# Patient Record
Sex: FEMALE | Race: WHITE | Employment: OTHER | URBAN - METROPOLITAN AREA
[De-identification: names, ages, dates, MRNs, and addresses within clinical notes are randomized per-mention and may not be internally consistent; named-entity substitution may affect disease eponyms.]

---

## 2017-01-13 PROBLEM — Z63.79 STRESS DUE TO SPOUSE WITH DEMENTIA: Status: ACTIVE | Noted: 2017-01-13

## 2017-04-13 PROBLEM — N63.20 LEFT BREAST MASS: Status: ACTIVE | Noted: 2017-04-13

## 2017-04-19 ENCOUNTER — HOSPITAL ENCOUNTER (OUTPATIENT)
Dept: MAMMOGRAPHY | Age: 82
Discharge: HOME | End: 2017-04-19
Attending: INTERNAL MEDICINE
Payer: MEDICARE

## 2017-04-19 DIAGNOSIS — N63.20 LEFT BREAST MASS: ICD-10-CM

## 2017-04-19 PROCEDURE — 77066 DX MAMMO INCL CAD BI: CPT

## 2017-07-13 PROBLEM — Z63.79 STRESS DUE TO SPOUSE WITH DEMENTIA: Status: RESOLVED | Noted: 2017-01-13 | Resolved: 2017-07-13

## 2017-10-09 PROBLEM — K62.1 RECTAL POLYP: Status: ACTIVE | Noted: 2017-10-09

## 2018-04-10 PROBLEM — K62.1 RECTAL POLYP: Status: RESOLVED | Noted: 2017-10-09 | Resolved: 2018-04-10

## 2018-04-10 PROBLEM — N63.20 LEFT BREAST MASS: Status: RESOLVED | Noted: 2017-04-13 | Resolved: 2018-04-10

## 2018-04-27 ENCOUNTER — HOSPITAL ENCOUNTER (OUTPATIENT)
Dept: MAMMOGRAPHY | Age: 83
Discharge: HOME | End: 2018-04-27
Attending: INTERNAL MEDICINE
Payer: MEDICARE

## 2018-04-27 DIAGNOSIS — Z12.31 VISIT FOR SCREENING MAMMOGRAM: ICD-10-CM

## 2018-04-27 PROCEDURE — 77067 SCR MAMMO BI INCL CAD: CPT

## 2018-09-25 ENCOUNTER — PATIENT OUTREACH (OUTPATIENT)
Dept: CASE MANAGEMENT | Age: 83
End: 2018-09-25

## 2018-09-25 NOTE — PROGRESS NOTES
Medication Adherence Outreach    Date/Time of Call:  9/25/2018  10:25 am    Name of medication and dosage:   * Atorvastatin 80 mg 1 every day    Does the patient know the purpose and dosage of medication? * Yes    Are you getting a #30 day or #90 day supply of your medication? * 90 day supply    Are you still taking this medication? If not, why? * Yes    Transportation issues or any problems paying for the medication or other reason? * No    What pharmacy are you using to fill your medication and do you know the last time that you got your medication filled? * [facility]   * Last refill 9/12/2018      Are you having any side effects from taking your medication? * No          Any other questions or concerns expressed by the patient. * No    Comments:  * Discussed medication adherence with Ms. Kaylene Jaime and she states she has no current barriers to prevent her from obtaining or taking her medication.           Call Completed By:  [id number] Raad Jain

## 2018-10-22 PROBLEM — Z86.73 HISTORY OF CEREBELLAR STROKE: Status: ACTIVE | Noted: 2018-10-22

## 2019-02-05 ENCOUNTER — HOSPITAL ENCOUNTER (OUTPATIENT)
Dept: GENERAL RADIOLOGY | Age: 84
Discharge: HOME | End: 2019-02-05
Payer: MEDICARE

## 2019-02-05 DIAGNOSIS — M75.52 BURSITIS OF LEFT SHOULDER: ICD-10-CM

## 2019-02-05 PROCEDURE — 73030 X-RAY EXAM OF SHOULDER: CPT

## 2019-02-05 NOTE — PROGRESS NOTES
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 Hopefully treatment in the office today will help.

## 2019-02-06 NOTE — PROGRESS NOTES
This has been fully explained to the patient, who indicates understanding that per Dr. 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 Hopefully treatment in the office today will help. Patient states the treatment yesterday help a lot,but she still have a little pain.

## 2019-03-22 DIAGNOSIS — I67.9 CVD (CEREBROVASCULAR DISEASE): ICD-10-CM

## 2019-05-01 ENCOUNTER — HOSPITAL ENCOUNTER (OUTPATIENT)
Dept: GENERAL RADIOLOGY | Age: 84
Discharge: HOME | End: 2019-05-01
Payer: MEDICARE

## 2019-05-01 DIAGNOSIS — M17.11 PRIMARY OSTEOARTHRITIS OF RIGHT KNEE: ICD-10-CM

## 2019-05-01 PROCEDURE — 73560 X-RAY EXAM OF KNEE 1 OR 2: CPT

## 2019-05-01 NOTE — PROGRESS NOTES
Please call patient, xray of knee does not show any significant arthritis. No fractures.   Santa Zarate MD

## 2019-05-15 ENCOUNTER — HOSPITAL ENCOUNTER (OUTPATIENT)
Dept: MRI IMAGING | Age: 84
Discharge: HOME | End: 2019-05-15
Attending: INTERNAL MEDICINE
Payer: MEDICARE

## 2019-05-15 DIAGNOSIS — M25.561 ACUTE PAIN OF RIGHT KNEE: ICD-10-CM

## 2019-05-15 DIAGNOSIS — M17.11 PRIMARY OSTEOARTHRITIS OF RIGHT KNEE: ICD-10-CM

## 2019-05-15 PROCEDURE — 73721 MRI JNT OF LWR EXTRE W/O DYE: CPT

## 2019-06-22 ENCOUNTER — HOSPITAL ENCOUNTER (OUTPATIENT)
Dept: MAMMOGRAPHY | Age: 84
Discharge: HOME | End: 2019-06-22
Attending: INTERNAL MEDICINE
Payer: MEDICARE

## 2019-06-22 DIAGNOSIS — Z12.31 VISIT FOR SCREENING MAMMOGRAM: ICD-10-CM

## 2019-06-22 PROCEDURE — 77067 SCR MAMMO BI INCL CAD: CPT

## 2019-09-30 ENCOUNTER — HOSPITAL ENCOUNTER (OUTPATIENT)
Dept: ULTRASOUND IMAGING | Age: 84
Discharge: HOME | End: 2019-09-30
Attending: INTERNAL MEDICINE
Payer: MEDICARE

## 2019-09-30 ENCOUNTER — HOSPITAL ENCOUNTER (OUTPATIENT)
Dept: CT IMAGING | Age: 84
Discharge: HOME | End: 2019-09-30
Attending: INTERNAL MEDICINE
Payer: MEDICARE

## 2019-09-30 DIAGNOSIS — I25.708 CORONARY ARTERY DISEASE OF BYPASS GRAFT OF NATIVE HEART WITH STABLE ANGINA PECTORIS (HCC): ICD-10-CM

## 2019-09-30 DIAGNOSIS — I67.9 CVD (CEREBROVASCULAR DISEASE): ICD-10-CM

## 2019-09-30 PROCEDURE — 70450 CT HEAD/BRAIN W/O DYE: CPT

## 2019-09-30 PROCEDURE — 93880 EXTRACRANIAL BILAT STUDY: CPT

## 2020-02-16 ENCOUNTER — APPOINTMENT (OUTPATIENT)
Dept: CT IMAGING | Age: 85
End: 2020-02-16
Attending: FAMILY MEDICINE
Payer: MEDICARE

## 2020-02-16 ENCOUNTER — HOSPITAL ENCOUNTER (OUTPATIENT)
Age: 85
Setting detail: OBSERVATION
Discharge: HOME | End: 2020-02-18
Attending: EMERGENCY MEDICINE | Admitting: INTERNAL MEDICINE
Payer: MEDICARE

## 2020-02-16 ENCOUNTER — APPOINTMENT (OUTPATIENT)
Dept: CT IMAGING | Age: 85
End: 2020-02-16
Attending: EMERGENCY MEDICINE
Payer: MEDICARE

## 2020-02-16 DIAGNOSIS — I16.1 HYPERTENSIVE EMERGENCY: Primary | ICD-10-CM

## 2020-02-16 DIAGNOSIS — I63.429 CEREBROVASCULAR ACCIDENT (CVA) DUE TO EMBOLISM OF ANTERIOR CEREBRAL ARTERY, UNSPECIFIED BLOOD VESSEL LATERALITY (HCC): ICD-10-CM

## 2020-02-16 DIAGNOSIS — R20.0 LEFT SIDED NUMBNESS: ICD-10-CM

## 2020-02-16 PROBLEM — I63.9 STROKE (CEREBRUM) (HCC): Status: ACTIVE | Noted: 2020-02-16

## 2020-02-16 PROBLEM — I16.0 HYPERTENSIVE URGENCY: Status: ACTIVE | Noted: 2020-02-16

## 2020-02-16 LAB
ALBUMIN SERPL-MCNC: 3.7 G/DL (ref 3.2–4.6)
ALBUMIN/GLOB SERPL: 0.9 {RATIO} (ref 1.2–3.5)
ALP SERPL-CCNC: 68 U/L (ref 50–130)
ALT SERPL-CCNC: 22 U/L (ref 12–65)
ANION GAP SERPL CALC-SCNC: 4 MMOL/L (ref 7–16)
AST SERPL-CCNC: 23 U/L (ref 15–37)
BASOPHILS # BLD: 0.1 K/UL (ref 0–0.2)
BASOPHILS NFR BLD: 1 % (ref 0–2)
BILIRUB SERPL-MCNC: 0.4 MG/DL (ref 0.2–1.1)
BUN SERPL-MCNC: 16 MG/DL (ref 8–23)
CALCIUM SERPL-MCNC: 9.9 MG/DL (ref 8.3–10.4)
CHLORIDE SERPL-SCNC: 106 MMOL/L (ref 98–107)
CO2 SERPL-SCNC: 29 MMOL/L (ref 21–32)
CREAT SERPL-MCNC: 1.1 MG/DL (ref 0.6–1)
DIFFERENTIAL METHOD BLD: ABNORMAL
EOSINOPHIL # BLD: 0.4 K/UL (ref 0–0.8)
EOSINOPHIL NFR BLD: 6 % (ref 0.5–7.8)
ERYTHROCYTE [DISTWIDTH] IN BLOOD BY AUTOMATED COUNT: 12.1 % (ref 11.9–14.6)
GLOBULIN SER CALC-MCNC: 3.9 G/DL (ref 2.3–3.5)
GLUCOSE BLD STRIP.AUTO-MCNC: 75 MG/DL (ref 65–100)
GLUCOSE SERPL-MCNC: 76 MG/DL (ref 65–100)
HCT VFR BLD AUTO: 48.1 % (ref 35.8–46.3)
HGB BLD-MCNC: 15.7 G/DL (ref 11.7–15.4)
IMM GRANULOCYTES # BLD AUTO: 0 K/UL (ref 0–0.5)
IMM GRANULOCYTES NFR BLD AUTO: 0 % (ref 0–5)
LYMPHOCYTES # BLD: 2.7 K/UL (ref 0.5–4.6)
LYMPHOCYTES NFR BLD: 37 % (ref 13–44)
MCH RBC QN AUTO: 29.8 PG (ref 26.1–32.9)
MCHC RBC AUTO-ENTMCNC: 32.6 G/DL (ref 31.4–35)
MCV RBC AUTO: 91.3 FL (ref 79.6–97.8)
MONOCYTES # BLD: 0.7 K/UL (ref 0.1–1.3)
MONOCYTES NFR BLD: 9 % (ref 4–12)
NEUTS SEG # BLD: 3.6 K/UL (ref 1.7–8.2)
NEUTS SEG NFR BLD: 48 % (ref 43–78)
NRBC # BLD: 0 K/UL (ref 0–0.2)
PLATELET # BLD AUTO: 233 K/UL (ref 150–450)
PMV BLD AUTO: 11 FL (ref 9.4–12.3)
POTASSIUM SERPL-SCNC: 4.1 MMOL/L (ref 3.5–5.1)
PROT SERPL-MCNC: 7.6 G/DL (ref 6.3–8.2)
RBC # BLD AUTO: 5.27 M/UL (ref 4.05–5.2)
SODIUM SERPL-SCNC: 139 MMOL/L (ref 136–145)
TROPONIN I SERPL-MCNC: <0.02 NG/ML (ref 0.02–0.05)
WBC # BLD AUTO: 7.5 K/UL (ref 4.3–11.1)

## 2020-02-16 PROCEDURE — 0042T CT PERF W CBF: CPT

## 2020-02-16 PROCEDURE — 74011250637 HC RX REV CODE- 250/637: Performed by: INTERNAL MEDICINE

## 2020-02-16 PROCEDURE — 74011250636 HC RX REV CODE- 250/636: Performed by: FAMILY MEDICINE

## 2020-02-16 PROCEDURE — 94762 N-INVAS EAR/PLS OXIMTRY CONT: CPT

## 2020-02-16 PROCEDURE — 85025 COMPLETE CBC W/AUTO DIFF WBC: CPT

## 2020-02-16 PROCEDURE — 65270000029 HC RM PRIVATE

## 2020-02-16 PROCEDURE — 74011000250 HC RX REV CODE- 250: Performed by: EMERGENCY MEDICINE

## 2020-02-16 PROCEDURE — 99218 HC RM OBSERVATION: CPT

## 2020-02-16 PROCEDURE — 96374 THER/PROPH/DIAG INJ IV PUSH: CPT

## 2020-02-16 PROCEDURE — 70496 CT ANGIOGRAPHY HEAD: CPT

## 2020-02-16 PROCEDURE — 74011000258 HC RX REV CODE- 258: Performed by: EMERGENCY MEDICINE

## 2020-02-16 PROCEDURE — 82962 GLUCOSE BLOOD TEST: CPT

## 2020-02-16 PROCEDURE — 80053 COMPREHEN METABOLIC PANEL: CPT

## 2020-02-16 PROCEDURE — 99285 EMERGENCY DEPT VISIT HI MDM: CPT

## 2020-02-16 PROCEDURE — 74011636320 HC RX REV CODE- 636/320: Performed by: EMERGENCY MEDICINE

## 2020-02-16 PROCEDURE — 70450 CT HEAD/BRAIN W/O DYE: CPT

## 2020-02-16 PROCEDURE — 84484 ASSAY OF TROPONIN QUANT: CPT

## 2020-02-16 RX ORDER — VALSARTAN 80 MG/1
80 TABLET ORAL DAILY
Status: DISCONTINUED | OUTPATIENT
Start: 2020-02-17 | End: 2020-02-18 | Stop reason: HOSPADM

## 2020-02-16 RX ORDER — ACETAMINOPHEN 325 MG/1
650 TABLET ORAL
Status: DISCONTINUED | OUTPATIENT
Start: 2020-02-16 | End: 2020-02-18 | Stop reason: HOSPADM

## 2020-02-16 RX ORDER — ISOSORBIDE MONONITRATE 30 MG/1
30 TABLET, EXTENDED RELEASE ORAL
Status: DISCONTINUED | OUTPATIENT
Start: 2020-02-17 | End: 2020-02-18 | Stop reason: HOSPADM

## 2020-02-16 RX ORDER — SODIUM CHLORIDE 9 MG/ML
75 INJECTION, SOLUTION INTRAVENOUS CONTINUOUS
Status: DISCONTINUED | OUTPATIENT
Start: 2020-02-16 | End: 2020-02-17

## 2020-02-16 RX ORDER — CLOPIDOGREL BISULFATE 75 MG/1
75 TABLET ORAL DAILY
Status: DISCONTINUED | OUTPATIENT
Start: 2020-02-17 | End: 2020-02-18 | Stop reason: HOSPADM

## 2020-02-16 RX ORDER — GUAIFENESIN 100 MG/5ML
81 LIQUID (ML) ORAL DAILY
Status: DISCONTINUED | OUTPATIENT
Start: 2020-02-17 | End: 2020-02-18 | Stop reason: HOSPADM

## 2020-02-16 RX ORDER — LABETALOL HYDROCHLORIDE 5 MG/ML
20 INJECTION, SOLUTION INTRAVENOUS
Status: COMPLETED | OUTPATIENT
Start: 2020-02-16 | End: 2020-02-16

## 2020-02-16 RX ORDER — LORATADINE 10 MG/1
10 TABLET ORAL DAILY
Status: DISCONTINUED | OUTPATIENT
Start: 2020-02-17 | End: 2020-02-18 | Stop reason: HOSPADM

## 2020-02-16 RX ORDER — FAMOTIDINE 20 MG/1
20 TABLET, FILM COATED ORAL EVERY 12 HOURS
Status: DISCONTINUED | OUTPATIENT
Start: 2020-02-16 | End: 2020-02-16

## 2020-02-16 RX ORDER — SODIUM CHLORIDE 0.9 % (FLUSH) 0.9 %
10 SYRINGE (ML) INJECTION
Status: COMPLETED | OUTPATIENT
Start: 2020-02-16 | End: 2020-02-16

## 2020-02-16 RX ORDER — ATORVASTATIN CALCIUM 40 MG/1
80 TABLET, FILM COATED ORAL
Status: DISCONTINUED | OUTPATIENT
Start: 2020-02-16 | End: 2020-02-18 | Stop reason: HOSPADM

## 2020-02-16 RX ORDER — SODIUM CHLORIDE 0.9 % (FLUSH) 0.9 %
5-40 SYRINGE (ML) INJECTION AS NEEDED
Status: DISCONTINUED | OUTPATIENT
Start: 2020-02-16 | End: 2020-02-18 | Stop reason: HOSPADM

## 2020-02-16 RX ORDER — MELATONIN
2000 2 TIMES DAILY
Status: DISCONTINUED | OUTPATIENT
Start: 2020-02-16 | End: 2020-02-18 | Stop reason: HOSPADM

## 2020-02-16 RX ORDER — BISACODYL 5 MG
5 TABLET, DELAYED RELEASE (ENTERIC COATED) ORAL DAILY PRN
Status: DISCONTINUED | OUTPATIENT
Start: 2020-02-16 | End: 2020-02-18 | Stop reason: HOSPADM

## 2020-02-16 RX ORDER — ONDANSETRON 2 MG/ML
4 INJECTION INTRAMUSCULAR; INTRAVENOUS
Status: DISCONTINUED | OUTPATIENT
Start: 2020-02-16 | End: 2020-02-18 | Stop reason: HOSPADM

## 2020-02-16 RX ORDER — SODIUM CHLORIDE 0.9 % (FLUSH) 0.9 %
5-40 SYRINGE (ML) INJECTION EVERY 8 HOURS
Status: DISCONTINUED | OUTPATIENT
Start: 2020-02-16 | End: 2020-02-18 | Stop reason: HOSPADM

## 2020-02-16 RX ORDER — FAMOTIDINE 20 MG/1
20 TABLET, FILM COATED ORAL DAILY
Status: DISCONTINUED | OUTPATIENT
Start: 2020-02-17 | End: 2020-02-17

## 2020-02-16 RX ADMIN — MELATONIN 2 TABLET: at 22:53

## 2020-02-16 RX ADMIN — ATORVASTATIN CALCIUM 80 MG: 40 TABLET, FILM COATED ORAL at 22:53

## 2020-02-16 RX ADMIN — SODIUM CHLORIDE 75 ML/HR: 900 INJECTION, SOLUTION INTRAVENOUS at 18:26

## 2020-02-16 RX ADMIN — Medication 5 ML: at 22:56

## 2020-02-16 RX ADMIN — LABETALOL HYDROCHLORIDE 20 MG: 5 INJECTION INTRAVENOUS at 15:28

## 2020-02-16 RX ADMIN — SODIUM CHLORIDE 100 ML: 900 INJECTION, SOLUTION INTRAVENOUS at 16:57

## 2020-02-16 RX ADMIN — IOPAMIDOL 100 ML: 755 INJECTION, SOLUTION INTRAVENOUS at 16:57

## 2020-02-16 RX ADMIN — Medication 10 ML: at 16:57

## 2020-02-16 NOTE — ED PROVIDER NOTES
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  She denies any headaches. Normally blood pressures between 120 and 130. Compliant with her medications. She denies any head injury. Denies any chest pain or shortness of breath. She initially thought it was just because her arm was falling asleep but symptom involved the left side of the face the arm the body as well as some in the legs. No resolution over the past 24 hours. With her previous stroke she has severe lightheadedness and dizziness. This is not the same. ROS  Constitutional: No fever, no chills  Skin: no rash  Eye: No vision changes  ENMT: No sore throat  Respiratory: No shortness of breath, no cough  Cardiovascular: No chest pain, no palpitations  Gastrointestinal: No vomiting, no nausea, no diarrhea, no abdominal pain  : No dysuria  MSK: No back pain, no muscle pain, no joint pain  Neuro: No headache, no change in mental status, + numbness, no tingling, no weakness  Psych:   Endocrine:   All other review of systems positive per history of present illness and the above otherwise negative or noncontributory. Visit Vitals  BP (!) 204/95   Pulse (!) 55   Temp 97.8 °F (36.6 °C)   Resp 17   Ht 5' 7\" (1.702 m)   Wt 61.2 kg (135 lb)   SpO2 96%   BMI 21.14 kg/m²     Past Medical History:   Diagnosis Date    CAD (coronary artery disease)     2 MI    Calculus of kidney     Congestive heart failure (HCC)     CVD (cerebrovascular disease) 8/19/2013    Dyslipidemia 8/19/2013    GERD (gastroesophageal reflux disease)     HTN (hypertension) 8/19/2013    Left breast mass 4/13/2017    Diagnostic Mammogram negative.     Long term current use of anticoagulant therapy     [facility] infarction [facility]) 2005, 2006    [facility], Dr. Jayla Ramos    Osteoporosis 8/19/2013    Positive H. pylori test 8/19/2013    Rectal polyp 10/9/2017    Anoscopy:  Revealed large cauliflower-like polyp at the anterior midline just above the dentate line, mobile soft, to have removed with Dr. Bladimir Obregon ([facility]), benign. No more fecal leakage.  Stroke ([address] 1/7/2009    no deficits expect patient reports going to one side if she gets up too fast    Thromboembolus ([address]     pulmanary     Past Surgical History:   Procedure Laterality Date    CARDIAC SURG PROCEDURE UNLIST      by pass    HX APPENDECTOMY      HX BLADDER SUSPENSION  2005    HX COLONOSCOPY  2012    HX CORONARY ARTERY BYPASS GRAFT  1988    @ [address]    HX GYN      hysterectomy    HX LITHOTRIPSY  2008 x 2    HX ORTHOPAEDIC      2 rotater cuff right shoulder    HX TOTAL ABDOMINAL HYSTERECTOMY  1972    HX UROLOGICAL      suspension     Prior to Admission Medications   Prescriptions Last Dose Informant Patient Reported? Taking? CRANBERRY EXTRACT (CRANBERRY PO)   Yes No   Sig: Take  by mouth two (2) times a day. TURMERIC PO   Yes No   Sig: Take  by mouth. aspirin 81 mg tablet   Yes No   Sig: Take 81 mg by mouth. TAKE AM OF SURGERY WITH SMALL SIP OF WATER   atorvastatin (LIPITOR) 40 mg tablet   No No   Sig: Take 1 Tab by mouth daily. cholecalciferol (D3-2000) 2,000 unit cap capsule   Yes No   Sig: Take 2,000 Units by mouth two (2) times a day. clopidogrel (PLAVIX) 75 mg tab   No No   Sig: Take 1 Tab by mouth daily. irbesartan (AVAPRO) 150 mg tablet   No No   Sig: Take 1 Tab by mouth daily. isosorbide mononitrate ER (IMDUR) 30 mg tablet   No No   Sig: Take 1 Tab by mouth every morning. loratadine (CLARITIN) 10 mg tablet   Yes No   Sig: Take 10 mg by mouth.       Facility-Administered Medications: None         Adult Exam   General: alert, no acute distress  Head: normocephalic, atraumatic  ENT: moist mucous membranes  Neck: supple, non-tender; full range of motion  Cardiovascular: regular rate and rhythm, normal peripheral perfusion, no edema, equal radial pulses  Respiratory:  normal respirations; no wheezing, rales or rhonchi  Gastrointestinal: soft, non-tender; no rebound or guarding, no peritoneal signs, no distension  Back: non-tender, full range of motion  Musculoskeletal: normal range of motion, normal strength, no gross deformities  Neurological: alert and oriented x 4, no gross focal deficits with the exception of decreased sensation in the left face, left chest, left arm, left leg; normal speech. Strength are equal.  No facial droop. No pronator drift. Normal finger-nose-finger exam.  Psychiatric: cooperative; appropriate mood and affect    MDM: Blood pressures are elevated. 290 systolic. Patient denies headache. Outside of code stroke window. She is on anticoagulant Plavix. Not a TPA candidate. CT noncontrast of the head obtained without any acute intracranial hemorrhage, large territory stroke. 20 mg of IV labetalol given. Heart rate improved to 928 systolic. EKG rate of 54 sinus bradycardia. Normal axis and interval.  No STEMI or ischemic changes noted. No ectopy or Brug[place]. No improvement in symptoms with blood pressure improvement. Given multiple risk factor for stroke recommend admission for further evaluation of possible stroke with decreased sensation in left side of body. This may also be secondary to hypertensive urgency. Will speak with hospitalist for admission. No signs of endorgan injury at this time.     Recent Results (from the past 12 hour(s))   GLUCOSE, POC    Collection Time: 02/16/20  3:14 PM   Result Value Ref Range    Glucose (POC) 75 65 - 100 mg/dL   CBC WITH AUTOMATED DIFF    Collection Time: 02/16/20  3:22 PM   Result Value Ref Range    WBC 7.5 4.3 - 11.1 K/uL    RBC 5.27 (H) 4.05 - 5.2 M/uL    HGB 15.7 (H) 11.7 - 15.4 g/dL    HCT 48.1 (H) 35.8 - 46.3 %    MCV 91.3 79.6 - 97.8 FL    MCH 29.8 26.1 - 32.9 PG MCHC 32.6 31.4 - 35.0 g/dL    RDW 12.1 11.9 - 14.6 %    PLATELET 457 667 - 089 K/uL    MPV 11.0 9.4 - 12.3 FL    ABSOLUTE NRBC 0.00 0.0 - 0.2 K/uL    DF AUTOMATED      NEUTROPHILS 48 43 - 78 %    LYMPHOCYTES 37 13 - 44 %    MONOCYTES 9 4.0 - 12.0 %    EOSINOPHILS 6 0.5 - 7.8 %    BASOPHILS 1 0.0 - 2.0 %    IMMATURE GRANULOCYTES 0 0.0 - 5.0 %    ABS. NEUTROPHILS 3.6 1.7 - 8.2 K/UL    ABS. LYMPHOCYTES 2.7 0.5 - 4.6 K/UL    ABS. MONOCYTES 0.7 0.1 - 1.3 K/UL    ABS. EOSINOPHILS 0.4 0.0 - 0.8 K/UL    ABS. BASOPHILS 0.1 0.0 - 0.2 K/UL    ABS. IMM. GRANS. 0.0 0.0 - 0.5 K/UL   METABOLIC PANEL, COMPREHENSIVE    Collection Time: 02/16/20  3:22 PM   Result Value Ref Range    Sodium 139 136 - 145 mmol/L    Potassium 4.1 3.5 - 5.1 mmol/L    Chloride 106 98 - 107 mmol/L    CO2 29 21 - 32 mmol/L    Anion gap 4 (L) 7 - 16 mmol/L    Glucose 76 65 - 100 mg/dL    BUN 16 8 - 23 MG/DL    Creatinine 1.10 (H) 0.6 - 1.0 MG/DL    GFR est AA >60 >60 ml/min/1.73m2    GFR est non-AA 50 (L) >60 ml/min/1.73m2    Calcium 9.9 8.3 - 10.4 MG/DL    Bilirubin, total 0.4 0.2 - 1.1 MG/DL    ALT (SGPT) 22 12 - 65 U/L    AST (SGOT) 23 15 - 37 U/L    Alk. phosphatase 68 50 - 130 U/L    Protein, total 7.6 6.3 - 8.2 g/dL    Albumin 3.7 3.2 - 4.6 g/dL    Globulin 3.9 (H) 2.3 - 3.5 g/dL    A-G Ratio 0.9 (L) 1.2 - 3.5                Ct Head Wo Cont    Result Date: 2/16/2020  Noncontrast head CT Clinical Indication: 5 days of persisting left upper extremity paresthesias and weakness with history of stroke, currently taking blood thinning medication. Technique: Noncontrast axial images were obtained through the brain. All CT scans at this location are performed using dose modulation techniques as appropriate including the following: Automated exposure control, adjustment of the MA and/or kV according to patient's size, or use of iterative reconstruction technique. Comparison: CT 9/30/2019 and MRI 5/30/2014 Findings:  There is no acute intracranial hemorrhage, hydrocephalus, intra-axial mass, or mass-effect. Again noted is the old left cerebellar infarction. Again demonstrated are moderate scattered and confluent areas of low attenuation involving bilateral white matter, and old tiny lacunar infarcts in the bilateral basal ganglia. Possibility of acute superimposed or developing small infarct cannot be excluded by CT, if clinically suspected. There is no CT evidence of acute large artery territorial infarction or abnormal extra-axial fluid collection. The mastoid air cells and paranasal sinuses are clear where imaged. No displaced skull fractures are present. Impression: No CT evidence of acute intracranial abnormality. Chronic changes. Dragon voice recognition software was used to create this note. Although the note has been reviewed and corrected where necessary, additional errors may have been overlooked and remain in the text.

## 2020-02-16 NOTE — ED TRIAGE NOTES
Pt has numbness in her left arm since Wednesday. No other deficits, but previous hx of stroke and bp is 212/96.

## 2020-02-16 NOTE — ED NOTES
TRANSFER - OUT REPORT:    Verbal report given to RN Nivia Favre on Varney Merlin  being transferred to [facility] [phone] for routine progression of care       Report consisted of patients Situation, Background, Assessment and   Recommendations(SBAR). Information from the following report(s) SBAR, ED Summary, Intake/Output, MAR and Cardiac Rhythm NSR was reviewed with the receiving nurse. Lines:   Peripheral IV 02/16/20 Right Forearm (Active)   Site Assessment Clean, dry, & intact 2/16/2020  3:42 PM   Phlebitis Assessment 0 2/16/2020  3:42 PM   Infiltration Assessment 0 2/16/2020  3:42 PM   Dressing Status Clean, dry, & intact 2/16/2020  3:42 PM   Hub Color/Line Status Green 2/16/2020  3:42 PM   Alcohol Cap Used No 2/16/2020  3:42 PM        Opportunity for questions and clarification was provided.       Patient transported with:   Registered Nurse

## 2020-02-16 NOTE — H&P
History and Physical    Patient: Michael Hunter MRN: [id number]  SSN: [id number]    YOB: 1931  Age: 80 y.o. Sex: female      Subjective:      Michael Hunter is a 80 y.o. female who came to ER due to left side weakness and numbness for the past 4 days. Patient has hypertension, CAD, previous cerebellar stroke, on aspirin and Plavix. At baseline, she has been walking well. She drives. She lives alone and is independent. Four days prior to admission, she had headache and felt weak and numb on left hand, arm and leg. It is persistent and she came to ER. No lightheadedness. No headache now. No nausea. No vomiting. No fever. No shaking. No chills. No chest pain. No shortness of breath. CT brain did not show acute abnormality. Her BP was elevated. She received Labetalol in ER and her BP was improved. Hospitalist service is requested to admit the patient due to stroke. Past Medical History:   Diagnosis Date    CAD (coronary artery disease)     2 MI    Calculus of kidney     Congestive heart failure (HCC)     CVD (cerebrovascular disease) 8/19/2013    Dyslipidemia 8/19/2013    GERD (gastroesophageal reflux disease)     HTN (hypertension) 8/19/2013    Left breast mass 4/13/2017    Diagnostic Mammogram negative.  Long term current use of anticoagulant therapy     Myocardial infarction [facility]) 2005, 2006    [facility], Dr. Jayla Ramos    Osteoporosis 8/19/2013    Positive H. pylori test 8/19/2013    Rectal polyp 10/9/2017    Anoscopy:  Revealed large cauliflower-like polyp at the anterior midline just above the dentate line, mobile soft, to have removed with Dr. Bladimir Obregon ([facility]), benign. No more fecal leakage.        Stroke ([address] 1/7/2009    no deficits expect patient reports going to one side if she gets up too fast    Thromboembolus [facility])     pulmanary     Past Surgical History:   Procedure Laterality Date    CARDIAC SURG PROCEDURE UNLIST      by pass    HX APPENDECTOMY      HX BLADDER SUSPENSION  2005    HX COLONOSCOPY  2012    [place]ARY [place] [facility]    HX GYN      hysterectomy    HX LITHOTRIPSY  2008 x 2    HX ORTHOPAEDIC      2 rotater cuff right shoulder    HX TOTAL ABDOMINAL HYSTERECTOMY  1972    HX UROLOGICAL      suspension      Family History   Problem Relation Age of Onset    Heart Disease Mother     Stroke Mother     Heart Disease Father     Heart Disease Brother     Cancer Brother         prostate    Heart Disease Brother     Heart Disease Brother     Heart Disease Brother     Malignant Hyperthermia Neg Hx     Pseudocholinesterase Deficiency Neg Hx     Delayed Awakening Neg Hx     Post-op Nausea/Vomiting Neg Hx     Emergence Delirium Neg Hx     Post-op Cognitive Dysfunction Neg Hx     Other Neg Hx      Social History     Tobacco Use    Smoking status: Never Smoker    Smokeless tobacco: Never Used   Substance Use Topics    Alcohol use: No      Prior to Admission medications    Medication Sig Start Date End Date Taking? Authorizing Provider   TURMERIC PO Take  by mouth. Provider, Historical   irbesartan (AVAPRO) 150 mg tablet Take 1 Tab by mouth daily. 1/21/20   Margaree Gosselin, MD   atorvastatin (LIPITOR) 40 mg tablet Take 1 Tab by mouth daily. 1/21/20   Margaree Gosselin, MD   isosorbide mononitrate ER (IMDUR) 30 mg tablet Take 1 Tab by mouth every morning. 9/23/19   Margaree Gosselin, MD   clopidogrel (PLAVIX) 75 mg tab Take 1 Tab by mouth daily. 9/23/19   Margaree Gosselin, MD   cholecalciferol (D3-2000) 2,000 unit cap capsule Take 2,000 Units by mouth two (2) times a day. Provider, Historical   loratadine (CLARITIN) 10 mg tablet Take 10 mg by mouth. Provider, Historical   CRANBERRY EXTRACT (CRANBERRY PO) Take  by mouth two (2) times a day. Provider, Historical   aspirin 81 mg tablet Take 81 mg by mouth.  TAKE AM OF SURGERY WITH SMALL SIP OF WATER    Janet Wright MD        Allergies   Allergen Reactions    Accupril [Quinapril] Unknown (comments)    Norvasc [Amlodipine] Unknown (comments)    Zoloft [Sertraline] Drowsiness       Review of Systems:    10-point review of systems is negative except what is mentioned in the present illness section. Objective:     Vitals:    02/16/20 1455 02/16/20 1516 02/16/20 1522 02/16/20 1532   BP: (!) 212/96 (!) 223/94 (!) 204/95    Pulse: 74  (!) 55    Resp: 17  17    Temp: 97.8 °F (36.6 °C)      SpO2: 98% 98% 98% 96%   Weight: 61.2 kg (135 lb)      Height: 5' 7\" (1.702 m)           Physical Exam:    General:                    The patient is a pleasant elderly female in no acute distress. She is lying flat in bed. Head:                                   Normocephalic/atraumatic. Eyes:                                   No palpebral pallor or scleral icterus. ENT:                                    External auricular and nasal exam within normal limits. Mucous membranes are moist.  Neck:                                   Supple, non-tender, no JVD. Lungs:                       Clear to auscultation bilaterally without wheezes or crackles. No respiratory distress or accessory muscle use. Heart:                                  Regular rate and rhythm, without murmurs, rubs, or gallops. Abdomen:                  Soft, non-tender, non-distended with normoactive bowel sounds. Genitourinary:           No tenderness over the bladder or bilateral CVAs. Extremities:               Without clubbing, cyanosis, or edema. Skin:                                    Normal color, texture, and turgor. No rashes, lesions, or jaundice. Pulses:                      Radial and dorsalis pedis pulses present 2+ bilaterally. Capillary refill <2s.    Neurologic:                CN II-XII grossly intact and symmetrical. motor power of left arm and leg is 4-/5. some numbnes sof left arm and leg. Psychiatric:                Pleasant demeanor, appropriate affect. Alert and oriented x 3    Lab and data     Recent Results (from the past 24 hour(s))   GLUCOSE, POC    Collection Time: 02/16/20  3:14 PM   Result Value Ref Range    Glucose (POC) 75 65 - 100 mg/dL   CBC WITH AUTOMATED DIFF    Collection Time: 02/16/20  3:22 PM   Result Value Ref Range    WBC 7.5 4.3 - 11.1 K/uL    RBC 5.27 (H) 4.05 - 5.2 M/uL    HGB 15.7 (H) 11.7 - 15.4 g/dL    HCT 48.1 (H) 35.8 - 46.3 %    MCV 91.3 79.6 - 97.8 FL    MCH 29.8 26.1 - 32.9 PG    MCHC 32.6 31.4 - 35.0 g/dL    RDW 12.1 11.9 - 14.6 %    PLATELET 847 366 - 566 K/uL    MPV 11.0 9.4 - 12.3 FL    ABSOLUTE NRBC 0.00 0.0 - 0.2 K/uL    DF AUTOMATED      NEUTROPHILS 48 43 - 78 %    LYMPHOCYTES 37 13 - 44 %    MONOCYTES 9 4.0 - 12.0 %    EOSINOPHILS 6 0.5 - 7.8 %    BASOPHILS 1 0.0 - 2.0 %    IMMATURE GRANULOCYTES 0 0.0 - 5.0 %    ABS. NEUTROPHILS 3.6 1.7 - 8.2 K/UL    ABS. LYMPHOCYTES 2.7 0.5 - 4.6 K/UL    ABS. MONOCYTES 0.7 0.1 - 1.3 K/UL    ABS. EOSINOPHILS 0.4 0.0 - 0.8 K/UL    ABS. BASOPHILS 0.1 0.0 - 0.2 K/UL    ABS. IMM. GRANS. 0.0 0.0 - 0.5 K/UL   METABOLIC PANEL, COMPREHENSIVE    Collection Time: 02/16/20  3:22 PM   Result Value Ref Range    Sodium 139 136 - 145 mmol/L    Potassium 4.1 3.5 - 5.1 mmol/L    Chloride 106 98 - 107 mmol/L    CO2 29 21 - 32 mmol/L    Anion gap 4 (L) 7 - 16 mmol/L    Glucose 76 65 - 100 mg/dL    BUN 16 8 - 23 MG/DL    Creatinine 1.10 (H) 0.6 - 1.0 MG/DL    GFR est AA >60 >60 ml/min/1.73m2    GFR est non-AA 50 (L) >60 ml/min/1.73m2    Calcium 9.9 8.3 - 10.4 MG/DL    Bilirubin, total 0.4 0.2 - 1.1 MG/DL    ALT (SGPT) 22 12 - 65 U/L    AST (SGOT) 23 15 - 37 U/L    Alk.  phosphatase 68 50 - 130 U/L    Protein, total 7.6 6.3 - 8.2 g/dL    Albumin 3.7 3.2 - 4.6 g/dL    Globulin 3.9 (H) 2.3 - 3.5 g/dL    A-G Ratio 0.9 (L) 1.2 - 3.5     TROPONIN I Collection Time: 02/16/20  3:22 PM   Result Value Ref Range    Troponin-I, Qt. <0.02 (L) 0.02 - 0.05 NG/ML     CT brain   2-  Impression: No CT evidence of acute intracranial abnormality. Assessment:     Hospital Problems  Date Reviewed: 1/21/2020          Codes Class Noted POA    * (Principal) Stroke (cerebrum) ([facility] [address] ICD-10-CM: I63.9  ICD-9-CM: 434.91  2/16/2020 Unknown        Hypertensive urgency ICD-10-CM: I16.0  ICD-9-CM: 401.9  2/16/2020 Unknown        History of cerebellar stroke ICD-10-CM: Z86.73  ICD-9-CM: V12.54  10/22/2018 Yes        Coronary artery disease involving coronary bypass graft of native heart without angina pectoris ICD-10-CM: I25.810  ICD-9-CM: 414.05  12/14/2015 Yes        HTN (hypertension) ICD-10-CM: I10  ICD-9-CM: 401.9  8/19/2013 Yes        Mixed dyslipidemia ICD-10-CM: E78.2  ICD-9-CM: 272.2  8/19/2013 Yes        CVD (cerebrovascular disease) ICD-10-CM: I67.9  ICD-9-CM: 437.9  8/19/2013 Yes        Osteoporosis ICD-10-CM: M81.0  ICD-9-CM: 733.00  8/19/2013 Yes              Plan:     Stroke   Left sided weakness and numbness   Admit to medical floor. IV fluid   Neurological monitoring   Monitor BP and vital signs   Aspirin   Plavix   Check CTA head and neck   Perfusion with CBF study   Echo   MRI brain   Consult PT and stroke coordinator     Hypertension  Monitor blood pressure and manage accordingly. Dyslipidemia  Statin     CAD  Continue home medications. Patient requires hospital stay as an in-patient and anticipated stay is more than 2 midnights due to the serious nature of the illness. Healthcare power of  is Ada lockhart December. I have discussed with patient regarding advance directive. Patient would like to have a DNR status. DVT prophylaxis : SCD     I have discussed the plan of care with patient and family members at bedside. Patient has no pain now. Will monitor. Further treatments will depend on initial responses and findings. Signed By: jC Anglin MD     February 16, 2020

## 2020-02-16 NOTE — PROGRESS NOTES
Shift assessment complete. Respirations present. Even and unlabored. No s/s of distress. Zero c/o pain at this time. Call light within reach. Encouraged patient to call for assistance. Patient verbalizes understanding. See Doc Flowsheet for assessment details. Patient resting in bed. Bed alarm in progress. Patient excellent . Memory perfect. Family at bedside. Room air. Heart monitor in place.

## 2020-02-17 ENCOUNTER — HOME HEALTH ADMISSION (OUTPATIENT)
Dept: HOME HEALTH SERVICES | Facility: HOME HEALTH | Age: 85
End: 2020-02-17

## 2020-02-17 ENCOUNTER — APPOINTMENT (OUTPATIENT)
Dept: MRI IMAGING | Age: 85
End: 2020-02-17
Attending: INTERNAL MEDICINE
Payer: MEDICARE

## 2020-02-17 LAB
CHOLEST SERPL-MCNC: 227 MG/DL
EST. AVERAGE GLUCOSE BLD GHB EST-MCNC: 123 MG/DL
HBA1C MFR BLD: 5.9 %
HDLC SERPL-MCNC: 36 MG/DL (ref 40–60)
HDLC SERPL: 6.3 {RATIO}
LDLC SERPL CALC-MCNC: 155.4 MG/DL
LIPID PROFILE,FLP: ABNORMAL
TRIGL SERPL-MCNC: 178 MG/DL (ref 35–150)
VLDLC SERPL CALC-MCNC: 35.6 MG/DL (ref 6–23)

## 2020-02-17 PROCEDURE — 80061 LIPID PANEL: CPT

## 2020-02-17 PROCEDURE — 93306 TTE W/DOPPLER COMPLETE: CPT

## 2020-02-17 PROCEDURE — 70551 MRI BRAIN STEM W/O DYE: CPT

## 2020-02-17 PROCEDURE — 83036 HEMOGLOBIN GLYCOSYLATED A1C: CPT

## 2020-02-17 PROCEDURE — 92523 SPEECH SOUND LANG COMPREHEN: CPT

## 2020-02-17 PROCEDURE — 36415 COLL VENOUS BLD VENIPUNCTURE: CPT

## 2020-02-17 PROCEDURE — 97161 PT EVAL LOW COMPLEX 20 MIN: CPT

## 2020-02-17 PROCEDURE — 99218 HC RM OBSERVATION: CPT

## 2020-02-17 PROCEDURE — 97535 SELF CARE MNGMENT TRAINING: CPT

## 2020-02-17 PROCEDURE — 94762 N-INVAS EAR/PLS OXIMTRY CONT: CPT

## 2020-02-17 PROCEDURE — 99205 OFFICE O/P NEW HI 60 MIN: CPT | Performed by: PSYCHIATRY & NEUROLOGY

## 2020-02-17 PROCEDURE — 97530 THERAPEUTIC ACTIVITIES: CPT

## 2020-02-17 PROCEDURE — 92610 EVALUATE SWALLOWING FUNCTION: CPT

## 2020-02-17 PROCEDURE — 97116 GAIT TRAINING THERAPY: CPT

## 2020-02-17 PROCEDURE — 97165 OT EVAL LOW COMPLEX 30 MIN: CPT

## 2020-02-17 PROCEDURE — 74011250637 HC RX REV CODE- 250/637: Performed by: INTERNAL MEDICINE

## 2020-02-17 PROCEDURE — 74011250636 HC RX REV CODE- 250/636: Performed by: FAMILY MEDICINE

## 2020-02-17 RX ORDER — PANTOPRAZOLE SODIUM 40 MG/1
40 TABLET, DELAYED RELEASE ORAL
Status: DISCONTINUED | OUTPATIENT
Start: 2020-02-17 | End: 2020-02-18 | Stop reason: HOSPADM

## 2020-02-17 RX ADMIN — LORATADINE 10 MG: 10 TABLET ORAL at 08:22

## 2020-02-17 RX ADMIN — ATORVASTATIN CALCIUM 80 MG: 40 TABLET, FILM COATED ORAL at 21:49

## 2020-02-17 RX ADMIN — SODIUM CHLORIDE 75 ML/HR: 900 INJECTION, SOLUTION INTRAVENOUS at 06:52

## 2020-02-17 RX ADMIN — ASPIRIN 81 MG 81 MG: 81 TABLET ORAL at 08:22

## 2020-02-17 RX ADMIN — VALSARTAN 80 MG: 80 TABLET, FILM COATED ORAL at 08:22

## 2020-02-17 RX ADMIN — PANTOPRAZOLE SODIUM 40 MG: 40 TABLET, DELAYED RELEASE ORAL at 13:06

## 2020-02-17 RX ADMIN — CLOPIDOGREL BISULFATE 75 MG: 75 TABLET ORAL at 08:22

## 2020-02-17 RX ADMIN — MELATONIN 2 TABLET: at 08:22

## 2020-02-17 RX ADMIN — ISOSORBIDE MONONITRATE 30 MG: 30 TABLET, EXTENDED RELEASE ORAL at 08:22

## 2020-02-17 RX ADMIN — Medication 10 ML: at 13:07

## 2020-02-17 RX ADMIN — Medication 10 ML: at 21:49

## 2020-02-17 RX ADMIN — FAMOTIDINE 20 MG: 20 TABLET, FILM COATED ORAL at 08:22

## 2020-02-17 RX ADMIN — MELATONIN 2 TABLET: at 17:19

## 2020-02-17 NOTE — PROGRESS NOTES
Problem: Mobility Impaired (Adult and Pediatric)  Goal: *Acute Goals and Plan of Care (Insert Text)  Description      LTG:  (1.)Ms. Samuels will move from supine to sit and sit to supine  in bed with INDEPENDENT within 7 treatment day(s). (2.)Ms. Samuels will transfer from bed to chair and chair to bed with STAND BY ASSIST using the least restrictive device within 7 treatment day(s). (3.)Ms. Samuels will ambulate with STAND BY ASSIST for 500 feet with the least restrictive device within 7 treatment day(s). (4)Ms. Samuels will perform HEP with cues and assistance to increase safety on her feet in 7 days. (5)Ms. Samuels will go up and down 5 steps with rail in 7 days. ________________________________________________________________________________________________     Outcome: Progressing Towards Goal     PHYSICAL THERAPY: Initial Assessment and PM 2/17/2020  INPATIENT: PT Visit Days : 1  Payor: Wiley Yony / Plan: Abhay Roman / Product Type: [employer] /       NAME/AGE/GENDER: Mohinder Chaidez is a 80 y.o. female   PRIMARY DIAGNOSIS: Stroke (cerebrum) ([facility] [address] [I63.9] Stroke (cerebrum) ([facility] [address] Stroke (cerebrum) ([facility] [address]        ICD-10: Treatment Diagnosis:    · Other lack of cordination (R27.8)  · Other abnormalities of gait and mobility (R26.89)   Precaution/Allergies:  Accupril [quinapril]; Norvasc [amlodipine]; and Zoloft [sertraline]      ASSESSMENT:     Ms. Bell Marie presents with some slight decreased strength, sensation, and coordination left LE. She complains more of her right chronic knee pain. She is normally independent at baseline with some general decreased balance, lives alone. She did well with functional mobility and was able to walk down the sahu CGA. She has some unsteadiness on her feet and has difficulty with higher level type balance. She would benefit from an assistive device. She reports chronic painful right knee and limps on it some with gait. Will follow.   Left with several family members in the room. This section established at most recent assessment   PROBLEM LIST (Impairments causing functional limitations):  1. Decreased Strength  2. Decreased ADL/Functional Activities  3. Decreased Transfer Abilities  4. Decreased Ambulation Ability/Technique  5. Decreased Balance  6. Decreased Activity Tolerance  7. Decreased Flexibility/Joint Mobility  8. Decreased [place] with Home Exercise Program   INTERVENTIONS PLANNED: (Benefits and precautions of physical therapy have been discussed with the patient.)  1. Balance Exercise  2. Bed Mobility  3. Family Education  4. Gait Training  5. Home Exercise Program (HEP)  6. Neuromuscular Re-education/Strengthening  7. Range of Motion (ROM)  8. Therapeutic Activites  9. Therapeutic Exercise/Strengthening  10. Transfer Training     TREATMENT PLAN: Frequency/Duration: daily for duration of hospital stay  Rehabilitation Potential For Stated Goals: Good     REHAB RECOMMENDATIONS (at time of discharge pending progress):    Placement:  Home with HHPT and family support ? Equipment:    None at this time              HISTORY:   History of Present Injury/Illness (Reason for Referral):  Rebeca Freeman is a 80 y.o. female who came to ER due to left side weakness and numbness for the past 4 days prior to admission. Patient has hypertension, CAD, previous cerebellar stroke, on aspirin and Plavix. At baseline, she has been walking well. She drives. She lives alone and is independent. Four days prior to admission, she had headache and felt weak and numb on left hand, arm and leg. It is persistent and she came to ER. No lightheadedness. No headache now. No nausea. No vomiting. No fever. No shaking. No chills. No chest pain. No shortness of breath. Initial CT brain did not show acute abnormality. Her BP was elevated. She received Labetalol in ER and her BP was improved.       Follow up perfusion scan of CBF and CTA head and neck show findings as below. Patient is feeling the same this morning. Past Medical History/Comorbidities:   Ms. Emmie Max  has a past medical history of CAD (coronary artery disease), Calculus of kidney, Congestive heart failure ([address] CVD (cerebrovascular disease) (8/19/2013), Dyslipidemia (8/19/2013), GERD (gastroesophageal reflux disease), HTN (hypertension) (8/19/2013), Left breast mass (4/13/2017), Long term current use of anticoagulant therapy, Myocardial infarction ([address] (2005, 2006), Osteoporosis (8/19/2013), Positive H. pylori test (8/19/2013), Rectal polyp (10/9/2017), Stroke ([address] (1/7/2009), and Thromboembolus ([address] She also has no past medical history of Aneurysm ([address] Coagulation defects, Difficult intubation, Heart failure ([address] Liver disease, Malignant hyperthermia due to anesthesia, Morbid obesity ([address] Nausea & vomiting, Pseudocholinesterase deficiency, Psychiatric disorder, Unspecified adverse effect of anesthesia, or Unspecified sleep apnea. Ms. Emmei Max  has a past surgical history that includes hx coronary artery bypass graft (1988); hx bladder suspension (2005); hx total abdominal hysterectomy (1972); hx orthopaedic; hx colonoscopy (2012); pr cardiac surg procedure unlist; hx gyn; hx lithotripsy (2008 x 2); hx urological; and hx appendectomy.   Social History/Living Environment:   Home Environment: Private residence  # Steps to Enter: 5(5 steps back R rail, 4 steps front B rails)  Rails to Enter: Yes  [employer] : Right  Living Alone: Yes  Support Systems: Child(birgit)  Patient Expects to be Discharged to[de-identified] Private residence  Current DME Used/Available at Home: [address], [place], straight, Grab bars, Walker, rolling  Prior Level of Function/Work/Activity:  independent     Number of Personal Factors/Comorbidities that affect the Plan of Care: 1-2: MODERATE COMPLEXITY   EXAMINATION:   Most Recent Physical Functioning:   Gross Assessment:  AROM: Within functional limits(LE's)  Strength: Generally decreased, functional(left LE slightly weaker)  Coordination: Generally decreased, functional(left LE slightly decreased)  Sensation: Impaired(left LE)               Posture:  Posture (WDL): Exceptions to WDL  Posture Assessment: Rounded shoulders  Balance:  Sitting: Intact  Standing: Intact; Impaired  Standing - Static: Good  Standing - Dynamic : Fair Bed Mobility:  Supine to Sit: Stand-by assistance  Wheelchair Mobility:     Transfers:  Sit to Stand: Stand-by assistance  Stand to Sit: Stand-by assistance  Duration: 10 Minutes  Gait:     Speed/Barb: Delayed  Stance: Right decreased  Gait Abnormalities: Decreased step clearance  Distance (ft): 150 Feet (ft)  Ambulation - Level of Assistance: Contact guard assistance  Interventions: Safety awareness training;Verbal cues      Body Structures Involved:  1. Bones  2. Joints  3. Muscles  4. Ligaments Body Functions Affected:  1. Movement Related Activities and Participation Affected:  1. Mobility   Number of elements that affect the Plan of Care: 3: MODERATE COMPLEXITY   CLINICAL PRESENTATION:   Presentation: Stable and uncomplicated: LOW COMPLEXITY   CLINICAL DECISION MAKIN [facility] [id number] AM-PAC 6 Clicks   Basic Mobility Inpatient Short Form  How much difficulty does the patient currently have. .. Unable A Lot A Little None   1. Turning over in bed (including adjusting bedclothes, sheets and blankets)? [] 1   [] 2   [x] 3   [] 4   2. Sitting down on and standing up from a chair with arms ( e.g., wheelchair, bedside commode, etc.)   [] 1   [] 2   [x] 3   [] 4   3. Moving from lying on back to sitting on the side of the bed? [] 1   [] 2   [x] 3   [] 4   How much help from another person does the patient currently need. .. Total A Lot A Little None   4. Moving to and from a bed to a chair (including a wheelchair)? [] 1   [] 2   [] 3   [x] 4   5. Need to walk in hospital room? [] 1   [] 2   [] 3   [x] 4   6. Climbing 3-5 steps with a railing?    [] 1   [] 2 [x] 3   [] 4   © 2007, Trustees of [address] [place] [address] [address] [address], under license to [employer]. All rights reserved      Score:  Initial: 20 Most Recent: X (Date: -- )    Interpretation of Tool:  Represents activities that are increasingly more difficult (i.e. Bed mobility, Transfers, Gait). Medical Necessity:     · Patient is expected to demonstrate progress in   · strength, range of motion, and balance  ·  to   · decrease assistance required with exercises and functional mobility   · . Reason for Services/Other Comments:  · Patient   · continues to require present interventions due to patient's inability to perform exercises and functional mobility independently   · . Use of outcome tool(s) and clinical judgement create a POC that gives a: Clear prediction of patient's progress: LOW COMPLEXITY            TREATMENT:   (In addition to Assessment/Re-Assessment sessions the following treatments were rendered)   Pre-treatment Symptoms/Complaints:  right knee pain, left LE weakness  Pain: Initial:      Post Session:  did not rate     Gait Training (10 Minutes):  Gait training to improve and/or restore physical functioning as related to mobility, strength and balance. Ambulated 150 Feet (ft) with Contact guard assistance using a   and minimal Safety awareness training;Verbal cues related to their stance phase to promote proper body alignment, promote proper body posture and promote proper body mechanics. Braces/Orthotics/Lines/Etc:   · O2 Device: Room air  Treatment/Session Assessment:    · Response to Treatment:  did fine  · Interdisciplinary Collaboration:   o Registered Nurse  · After treatment position/precautions:   o Bed/Chair-wheels locked  o Bed in low position  o Caregiver at bedside  o Call light within reach  o Family at bedside  o Sitting on side of bed   · Compliance with Program/Exercises: Will assess as treatment progresses  · Recommendations/Intent for next treatment session:   \"Next visit will focus on advancements to more challenging activities and reduction in assistance provided\".   Total Treatment Duration:  PT Patient Time In/Time Out  Time In: 1340  Time Out: 201 Fabi Bates, PT

## 2020-02-17 NOTE — PROGRESS NOTES
Progress Note    Patient: Mohinder Chaidez MRN: [id number]  SSN: [id number]    YOB: 1931  Age: 80 y.o. Sex: female      Admit Date: 2/16/2020    LOS: 1 day     Subjective:     Mohinder Chaidez is a 80 y.o. female who came to ER due to left side weakness and numbness for the past 4 days prior to admission.     Patient has hypertension, CAD, previous cerebellar stroke, on aspirin and Plavix.      At baseline, she has been walking well. She drives. She lives alone and is independent.      Four days prior to admission, she had headache and felt weak and numb on left hand, arm and leg. It is persistent and she came to ER.      No lightheadedness. No headache now. No nausea. No vomiting. No fever. No shaking. No chills. No chest pain. No shortness of breath.       Initial CT brain did not show acute abnormality. Her BP was elevated. She received Labetalol in ER and her BP was improved.      Follow up perfusion scan of CBF and CTA head and neck show findings as below. Patient is feeling the same this morning. Objective:     Vitals:    02/17/20 0009 02/17/20 0333 02/17/20 0805 02/17/20 0832   BP: 169/67 162/69 150/83    Pulse: (!) 48 (!) 52 (!) 50    Resp: 20 16 18    Temp: 97.9 °F (36.6 °C) 98.2 °F (36.8 °C) 96.3 °F (35.7 °C)    SpO2: 95% 95% 99% 97%   Weight:       Height:            Intake and Output:  Current Shift: No intake/output data recorded. Last three shifts: No intake/output data recorded. Physical Exam:     General:                    The patient is a pleasant elderly female in no acute distress. She is lying flat in bed. she is talking well. KTEM:                                   EUWUDSBCPAKMI/PLBCREBRPO. Eyes:                                   No palpebral pallor or scleral icterus. ENT:                                    External auricular and nasal exam within normal limits.                                                KZLASU membranes are moist.  Neck:                                   Supple, non-tender, no JVD. Lungs:                       Clear to auscultation bilaterally without wheezes or crackles.                                             No respiratory distress or accessory muscle use. Heart:                                  Regular rate and rhythm, without murmurs, rubs, or gallops. Abdomen:                  Soft, non-tender, non-distended with normoactive bowel sounds. Genitourinary:           No tenderness over the bladder or bilateral CVAs. Extremities:               Without clubbing, cyanosis, or edema. Skin:                                    Normal color, texture, and turgor. No rashes, lesions, or jaundice. Pulses:                      Radial and dorsalis pedis pulses present 2+ bilaterally.                                               Capillary refill <2s. Neurologic:                CN II-XII grossly intact and symmetrical.                                               motor power of left arm and leg is 4-/5. some numbnes sof left arm and leg. Psychiatric:                Pleasant demeanor, appropriate affect. Alert and oriented x 3    Lab/Data Review:    Recent Results (from the past 24 hour(s))   GLUCOSE, POC    Collection Time: 02/16/20  3:14 PM   Result Value Ref Range    Glucose (POC) 75 65 - 100 mg/dL   CBC WITH AUTOMATED DIFF    Collection Time: 02/16/20  3:22 PM   Result Value Ref Range    WBC 7.5 4.3 - 11.1 K/uL    RBC 5.27 (H) 4.05 - 5.2 M/uL    HGB 15.7 (H) 11.7 - 15.4 g/dL    HCT 48.1 (H) 35.8 - 46.3 %    MCV 91.3 79.6 - 97.8 FL    MCH 29.8 26.1 - 32.9 PG    MCHC 32.6 31.4 - 35.0 g/dL    RDW 12.1 11.9 - 14.6 %    PLATELET 413 581 - 394 K/uL    MPV 11.0 9.4 - 12.3 FL    ABSOLUTE NRBC 0.00 0.0 - 0.2 K/uL    DF AUTOMATED      NEUTROPHILS 48 43 - 78 %    LYMPHOCYTES 37 13 - 44 %    MONOCYTES 9 4.0 - 12.0 %    EOSINOPHILS 6 0.5 - 7.8 %    BASOPHILS 1 0.0 - 2.0 %    IMMATURE GRANULOCYTES 0 0.0 - 5.0 %    ABS. NEUTROPHILS 3.6 1.7 - 8.2 K/UL    ABS. LYMPHOCYTES 2.7 0.5 - 4.6 K/UL    ABS. MONOCYTES 0.7 0.1 - 1.3 K/UL    ABS. EOSINOPHILS 0.4 0.0 - 0.8 K/UL    ABS. BASOPHILS 0.1 0.0 - 0.2 K/UL    ABS. IMM. GRANS. 0.0 0.0 - 0.5 K/UL   METABOLIC PANEL, COMPREHENSIVE    Collection Time: 02/16/20  3:22 PM   Result Value Ref Range    Sodium 139 136 - 145 mmol/L    Potassium 4.1 3.5 - 5.1 mmol/L    Chloride 106 98 - 107 mmol/L    CO2 29 21 - 32 mmol/L    Anion gap 4 (L) 7 - 16 mmol/L    Glucose 76 65 - 100 mg/dL    BUN 16 8 - 23 MG/DL    Creatinine 1.10 (H) 0.6 - 1.0 MG/DL    GFR est AA >60 >60 ml/min/1.73m2    GFR est non-AA 50 (L) >60 ml/min/1.73m2    Calcium 9.9 8.3 - 10.4 MG/DL    Bilirubin, total 0.4 0.2 - 1.1 MG/DL    ALT (SGPT) 22 12 - 65 U/L    AST (SGOT) 23 15 - 37 U/L    Alk. phosphatase 68 50 - 130 U/L    Protein, total 7.6 6.3 - 8.2 g/dL    Albumin 3.7 3.2 - 4.6 g/dL    Globulin 3.9 (H) 2.3 - 3.5 g/dL    A-G Ratio 0.9 (L) 1.2 - 3.5     TROPONIN I    Collection Time: 02/16/20  3:22 PM   Result Value Ref Range    Troponin-I, Qt. <0.02 (L) 0.02 - 0.05 NG/ML   LIPID PANEL    Collection Time: 02/17/20  5:23 AM   Result Value Ref Range    LIPID PROFILE          Cholesterol, total 227 MG/DL    Triglyceride 178 (H) 35 - 150 MG/DL    HDL Cholesterol 36 (L) 40 - 60 MG/DL    LDL, calculated 155.4 (H) <100 MG/DL    VLDL, calculated 35.6 (H) 6.0 - 23.0 MG/DL    CHOL/HDL Ratio 6.3 <200     HEMOGLOBIN A1C WITH EAG    Collection Time: 02/17/20  5:23 AM   Result Value Ref Range    Hemoglobin A1c 5.9 %    Est. average glucose 123 mg/dL     CT brain   2-  Impression: No CT evidence of acute intracranial abnormality.     CT perfusion with CBF   2-  IMPRESSION:        1. No core infarct.     2. Small volume potential ischemic penumbra in the right parietal lobe.     3. Possible technical error involving venous and arterial curves. .     Please note that the determination of patient treatment is not based solely upon  imaging factors or calculation values. Management of ischemia is at the  discretion of the primary physician and is based upon a combination of clinical  and imaging data, along other factors.     CTA  2-  Impression:   1. No evidence of proximal ICA stenosis. 2. In the cavernous right ICA, extensive plaque is present with at least  moderate stenosis. 3. No evidence of intracranial large vessel occlusion.       Current Facility-Administered Medications:     0.9% sodium chloride infusion, 75 mL/hr, IntraVENous, CONTINUOUS, Giles Tolentino MD, Last Rate: 75 mL/hr at 02/17/20 0652, 75 mL/hr at 02/17/20 0652    sodium chloride (NS) flush 5-40 mL, 5-40 mL, IntraVENous, Q8H, Debby Joaquin MD, 5 mL at 02/16/20 2256    sodium chloride (NS) flush 5-40 mL, 5-40 mL, IntraVENous, PRN, Debby Joaquin MD    ondansetron (ZOFRAN) injection 4 mg, 4 mg, IntraVENous, Q6H PRN, Debby Joaquin MD    aspirin chewable tablet 81 mg, 81 mg, Oral, DAILY, Debby Joaquin MD, 81 mg at 02/17/20 0822    clopidogreL (PLAVIX) tablet 75 mg, 75 mg, Oral, DAILY, Debby Joaquin MD, 75 mg at 02/17/20 2920    acetaminophen (TYLENOL) tablet 650 mg, 650 mg, Oral, Q4H PRN, Emerald Cornejo MD    atorvastatin (LIPITOR) tablet 80 mg, 80 mg, Oral, QHS, Debby Joaquin MD, 80 mg at 02/16/20 2253    bisacodyL (DULCOLAX) tablet 5 mg, 5 mg, Oral, DAILY PRN, Emerald Cornejo MD    cholecalciferol (VITAMIN D3) (1000 Units /25 mcg) tablet 2 Tab, 2,000 Units, Oral, BID, Emerald Cornejo MD, 2 Tab at 02/17/20 0822    valsartan (DIOVAN) tablet 80 mg, 80 mg, Oral, DAILY, Debby Joaquin MD, 80 mg at 02/17/20 3292    isosorbide mononitrate ER (IMDUR) tablet 30 mg, 30 mg, Oral, 7am, Debby Joaquin MD, 30 mg at 02/17/20 0822    loratadine (CLARITIN) tablet 10 mg, 10 mg, Oral, DAILY, Debby Joaquin MD, 10 mg at 02/17/20 0822    famotidine (PEPCID) tablet 20 mg, 20 mg, Oral, DAILY, Jemal Giles Adamson MD, 20 mg at 02/17/20 1930         Assessment:     Principal Problem:    Stroke (cerebrum) ([facility] [address] (2/16/2020)    Active Problems:    HTN (hypertension) (8/19/2013)      Mixed dyslipidemia (8/19/2013)      CVD (cerebrovascular disease) (8/19/2013)      Osteoporosis (8/19/2013)      Coronary artery disease involving coronary bypass graft of native heart without angina pectoris (12/14/2015)      History of cerebellar stroke (10/22/2018)      Hypertensive urgency (2/16/2020)        Plan:     Stroke   Left sided weakness and numbness   Neurological monitoring   Monitor BP and vital signs   Aspirin   Plavix   CTA head and neck and Perfusion with CBF study as above. Will check Echo   Will check MRI brain   Consult PT and stroke coordinator   consult neurology for recommendation.      Hypertension  Monitor blood pressure and manage accordingly.      Dyslipidemia  Statin      CAD  Continue home medications.       I have discussed with patient regarding advance directive. Patient would like to have a DNR status.       DVT prophylaxis : SCD      I have discussed the plan of care with patient.       Signed By: Kadi Barry MD     February 17, 2020

## 2020-02-17 NOTE — PROGRESS NOTES
02/16/20 1955   Oxygen Therapy   O2 Sat (%) 97 %   Pulse via Oximetry 48 beats per minute   O2 Device Room air   Patient placed on continuous sat monitor. Monitor history deleted prior to placing on patient. Family at bedside. NAD.

## 2020-02-17 NOTE — PROGRESS NOTES
[address] [facility]  Face to Face Encounter    Patients Name: Saud Herring    YOB: 1931    Ordering Physician:  CHIARA Joaquin    Primary Diagnosis: Stroke (cerebrum) [facility]) [I63.9]  Stroke (cerebrum) [facility]) [I63.9]    Date of Face to Face:   2/17/2020                                  Face to Face Encounter findings are related to primary reason for home care:   yes. 1. I certify that the patient needs intermittent care as follows: skilled nursing care:  skilled observation/assessment, patient education  physical therapy: strengthening and gait/stair training  occupational therapy:  ADL safety (ie. cooking, bathing, dressing)    2. I certify that this patient is homebound, that is: 1) patient requires the use of a cane device, special transportation, or assistance of another to leave the home; or 2) patient's condition makes leaving the home medically contraindicated; and 3) patient has a normal inability to leave the home and leaving the home requires considerable and taxing effort. Patient may leave the home for infrequent and short duration for medical reasons, and occasional absences for non-medical reasons. Homebound status is due to the following functional limitations: Patient with strength deficits limiting the performance of all ADL's without caregiver assistance or the use of an assistive device. 3. I certify that this patient is under my care and that I, or a nurse practitioner or  [id number], or clinical nurse specialist, or certified nurse midwife, working with me, had a Face-to-Face Encounter that meets the physician Face-to-Face Encounter requirements.   The following are the clinical findings from the [address] [place] [address] encounter that support the need for skilled services and is a summary of the encounter:  See Progress Notes     See attached progess note      JOELLE Huston  2/17/2020      THE FOLLOWING TO BE COMPLETED BY THE COMMUNITY PHYSICIAN:    I concur with the findings described above from the F2F encounter that this patient is homebound and in need of a skilled service.     Certifying Physician: _____________________________________      Printed Certifying Physician Name: _____________________________________    Date: _________________

## 2020-02-17 NOTE — PROGRESS NOTES
Assessment complete via flow sheet. Pt A&Ox4. NIH 0. Respirations even and unlabored. S1 S2 auscultated. Pt on room air. Pt reports pain level of 0 out of 10. Bowel sounds active, abdomen soft and tender. Denies other needs. Bed in lowest position, side rails up 3, call bell in reach. Instructed to call for assistance. Pt verbalized understanding. Plan of care reviewed with patient.

## 2020-02-17 NOTE — PROGRESS NOTES
Problem: Falls - Risk of  Goal: *Absence of Falls  Description  Document [address] Fall Risk and appropriate interventions in the flowsheet.   Outcome: Progressing Towards Goal  Note: Fall Risk Interventions:                      History of Falls Interventions: Bed/chair exit alarm         Problem: Patient Education: Go to Patient Education Activity  Goal: Patient/Family Education  Outcome: Progressing Towards Goal     Problem: Patient Education: Go to Patient Education Activity  Goal: Patient/Family Education  Outcome: Progressing Towards Goal     Problem: Patient Education: Go to Patient Education Activity  Goal: Patient/Family Education  Outcome: Progressing Towards Goal     Problem: Patient Education: Go to Patient Education Activity  Goal: Patient/Family Education  Outcome: Progressing Towards Goal

## 2020-02-17 NOTE — PROGRESS NOTES
Problem: Neurolinguistics Impaired (Adult)  Goal: *Acute Goals and Plan of Care (Insert Text)  Description  LTG: Patient will increase neuro-linguistic abilities to increase safety and awareness in functional living environment   STG: Patient will participate in moderate level word finding task with 90% accuracy given minimal verbal cues. STG: Patient will recall relevant verbal information with 80% accuracy with minimal assistance. STG: Patient will utilize memory compensatory strategies to improve recall of functional list/message with 80% accuracy given min-mod cues. STG: Patient will participate in ongoing cognitive linguistic assessment for goal development as clinically indicated. Outcome: Progressing Towards Goal    SPEECH LANGUAGE PATHOLOGY: DYSPHAGIA AND SPEECH-LANGUAGE/COGNITION: Initial Assessment    NAME/AGE/GENDER: Aravind Garcias is a 80 y.o. female  DATE: 2/17/2020  PRIMARY DIAGNOSIS: Stroke (cerebrum) ([facility] [address] [I63.9]       ICD-10: Treatment Diagnosis: R13.12 Dysphagia, Oropharyngeal Phase  R41.841 Cognitive-Communication Deficit    INTERDISCIPLINARY COLLABORATION: Registered Nurse  PRECAUTIONS/ALLERGIES: Accupril [quinapril]; Norvasc [amlodipine]; and Zoloft [sertraline]     SUBJECTIVE   Patient alert upright in bed for session. \"Granddaughter in-law\" at bedside     Current Diet: Clear liquid diet     History of Present Injury/Illness: Ms. aRndy Boyle  has a past medical history of CAD (coronary artery disease), Calculus of kidney, Congestive heart failure ([address] CVD (cerebrovascular disease) (8/19/2013), Dyslipidemia (8/19/2013), GERD (gastroesophageal reflux disease), HTN (hypertension) (8/19/2013), Left breast mass (4/13/2017), Long term current use of anticoagulant therapy, Myocardial infarction ([address] (2005, 2006), Osteoporosis (8/19/2013), Positive H. pylori test (8/19/2013), Rectal polyp (10/9/2017), Stroke ([address] (1/7/2009), and Thromboembolus ([address]  She also has no past medical history of Aneurysm ([address] Coagulation defects, Difficult intubation, Heart failure ([address] Liver disease, Malignant hyperthermia due to anesthesia, Morbid obesity ([address] Nausea & vomiting, Pseudocholinesterase deficiency, Psychiatric disorder, Unspecified adverse effect of anesthesia, or Unspecified sleep apnea. . She also  has a past surgical history that includes hx coronary artery bypass graft (); hx bladder suspension (); hx total abdominal hysterectomy (); hx orthopaedic; hx colonoscopy (); pr cardiac surg procedure unlist; hx gyn; hx lithotripsy (2008 x 2); hx urological; and hx appendectomy. Previous Speech Therapy: NONE REPORTED  Patient does report having frequent belching and globus sensation in mid esophagus    Problem List:  (Impairments causing functional limitations):  1. Oropharyngeal dysphagia- No symptoms identified  2. Cognitive linguistic deficit    Orientation:   Person  Place  Time  Situation     Pain: Pain Scale 1: Numeric (0 - 10)  Pain Intensity 1: 0         OBJECTIVE   Oral Motor Assessment:  · Labial: No impairment  · Dentition: Intact and Natural  · Oral Hygiene: Adequate  · Lingual: No impairment     Swallow assessment:   Patient presented with thin by cup/straw/serial sips, mixed, and solid consistency trials. No overt s/sx airway compromise with solids or liquids. Timely oral prep time and adequate oral clearance with all trials. Patient with frequent belching and reports globus sensation with both solids and liquids in mid chest with all PO. Reports this has been going on for a while, but worse over the past few months. Cognitive Linguistic Assessment :Informal cognitive linguistic assessment    Speech 100% intelligible in conversation. No anomia in 3-4 minute conversation.    1 step directions: 3/3 accurate  2 step directions: 3/3 accurate  Responsive namin/4 accurate  Short term recall: 1/3 accurate  Functional reasonin/2 accurate       ASSESSMENT   Swallowing Evaluation: Patient presents with grossly functional oropharyngeal swallow function. No oral or pharyngeal dysphagia symptoms identified. Recommend regular consistency diet/thin liquids. Medications with liquids wash. Concerns for possible esophageal dysphagia as patient noted to have frequent belching throughout assessment and reports globus sensation in mid chest with solid and liquids. Patient may benefit from GI consult, per MD discretion, to further assess. Cognitive/language: Patient present with mild cognitive linguistic deficits characterized by reduced specific word finding and short term memory. Speech production and receptive language skills intact. Although patient exhibited no expressive language deficits during assessment, patient reports some mild difficulty with word finding at baseline. Basic reasoning skills intact; however, further assessment indicated as patient is independent with all ADLs including driving. Patient with reduced short term recall during assessment unable to recall information approximately 5 minutes after presentation. Will continue to follow for further treatment and assessment of cognitive linguistic function.           Tool Used: Dysphagia Outcome and Severity Scale (MARITZA)    Score Comments   Normal Diet  [x] 7 With no strategies or extra time needed   Functional Swallow  [] 6 May have mild oral or pharyngeal delay   Mild Dysphagia  [] 5 Which may require one diet consistency restricted    Mild-Moderate Dysphagia  [] 4 With 1-2 diet consistencies restricted   Moderate Dysphagia  [] 3 With 2 or more diet consistencies restricted   Moderate-Severe Dysphagia  [] 2 With partial PO strategies (trials with ST only)   Severe Dysphagia  [] 1 With inability to tolerate any PO safely      Score:  Initial: 7 Most Recent:  (Date 02/17/20 )   Interpretation of Tool: The Dysphagia Outcome and Severity Scale (MARITZA) is a simple, easy-to-use, 7-point scale developed to systematically rate the functional severity of dysphagia based on objective assessment and make recommendations for diet level, independence level, and type of nutrition. Tool Used: MODIFIED YADIRA SCALE (mRS)   Score   No Symptoms  [] 0   No significant disability despite symptoms; able to carry out all usual duties and activities  [] 1   Slight disability; unable to carry out all previous activities but able to look after own affairs without assistance. [] 2   Moderate disability; requiring some help but able to walk without assistance  [] 3   Moderately severe disability; unable to walk without assistance and unable to attend to own bodily needs without assistance  [] 4   Severe disability; bedridden, incontinent, and requiring constant nursing care and attention  [] 5      Score:  Initial: 3    Interpretation of Tool: The Modified Yadira Scale is a 7-point scaled used to quantify level of disability as it relates to a patient's functional abilities. Current Medications:   No current facility-administered medications on file prior to encounter. Current Outpatient Medications on File Prior to Encounter   Medication Sig Dispense Refill    TURMERIC PO Take  by mouth.  irbesartan (AVAPRO) 150 mg tablet Take 1 Tab by mouth daily. 90 Tab 3    atorvastatin (LIPITOR) 40 mg tablet Take 1 Tab by mouth daily. 90 Tab 3    isosorbide mononitrate ER (IMDUR) 30 mg tablet Take 1 Tab by mouth every morning. 90 Tab 3    clopidogrel (PLAVIX) 75 mg tab Take 1 Tab by mouth daily. 90 Tab 3    cholecalciferol (D3-2000) 2,000 unit cap capsule Take 2,000 Units by mouth two (2) times a day.  loratadine (CLARITIN) 10 mg tablet Take 10 mg by mouth.  CRANBERRY EXTRACT (CRANBERRY PO) Take  by mouth two (2) times a day.  aspirin 81 mg tablet Take 81 mg by mouth.  TAKE AM OF SURGERY WITH SMALL SIP OF WATER         PLAN    FREQUENCY/DURATION: Continue to follow patient 2 times a week for duration of hospital stay to address above goals. - Recommendations for next treatment session: Next treatment will address cognitive linguistic treatment      REHABILITATION POTENTIAL FOR STATED GOALS: Excellent     COMPLIANCE WITH PROGRAM/EXERCISES: Will assess as treatment progresses    CONTINUATION OF SKILLED SERVICES/MEDICAL NECESSITY:   No dysphagia goals identified as swallow function is within normal limits.  Patient is expected to demonstrate progress in expressive communication and cognitive ability to decrease assistance required communication, increase independence with activities of daily living, and increase communication with family/caregivers.  Patient continues to require skilled intervention due to cognitive linguistic deficits. RECOMMENDATIONS   DIET:    PO:  Regular   Liquids:  regular thin    MEDICATIONS: With liquid     ASPIRATION PRECAUTIONS  · Slow rate of intake  · Small bites/sips  · Upright at 90 degrees during meal     COMPENSATORY STRATEGIES/MODIFICATIONS  · None     EDUCATION:  · Recommendations discussed with Nursing  · Family  · Messaged hospitalist via perfect serve regarding recommendations  · Patient     RECOMMENDATIONS for CONTINUED SPEECH THERAPY: YES: Anticipate need for ongoing speech therapy during this hospitalization.        SAFETY:  After treatment position/precautions:  · Upright in bed  · RN notified  · Family at bedside    Total Treatment Duration:   Time In: 1140  Time Out: [address], [id number] [facility]

## 2020-02-17 NOTE — PROGRESS NOTES
SPEECH PATHOLOGY NOTE:    Speech therapy consult received and appreciated. Attempted to see patient this AM for bedside swallow evaluation. Patient is currently off the floor for MRI. Will re-attempt at later time this date as patient becomes available.        Lidia Samuel MS, CCC-SLP

## 2020-02-17 NOTE — PROGRESS NOTES
PT NOTE: Attempted to see patient 4 times this morning: receiving neuro consult, then echocardiogram and then off floor for MRI. Will check back later today if time allows or in the morning.

## 2020-02-17 NOTE — CONSULTS
PM&R Consulted under CODE S    HPI; Randol Boast is a 80 y.o. female who came to ER due to left side weakness and numbness for the past 4 days. Patient has hypertension, CAD, previous cerebellar stroke, on aspirin and Plavix.    At baseline, she has been walking well. She drives. She lives alone and is independent.    Four days prior to admission, she had headache and felt weak and numb on left hand, arm and leg. It is persistent and she came to ER.    No lightheadedness. No headache now. No nausea. No vomiting. No fever. No shaking. No chills. No chest pain. No shortness of breath.     CT brain did not show acute abnormality. Her BP was elevated. She received Labetalol in ER and her BP was improved. Chart reviewed, PT/OT evals appreciated  MRI has resulted with a noted early subacute lacunar infarct in the right thalamus. .4, on hi intensity statin. Cont ASA/Plavix.    BP long term goal <140/80; currently on diovan and imdur with BP of 175/71 with permissive htn ok for now    Rec; HH PT/OT and supervision 24/7; no driving  -currently, has no medical necessity for [facility] and is too functional. Please if situation changes  -No charge    Avril Mantilla MD

## 2020-02-17 NOTE — CONSULTS
[facility]  [place] [facility], [address] [facility] [facility]          Chief Complaint   Patient presents with   [address] Extremity Weakness       Diomedes Andujar is a 80 y.o. female who presents for evaluation of acute stroke  The patient experienced the onset last Wednesday of numbness affecting the left side of the body. This was not associated with a degree of dysphasia this was not associated with dysarthria. It did not have associated headache. There is a past history of similar event with relatively quiescent symptoms. Also history of coronary artery disease for which she is on dual antiplatelet therapy chronically. She has a fair amount of bruising tendency is relation to that therapy    Does not have a history of atrial fibrillation. Is hypertensive. No history of seizures no history of recent head injury no history of recent falls with head trauma    She is receiving longstanding statin therapy for dyslipidemia. Past Medical History:   Diagnosis Date    CAD (coronary artery disease)     2 MI    Calculus of kidney     Congestive heart failure (HCC)     CVD (cerebrovascular disease) 8/19/2013    Dyslipidemia 8/19/2013    GERD (gastroesophageal reflux disease)     HTN (hypertension) 8/19/2013    Left breast mass 4/13/2017    Diagnostic Mammogram negative.  Long term current use of anticoagulant therapy     Myocardial infarction [facility]) 2005, 2006    [address] Card[address], Dr. Terese Todd    Osteoporosis 8/19/2013    Positive H. pylori test 8/19/2013    Rectal polyp 10/9/2017    Anoscopy:  Revealed large cauliflower-like polyp at the anterior midline just above the dentate line, mobile soft, to have removed with Dr. Jacoby Fleming ([facility]), benign. No more fecal leakage.        Stroke ([address] 1/7/2009    no deficits expect patient reports going to one side if she gets up too fast    Thromboembolus [facility])     pulmanary       Past Surgical History:   Procedure Laterality Date    CARDIAC SURG PROCEDURE UNLIST      by pass    HX APPENDECTOMY      HX BLADDER SUSPENSION  2005    HX COLONOSCOPY  2012    HX CORONARY ARTERY BYPASS GRAFT  1988    [facility]    HX GYN      hysterectomy    HX LITHOTRIPSY  2008 x 2    HX ORTHOPAEDIC      2 rotater cuff right shoulder    HX TOTAL ABDOMINAL HYSTERECTOMY  1972    HX UROLOGICAL      suspension       Family History   Problem Relation Age of Onset    Heart Disease Mother     Stroke Mother     Heart Disease Father     Heart Disease Brother     Cancer Brother         prostate    Heart Disease Brother     Heart Disease Brother     Heart Disease Brother     Malignant Hyperthermia Neg Hx     Pseudocholinesterase Deficiency Neg Hx     Delayed Awakening Neg Hx     Post-op Nausea/Vomiting Neg Hx     Emergence Delirium Neg Hx     Post-op Cognitive Dysfunction Neg Hx     Other Neg Hx    Review of systems  Denies diplopia dysarthria dysphagia.   No shortness of breath  No orthopnea  No snoring or sleep disturbance  No hematemesis or melena  No rash  Degenerative arthritic symptoms no inflammatory arthropathy  Denies productive sputum  No sepsis chills or fever    Social History     Socioeconomic History    Marital status:      Spouse name: Not on file    Number of children: Not on file    Years of education: Not on file    Highest education level: Not on file   Tobacco Use    Smoking status: Never Smoker    Smokeless tobacco: Never Used   Substance and Sexual Activity    Alcohol use: No    Drug use: No           Current Facility-Administered Medications:     pantoprazole (PROTONIX) tablet 40 mg, 40 mg, Oral, ACB, Debby Joaquin MD, 40 mg at 02/17/20 1306    sodium chloride (NS) flush 5-40 mL, 5-40 mL, IntraVENous, Q8H, Debby Joaquin MD, 10 mL at 02/17/20 1307    sodium chloride (NS) flush 5-40 mL, 5-40 mL, IntraVENous, PRN, Debby Joaquin MD    ondansetron (ZOFRAN) injection 4 mg, 4 mg, IntraVENous, Q6H PRN, Jemal MD Debby    aspirin chewable tablet 81 mg, 81 mg, Oral, DAILY, Debby Joaquin MD, 81 mg at 02/17/20 0464    clopidogreL (PLAVIX) tablet 75 mg, 75 mg, Oral, DAILY, Debby Joaquin MD, 75 mg at 02/17/20 3343    acetaminophen (TYLENOL) tablet 650 mg, 650 mg, Oral, Q4H PRN, Debby Joaquin MD    atorvastatin (LIPITOR) tablet 80 mg, 80 mg, Oral, QHS, Debby Joaquin MD, 80 mg at 02/16/20 2253    bisacodyL (DULCOLAX) tablet 5 mg, 5 mg, Oral, DAILY PRN, Debby Joaquin MD    cholecalciferol (VITAMIN D3) (1000 Units /25 mcg) tablet 2 Tab, 2,000 Units, Oral, BID, Debby Joaquin MD, 2 Tab at 02/17/20 9375    valsartan (DIOVAN) tablet 80 mg, 80 mg, Oral, DAILY, Debby Joaquin MD, 80 mg at 02/17/20 3688    isosorbide mononitrate ER (IMDUR) tablet 30 mg, 30 mg, Oral, 7am, Debby Joaquin MD, 30 mg at 02/17/20 1751    loratadine (CLARITIN) tablet 10 mg, 10 mg, Oral, DAILY, Debby Joaquin MD, 10 mg at 02/17/20 7235    Allergies   Allergen Reactions    Accupril [Quinapril] Unknown (comments)    Norvasc [Amlodipine] Unknown (comments)    Zoloft [Sertraline] Drowsiness       Visit Vitals  /71 (BP 1 Location: Right arm, BP Patient Position: At rest;Supine)   Pulse (!) 50   Temp 96.4 °F (35.8 °C)   Resp 18   Ht 5' 7\" (1.702 m)   Wt 135 lb (61.2 kg)   SpO2 96%   Breastfeeding Unknown   BMI 21.14 kg/m²       Neurologic Exam  The patient is alert cooperative and oriented. Skin is not icteric. Patient looks well-hydrated. Does not appear chronically ill. Appears stated age  Cranial nerve examination normal visual fields. Normal random eye movements. No ptosis. No lid lag. There is no nystagmus on lateral gaze and no evidence of a supranuclear disorder of eye movement.     Face moves strongly symmetrically and equally  Speech is normal  Motor control of the upper extremities is normal.  There is no evidence of finger-to-nose ataxia in the upper extremities  There is hypnesthesia over the left face and left arm none over the left torso or left lower extremity  Casual gait is normal with no evidence of ataxia  No evidence of focal ataxia. No evidence of tremor  Fine motor coordination is normal   no bruits carotids     Most recent MRI  Results from Hospital Encounter encounter on 02/16/20   MRI BRAIN WO CONT    Narrative MRI BRAIN WITHOUT CONTRAST 2/17/2020    HISTORY: Numbness in left arm since Wednesday    TECHNIQUE: Sagittal and axial T1-weighted, axial T2-weighted, axial and coronal  FLAIR, axial T2-weighted gradient-echo, axial diffusion weighted images with ADC  maps of the brain. COMPARISON: May 30, 2014    FINDINGS: There is a small focus of restricted diffusion with increased FLAIR  signal in the right thalamus consistent with an early subacute lacunar infarct. There is no associated hemorrhage or mass effect. Encephalomalacia is present in  the left cerebellar hemisphere. Mucosal thickening is present in the left  maxillary sinus. There is no intra-axial mass, hydrocephalus or extra-axial hematoma. Impression IMPRESSION:    1. Early subacute lacunar infarct in the right thalamus. Neurology evaluation is  recommended. 2. Left cerebellar encephalomalacia. 3. White matter findings compatible with chronic small vessel ischemic disease. Most recent MRA  Results from [place] encounter on 02/16/20   MRI BRAIN WO CONT    Narrative MRI BRAIN WITHOUT CONTRAST 2/17/2020    HISTORY: Numbness in left arm since Wednesday    TECHNIQUE: Sagittal and axial T1-weighted, axial T2-weighted, axial and coronal  FLAIR, axial T2-weighted gradient-echo, axial diffusion weighted images with ADC  maps of the brain. COMPARISON: May 30, 2014    FINDINGS: There is a small focus of restricted diffusion with increased FLAIR  signal in the right thalamus consistent with an early subacute lacunar infarct.   There is no associated hemorrhage or mass effect. Encephalomalacia is present in  the left cerebellar hemisphere. Mucosal thickening is present in the left  maxillary sinus. There is no intra-axial mass, hydrocephalus or extra-axial hematoma. Impression IMPRESSION:    1. Early subacute lacunar infarct in the right thalamus. Neurology evaluation is  recommended. 2. Left cerebellar encephalomalacia. 3. White matter findings compatible with chronic small vessel ischemic disease. Most recent CTA  Results from [place] encounter on 02/16/20   CT PERF W CBF    Narrative EXAMINATION: CT PERFUSION    DATE: 2/16/2020 5:10 PM    INDICATION: : r/o cva; 5 days a left arm numbness. Patient is on blood thinners. COMPARISON: Head CT and CT angiogram same day    TECHNIQUE: CT perfusion of the brain was obtained after the administration of  intravenous contrast. Perfusion maps and perfusion analysis output were  generated using the [employer] perfusion processing software algorithm. Radiation  dose reduction techniques were used for this study: All CT scans performed at  this facility use one or all of the following: Automated exposure control,  adjustment of the mA and/or kVp according to patient's size, iterative  reconstruction. FINDINGS: Arterial input and venous output function curves are almost identical  raising a question of erroneous regions of interest selected. Perfusion maps demonstrate no core infarct. There is a small volume area of  elevated Tmax in the right parietal lobe may be an ischemic penumbra.       Perfusion Output Values:     CBF < 30% volume (best correlation with core infarct volume without overcalls):  0 ml (core infarction volume greater than 50 cc associated with poor outcomes)    Tmax > 6 seconds: 12.9 ml    Tmax/CBF Mismatch Volume: 12.9 ml    Tmax/CBF Mismatch Ratio: Infinity    Hypoperfusion Intensity Ratio (Tmax > 10 seconds / Tmax > 6 seconds): 0 (values  greater than 0.5 associated with poor outcome)    Tmax > 10 seconds Volume: 0 ml (volume greater than 100 mL is associated with  poor outcome)      Impression IMPRESSION:      1. No core infarct. 2. Small volume potential ischemic penumbra in the right parietal lobe. 3. Possible technical error involving venous and arterial curves. .    Please note that the determination of patient treatment is not based solely upon  imaging factors or calculation values. Management of ischemia is at the  discretion of the primary physician and is based upon a combination of clinical  and imaging data, along other factors. Most recent Echo  No results found for this visit on 02/16/20. Most recent lipid panels  Lab Results   Component Value Date/Time    Cholesterol, total 227 02/17/2020 05:23 AM    HDL Cholesterol 36 (L) 02/17/2020 05:23 AM    LDL, calculated 155.4 (H) 02/17/2020 05:23 AM    VLDL, calculated 35.6 (H) 02/17/2020 05:23 AM    Triglyceride 178 (H) 02/17/2020 05:23 AM    CHOL/HDL Ratio 6.3 02/17/2020 05:23 AM       Most recent Hgb A1C  Lab Results   Component Value Date/Time    Hemoglobin A1c 5.9 02/17/2020 05:23 AM                 Diagnoses and all orders for this visit:    1. Hypertensive emergency    2.  Left sided numbness    Other orders  -     CT HEAD WO CONT; Standing  -     INSERT PERIPHERAL IV; Standing  -     CBC WITH AUTOMATED DIFF; Standing  -     METABOLIC PANEL, COMPREHENSIVE; Standing  -     TROPONIN I; Standing  -     labetaloL (NORMODYNE;TRANDATE) injection 20 mg  -     GLUCOSE, POC; Standing  -     CTA CODE NEURO HEAD AND NECK W CONT; Standing  -     CT PERF W CBF; Standing  -     2D ECHO COMPLETE ADULT (TTE) W OR WO CONTR; Standing  -     sodium chloride 0.9 % bolus infusion 100 mL  -     saline peripheral flush soln 10 mL  -     iopamidoL (ISOVUE-370) 76 % injection 100 mL  -     NURSING-MISCELLANEOUS:; Standing  -     VITAL SIGNS PER UNIT ROUTINE; Standing  -     NOTIFY PROVIDER: VITAL SIGNS CHANGES; Standing  - NEURO/VASCULAR CHECKS; Standing  -     CARDIAC MONITORING; Standing  -     INTAKE AND OUTPUT; Standing  -     MEASURE HEIGHT; Standing  -     WEIGH PATIENT; Standing  -     FALL PRECAUTIONS; Standing  -     NURSING-MISCELLANEOUS:; Standing  -     NURSING-MISCELLANEOUS:; Standing  -     sodium chloride (NS) flush 5-40 mL  -     sodium chloride (NS) flush 5-40 mL  -     LIPID PANEL; Standing  -     HEMOGLOBIN A1C WITH EAG; Standing  -     IP CONSULT TO STROKE COORDINATOR; Standing  -     IP CONSULT TO CASE MANAGEMENT; Standing  -     SLP--EVAL, DEVISE PLAN OF CARE AND TREAT; Standing  -     PT--EVAL, DEVISE PLAN OF CARE AND TREAT; Standing  -     OT--EVAL, DEVISE PLAN OF CARE AND TREAT; Standing  -     INITIAL PHYSICIAN ORDER: INPATIENT; Standing  -     DO NOT RESUSCITATE; Standing  -     RT--OXIMETRY, CONTINUOUS; Standing  -     RT--OXIMETRY, SPOT CHECK; Standing  -     ondansetron (ZOFRAN) injection 4 mg  -     aspirin chewable tablet 81 mg  -     clopidogreL (PLAVIX) tablet 75 mg  -     acetaminophen (TYLENOL) tablet 650 mg  -     atorvastatin (LIPITOR) tablet 80 mg  -     bisacodyL (DULCOLAX) tablet 5 mg  -     APPLY/MAINTAIN SEQUENTIAL COMPRESSION DEVICE; Standing  -     IP CONSULT TO PHYSIATRIST(REHAB MEDICINE);  Standing  -     cholecalciferol (VITAMIN D3) (1000 Units /25 mcg) tablet 2 Tab  -     valsartan (DIOVAN) tablet 80 mg  -     isosorbide mononitrate ER (IMDUR) tablet 30 mg  -     loratadine (CLARITIN) tablet 10 mg  -     MRI BRAIN WO CONT; Standing  -     IP CONSULT TO NEUROLOGY; Standing  -     DIET CARDIAC; Standing  -     pantoprazole (PROTONIX) tablet 40 mg  -     INITIAL PHYSICIAN ORDER: OBSERVATION/OUTPATIENT IN A BED; Standing    Impression acute thalamic stroke    Our therapeutic recommendations are as followed the majority of these have already been carried out  Recommendations  · Start ASA 81 mg daily   · Initiate high intensity statin   · Neurochecks Q4H  · MRI of brain  · CTA of head and neck · Bedside swallow test   · Labs: A1c, FLP, TSH, Cardiac enzymes, BMP, CBC  · Telemetry and echocardiogram with bubble study  · PT/OT/ST  · DVT prophylaxis   · BP management avoid hypertension at this juncture with blood pressures systolic above 1 83-3 50 range  · Smoking cessation if applicable   · Diabetes education if applicable   · Depression Screening prior to discharge              Remigio Patrick MD

## 2020-02-17 NOTE — PROGRESS NOTES
Care Management Interventions  PCP Verified by CM: Yes  Transition of Care Consult (CM Consult): Discharge Planning  Physical Therapy Consult: Yes  Occupational Therapy Consult: Yes  Current Support Network: Own Home, Lives Alone  Confirm Follow Up Transport: Family  The Plan for Transition of Care is Related to the Following Treatment Goals : pt  The Patient and/or Patient Representative was Provided with a Choice of Provider and Agrees with the Discharge Plan?: Yes  Freedom of Choice List was Provided with Basic Dialogue that Supports the Patient's Individualized Plan of Care/Goals, Treatment Preferences and Shares the Quality Data Associated with the Providers?: Yes  Discharge Location  Discharge Placement: Home with home health      PERFECTO spoke with pt who is A&O. PTA pt indep with ADL'S, lives alone, drives. Pt's sister-in-law ( Maris ) at bedside, pt has another sister-in-law and niece who live next door to pt. Pt admitted for CVA, has been evaluated by both PT and OT who along with MD recommend [facility] PT & OT. Pt has both a walker and cane at home. Pt's PCP is Dr. Lindsay England. PERFECTO provided pt [facility] choice list & pt prefers [facility]. SW made referral to [facility].

## 2020-02-17 NOTE — PROGRESS NOTES
Problem: Self Care Deficits Care Plan (Adult)  Goal: *Acute Goals and Plan of Care (Insert Text)  Description  1. Patient will perform grooming with supervision. 2. Patient will perform Upper body dressing with supervision. 3. Patient will perform lower body dressing with supervision. 4. Patient will perform upper and lower body bathing with SBA. 5. Patient will perform toilet transfers with SBA. 6. Patient will perform shower transfer with CGA/SBA. 7. Patient will participate in 30 + minutes of ADL/ therapeutic exercise/therapeutic activity with min rest breaks to increase activity tolerance for self care. 8. Patient will perform ADL functional mobility in room with SBA. Goals to be achieved in 7 days. Outcome: Progressing Towards Goal       OCCUPATIONAL THERAPY: Initial Assessment and AM 2/17/2020  INPATIENT: OT Visit Days: 1  Payor: Sanna Spear / Plan: Francisco Law / Product Type: [employer] Medicare /      NAME/AGE/GENDER: Mike Padgett is a 80 y.o. female   PRIMARY DIAGNOSIS:  Stroke (cerebrum) ([facility] [address] [I63.9] Stroke (cerebrum) ([facility] [address] Stroke (cerebrum) ([facility] [address]        ICD-10: Treatment Diagnosis:    · Generalized Muscle Weakness (M62.81)  · Other lack of cordination (R27.8)  · Difficulty in walking, Not elsewhere classified (R26.2)  · Other abnormalities of gait and mobility (R26.89)   Precautions/Allergies:     Accupril [quinapril]; Norvasc [amlodipine]; and Zoloft [sertraline]      ASSESSMENT:     Ms. Bobby Gonzalez presents supine in bed with above diagnosis. 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 She lives alone and is very independent. She drives and is Independent with all ADLs. She presents supine in bed. She transferred to EOB with SBA. She donned her socks with supervision. She stood and ambulated to the bathroom with CGA. She holds onto the wall and door for balance.  She toileted with with SBA and washed her hands at the sink with SBA. She returned to her bed with CGA and SBA sit to supine. Her grandson is present. Her L UE is numb from mid upper arm to her hand and her strength in B UE appears symmetrical.  She is able to use L UE functionally with additional time. She tied a lace, did snaps and was able groom at sink with SBa. SHe reported her vision is at baseline and was able to read monitor and clock effectively. She would benefit form skilled OT services. Will follow. This section established at most recent assessment   PROBLEM LIST (Impairments causing functional limitations):  1. Decreased ADL/Functional Activities  2. Decreased Transfer Abilities  3. Decreased Ambulation Ability/Technique  4. Decreased Balance   INTERVENTIONS PLANNED: (Benefits and precautions of occupational therapy have been discussed with the patient.)  1. Activities of daily living training  2. Adaptive equipment training  3. Balance training  4. Clothing management  5. Donning&doffing training  6. Neuromuscular re-eduation  7. Therapeutic activity  8. Therapeutic exercise     TREATMENT PLAN: Frequency/Duration: Follow patient 3 times to address above goals. Rehabilitation Potential For Stated Goals: Good     REHAB RECOMMENDATIONS (at time of discharge pending progress):    Placement: It is my opinion, based on this patient's performance to date, that Ms. Samuels may benefit from [address] after discharge due to the functional deficits listed above that are likely to improve with skilled rehabilitation because he/she has multiple medical issues that affect his/her functional mobility in the community.   Equipment:    None at this time              OCCUPATIONAL PROFILE AND HISTORY:   History of Present Injury/Illness (Reason for Referral):  See H and P  Past Medical History/Comorbidities:   Ms. Linda Sharif  has a past medical history of CAD (coronary artery disease), Calculus of kidney, Congestive heart failure ([facility] [address] CVD (cerebrovascular disease) (8/19/2013), Dyslipidemia (8/19/2013), GERD (gastroesophageal reflux disease), HTN (hypertension) (8/19/2013), Left breast mass (4/13/2017), Long term current use of anticoagulant therapy, Myocardial infarction ([facility] [address] (2005, 2006), Osteoporosis (8/19/2013), Positive H. pylori test (8/19/2013), Rectal polyp (10/9/2017), Stroke ([address] (1/7/2009), and Thromboembolus ([facility] [address] She also has no past medical history of Aneurysm ([address] Coagulation defects, Difficult intubation, Heart failure ([address] Liver disease, Malignant hyperthermia due to anesthesia, Morbid obesity ([address] Nausea & vomiting, Pseudocholinesterase deficiency, Psychiatric disorder, Unspecified adverse effect of anesthesia, or Unspecified sleep apnea. Ms. Reid Fox  has a past surgical history that includes hx coronary artery bypass graft (1988); hx bladder suspension (2005); hx total abdominal hysterectomy (1972); hx orthopaedic; hx colonoscopy (2012); pr cardiac surg procedure unlist; hx gyn; hx lithotripsy (2008 x 2); hx urological; and hx appendectomy.   Social History/Living Environment:   Home Environment: Private residence  # Steps to Enter: 5(5 steps back R rail, 4 steps front B rails)  Rails to Enter: Yes  [employer] : Right  Living Alone: Yes  Support Systems: Child(birgit)  Patient Expects to be Discharged to[de-identified] Private residence  Current DME Used/Available at Home: Shefali Bacca, straight, Grab bars, Walker, rolling  Prior Level of Function/Work/Activity:  Independent with ADLs, lives alone, drives supportive son and grandson     Number of Personal Factors/Comorbidities that affect the Plan of Care: Brief history (0):  LOW COMPLEXITY   ASSESSMENT OF OCCUPATIONAL PERFORMANCE[de-identified]   Activities of Daily Living:   Basic ADLs (From Assessment) Complex ADLs (From Assessment)   Feeding: Supervision  Oral Facial Hygiene/Grooming: Stand-by assistance(stood at sink to wash her hands)  Bathing: Stand-by assistance, Contact guard assistance  Upper Body Dressing: Supervision  Lower Body Dressing: Stand-by assistance, Contact guard assistance  Toileting: Stand by assistance, Contact guard assistance     Grooming/Bathing/Dressing Activities of Daily Living     Cognitive Retraining  Safety/Judgement: Awareness of environment; Fall prevention; Insight into deficits                 Functional Transfers  Bathroom Mobility: Contact guard assistance  Toilet Transfer : Contact guard assistance     Bed/Mat Mobility  Supine to Sit: Stand-by assistance  Sit to Supine: Stand-by assistance  Sit to Stand: Contact guard assistance  Stand to Sit: Contact guard assistance  Scooting: Stand-by assistance     Most Recent Physical Functioning:   Gross Assessment:                  Posture:     Balance:  Sitting: Intact  Standing: With support; Without support Bed Mobility:  Supine to Sit: Stand-by assistance  Sit to Supine: Stand-by assistance  Scooting: Stand-by assistance  Wheelchair Mobility:     Transfers:  Sit to Stand: Contact guard assistance  Stand to Sit: Contact guard assistance            Patient Vitals for the past 6 hrs:   BP BP Patient Position SpO2 Pulse   20 0805 150/83 At rest;Supine 99 % (!) 50   20 0832   97 %        Mental Status  Neurologic State: Alert, Appropriate for age  Orientation Level: Appropriate for age  Cognition: Appropriate decision making, Appropriate for age attention/concentration, Appropriate safety awareness, Follows commands  Perception: Appears intact  Safety/Judgement: Awareness of environment, Fall prevention, Insight into deficits                          Physical Skills Involved:  1. Balance  2. Strength  3. Activity Tolerance Cognitive Skills Affected (resulting in the inability to perform in a timely and safe manner):   1. none  Psychosocial Skills Affected:  1. none    Number of elements that affect the Plan of Care: 1-3:  LOW COMPLEXITY   CLINICAL DECISION MAKIN [facility] [address] [id number] AM-PAC 6 Clicks   Daily Activity Inpatient Short Form  How much help from another person does the patient currently need. .. Total A Lot A Little None   1. Putting on and taking off regular lower body clothing? [] 1   [] 2   [x] 3   [] 4   2. Bathing (including washing, rinsing, drying)? [] 1   [] 2   [x] 3   [] 4   3. Toileting, which includes using toilet, bedpan or urinal?   [] 1   [] 2   [x] 3   [] 4   4. Putting on and taking off regular upper body clothing? [] 1   [] 2   [] 3   [x] 4   5. Taking care of personal grooming such as brushing teeth? [] 1   [] 2   [] 3   [x] 4   6. Eating meals? [] 1   [] 2   [] 3   [x] 4   © 2007, Trustees of [address] [place] [address] [address] [address], under license to [employer]. All rights reserved      Score:  Initial: 21 Most Recent: X (Date: -- )    Interpretation of Tool:  Represents activities that are increasingly more difficult (i.e. Bed mobility, Transfers, Gait). Medical Necessity:     · Patient is expected to demonstrate progress in   · Self care skills and functional mobility  ·     Reason for Services/Other Comments:  · Patient continues to require skilled intervention due to   · Above listed deficits     Use of outcome tool(s) and clinical judgement create a POC that gives a: LOW COMPLEXITY         TREATMENT:   (In addition to Assessment/Re-Assessment sessions the following treatments were rendered)     Pre-treatment Symptoms/Complaints:    Pain: Initial:   Pain Intensity 1: 0  Post Session:  0/10     Self Care: (10): Procedure(s) (per grid) utilized to improve and/or restore self-care/home management as related to toileting and grooming. Required minimal visual, verbal, manual, and tactile cueing to facilitate activities of daily living skills and compensatory activities.     Assessment/Reassessment 10 minutes    Braces/Orthotics/Lines/Etc:   · IV  · O2 sat monitor   · O2 Device: Room air  Treatment/Session Assessment:    · Response to Treatment:  tolerated well, supine in bed grandson present  · Interdisciplinary Collaboration: o Physical Therapist  o Occupational Therapist  o Registered Nurse  o   · After treatment position/precautions:   o Supine in bed  o Bed alarm/tab alert on  o Bed/Chair-wheels locked  o Bed in low position  o Call light within reach  o RN notified  o Family at bedside  o Side rails x 2   · Compliance with Program/Exercises: Compliant all of the time. · Recommendations/Intent for next treatment session: \"Next visit will focus on advancements to more challenging activities and reduction in assistance provided\".   Total Treatment Duration:10  OT Patient Time In/Time Out  Time In: 0[address]  Time Out: [facility] [address],

## 2020-02-18 VITALS
OXYGEN SATURATION: 97 % | SYSTOLIC BLOOD PRESSURE: 183 MMHG | RESPIRATION RATE: 18 BRPM | DIASTOLIC BLOOD PRESSURE: 84 MMHG | BODY MASS INDEX: 21.19 KG/M2 | TEMPERATURE: 98 F | WEIGHT: 135 LBS | HEART RATE: 74 BPM | HEIGHT: 67 IN

## 2020-02-18 PROCEDURE — 74011250637 HC RX REV CODE- 250/637: Performed by: INTERNAL MEDICINE

## 2020-02-18 PROCEDURE — 99218 HC RM OBSERVATION: CPT

## 2020-02-18 PROCEDURE — 92523 SPEECH SOUND LANG COMPREHEN: CPT

## 2020-02-18 RX ORDER — PANTOPRAZOLE SODIUM 20 MG/1
20 TABLET, DELAYED RELEASE ORAL DAILY
Qty: 90 TAB | Refills: 1 | Status: SHIPPED | OUTPATIENT
Start: 2020-02-18 | End: 2020-09-24 | Stop reason: SDUPTHER

## 2020-02-18 RX ORDER — ATORVASTATIN CALCIUM 80 MG/1
80 TABLET, FILM COATED ORAL
Qty: 90 TAB | Refills: 1 | Status: SHIPPED | OUTPATIENT
Start: 2020-02-18 | End: 2020-09-15

## 2020-02-18 RX ADMIN — VALSARTAN 80 MG: 80 TABLET, FILM COATED ORAL at 08:20

## 2020-02-18 RX ADMIN — LORATADINE 10 MG: 10 TABLET ORAL at 08:20

## 2020-02-18 RX ADMIN — MELATONIN 2 TABLET: at 08:20

## 2020-02-18 RX ADMIN — CLOPIDOGREL BISULFATE 75 MG: 75 TABLET ORAL at 08:20

## 2020-02-18 RX ADMIN — Medication 10 ML: at 05:47

## 2020-02-18 RX ADMIN — Medication 10 ML: at 14:00

## 2020-02-18 RX ADMIN — ISOSORBIDE MONONITRATE 30 MG: 30 TABLET, EXTENDED RELEASE ORAL at 07:10

## 2020-02-18 RX ADMIN — PANTOPRAZOLE SODIUM 40 MG: 40 TABLET, DELAYED RELEASE ORAL at 07:10

## 2020-02-18 RX ADMIN — ASPIRIN 81 MG 81 MG: 81 TABLET ORAL at 08:20

## 2020-02-18 NOTE — PROGRESS NOTES
Problem: Falls - Risk of  Goal: *Absence of Falls  Description  Document Bebe Donohue Fall Risk and appropriate interventions in the flowsheet.   Outcome: Progressing Towards Goal  Note: Fall Risk Interventions:                      History of Falls Interventions: Door open when patient unattended         Problem: Patient Education: Go to Patient Education Activity  Goal: Patient/Family Education  Outcome: Progressing Towards Goal     Problem: Patient Education: Go to Patient Education Activity  Goal: Patient/Family Education  Outcome: Progressing Towards Goal     Problem: Patient Education: Go to Patient Education Activity  Goal: Patient/Family Education  Outcome: Progressing Towards Goal     Problem: Patient Education: Go to Patient Education Activity  Goal: Patient/Family Education  Outcome: Progressing Towards Goal     Problem: Patient Education: Go to Patient Education Activity  Goal: Patient/Family Education  Outcome: Progressing Towards Goal     Problem: TIA/CVA Stroke: 0-24 hours  Goal: Off Pathway (Use only if patient is Off Pathway)  Outcome: Progressing Towards Goal  Goal: Activity/Safety  Outcome: Progressing Towards Goal  Goal: Consults, if ordered  Outcome: Progressing Towards Goal  Goal: Diagnostic Test/Procedures  Outcome: Progressing Towards Goal  Goal: Nutrition/Diet  Outcome: Progressing Towards Goal  Goal: Discharge Planning  Outcome: Progressing Towards Goal  Goal: Medications  Outcome: Progressing Towards Goal  Goal: Respiratory  Outcome: Progressing Towards Goal  Goal: Treatments/Interventions/Procedures  Outcome: Progressing Towards Goal  Goal: Minimize risk of bleeding post-thrombolytic infusion  Outcome: Progressing Towards Goal  Goal: Monitor for complications post-thrombolytic infusion  Outcome: Progressing Towards Goal  Goal: Psychosocial  Outcome: Progressing Towards Goal  Goal: *Hemodynamically stable  Outcome: Progressing Towards Goal  Goal: *Neurologically stable  Description  Absence of additional neurological deficits    Outcome: Progressing Towards Goal  Goal: *Verbalizes anxiety and depression are reduced or absent  Outcome: Progressing Towards Goal  Goal: *Absence of Signs of Aspiration on Current Diet  Outcome: Progressing Towards Goal  Goal: *Absence of deep venous thrombosis signs and symptoms(Stroke Metric)  Outcome: Progressing Towards Goal  Goal: *Ability to perform ADLs and demonstrates progressive mobility and function  Outcome: Progressing Towards Goal  Goal: *Stroke education started(Stroke Metric)  Outcome: Progressing Towards Goal  Goal: *Dysphagia screen performed(Stroke Metric)  Outcome: Progressing Towards Goal  Goal: *Rehab consulted(Stroke Metric)  Outcome: Progressing Towards Goal     Problem: TIA/CVA Stroke: Day 2 Until Discharge  Goal: Off Pathway (Use only if patient is Off Pathway)  Outcome: Progressing Towards Goal  Goal: Activity/Safety  Outcome: Progressing Towards Goal  Goal: Diagnostic Test/Procedures  Outcome: Progressing Towards Goal  Goal: Nutrition/Diet  Outcome: Progressing Towards Goal  Goal: Discharge Planning  Outcome: Progressing Towards Goal  Goal: Medications  Outcome: Progressing Towards Goal  Goal: Respiratory  Outcome: Progressing Towards Goal  Goal: Treatments/Interventions/Procedures  Outcome: Progressing Towards Goal  Goal: Psychosocial  Outcome: Progressing Towards Goal  Goal: *Verbalizes anxiety and depression are reduced or absent  Outcome: Progressing Towards Goal  Goal: *Absence of aspiration  Outcome: Progressing Towards Goal  Goal: *Absence of deep venous thrombosis signs and symptoms(Stroke Metric)  Outcome: Progressing Towards Goal  Goal: *Optimal pain control at patient's stated goal  Outcome: Progressing Towards Goal  Goal: *Tolerating diet  Outcome: Progressing Towards Goal  Goal: *Ability to perform ADLs and demonstrates progressive mobility and function  Outcome: Progressing Towards Goal  Goal: *Stroke education continued(Stroke Metric)  Outcome: Progressing Towards Goal     Problem: Ischemic Stroke: Discharge Outcomes  Goal: *Verbalizes anxiety and depression are reduced or absent  Outcome: Progressing Towards Goal  Goal: *Verbalize understanding of risk factor modification(Stroke Metric)  Outcome: Progressing Towards Goal  Goal: *Hemodynamically stable  Outcome: Progressing Towards Goal  Goal: *Absence of aspiration pneumonia  Outcome: Progressing Towards Goal  Goal: *Aware of needed dietary changes  Outcome: Progressing Towards Goal  Goal: *Verbalize understanding of prescribed medications including anti-coagulants, anti-lipid, and/or anti-platelets(Stroke Metric)  Outcome: Progressing Towards Goal  Goal: *Tolerating diet  Outcome: Progressing Towards Goal  Goal: *Aware of follow-up diagnostics related to anticoagulants  Outcome: Progressing Towards Goal  Goal: *Ability to perform ADLs and demonstrates progressive mobility and function  Outcome: Progressing Towards Goal  Goal: *Absence of DVT(Stroke Metric)  Outcome: Progressing Towards Goal  Goal: *Absence of aspiration  Outcome: Progressing Towards Goal  Goal: *Optimal pain control at patient's stated goal  Outcome: Progressing Towards Goal  Goal: *Home safety concerns addressed  Outcome: Progressing Towards Goal  Goal: *Describes available resources and support systems  Outcome: Progressing Towards Goal  Goal: *Verbalizes understanding of activation of EMS(911) for stroke symptoms(Stroke Metric)  Outcome: Progressing Towards Goal  Goal: *Understands and describes signs and symptoms to report to providers(Stroke Metric)  Outcome: Progressing Towards Goal  Goal: *Neurolgocially stable (absence of additional neurological deficits)  Outcome: Progressing Towards Goal  Goal: *Verbalizes importance of follow-up with primary care physician(Stroke Metric)  Outcome: Progressing Towards Goal  Goal: *Smoking cessation discussed,if applicable(Stroke Metric)  Outcome: Progressing Towards Goal  Goal: *Depression screening completed(Stroke Metric)  Outcome: Progressing Towards Goal

## 2020-02-18 NOTE — DISCHARGE SUMMARY
Hospitalist Discharge Summary     Admit Date:  2020  3:04 PM   Name:  Alysia Paige   Age:  80 y.o.  :  1931   MRN:  [id number]   PCP:  John Rose MD  Treatment Team: Utilization Review: Lisa Butterfield, SRAVANTHI; : Kehinde Delgado [place]    Problem List for this Hospitalization:  Hospital Problems as of 2020 Date Reviewed: 2020          Codes Class Noted - Resolved POA    * (Principal) Stroke (cerebrum) ([facility] [address] ICD-10-CM: I63.9  ICD-9-CM: 434.91  2020 - Present Unknown        Hypertensive urgency ICD-10-CM: I16.0  ICD-9-CM: 401.9  2020 - Present Unknown        History of cerebellar stroke ICD-10-CM: Z86.73  ICD-9-CM: V12.54  10/22/2018 - Present Yes        Coronary artery disease involving coronary bypass graft of native heart without angina pectoris ICD-10-CM: I25.810  ICD-9-CM: 414.05  2015 - Present Yes        HTN (hypertension) ICD-10-CM: I10  ICD-9-CM: 401.9  2013 - Present Yes        Mixed dyslipidemia ICD-10-CM: E78.2  ICD-9-CM: 272.2  2013 - Present Yes        CVD (cerebrovascular disease) ICD-10-CM: I67.9  ICD-9-CM: 437.9  2013 - Present Yes        Osteoporosis ICD-10-CM: M81.0  ICD-9-CM: 733.00  2013 - Present Yes                Admission HPI from 2020:    \" Alysia Paige is a 80 y.o. female who came to ER due to left side weakness and numbness for the past 4 days.      Patient has hypertension, CAD, previous cerebellar stroke, on aspirin and Plavix.      At baseline, she has been walking well. She drives. She lives alone and is independent.      Four days prior to admission, she had headache and felt weak and numb on left hand, arm and leg. It is persistent and she came to ER.      No lightheadedness. No headache now. No nausea. No vomiting. No fever. No shaking. No chills. No chest pain. No shortness of breath.       CT brain did not show acute abnormality. Her BP was elevated.  She received Labetalol in ER and her BP was improved.      Hospitalist service is requested to admit the patient due to stroke [facility] Course:  Pt admitted for suspected cva. MRI with early subacute lacunar infarct in R thalamus, L cerebellar encephlomalacia, small vessel disease. There was also extensive plaque R ICA with at least moderate stenosis. She was already on DAPT and statin for hx of multivessel CAD. Echo with bubble showed: large right-to-left shunt, induced by the Valsalva  Maneuver. No prior to compare. D/w neuro: f/u outpatient with cards for ? MELYSSA w/ closure-Dr Leatha Noguera would like cardiology to comment. Pt stated at home her BP was controlled but inpatient it was elevated. She was instructed to f/u OP with PMD for to evaluate if her home BP cuff is accurate. She was instructed to check BP 4x per day. Dc'd home with OP PT/OT. She was instructed not to drive by physiatry. Started on PPI given need for prolonged DAPT. Return precautions given. I answered all of her questions. Disposition: Home or Self Care  Diet: DIET CARDIAC Regular  Code Status: DNR    Follow up instructions, discharge meds at bottom of this note. Plan was discussed with patient/son. All questions answered. Patient was stable at time of discharge. Patient will call a physician or return if any concerns. Discharge summary was sent to PCP electronically via \"Comm Mgt\" link in [facility], if possible. Diagnostic Imaging/Tests:   Mri Brain Wo Cont    Result Date: 2/17/2020  MRI BRAIN WITHOUT CONTRAST 2/17/2020 HISTORY: Numbness in left arm since Wednesday TECHNIQUE: Sagittal and axial T1-weighted, axial T2-weighted, axial and coronal FLAIR, axial T2-weighted gradient-echo, axial diffusion weighted images with ADC maps of the brain. COMPARISON: May 30, 2014 FINDINGS: There is a small focus of restricted diffusion with increased FLAIR signal in the right thalamus consistent with an early subacute lacunar infarct. There is no associated hemorrhage or mass effect. Encephalomalacia is present in the left cerebellar hemisphere. Mucosal thickening is present in the left maxillary sinus. There is no intra-axial mass, hydrocephalus or extra-axial hematoma. IMPRESSION: 1. Early subacute lacunar infarct in the right thalamus. Neurology evaluation is recommended. 2. Left cerebellar encephalomalacia. 3. White matter findings compatible with chronic small vessel ischemic disease. Ct Head Wo Cont    Result Date: 2/16/2020  Noncontrast head CT Clinical Indication: 5 days of persisting left upper extremity paresthesias and weakness with history of stroke, currently taking blood thinning medication. Technique: Noncontrast axial images were obtained through the brain. All CT scans at this location are performed using dose modulation techniques as appropriate including the following: Automated exposure control, adjustment of the MA and/or kV according to patient's size, or use of iterative reconstruction technique. Comparison: CT 9/30/2019 and MRI 5/30/2014 Findings: There is no acute intracranial hemorrhage, hydrocephalus, intra-axial mass, or mass-effect. Again noted is the old left cerebellar infarction. Again demonstrated are moderate scattered and confluent areas of low attenuation involving bilateral white matter, and old tiny lacunar infarcts in the bilateral basal ganglia. Possibility of acute superimposed or developing small infarct cannot be excluded by CT, if clinically suspected. There is no CT evidence of acute large artery territorial infarction or abnormal extra-axial fluid collection. The mastoid air cells and paranasal sinuses are clear where imaged. No displaced skull fractures are present. Impression: No CT evidence of acute intracranial abnormality. Chronic changes. Ct Perf W Cbf    Result Date: 2/17/2020  EXAMINATION: CT PERFUSION DATE: 2/16/2020 5:10 PM INDICATION: : r/o cva; 5 days a left arm numbness. Patient is on blood thinners.  COMPARISON: Head CT and CT angiogram same day TECHNIQUE: CT perfusion of the brain was obtained after the administration of intravenous contrast. Perfusion maps and perfusion analysis output were generated using the [employer] perfusion processing software algorithm. Radiation dose reduction techniques were used for this study: All CT scans performed at this facility use one or all of the following: Automated exposure control, adjustment of the mA and/or kVp according to patient's size, iterative reconstruction. FINDINGS: Arterial input and venous output function curves are almost identical raising a question of erroneous regions of interest selected. Perfusion maps demonstrate no core infarct. There is a small volume area of elevated Tmax in the right parietal lobe may be an ischemic penumbra. Perfusion Output Values: CBF < 30% volume (best correlation with core infarct volume without overcalls): 0 ml (core infarction volume greater than 50 cc associated with poor outcomes) Tmax > 6 seconds: 12.9 ml Tmax/CBF Mismatch Volume: 12.9 ml Tmax/CBF Mismatch Ratio: Infinity Hypoperfusion Intensity Ratio (Tmax > 10 seconds / Tmax > 6 seconds): 0 (values greater than 0.5 associated with poor outcome) Tmax > 10 seconds Volume: 0 ml (volume greater than 100 mL is associated with poor outcome)     IMPRESSION: 1. No core infarct. 2. Small volume potential ischemic penumbra in the right parietal lobe. 3. Possible technical error involving venous and arterial curves. . Please note that the determination of patient treatment is not based solely upon imaging factors or calculation values. Management of ischemia is at the discretion of the primary physician and is based upon a combination of clinical and imaging data, along other factors. Cta Code Neuro Head And Neck W Cont    Result Date: 2/16/2020  Title:  CT arteriogram of the neck and head. Indication: Left arm numbness for 5 days.  Technique: Axial images of the neck and head were obtained after the uneventful administration of intravenous iodinated contrast media. Contrast was used to best identify the arterial structures. Images were reviewed on a separate, free standing, three-dimensional workstation as per the referring physicians request.  All stenosis percentages derived by comparing the narrowest segment with the distal Internal Carotid Artery luminal diameter, as described in the [employer] American Symptomatic Carotid Endarterectomy Trial (NASCET) criteria. All CT scans at this facility are performed using dose reduction/dose modulation techniques, as appropriate the performed exam, including the following: Automated Exposure Control; Adjustment of the mA and/or kV according to patient size (this includes techniques or standardized protocols for targeted exams where dose is matched to indication/reason for exam); and Use of Iterative Reconstruction Technique. Comparison: Ultrasound September 30, 2019. Findings: Lungs: Normal Soft Tissues: Minimal left maxillary mucosal sinus thickening. Cervical Spine: Degenerative changes Aorta: Conventional 3 vessel arch Great Vessels: Patent Right ICA: Extensive hard and soft plaque at the bifurcation with extensive packing in the cavernous sinus. At least moderate stenosis is present. % Stenosis: Less than 25% in the bulb Right MCA: Patent Right EDITH: Patent Left ICA: Primarily hard plaque at the cavernous sinus and bulge. Mild stenoses are present. % Stenosis: Less than 50% Left MCA: Patent Left EDITH: Patent Right Vertebral: Patent and ends in PICA Left Vertebral: Patent Dominance: Left Basilar: Patent Right PCA: Patent Left PCA: Patent Other Vascular: Negative     Impression: 1. No evidence of proximal ICA stenosis. 2. In the cavernous right ICA, extensive plaque is present with at least moderate stenosis. 3. No evidence of intracranial large vessel occlusion. .        Echocardiogram results:  Results for orders placed or performed during the hospital encounter of 20   2D ECHO COMPLETE ADULT (TTE) W OR [address]  [address] [facility], [address] [facility]  [phone]    Transthoracic Echocardiogram  2D, M-mode, Doppler, and Color Doppler    Patient: Esteban Agee  MR #: [id number]  : [id number]  Age: 80 years  Gender: Female  Study date: 2020  Account #: [de-identified]  Height: 67 in  Weight: 134.6 lb  BSA: 1.71 mï¾²  Status:Routine  Location: [facility]  BP: 162/ 69    Allergies: QUINAPRIL, AMLODIPINE, SERTRALINE    Sonographer:  Lamont Centeno [facility]  Group:  [facility]  Referring Physician:  Elle Vu MD  Reading Physician:  Abbey Haddad MD [facility]    INDICATIONS: TIA vs CVA    PROCEDURE: This was a routine study. A transthoracic echocardiogram was  performed. The study included complete 2D imaging, M-mode, complete spectral  Doppler, and color Doppler. Intravenous contrast (agitated saline) was  administered. Image quality was adequate. LEFT VENTRICLE: Size was normal. Systolic function was normal. Ejection  fraction was estimated in the range of 55 % to 60 %. There were no regional  wall motion abnormalities. Wall thickness was normal. Left ventricular  diastolic function is indeterminate basedon assessment criteria. Avg   E/e':13.7. RIGHT VENTRICLE: The size was normal. Systolic function was normal. Estimated  peak pressure was in the range of 30-35 mmHg. LEFT ATRIUM: The atrium was mildly dilated. ATRIAL SEPTUM: Agitated saline contrast injection (bubble study) was   performed. There was a large right-to-left shunt, induced by the Valsalva maneuver. RIGHT ATRIUM: Size was normal.    SYSTEMIC VEINS: IVC: The inferior vena cava was normal in size and course. The  respirophasic change in diameter was more than 50%. AORTIC VALVE: The valve was trileaflet. Leaflets exhibited mild to moderate  sclerosis. There was no evidence for stenosis.  There was no insufficiency. MITRAL VALVE: There was mild to moderate annular calcification. There was no  evidence for stenosis. There was mild regurgitation. TRICUSPID VALVE: The valve structure was normal. There was no evidence for  stenosis. There was mild regurgitation. PULMONIC VALVE: Not well visualized. There was no evidence for stenosis. There  was mild regurgitation. PERICARDIUM: There was no pericardial effusion. AORTA: The root exhibited normal size. SUMMARY:    -  Left ventricle: Systolic function was normal. Ejection fraction was  estimated in the range of 55 % to 60 %. There were no regional wall motion  abnormalities. -  Left atrium: The atrium was mildly dilated. -  Atrial septum: Agitated saline contrast injection (bubble study) was  performed. There was a large right-to-left shunt, induced by the Valsalva  maneuver. -  Inferior vena cava, hepatic veins: The respirophasic change in diameter   was  more than 50%. -  Mitral valve: There was mild to moderate annular calcification. There was  mild regurgitation.    -  Tricuspid valve: There was mild regurgitation. SYSTEM MEASUREMENT TABLES    2D mode  AoR Diam (2D): 2.8 cm  LA Dimension (2D): 4.2 cm  Left Atrium Systolic Volume Index; Method of Disks, Biplane; 2D mode;: 26.9  ml/m2  IVS/LVPW (2D): 1.1  IVSd (2D): 1 cm  LVIDd (2D): 3.7 cm  LVIDs (2D): 2.4 cm  LVOT Area (2D): 2.5 cm2  LVPWd (2D): 1 cm  RVIDd (2D): 3.2 cm    Tissue Doppler Imaging  LV Peak Early Rodriguez Tissue Jose; Lateral MA (TDI): 7.8 cm/s  LV Peak Early Rodriguez Tissue Jose; Medial MA (TDI): 4.6 cm/s    Unspecified Scan Mode  Peak Grad; Mean; Antegrade Flow: 4 mm[Hg]  Vmax; Antegrade Flow: 96.6 cm/s  LVOT Diam: 1.8 cm  MV Peak Jose/LV Peak Tissue Jose E-Wave; Lateral MA: 10.1  MV Peak Jose/LV Peak Tissue Jose E-Wave; Medial MA: 17.3    Prepared and signed by    Toñito Venegas MD [facility]  Signed 17-Feb-2020 15:31:39         Procedures done this admission:  * No surgery found *    All Micro Results     None          Labs: Results:       BMP, Mg, Phos Recent Labs     02/16/20  1522      K 4.1      CO2 29   AGAP 4*   BUN 16   CREA 1.10*   CA 9.9   GLU 76      CBC Recent Labs     02/16/20  1522   WBC 7.5   RBC 5.27*   HGB 15.7*   HCT 48.1*      GRANS 48   LYMPH 37   EOS 6   MONOS 9   BASOS 1   IG 0   ANEU 3.6   ABL 2.7   BRODIE 0.4   ABM 0.7   ABB 0.1   AIG 0.0      LFT Recent Labs     02/16/20  1522   SGOT 23   ALT 22   AP 68   TP 7.6   ALB 3.7   GLOB 3.9*   AGRAT 0.9*      Cardiac Testing Lab Results   Component Value Date/Time    Troponin-I, Qt. <0.02 (L) 02/16/2020 03:22 PM      Coagulation Tests Lab Results   Component Value Date/Time    Prothrombin time 10.4 01/07/2009 03:00 PM    INR 1.0 01/07/2009 03:00 PM    aPTT 24.5 01/12/2009 06:50 AM    aPTT 24.6 01/11/2009 06:30 AM    aPTT 24.7 01/10/2009 06:25 AM      A1c Lab Results   Component Value Date/Time    Hemoglobin A1c 5.9 02/17/2020 05:23 AM      Lipid Panel Lab Results   Component Value Date/Time    Cholesterol, total 227 02/17/2020 05:23 AM    HDL Cholesterol 36 (L) 02/17/2020 05:23 AM    LDL, calculated 155.4 (H) 02/17/2020 05:23 AM    VLDL Cholesterol 25 07/22/2013 02:10 PM    VLDL, calculated 35.6 (H) 02/17/2020 05:23 AM    Triglyceride 178 (H) 02/17/2020 05:23 AM    CHOL/HDL Ratio 6.3 02/17/2020 05:23 AM      Thyroid Panel Lab Results   Component Value Date/Time    TSH 2.110 04/03/2018 08:05 AM    TSH 2.150 10/04/2017 08:03 AM        Most Recent UA Lab Results   Component Value Date/Time    Color Yellow 07/10/2017 08:41 AM    Appearance Clear 07/10/2017 08:41 AM    pH (UA) 6.0 07/10/2017 08:41 AM    Ketone Negative 07/10/2017 08:41 AM    Bilirubin Negative 07/10/2017 08:41 AM    Blood 2+ (A) 07/10/2017 08:41 AM    Nitrites Negative 07/10/2017 08:41 AM    Leukocyte Esterase Trace (A) 07/10/2017 08:41 AM    WBC 0-5 07/10/2017 08:41 AM    RBC 11-30 (A) 07/10/2017 08:41 AM    Epithelial cells 0-10 07/10/2017 08:41 AM    Bacteria None seen 07/10/2017 08:41 AM    Mucus Present 07/10/2017 08:41 AM        Allergies   Allergen Reactions    Accupril [Quinapril] Unknown (comments)    Norvasc [Amlodipine] Unknown (comments)    Zoloft [Sertraline] Drowsiness     Immunization History   Administered Date(s) Administered    Influenza High Dose Vaccine PF 11/16/2015, 09/26/2016, 10/09/2017, 09/18/2018    Influenza Vaccine 09/24/2012, 10/10/2013, 10/20/2014    Influenza Vaccine (Tri) Adjuvanted 09/23/2019    Pneumococcal Conjugate (PCV-13) 06/14/2016    Pneumococcal Vaccine (Unspecified Type) 10/01/2010    Tdap 09/02/2013       All Labs from Last 24 Hrs:  No results found for this or any previous visit (from the past 24 hour(s)). Current Med List in Hospital:   Current Facility-Administered Medications   Medication Dose Route Frequency    pantoprazole (PROTONIX) tablet 40 mg  40 mg Oral ACB    sodium chloride (NS) flush 5-40 mL  5-40 mL IntraVENous Q8H    sodium chloride (NS) flush 5-40 mL  5-40 mL IntraVENous PRN    ondansetron (ZOFRAN) injection 4 mg  4 mg IntraVENous Q6H PRN    aspirin chewable tablet 81 mg  81 mg Oral DAILY    clopidogreL (PLAVIX) tablet 75 mg  75 mg Oral DAILY    acetaminophen (TYLENOL) tablet 650 mg  650 mg Oral Q4H PRN    atorvastatin (LIPITOR) tablet 80 mg  80 mg Oral QHS    bisacodyL (DULCOLAX) tablet 5 mg  5 mg Oral DAILY PRN    cholecalciferol (VITAMIN D3) (1000 Units /25 mcg) tablet 2 Tab  2,000 Units Oral BID    valsartan (DIOVAN) tablet 80 mg  80 mg Oral DAILY    isosorbide mononitrate ER (IMDUR) tablet 30 mg  30 mg Oral 7am    loratadine (CLARITIN) tablet 10 mg  10 mg Oral DAILY     Current Outpatient Medications   Medication Sig    atorvastatin (LIPITOR) 80 mg tablet Take 1 Tab by mouth nightly.  aspirin 81 mg tablet Take 1 Tab by mouth daily. TAKE AM OF SURGERY WITH SMALL SIP OF WATER    pantoprazole (PROTONIX) 20 mg tablet Take 1 Tab by mouth daily.     TURMERIC PO Take  by mouth.    irbesartan (AVAPRO) 150 mg tablet Take 1 Tab by mouth daily.  isosorbide mononitrate ER (IMDUR) 30 mg tablet Take 1 Tab by mouth every morning.  clopidogrel (PLAVIX) 75 mg tab Take 1 Tab by mouth daily.  cholecalciferol (D3-2000) 2,000 unit cap capsule Take 2,000 Units by mouth two (2) times a day.  loratadine (CLARITIN) 10 mg tablet Take 10 mg by mouth.  CRANBERRY EXTRACT (CRANBERRY PO) Take  by mouth two (2) times a day. Discharge Exam:  Patient Vitals for the past 24 hrs:   Temp Pulse Resp BP SpO2   02/18/20 1204 98 °F (36.7 °C) 74 18 183/84 97 %   02/18/20 0734 97.7 °F (36.5 °C) 60 16 180/90 99 %   02/18/20 0318 98 °F (36.7 °C)  16 147/77    02/17/20 2315 97.8 °F (36.6 °C) 66 16 170/79 98 %   02/17/20 1938 97.8 °F (36.6 °C) (!) 53 18 165/77 98 %     Oxygen Therapy  O2 Sat (%): 97 % (02/18/20 1204)  Pulse via Oximetry: 53 beats per minute (02/17/20 0832)  O2 Device: Room air (02/17/20 0832)    Estimated body mass index is 21.14 kg/m² as calculated from the following:    Height as of this encounter: 5' 7\" (1.702 m). Weight as of this encounter: 61.2 kg (135 lb). No intake or output data in the 24 hours ending 02/18/20 1757    *Note that automatically entered I/Os may not be accurate; dependent on patient compliance with collection and accurate  by assistants. General:    Well nourished. Alert. Eyes:   Normal sclerae. Extraocular movements intact. ENT:  Normocephalic, atraumatic. Moist mucous membranes  CV:   Regular rate and rhythm. No murmur, rub, or gallop. Lungs:  Clear to auscultation bilaterally. No wheezing, rhonchi, or rales. Abdomen: Soft, nontender, nondistended. Bowel sounds normal.   Extremities: Warm and dry. No cyanosis or edema. Neurologic: CN II-XII grossly intact. No gross focal deficits   Skin:     No rashes or jaundice. Psych:  Normal mood and affect.       Discharge Info:   Discharge Medication List as of 2/18/2020  4:12 PM START taking these medications    Details   pantoprazole (PROTONIX) 20 mg tablet Take 1 Tab by mouth daily. , Normal, Disp-90 Tab, R-1         CONTINUE these medications which have CHANGED    Details   atorvastatin (LIPITOR) 80 mg tablet Take 1 Tab by mouth nightly., Normal, Disp-90 Tab, R-1      aspirin 81 mg tablet Take 1 Tab by mouth daily. TAKE AM OF SURGERY WITH SMALL SIP OF WATER, Normal, Disp-21 Tab, R-0         CONTINUE these medications which have NOT CHANGED    Details   TURMERIC PO Take  by mouth., Historical Med      irbesartan (AVAPRO) 150 mg tablet Take 1 Tab by mouth daily. , Normal, Disp-90 Tab, R-3      isosorbide mononitrate ER (IMDUR) 30 mg tablet Take 1 Tab by mouth every morning., Normal, Disp-90 Tab, R-3      clopidogrel (PLAVIX) 75 mg tab Take 1 Tab by mouth daily. , Normal, Disp-90 Tab, R-3      cholecalciferol (D3-2000) 2,000 unit cap capsule Take 2,000 Units by mouth two (2) times a day., Historical Med      loratadine (CLARITIN) 10 mg tablet Take 10 mg by mouth., Historical Med      CRANBERRY EXTRACT (CRANBERRY PO) Take  by mouth two (2) times a day., Historical Med             Follow Up Orders: Follow-up Appointments   Procedures    FOLLOW UP VISIT Appointment in: Other (Specify) PMD in 3-5 days, neurology in 2 weeks, cardiology in 2 weeks (for Right to Left Shunt)     PMD in 3-5 days, neurology in 2 weeks, cardiology in 2 weeks (for Right to Left Shunt)     Standing Status:   Standing     Number of Occurrences:   1     Order Specific Question:   Appointment in     Answer:    Other (Specify)       Follow-up Information     Follow up With Specialties Details Why Contact Info    Irene Winter MD Internal Medicine   [address] [facility]  [phone]      Elba Isbell MD Neurology On 3/18/2020 at 1120 AM, please arrive 30 minutes prior to appt. [address] [place] [address]  On 3/10/2021 at [address] Jaky Jones [address] [place] [address]  [phone]          Time spent in patient discharge planning and coordination 35 minutes.     Signed:  Zachary Taylor MD

## 2020-02-18 NOTE — PROGRESS NOTES
Assessment complete via flow sheet. Pt A&Ox4. NIH 0  Respirations even and unlabored. CTA. S1 S2 auscultated. Pt on room air. Pt reports pain level of 0 out of 10. Bowel sounds active, abdomen soft and tender. Denies other needs. Bed in lowest position, side rails up x3, call bell in reach. Instructed to call for assistance. Pt verbalized understanding. Plan of care reviewed with patient.

## 2020-02-18 NOTE — PROGRESS NOTES
Problem: Neurolinguistics Impaired (Adult)  Goal: *Acute Goals and Plan of Care (Insert Text)  Description  LTG: Patient will increase neuro-linguistic abilities to increase safety and awareness in functional living environment   STG: Patient will participate in moderate level word finding task with 90% accuracy given minimal verbal cues. STG: Patient will recall relevant verbal information with 80% accuracy with minimal assistance. STG: Patient will utilize memory compensatory strategies to improve recall of functional list/message with 80% accuracy given min-mod cues. STG: Patient will participate in ongoing cognitive linguistic assessment for goal development as clinically indicated. MET 2/18/20       Outcome: Progressing Towards Goal      SPEECH LANGUAGE PATHOLOGY: SPEECH-LANGUAGE/COGNITION: Initial Assessment    NAME/AGE/GENDER: Duran John is a 80 y.o. female  DATE: 2/18/2020  PRIMARY DIAGNOSIS: Stroke (cerebrum) ([address] [I63.9]  Stroke (cerebrum) ([address] [I63.9]      ICD-10: Treatment Diagnosis: R41.841 Cognitive-Communication Deficit    INTERDISCIPLINARY COLLABORATION: Registered Nurse  PRECAUTIONS/ALLERGIES: Accupril [quinapril]; Norvasc [amlodipine]; and Zoloft [sertraline]     SUBJECTIVE   Patient alert and upright in bed for cognitive linguistic assessment. Son at bedside. History of Present Injury/Illness: Ms. Chay Paz  has a past medical history of CAD (coronary artery disease), Calculus of kidney, Congestive heart failure ([address] CVD (cerebrovascular disease) (8/19/2013), Dyslipidemia (8/19/2013), GERD (gastroesophageal reflux disease), HTN (hypertension) (8/19/2013), Left breast mass (4/13/2017), Long term current use of anticoagulant therapy, Myocardial infarction ([address] (2005, 2006), Osteoporosis (8/19/2013), Positive H. pylori test (8/19/2013), Rectal polyp (10/9/2017), Stroke ([address] (1/7/2009), and Thromboembolus ([address]  She also has no past medical history of Aneurysm ([address] Coagulation defects, Difficult intubation, Heart failure ([facility] Liver disease, Malignant hyperthermia due to anesthesia, Morbid obesity ([facility] Nausea & vomiting, Pseudocholinesterase deficiency, Psychiatric disorder, Unspecified adverse effect of anesthesia, or Unspecified sleep apnea. . She also  has a past surgical history that includes hx coronary artery bypass graft (); hx bladder suspension (); hx total abdominal hysterectomy (); hx orthopaedic; hx colonoscopy (); pr cardiac surg procedure unlist; hx gyn; hx lithotripsy (2008 x 2); hx urological; and hx appendectomy. Previous Speech Therapy: NONE REPORTED    Problem List:  (Impairments causing functional limitations):  1. Mild cognitive linguistic deficits    Orientation:   Person  Place  Time  Situation     Pain: Pain Scale 1: Numeric (0 - 10)  Pain Intensity 1: 0         OBJECTIVE   Patient was administered the SLUMS yielding a score of 23 out of 30, which falls within the mild neurocognitive decline range. A score of 27 or greater is considered WNL. Results as follows:    Orientation: 3/3 accurate  Short term memory: recalled 5/5 unrelated words 3 minutes post training with word list verbally presented x3 before patient was able to repeat. Mental manipulation/functional math: 1/3 accurate  Divergent naming: 3/3 accurate  Divided attention: 1/2 accurate  Executive function/clock drawin/4 accurate  Auditory comprehension: 2/2 accurate  Paragraph recall: 4/8 accurate        ASSESSMENT   Patient presents with mild cognitive linguistic deficits characterized by reduced attention for mental manipulation tasks and impaired immediate and short memory. Immediate memory currently impacting short term recall as patient is able to recall short term memory information, but requires repetition of information for immediate recall. Patient reports this is her baseline at the [facility] utilizes calendars and checklists to compensate for memory deficit.  Will continue to follow patient for cognitive linguistic treatment and ongoing education on compensatory strategies for mild deficits during hospitalization as patient is independent with ADLs. Tool Used: MODIFIED RADHA SCALE (mRS)   Score   No Symptoms  [] 0   No significant disability despite symptoms; able to carry out all usual duties and activities  [] 1   Slight disability; unable to carry out all previous activities but able to look after own affairs without assistance. [x] 2   Moderate disability; requiring some help but able to walk without assistance  [] 3   Moderately severe disability; unable to walk without assistance and unable to attend to own bodily needs without assistance  [] 4   Severe disability; bedridden, incontinent, and requiring constant nursing care and attention  [] 5      Score:  Initial: 2    Interpretation of Tool: The Modified [place] Scale is a 7-point scaled used to quantify level of disability as it relates to a patient's functional abilities. Current Medications:   No current facility-administered medications on file prior to encounter. Current Outpatient Medications on File Prior to Encounter   Medication Sig Dispense Refill    TURMERIC PO Take  by mouth.  irbesartan (AVAPRO) 150 mg tablet Take 1 Tab by mouth daily. 90 Tab 3    atorvastatin (LIPITOR) 40 mg tablet Take 1 Tab by mouth daily. 90 Tab 3    isosorbide mononitrate ER (IMDUR) 30 mg tablet Take 1 Tab by mouth every morning. 90 Tab 3    clopidogrel (PLAVIX) 75 mg tab Take 1 Tab by mouth daily. 90 Tab 3    cholecalciferol (D3-2000) 2,000 unit cap capsule Take 2,000 Units by mouth two (2) times a day.  loratadine (CLARITIN) 10 mg tablet Take 10 mg by mouth.  CRANBERRY EXTRACT (CRANBERRY PO) Take  by mouth two (2) times a day.  aspirin 81 mg tablet Take 81 mg by mouth.  TAKE AM OF SURGERY WITH SMALL SIP OF WATER         PLAN    FREQUENCY/DURATION: Continue to follow patient 2 times a week for duration of hospital stay to address above goals. - Recommendations for next treatment session: Next treatment will address cognitive linguistic treatment     REHABILITATION POTENTIAL FOR STATED GOALS: Good         RECOMMENDATIONS   STRATEGIES: N/A     EDUCATION:  · Recommendations discussed with Family  · Patient     RECOMMENDATIONS for CONTINUED SPEECH THERAPY: YES: Anticipate need for ongoing speech therapy during this hospitalization. Compliance with Program/Exercises: Will assess as treatment progresses  Continuation of Skilled Services/Medical Necessity:   Patient is expected to demonstrate progress in expressive communication and cognitive ability to increase independence with activities of daily living.  Patient continues to require skilled intervention due to mild cognitive linguistic deficits.        SAFETY:  After treatment position/precautions:  · Upright in bed  · Son at bedside  · Call light within reach    Total Treatment Duration:     Time In: 1213  Time Out: 1430 [facility] [address], [address] [facility]

## 2020-02-18 NOTE — PROGRESS NOTES
Report received from Jorge Avalos RN. PM assessment completed. PT is AAO x 4 with normal unlabored respirations present on RA. IV site is CDI. PT denies pain or other needs at this time. Bed is low and locked with call light in reach. Will continue to monitor.

## 2020-02-18 NOTE — DISCHARGE INSTRUCTIONS
DISCHARGE SUMMARY from Nurse    PATIENT INSTRUCTIONS:    After general anesthesia or intravenous sedation, for 24 hours or while taking prescription Narcotics:  · Limit your activities  · Do not drive and operate hazardous machinery  · Do not make important personal or business decisions  · Do  not drink alcoholic beverages  · If you have not urinated within 8 hours after discharge, please contact your surgeon on call. Report the following to your surgeon:  · Excessive pain, swelling, redness or odor of or around the surgical area  · Temperature over 100.5  · Nausea and vomiting lasting longer than 4 hours or if unable to take medications  · Any signs of decreased circulation or nerve impairment to extremity: change in color, persistent  numbness, tingling, coldness or increase pain  · Any questions    What to do at Home:  Recommended activity: Activity as tolerated, recommended by MD    If you experience any of the following symptoms balance issues, pupils uneven, facial drop, arm/leg un even, please come to ER. *  Please give a list of your current medications to your Primary Care Provider. *  Please update this list whenever your medications are discontinued, doses are      changed, or new medications (including over-the-counter products) are added. *  Please carry medication information at all times in case of emergency situations. These are general instructions for a healthy lifestyle:    No smoking/ No tobacco products/ Avoid exposure to second hand smoke  Surgeon General's Warning:  Quitting smoking now greatly reduces serious risk to your health.     Obesity, smoking, and sedentary lifestyle greatly increases your risk for illness    A healthy diet, regular physical exercise & weight monitoring are important for maintaining a healthy lifestyle    You may be retaining fluid if you have a history of heart failure or if you experience any of the following symptoms:  Weight gain of 3 pounds or more overnight or 5 pounds in a week, increased swelling in our hands or feet or shortness of breath while lying flat in bed. Please call your doctor as soon as you notice any of these symptoms; do not wait until your next office visit. The discharge information has been reviewed with the patient. The patient verbalized understanding. Discharge medications reviewed with the patient and appropriate educational materials and side effects teaching were provided. ___________________________________________________________________________________________________________________________________  Stroke: After Your Visit     Your Care Instructions     Risk factors for stroke include being overweight, smoking, and sedentary lifestyle. This means that the blood flow to a part of your brain was blocked for some time, which damages the nerve cells in that part of the brain. The part of your body controlled by that part of your brain may not function properly now. The brain is an amazing organ that can heal itself to some degree. The stroke you had damaged part of your brain, but other parts of your brain may take over in some way for the damaged areas. You have already started this process. Going home may be hard for you and your family. The more you can try to do for yourself, the better. Remember to take each day one at a time. Follow-up care is a key part of your treatment and safety. Be sure to make and go to all appointments, and call your doctor if you are having problems. Its also a good idea to know your test results and keep a list of the medicines you take. How can you care for yourself at home? Enter a stroke rehabilitation (rehab) program, if your doctor recommends it. Physical, speech, and occupational therapies can help you manage bathing, dressing, eating, and other basics of daily living. Eat a heart-healthy diet that is low in cholesterol, saturated fat, and salt.  Eat lots of fresh fruits and vegetables and foods high in fiber. Increase your activities slowly. Take short rest breaks when you get tired. Gradually increase the amount you walk. Start out by walking a little more than you did the day before. Do not drive until your doctor says it is okay. It is normal to feel sad or depressed after a stroke. If the blues last, talk to your doctor. If you are having problems with urine leakage, go to the bathroom at regular times, including when you first wake up and at bedtime. Also, limit fluids after dinner. If you are constipated, drink plenty of fluids, enough so that your urine is light yellow or clear like water. If you have kidney, heart, or liver disease and have to limit fluids, talk with your doctor before you increase the amount of fluids you drink. Set up a regular time for using the toilet. If you continue to have constipation, your doctor may suggest using a bulking agent, such as Metamucil, or a stool softener, laxative, or enema. Medicines  Take your medicines exactly as prescribed. Call your doctor if you think you are having a problem with your medicine. You may be taking several medicines. ACE (angiotensin-converting enzyme) inhibitors, angiotensin II receptor blockers (ARBs), beta-blockers, diuretics (water pills), and calcium channel blockers control your blood pressure. Statins help lower cholesterol. Your doctor may also prescribe medicines for depression, pain, sleep problems, anxiety, or agitation. If your doctor has given you medicine that prevents blood clots, such as warfarin (Coumadin), aspirin combined with extended-release dipyridamole (Aggrenox), clopidogrel (Plavix), or aspirin to prevent another stroke, you should:  Tell your dentist, pharmacist, and other health professionals that you take these medicines. Watch for unusual bruising or bleeding, such as blood in your urine, red or black stools, or bleeding from your nose or gums.   Get regular blood tests to check your clotting time if you are taking Coumadin. Wear medical alert jewelry that says you take blood thinners. You can buy this at most drugstores. Do not take any over-the-counter medicines or herbal products without talking to your doctor first.  If you take birth control pills or hormone replacement therapy, talk to your doctor about whether they are right for you. For family members and caregivers  Make the home safe. Set up a room so that your loved one does not have to climb stairs. Be sure the bathroom is on the same floor. Move throw rugs and furniture that could cause falls, and make sure that the lighting is good. Put grab bars and seats in tubs and showers. Find out what your loved one can do and what he or she needs help with. Try not to do things for your loved one that your loved one can do on his or her own. Help him or her learn and practice new skills. Visit and talk with your loved one often. Try doing activities together that you both enjoy, such as playing cards or board games. Keep in touch with your loved one's friends as much as you can, and encourage them to visit. Take care of yourself. Do not try to do everything yourself. Ask other family members to help. Eat well, get enough rest, and take time to do things that you enjoy. Keep up with your own doctor visits, and make sure to take your medicines regularly. Get out of the house as much as you can. Join a local support group. Find out if you qualify for home health care visits to help with rehab or for adult day care. When should you call for help? Call 911 anytime you think you may need emergency care. For example, call if:  You have signs of another stroke. These may include:  Sudden numbness, paralysis, or weakness in your face, arm, or leg, especially on only one side of your body. New problems with walking or balance. Sudden vision changes. Drooling or slurred speech.   New problems speaking or understanding simple statements, or you feel confused. A sudden, severe headache that is different from past headaches. Call 911 even if these symptoms go away in a few minutes. You cough up blood. You vomit blood or what looks like coffee grounds. You pass maroon or very bloody stools. Call your doctor now or seek immediate medical care if:  You have new bruises or blood spots under your skin. You have a nosebleed. Your gums bleed when you brush your teeth. You have blood in your urine. Your stools are black and tarlike or have streaks of blood. You have vaginal bleeding when you are not having your period, or heavy period bleeding. You have new symptoms that may be related to your stroke, such as falls or trouble swallowing. Watch closely for changes in your health, and be sure to contact your doctor if you have any problems. Where can you learn more? Go to Bookya.be    Enter C294  in the search box to learn more about \"Stroke: After Your Visit\". © 8921-6762 Healthwise, Incorporated. Care instructions adapted under license by [employer] (which disclaims liability or warranty for this information). This care instruction is for use with your licensed healthcare professional. If you have questions about a medical condition or this instruction, always ask your healthcare professional. Tereasa Scale any warranty or liability for your use of this information.

## 2020-02-18 NOTE — PROGRESS NOTES
Physical therapy - spoke with patient who was eating lunch and getting ready to go home. Son present. She says she has been up and moving this am without difficulty. Stressed safety to her and she has family to help. She had no questions or concerns about going home today.

## 2020-02-18 NOTE — PROGRESS NOTES
Care Management Interventions  PCP Verified by CM: Yes  Transition of Care Consult (CM Consult): Discharge Planning  Physical Therapy Consult: Yes  Occupational Therapy Consult: Yes  Current Support Network: Own Home, Lives Alone  Confirm Follow Up Transport: Family  The Plan for Transition of Care is Related to the Following Treatment Goals : pt  The Patient and/or Patient Representative was Provided with a Choice of Provider and Agrees with the Discharge Plan?: Yes  Freedom of Choice List was Provided with Basic Dialogue that Supports the Patient's Individualized Plan of Care/Goals, Treatment Preferences and Shares the Quality Data Associated with the Providers?: Yes  Discharge Location  Discharge Placement: Home with home health          Per MD pt stable for d/c today. SW notified Jalil Cooper with [facility].

## 2020-02-20 ENCOUNTER — HOME CARE VISIT (OUTPATIENT)
Dept: SCHEDULING | Facility: HOME HEALTH | Age: 85
End: 2020-02-20

## 2020-07-08 ENCOUNTER — HOSPITAL ENCOUNTER (OUTPATIENT)
Dept: MAMMOGRAPHY | Age: 85
Discharge: HOME | End: 2020-07-08
Attending: INTERNAL MEDICINE

## 2020-07-08 DIAGNOSIS — Z12.31 SCREENING MAMMOGRAM, ENCOUNTER FOR: ICD-10-CM

## 2020-09-01 ENCOUNTER — HOSPITAL ENCOUNTER (OUTPATIENT)
Dept: GENERAL RADIOLOGY | Age: 85
Discharge: HOME | End: 2020-09-01
Attending: PHYSICIAN ASSISTANT
Payer: MEDICARE

## 2020-09-01 DIAGNOSIS — M54.16 LUMBAR BACK PAIN WITH RADICULOPATHY AFFECTING RIGHT LOWER EXTREMITY: ICD-10-CM

## 2020-09-01 PROCEDURE — 72110 X-RAY EXAM L-2 SPINE 4/>VWS: CPT

## 2020-09-01 NOTE — PROGRESS NOTES
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 Please notify patient and forward a copy of these results to [facility] - see [facility] for their fax number.

## 2020-09-02 NOTE — PROGRESS NOTES
Patient notified of Back xray results and told that a copy results would be faxed to [facility]

## 2020-09-24 PROBLEM — I25.118 CORONARY ARTERY DISEASE WITH STABLE ANGINA PECTORIS (HCC): Status: ACTIVE | Noted: 2020-09-24

## 2021-02-02 NOTE — PROGRESS NOTES
Called to pre-assess for Arvind , Scheduled [facility]. No answer & message left. Left VM telling patient to arrive for 1030.

## 2021-02-03 ENCOUNTER — HOSPITAL ENCOUNTER (OUTPATIENT)
Dept: CARDIAC CATH/INVASIVE PROCEDURES | Age: 86
Discharge: HOME | End: 2021-02-03
Attending: INTERNAL MEDICINE | Admitting: INTERNAL MEDICINE
Payer: MEDICARE

## 2021-02-03 VITALS
OXYGEN SATURATION: 95 % | TEMPERATURE: 98.1 F | HEIGHT: 66 IN | WEIGHT: 135 LBS | BODY MASS INDEX: 21.69 KG/M2 | SYSTOLIC BLOOD PRESSURE: 180 MMHG | DIASTOLIC BLOOD PRESSURE: 80 MMHG | HEART RATE: 44 BPM

## 2021-02-03 DIAGNOSIS — I20.9 ANGINA, CLASS III (HCC): ICD-10-CM

## 2021-02-03 LAB
ALBUMIN SERPL-MCNC: 4.2 G/DL (ref 3.2–4.6)
ALBUMIN/GLOB SERPL: 1.2 {RATIO} (ref 1.2–3.5)
ALP SERPL-CCNC: 70 U/L (ref 50–136)
ALT SERPL-CCNC: 22 U/L (ref 12–65)
ANION GAP SERPL CALC-SCNC: 4 MMOL/L (ref 7–16)
AST SERPL-CCNC: 22 U/L (ref 15–37)
ATRIAL RATE: 49 BPM
BILIRUB SERPL-MCNC: 1 MG/DL (ref 0.2–1.1)
BUN SERPL-MCNC: 16 MG/DL (ref 8–23)
CALCIUM SERPL-MCNC: 10.1 MG/DL (ref 8.3–10.4)
CALCULATED P AXIS, ECG09: 58 DEGREES
CALCULATED R AXIS, ECG10: 6 DEGREES
CALCULATED T AXIS, ECG11: 93 DEGREES
CHLORIDE SERPL-SCNC: 109 MMOL/L (ref 98–107)
CO2 SERPL-SCNC: 29 MMOL/L (ref 21–32)
CREAT SERPL-MCNC: 0.89 MG/DL (ref 0.6–1)
DIAGNOSIS, 93000: NORMAL
ERYTHROCYTE [DISTWIDTH] IN BLOOD BY AUTOMATED COUNT: 11.9 % (ref 11.9–14.6)
GLOBULIN SER CALC-MCNC: 3.6 G/DL (ref 2.3–3.5)
GLUCOSE SERPL-MCNC: 86 MG/DL (ref 65–100)
HCT VFR BLD AUTO: 48.3 % (ref 35.8–46.3)
HGB BLD-MCNC: 15.9 G/DL (ref 11.7–15.4)
INR PPP: 1
MCH RBC QN AUTO: 29.7 PG (ref 26.1–32.9)
MCHC RBC AUTO-ENTMCNC: 32.9 G/DL (ref 31.4–35)
MCV RBC AUTO: 90.3 FL (ref 79.6–97.8)
NRBC # BLD: 0 K/UL (ref 0–0.2)
P-R INTERVAL, ECG05: 156 MS
PLATELET # BLD AUTO: 212 K/UL (ref 150–450)
PMV BLD AUTO: 11.1 FL (ref 9.4–12.3)
POTASSIUM SERPL-SCNC: 4 MMOL/L (ref 3.5–5.1)
PROT SERPL-MCNC: 7.8 G/DL (ref 6.3–8.2)
PROTHROMBIN TIME: 13.2 SEC (ref 12.5–14.7)
Q-T INTERVAL, ECG07: 504 MS
QRS DURATION, ECG06: 80 MS
QTC CALCULATION (BEZET), ECG08: 455 MS
RBC # BLD AUTO: 5.35 M/UL (ref 4.05–5.2)
SODIUM SERPL-SCNC: 142 MMOL/L (ref 136–145)
VENTRICULAR RATE, ECG03: 49 BPM
WBC # BLD AUTO: 6.8 K/UL (ref 4.3–11.1)

## 2021-02-03 PROCEDURE — 33285 INSJ SUBQ CAR RHYTHM MNTR: CPT

## 2021-02-03 PROCEDURE — C1764 EVENT RECORDER, CARDIAC: HCPCS

## 2021-02-03 PROCEDURE — 93005 ELECTROCARDIOGRAM TRACING: CPT | Performed by: INTERNAL MEDICINE

## 2021-02-03 PROCEDURE — C1887 CATHETER, GUIDING: HCPCS

## 2021-02-03 PROCEDURE — 93459 L HRT ART/GRFT ANGIO: CPT

## 2021-02-03 PROCEDURE — 77030013687 HC GD NDL BARD -B

## 2021-02-03 PROCEDURE — C1769 GUIDE WIRE: HCPCS

## 2021-02-03 PROCEDURE — 85027 COMPLETE CBC AUTOMATED: CPT

## 2021-02-03 PROCEDURE — 80053 COMPREHEN METABOLIC PANEL: CPT

## 2021-02-03 PROCEDURE — C1894 INTRO/SHEATH, NON-LASER: HCPCS

## 2021-02-03 PROCEDURE — 85610 PROTHROMBIN TIME: CPT

## 2021-02-03 PROCEDURE — 77030002996 HC SUT SLK J&J -A

## 2021-02-03 PROCEDURE — 74011000250 HC RX REV CODE- 250: Performed by: INTERNAL MEDICINE

## 2021-02-03 PROCEDURE — 74011250636 HC RX REV CODE- 250/636: Performed by: INTERNAL MEDICINE

## 2021-02-03 PROCEDURE — 74011000636 HC RX REV CODE- 636: Performed by: INTERNAL MEDICINE

## 2021-02-03 PROCEDURE — 77030019569 HC BND COMPR RAD TERU -B

## 2021-02-03 PROCEDURE — 99153 MOD SED SAME PHYS/QHP EA: CPT

## 2021-02-03 PROCEDURE — 74011250637 HC RX REV CODE- 250/637: Performed by: INTERNAL MEDICINE

## 2021-02-03 PROCEDURE — 99152 MOD SED SAME PHYS/QHP 5/>YRS: CPT

## 2021-02-03 PROCEDURE — 77030041397 HC DRSG PRIMASEAL AG MDII -B

## 2021-02-03 PROCEDURE — 77030016699 HC CATH ANGI DX INFN1 CARD -A

## 2021-02-03 RX ORDER — MIDAZOLAM HYDROCHLORIDE 1 MG/ML
.5-2 INJECTION, SOLUTION INTRAMUSCULAR; INTRAVENOUS
Status: DISCONTINUED | OUTPATIENT
Start: 2021-02-03 | End: 2021-02-03 | Stop reason: HOSPADM

## 2021-02-03 RX ORDER — LIDOCAINE HYDROCHLORIDE 10 MG/ML
1-30 INJECTION, SOLUTION EPIDURAL; INFILTRATION; INTRACAUDAL; PERINEURAL ONCE
Status: COMPLETED | OUTPATIENT
Start: 2021-02-03 | End: 2021-02-03

## 2021-02-03 RX ORDER — HYDROMORPHONE HYDROCHLORIDE 2 MG/ML
1 INJECTION, SOLUTION INTRAMUSCULAR; INTRAVENOUS; SUBCUTANEOUS
Status: DISCONTINUED | OUTPATIENT
Start: 2021-02-03 | End: 2021-02-03 | Stop reason: HOSPADM

## 2021-02-03 RX ORDER — SODIUM CHLORIDE 9 MG/ML
75 INJECTION, SOLUTION INTRAVENOUS CONTINUOUS
Status: DISCONTINUED | OUTPATIENT
Start: 2021-02-03 | End: 2021-02-03 | Stop reason: HOSPADM

## 2021-02-03 RX ORDER — HEPARIN SODIUM 10000 [USP'U]/ML
1000-10000 INJECTION, SOLUTION INTRAVENOUS; SUBCUTANEOUS
Status: DISCONTINUED | OUTPATIENT
Start: 2021-02-03 | End: 2021-02-03 | Stop reason: HOSPADM

## 2021-02-03 RX ORDER — CLONIDINE HYDROCHLORIDE 0.1 MG/1
0.1 TABLET ORAL
Status: COMPLETED | OUTPATIENT
Start: 2021-02-03 | End: 2021-02-03

## 2021-02-03 RX ORDER — GUAIFENESIN 100 MG/5ML
81-324 LIQUID (ML) ORAL ONCE
Status: DISCONTINUED | OUTPATIENT
Start: 2021-02-03 | End: 2021-02-03 | Stop reason: HOSPADM

## 2021-02-03 RX ORDER — HEPARIN SODIUM 200 [USP'U]/100ML
3 INJECTION, SOLUTION INTRAVENOUS CONTINUOUS
Status: DISCONTINUED | OUTPATIENT
Start: 2021-02-03 | End: 2021-02-03 | Stop reason: HOSPADM

## 2021-02-03 RX ADMIN — HEPARIN SODIUM 2 ML: 10000 INJECTION, SOLUTION INTRAVENOUS; SUBCUTANEOUS at 14:35

## 2021-02-03 RX ADMIN — CLONIDINE HYDROCHLORIDE 0.1 MG: 0.1 TABLET ORAL at 18:16

## 2021-02-03 RX ADMIN — MIDAZOLAM 1 MG: 1 INJECTION INTRAMUSCULAR; INTRAVENOUS at 14:41

## 2021-02-03 RX ADMIN — HEPARIN SODIUM 3 UNITS/HR: 5000 INJECTION, SOLUTION INTRAVENOUS; SUBCUTANEOUS at 14:33

## 2021-02-03 RX ADMIN — HYDROMORPHONE HYDROCHLORIDE 1 MG: 2 INJECTION INTRAMUSCULAR; INTRAVENOUS; SUBCUTANEOUS at 14:51

## 2021-02-03 RX ADMIN — IOPAMIDOL 155 ML: 755 INJECTION, SOLUTION INTRAVENOUS at 15:21

## 2021-02-03 RX ADMIN — MIDAZOLAM 2 MG: 1 INJECTION INTRAMUSCULAR; INTRAVENOUS at 14:27

## 2021-02-03 RX ADMIN — HEPARIN SODIUM 3000 UNITS: 10000 INJECTION INTRAVENOUS; SUBCUTANEOUS at 14:35

## 2021-02-03 RX ADMIN — LIDOCAINE HYDROCHLORIDE 4 ML: 10 INJECTION, SOLUTION EPIDURAL; INFILTRATION; INTRACAUDAL; PERINEURAL at 14:30

## 2021-02-03 RX ADMIN — SODIUM CHLORIDE 75 ML/HR: 900 INJECTION, SOLUTION INTRAVENOUS at 12:01

## 2021-02-03 RX ADMIN — LIDOCAINE HYDROCHLORIDE 6 ML: 10 INJECTION, SOLUTION EPIDURAL; INFILTRATION; INTRACAUDAL; PERINEURAL at 14:22

## 2021-02-03 RX ADMIN — MIDAZOLAM 1 MG: 1 INJECTION INTRAMUSCULAR; INTRAVENOUS at 15:03

## 2021-02-03 NOTE — PROGRESS NOTES
Patient up to bedside, vital signs and site stable. Patient ambulated to bathroom without difficulty. Patient voided without difficulty. Vascular site stable. Discharge instructions and home medications reviewed with patient. Time allowed for questions and answers. 1830 Patient ambulated second time without difficulty. Site stable after ambulation. Peripheral IV sites dc'd without difficulty with tips intact. 1845 Patient discharged to home with family.

## 2021-02-03 NOTE — PROGRESS NOTES
Report received from [address] [place] [address]. Procedural findings communicated. Intra procedural  medication administration reviewed. Progression of care discussed. Patient received into [id number] [address] post sheath removal.     Access site without bleeding or swelling yes    Dressing dry and intact yes    Patient instructed to limit movement to left upper extremity and mid sternal incision.     Routine post procedural vital signs and site assessment initiated yes

## 2021-02-03 NOTE — PROGRESS NOTES
Dr. Hussain Garza notified patient's bp still running consistently elavated. Orders received to start patient on new prescription of Clonidine 0.1 mg po bid. Called to pharmacy.

## 2021-02-03 NOTE — PROGRESS NOTES
Terumo band completely deflated. 1810 Terumo band removed from left wrist using sterile technique. Sterile dressing applied. No signs and symptoms of bleeding, oozing or hematoma.

## 2021-02-03 NOTE — PROGRESS NOTES
Pt arrived, ambulated to room with no visible problems, planned C for Dr Justen Dean. Consent signed, Procedure discussed with pt all questions answered voiced understanding. Medications and history discussed with pt. Pt prepped per ordersThe patient has a fraility score of 4-VULNERABLE, based on age, ability to complete ADLs without assistance.       Patient took Aspirin 324mg today at 0800 prior to arrival.

## 2021-02-03 NOTE — DISCHARGE INSTRUCTIONS
New medication:  Clonidine 0.1 mg twice daily,  Prescription called to [facility] [address] [facility]    You are scheduled for a heart catheterization on Monday February 8th. Nothing to eat or drink after Midnight Sunday night. You will be contacted with time and instructions on Friday afternoon. HEART CATHETERIZATION/ANGIOGRAPHY DISCHARGE INSTRUCTIONS    1. Check puncture site frequently for swelling or bleeding. If there is any bleeding, lie down and apply pressure over the area with a clean towel or washcloth and call 911. Notify your doctor for any redness, swelling, drainage, or oozing from the puncture site. Notify your doctor for any fever or chills. 2. If the extremity becomes cold, numb, or painful call Dr. Hussain Garza at [phone].  3. Activity should be limited for the next 48 hours. Avoid pushing, pulling and bending of affected wrist for 48 hours. No heavy lifting (anything over 5 pounds) for 3 days. No driving for 48 hours. 4. You may resume your usual diet. Drink more fluids than usual.  5. Have a responsible person drive you home and stay with you for at least 24 hours after your heart catheterization/angiography. 6. You may remove bandage from your left arm  in 24 hours. You may shower in 24 hours. No tub baths, hot tubs, or swimming for 1 week. Do not place any lotions, creams, powders, or ointments over puncture site for 1 week. You may place a clean band-aid over the puncture site each day for 5 days. Change daily. I have read the above instructions and have had the opportunity to ask questions.

## 2021-02-03 NOTE — ROUTINE PROCESS
TRANSFER - OUT REPORT: 
 
[facility] w/ grafts and loop recorder implant Dr. Akil Gongora LRA w/ hemoband \"12\" Left chest incision-Suture, Primaseal dressing Diagnostic cath- multivessel disease, RCA occluded, Plan for rotablade LAD/circ Versed 4mg, Dilaudid 1mg Heparin 5,000 units cocktail Verbal report given to [facility]) on Mariano Pipe  being transferred to CPRU(unit) for routine progression of care Report consisted of patients Situation, Background, Assessment and  
Recommendations(SBAR). Information from the following report(s) SBAR and Procedure Summary was reviewed with the receiving nurse. Lines:  
Peripheral IV 02/03/21 Right; Lower Forearm (Active) Peripheral IV 02/03/21 Left; Lower Forearm (Active) Opportunity for questions and clarification was provided. Patient transported with: 
 Registered Nurse

## 2021-02-04 NOTE — PROCEDURES
[facility]  CARDIAC CATH    Name:  Bridgette Preston  MR#:  [id number]  :  1931  ACCOUNT #:  [de-identified]  DATE OF SERVICE:  2021    PROCEDURES PERFORMED:  1. Percutaneous loop monitor implantation. 2.  Cardiac catheterization. PREOPERATIVE DIAGNOSES:  1. History of embolic stroke of uncertain source. 2.  Coronary artery disease with worsening angina. POSTOPERATIVE DIAGNOSES:  The same. SURGEON:  Kailash Barron MD    ASSISTANT:  STARR Khoury    ESTIMATED BLOOD LOSS:  Zero. SPECIMENS REMOVED:  Zero. COMPLICATIONS:  Zero. IMPLANTS:  Loop monitor. ANESTHESIA:  Conscious sedation administered by Gricelda Gallego RN. She is certified to give conscious sedation. This was under my direction. A total of 4 mg of Versed and 1 mg of Dilaudid was administered during the procedure whose start time was 14:30 and end time 15:15. HISTORY:  This is an active 43-year-old lady. She has had an embolic stroke of uncertain source and a loop monitor implant has been recommended. She also has coronary artery disease. She has had prior CABG and recent onset of worsening angina pectoris in spite of good medical therapy. A loop monitor implant and a cardiac catheterization is recommended. PROCEDURE:  The left parasternal area was prepped and draped in sterile manner. A [employer]onic loop monitor model number [id number], serial number [id number] was implanted by standard technique without difficulty. The wound was closed with a 2-0 silk mattress suture. Sterile dressing was applied. Cardiac catheterization was then performed from the distal left radial artery. Using ultrasound at the anatomic snuffbox, a 4.5 Slender sheath was inserted and cardiac catheterization was performed. The native right was injected with a 5-Ukrainian multipurpose. This was then used to inject the occluded vein graft to the LAD.   A left ventriculogram was then performed with a 5-Ukrainian angled pigtail catheter. A 5-Vietnamese 3.5 EBU was then advanced. The left coronary could not be intubated. This was used to perform a second injection of the right coronary artery. This catheter was then changed out for a 3.0 EBU. This was used for intubation of the left coronary artery followed by selective angiography. A patent vein graft to the ramus and left circumflex was then injected with a 5-Vietnamese 3.5 left Beth. She tolerated the procedure well. FINDINGS:  The central aortic pressure is 120/70 mmHg. Left ventricular end-diastolic pressure is 18 mmHg. There is no gradient on pullback across the aortic valve. The overall left ventricular size is normal.  The wall motion looks normal.  Ejection fraction is 60%. On fluoroscopy, there is heavy coronary calcification of the left and right coronary arteries. The right coronary artery is chronically occluded in its proximal portion. There is faint left filling from the left. It does not appear to be a very large vessel. On the left, the main looks fairly normal.  It divides into an occluded left circumflex and the LAD. The LAD is severely diseased all along its proximal segment. There is high-grade stenosis at its junction with the middle segment. The further middle and distal segments show mild disease. The saphenous vein graft to the ramus and left circumflex is patent. It is irregular with mild atherosclerosis along the course of the vessel with no severe stenosis identified. It is normal in its insertion with good runoff into the obtuse marginal branch with good retrograde filling of the more proximal and distal segments of the left circumflex circulation. There is good filling of the ramus as well. IMPRESSION:  1. Normal left ventricular function. 2.  Severe coronary artery disease as described. There is severe left anterior descending disease. The ramus and left circumflex are occluded.   The sequential saphenous vein graft to these vessels is patent. The right coronary artery is chronically occluded. It does not appear to be a large vessel. 3.  Successful loop implantation. PLAN:  She will be brought back for PCI to the LAD.       Gisele Pleitez MD      [id number]  D:  02/03/2021 15:56  T:  02/04/2021 6:09  JOB #:  [id number]

## 2021-02-18 ENCOUNTER — HOSPITAL ENCOUNTER (OUTPATIENT)
Dept: CARDIAC CATH/INVASIVE PROCEDURES | Age: 86
Discharge: HOME | End: 2021-02-18
Attending: INTERNAL MEDICINE | Admitting: INTERNAL MEDICINE
Payer: MEDICARE

## 2021-02-18 ENCOUNTER — APPOINTMENT (OUTPATIENT)
Dept: GENERAL RADIOLOGY | Age: 86
End: 2021-02-18
Attending: INTERNAL MEDICINE
Payer: MEDICARE

## 2021-02-18 VITALS — OXYGEN SATURATION: 97 % | DIASTOLIC BLOOD PRESSURE: 70 MMHG | HEART RATE: 60 BPM | SYSTOLIC BLOOD PRESSURE: 161 MMHG

## 2021-02-18 LAB
ATRIAL RATE: 39 BPM
CALCULATED P AXIS, ECG09: 49 DEGREES
CALCULATED R AXIS, ECG10: 9 DEGREES
CALCULATED T AXIS, ECG11: 70 DEGREES
DIAGNOSIS, 93000: NORMAL
P-R INTERVAL, ECG05: 176 MS
Q-T INTERVAL, ECG07: 536 MS
QRS DURATION, ECG06: 82 MS
QTC CALCULATION (BEZET), ECG08: 431 MS
VENTRICULAR RATE, ECG03: 39 BPM

## 2021-02-18 PROCEDURE — 93005 ELECTROCARDIOGRAM TRACING: CPT | Performed by: INTERNAL MEDICINE

## 2021-02-18 PROCEDURE — 74011250636 HC RX REV CODE- 250/636: Performed by: INTERNAL MEDICINE

## 2021-02-18 PROCEDURE — L3650 SO 8 ABD RESTRAINT PRE OTS: HCPCS

## 2021-02-18 PROCEDURE — 33286 RMVL SUBQ CAR RHYTHM MNTR: CPT

## 2021-02-18 PROCEDURE — 99153 MOD SED SAME PHYS/QHP EA: CPT

## 2021-02-18 PROCEDURE — C1785 PMKR, DUAL, RATE-RESP: HCPCS

## 2021-02-18 PROCEDURE — 99152 MOD SED SAME PHYS/QHP 5/>YRS: CPT

## 2021-02-18 PROCEDURE — 77030002986 HC SUT PROL J&J -A

## 2021-02-18 PROCEDURE — 71045 X-RAY EXAM CHEST 1 VIEW: CPT

## 2021-02-18 PROCEDURE — 74011000250 HC RX REV CODE- 250: Performed by: INTERNAL MEDICINE

## 2021-02-18 PROCEDURE — 33208 INSRT HEART PM ATRIAL & VENT: CPT

## 2021-02-18 PROCEDURE — 77030011747 HC SEAL HEMSTAT TELE -C

## 2021-02-18 PROCEDURE — 74011000258 HC RX REV CODE- 258: Performed by: INTERNAL MEDICINE

## 2021-02-18 PROCEDURE — 77030041397 HC DRSG PRIMASEAL AG MDII -B

## 2021-02-18 PROCEDURE — 33208 INSRT HEART PM ATRIAL & VENT: CPT | Performed by: INTERNAL MEDICINE

## 2021-02-18 PROCEDURE — C1892 INTRO/SHEATH,FIXED,PEEL-AWAY: HCPCS

## 2021-02-18 PROCEDURE — 77030031139 HC SUT VCRL2 J&J -A

## 2021-02-18 PROCEDURE — 33286 RMVL SUBQ CAR RHYTHM MNTR: CPT | Performed by: INTERNAL MEDICINE

## 2021-02-18 PROCEDURE — C1898 LEAD, PMKR, OTHER THAN TRANS: HCPCS

## 2021-02-18 PROCEDURE — 77030018579 HC SUT TICRN1 COVD -B

## 2021-02-18 PROCEDURE — 77030013687 HC GD NDL BARD -B

## 2021-02-18 PROCEDURE — 77030041279 HC DRSG PRMSL AG MDII -B

## 2021-02-18 PROCEDURE — 74011250637 HC RX REV CODE- 250/637: Performed by: INTERNAL MEDICINE

## 2021-02-18 PROCEDURE — 77030008462 HC STPLR SKN PROX J&J -A

## 2021-02-18 RX ORDER — ACETAMINOPHEN 325 MG/1
650 TABLET ORAL
Status: DISCONTINUED | OUTPATIENT
Start: 2021-02-18 | End: 2021-02-18 | Stop reason: HOSPADM

## 2021-02-18 RX ORDER — SODIUM CHLORIDE 9 MG/ML
75 INJECTION, SOLUTION INTRAVENOUS CONTINUOUS
Status: DISCONTINUED | OUTPATIENT
Start: 2021-02-18 | End: 2021-02-18 | Stop reason: HOSPADM

## 2021-02-18 RX ORDER — CEFAZOLIN SODIUM/WATER 2 G/20 ML
2 SYRINGE (ML) INTRAVENOUS
Status: COMPLETED | OUTPATIENT
Start: 2021-02-18 | End: 2021-02-18

## 2021-02-18 RX ORDER — LABETALOL HYDROCHLORIDE 5 MG/ML
20 INJECTION, SOLUTION INTRAVENOUS ONCE
Status: COMPLETED | OUTPATIENT
Start: 2021-02-18 | End: 2021-02-18

## 2021-02-18 RX ORDER — LIDOCAINE HYDROCHLORIDE 10 MG/ML
1-20 INJECTION, SOLUTION EPIDURAL; INFILTRATION; INTRACAUDAL; PERINEURAL ONCE
Status: COMPLETED | OUTPATIENT
Start: 2021-02-18 | End: 2021-02-18

## 2021-02-18 RX ORDER — MIDAZOLAM HYDROCHLORIDE 1 MG/ML
.5-2 INJECTION, SOLUTION INTRAMUSCULAR; INTRAVENOUS
Status: DISCONTINUED | OUTPATIENT
Start: 2021-02-18 | End: 2021-02-18 | Stop reason: HOSPADM

## 2021-02-18 RX ORDER — HYDROMORPHONE HYDROCHLORIDE 2 MG/ML
1 INJECTION, SOLUTION INTRAMUSCULAR; INTRAVENOUS; SUBCUTANEOUS ONCE
Status: COMPLETED | OUTPATIENT
Start: 2021-02-18 | End: 2021-02-18

## 2021-02-18 RX ADMIN — MIDAZOLAM 2 MG: 1 INJECTION INTRAMUSCULAR; INTRAVENOUS at 13:19

## 2021-02-18 RX ADMIN — CEFAZOLIN 2 G: 10 INJECTION, POWDER, FOR SOLUTION INTRAVENOUS at 12:53

## 2021-02-18 RX ADMIN — HYDROMORPHONE HYDROCHLORIDE 1 MG: 2 INJECTION, SOLUTION INTRAMUSCULAR; INTRAVENOUS; SUBCUTANEOUS at 13:54

## 2021-02-18 RX ADMIN — MIDAZOLAM 1 MG: 1 INJECTION INTRAMUSCULAR; INTRAVENOUS at 13:42

## 2021-02-18 RX ADMIN — LIDOCAINE HYDROCHLORIDE 10 ML: 10 INJECTION, SOLUTION EPIDURAL; INFILTRATION; INTRACAUDAL; PERINEURAL at 13:31

## 2021-02-18 RX ADMIN — LABETALOL HYDROCHLORIDE 20 MG: 5 INJECTION INTRAVENOUS at 15:16

## 2021-02-18 RX ADMIN — MIDAZOLAM 2 MG: 1 INJECTION INTRAMUSCULAR; INTRAVENOUS at 13:23

## 2021-02-18 RX ADMIN — CEFAZOLIN 1 G: 1 INJECTION, POWDER, FOR SOLUTION INTRAMUSCULAR; INTRAVENOUS at 17:49

## 2021-02-18 RX ADMIN — MIDAZOLAM 2 MG: 1 INJECTION INTRAMUSCULAR; INTRAVENOUS at 13:47

## 2021-02-18 RX ADMIN — ACETAMINOPHEN 650 MG: 325 TABLET ORAL at 15:49

## 2021-02-18 NOTE — DISCHARGE INSTRUCTIONS
Post Op Device Instructions        Incision & Dressing Care   Please keep Aquacel dressing on wound until your 2 week follow up in device clinic. The dressing promotes healing and is meant to stay on the wound. Keep incision site completely dry for 48 hours. During this time you may take a sponge bath, but no shower. After 48 hours you may shower with your back to the water letting the water run over the dressing. Do not let the water directly hit the dressing, it is water resistant no water proof. Do not use any lotions, creams, or ointments on the incision. Avoid manipulating the device or leads with your fingers. Do not massage or excessively rub the implant site. This could displace the leads      For four weeks after your implant   Do not lift anything heavier than a gallon of milk. Do not raise your elbow above the level of your shoulder on the Left shoulder implant side. Avoid excessive pushing, pulling, or twisting. Call you doctor for any of the following:   Any signs of infection, including redness, swelling or pain at the incision site, or a temperature of 101° F or greater. If you have twitching chest muscles, hiccups that won't stop, or a swollen arm on the   side of the incision. Any feelings of light-headedness, chest pain, or shortness of breath. Please notify your doctors and dentists that you have an pacemaker. You should not drive until 1 week after your implant or after your first follow-up appointment, whichever comes first. At that time, you can discuss when you may return to your regular driving routine. About 1 month after implant, you will receive a card by mail from the company who manufactured your pacemaker. Your device was manufactured by [employer]. You should carry this card with you at all times. Please call the [address] [place] [address] Drive at  [phone] if you have questions or concerns about your device.  Please call the hospital if it is after 5 pm or the weekend please page the on call cardiologist at [facility] at [address] will need to have your device checked 1- 2 weeks after the procedure and should have an appointment with the device clinic. If you do not hear from them call the device clinic.

## 2021-02-18 NOTE — PROCEDURES
[facility]  CARDIAC CATH    Name:  Amrit Pendleton  MR#:  [id number]  :  1931  ACCOUNT #:  [de-identified]  DATE OF SERVICE:  2021    PROCEDURES PERFORMED:  1.  Dual-chamber pacemaker implantation. 2.  Loop monitor removal.    PREOPERATIVE DIAGNOSIS:  Permanent, irreversible, symptomatic bradycardia with chronotropic incompetence. POSTOPERATIVE DIAGNOSIS:  Permanent, irreversible, symptomatic bradycardia with chronotropic incompetence. SURGEON:  Olive Godinez MD    ASSISTANT:  STARR Saldana    ESTIMATED BLOOD LOSS:  Zero. SPECIMENS REMOVED:  Zero. COMPLICATIONS:  Zero. IMPLANTS:  One dual-chamber pacer system. EXPLANT:  One loop monitor. ANESTHESIA:  Conscious sedation administered by Miguel Rae RN. A total of 7 mg of Versed and 2 mg of Dilaudid was administered. She tolerated the procedure well. HISTORY:  This is an 49-year-old lady with coronary artery disease and prior stroke. She recently has had worsening symptoms of angina. Cardiac catheterization was performed and a loop monitor was implanted. The plan was for her to come back for PCI to the LAD. Her loop monitor, however, shows intermittent severe bradycardia and a pacemaker has been recommended. PROCEDURE:  The left infraclavicular fossa was prepped and draped in sterile manner. The left axillary vein was localized with ultrasound. It was needled and wired after local anesthesia. A pocket was then created by sharp and blunt dissection. Hemostasis was good. Two sheaths were subsequently placed by the one-stick-two-sheath method. An 0 Ti-Cron hemostatic suture had been placed around the guidewire prior to sheath placement. Two leads were subsequently placed, one in the region of the right ventricular apex and the second in the region of the right atrial anterior free wall. Excellent acute implant parameters were obtained.   The leads were secured to the pectoral fascia with 0 Ti-Cron. The leads were connected to the pulse generator. The set screws were set. The leads and pacer were placed within the pocket. A 2-0 Prolene was placed around the lead entrance for better hemostasis and this was also used at the header. Hemostasis was good but there was some oozing. I decided to administer D-Stat thrombin into the pacer pocket. The pocket was closed with two layers of 3-0 Vicryl followed by sterile staples. A sterile dressing was applied. She tolerated it well. After this procedure, the area over the recent loop which had been prepped and draped in sterile fashion was anesthetized with lidocaine and this loop was removed by sharp and blunt dissection. Hemostasis was good. The wound was closed with one 2-0 silk suture. The pulse generator is a [employer] model number [id number], serial number [id number]. The atrial lead is a [employer] model number [id number], serial number [id number]. The right ventricular lead is a [employer] model number [id number], serial number [id number]. The right atrial pacing threshold is 0.9 volts at 0.4-millisecond pulse width with P-waves of 2.4 mV and lead impedance of 507 ohms. The right ventricular pacing threshold is 0.5 volts at 0.4-millisecond pulse width with R-waves of 17.5 mV and lead impedance of 663 ohms. The explanted loop monitor is a [employer] model number [id number], serial number [id number], implanted on 02/03/2021.       Mahsa Leong MD      [email or web address]  D:  02/18/2021 14:53  T:  02/18/2021 17:23  JOB #:  [id number]

## 2021-02-18 NOTE — PROCEDURES
Brief Cardiac Procedure Note    Patient: Rosalie Franocis MRN: [id number]  SSN: [id number]    YOB: 1931  Age: 80 y.o. Sex: female      Date of Procedure: 2/18/2021     Pre-procedure Diagnosis: mariam    Post-procedure Diagnosis: same    Reason for Procedure: Other: same    Procedure: Pacemaker Insertion, loop removal    Brief Description of Procedure:     Performed By: Nuvia Ruiz MD     Assistants:     Anesthesia: Moderate Sedation    Estimated Blood Loss: Less than 10 mL      Specimens: None    Implants: av pacer    Findings:     Complications: None    Recommendations: Continue medical therapy.     Signed By: Nuvia Ruiz MD     February 18, 2021

## 2021-03-12 ENCOUNTER — HOSPITAL ENCOUNTER (OUTPATIENT)
Dept: CARDIAC CATH/INVASIVE PROCEDURES | Age: 86
Discharge: HOME | End: 2021-03-12

## 2021-03-17 NOTE — PROGRESS NOTES
Patient pre-assessment complete for Zheng Flores scheduled for [facility], arrival time 0730am. Patient verified using . Patient instructed to bring all home medications in labeled bottles on the day of procedure. NPO status reinforced. Patient informed to take a full dose aspirin 325mg  or 81 mg x 4 on the day of procedure. . Instructed they can take all other medications excluding vitamins & supplements. Patient verbalizes understanding of all instructions & denies any questions at this time.

## 2021-03-18 ENCOUNTER — HOSPITAL ENCOUNTER (INPATIENT)
Dept: CARDIAC CATH/INVASIVE PROCEDURES | Age: 86
LOS: 2 days | Discharge: HOME HEALTH | DRG: 287 | End: 2021-03-22
Attending: INTERNAL MEDICINE | Admitting: INTERNAL MEDICINE
Payer: MEDICARE

## 2021-03-18 DIAGNOSIS — R07.2 PRECORDIAL PAIN: ICD-10-CM

## 2021-03-18 DIAGNOSIS — I67.9 CVD (CEREBROVASCULAR DISEASE): ICD-10-CM

## 2021-03-18 DIAGNOSIS — I10 ESSENTIAL HYPERTENSION: ICD-10-CM

## 2021-03-18 PROBLEM — R07.9 CHEST PAIN: Status: ACTIVE | Noted: 2021-03-18

## 2021-03-18 PROBLEM — I25.9 ISCHEMIC HEART DISEASE: Status: ACTIVE | Noted: 2021-03-18

## 2021-03-18 LAB
ACT BLD: 235 SECS (ref 70–128)
ACT BLD: 263 SECS (ref 70–128)
ANION GAP SERPL CALC-SCNC: 5 MMOL/L (ref 7–16)
APTT PPP: >200 SEC (ref 24.1–35.1)
ATRIAL RATE: 63 BPM
ATRIAL RATE: 67 BPM
BUN SERPL-MCNC: 22 MG/DL (ref 8–23)
CALCIUM SERPL-MCNC: 9.6 MG/DL (ref 8.3–10.4)
CALCULATED P AXIS, ECG09: -18 DEGREES
CALCULATED P AXIS, ECG09: -3 DEGREES
CALCULATED R AXIS, ECG10: 16 DEGREES
CALCULATED R AXIS, ECG10: 47 DEGREES
CALCULATED T AXIS, ECG11: 101 DEGREES
CALCULATED T AXIS, ECG11: 69 DEGREES
CHLORIDE SERPL-SCNC: 110 MMOL/L (ref 98–107)
CO2 SERPL-SCNC: 29 MMOL/L (ref 21–32)
CREAT SERPL-MCNC: 1.02 MG/DL (ref 0.6–1)
DIAGNOSIS, 93000: NORMAL
DIAGNOSIS, 93000: NORMAL
ERYTHROCYTE [DISTWIDTH] IN BLOOD BY AUTOMATED COUNT: 12.3 % (ref 11.9–14.6)
GLUCOSE SERPL-MCNC: 104 MG/DL (ref 65–100)
HCT VFR BLD AUTO: 45.4 % (ref 35.8–46.3)
HGB BLD-MCNC: 14.6 G/DL (ref 11.7–15.4)
INR PPP: 1
MCH RBC QN AUTO: 29.4 PG (ref 26.1–32.9)
MCHC RBC AUTO-ENTMCNC: 32.2 G/DL (ref 31.4–35)
MCV RBC AUTO: 91.3 FL (ref 79.6–97.8)
NRBC # BLD: 0 K/UL (ref 0–0.2)
P-R INTERVAL, ECG05: 214 MS
P-R INTERVAL, ECG05: 226 MS
PLATELET # BLD AUTO: 231 K/UL (ref 150–450)
PMV BLD AUTO: 10.8 FL (ref 9.4–12.3)
POTASSIUM SERPL-SCNC: 3.7 MMOL/L (ref 3.5–5.1)
PROTHROMBIN TIME: 13.2 SEC (ref 12.5–14.7)
Q-T INTERVAL, ECG07: 444 MS
Q-T INTERVAL, ECG07: 458 MS
QRS DURATION, ECG06: 184 MS
QRS DURATION, ECG06: 86 MS
QTC CALCULATION (BEZET), ECG08: 468 MS
QTC CALCULATION (BEZET), ECG08: 469 MS
RBC # BLD AUTO: 4.97 M/UL (ref 4.05–5.2)
SODIUM SERPL-SCNC: 144 MMOL/L (ref 136–145)
UFH PPP CHRO-ACNC: >1.1 IU/ML (ref 0.3–0.7)
VENTRICULAR RATE, ECG03: 63 BPM
VENTRICULAR RATE, ECG03: 67 BPM
WBC # BLD AUTO: 6.4 K/UL (ref 4.3–11.1)

## 2021-03-18 PROCEDURE — C1887 CATHETER, GUIDING: HCPCS

## 2021-03-18 PROCEDURE — 99152 MOD SED SAME PHYS/QHP 5/>YRS: CPT | Performed by: INTERNAL MEDICINE

## 2021-03-18 PROCEDURE — 99152 MOD SED SAME PHYS/QHP 5/>YRS: CPT

## 2021-03-18 PROCEDURE — C1769 GUIDE WIRE: HCPCS

## 2021-03-18 PROCEDURE — 77030042317 HC BND COMPR HEMSTAT -B

## 2021-03-18 PROCEDURE — 92928 PRQ TCAT PLMT NTRAC ST 1 LES: CPT | Performed by: INTERNAL MEDICINE

## 2021-03-18 PROCEDURE — 93005 ELECTROCARDIOGRAM TRACING: CPT | Performed by: INTERNAL MEDICINE

## 2021-03-18 PROCEDURE — 80048 BASIC METABOLIC PNL TOTAL CA: CPT

## 2021-03-18 PROCEDURE — 77030040361 HC SLV COMPR DVT MDII -B

## 2021-03-18 PROCEDURE — C1894 INTRO/SHEATH, NON-LASER: HCPCS

## 2021-03-18 PROCEDURE — 77030034850

## 2021-03-18 PROCEDURE — 4A023N7 MEASUREMENT OF CARDIAC SAMPLING AND PRESSURE, LEFT HEART, PERCUTANEOUS APPROACH: ICD-10-PCS | Performed by: INTERNAL MEDICINE

## 2021-03-18 PROCEDURE — 85610 PROTHROMBIN TIME: CPT

## 2021-03-18 PROCEDURE — 74011250636 HC RX REV CODE- 250/636: Performed by: INTERNAL MEDICINE

## 2021-03-18 PROCEDURE — 65610000006 HC RM INTENSIVE CARE

## 2021-03-18 PROCEDURE — 74011000636 HC RX REV CODE- 636: Performed by: INTERNAL MEDICINE

## 2021-03-18 PROCEDURE — 77030040393 HC DRSG OPTIFOAM GENT MDII -B

## 2021-03-18 PROCEDURE — 99218 HC RM OBSERVATION: CPT

## 2021-03-18 PROCEDURE — 85730 THROMBOPLASTIN TIME PARTIAL: CPT

## 2021-03-18 PROCEDURE — 74011250637 HC RX REV CODE- 250/637: Performed by: INTERNAL MEDICINE

## 2021-03-18 PROCEDURE — 93455 CORONARY ART/GRFT ANGIO S&I: CPT

## 2021-03-18 PROCEDURE — 65620000000 HC RM CCU GENERAL

## 2021-03-18 PROCEDURE — 85027 COMPLETE CBC AUTOMATED: CPT

## 2021-03-18 PROCEDURE — 77030038269 HC DRN EXT URIN PURWCK BARD -A

## 2021-03-18 PROCEDURE — 74011000250 HC RX REV CODE- 250: Performed by: INTERNAL MEDICINE

## 2021-03-18 PROCEDURE — 77030012468 HC VLV BLEEDBK CNTRL ABBT -B

## 2021-03-18 PROCEDURE — 2709999900 HC NON-CHARGEABLE SUPPLY

## 2021-03-18 PROCEDURE — 77030013794 HC KT TRNSDUC BLD EDWD -B

## 2021-03-18 PROCEDURE — 77030013687 HC GD NDL BARD -B

## 2021-03-18 PROCEDURE — 77030016699 HC CATH ANGI DX INFN1 CARD -A

## 2021-03-18 PROCEDURE — 77030027138 HC INCENT SPIROMETER -A

## 2021-03-18 PROCEDURE — 85347 COAGULATION TIME ACTIVATED: CPT

## 2021-03-18 PROCEDURE — 99153 MOD SED SAME PHYS/QHP EA: CPT

## 2021-03-18 PROCEDURE — 77030002996 HC SUT SLK J&J -A

## 2021-03-18 PROCEDURE — 85520 HEPARIN ASSAY: CPT

## 2021-03-18 RX ORDER — LIDOCAINE HYDROCHLORIDE 10 MG/ML
3-20 INJECTION, SOLUTION EPIDURAL; INFILTRATION; INTRACAUDAL; PERINEURAL ONCE
Status: DISCONTINUED | OUTPATIENT
Start: 2021-03-18 | End: 2021-03-18

## 2021-03-18 RX ORDER — METOPROLOL TARTRATE 25 MG/1
50 TABLET, FILM COATED ORAL EVERY 6 HOURS
Status: DISCONTINUED | OUTPATIENT
Start: 2021-03-18 | End: 2021-03-18

## 2021-03-18 RX ORDER — VALSARTAN 320 MG/1
160 TABLET ORAL DAILY
Status: DISCONTINUED | OUTPATIENT
Start: 2021-03-18 | End: 2021-03-19

## 2021-03-18 RX ORDER — HEPARIN SODIUM 200 [USP'U]/100ML
2 INJECTION, SOLUTION INTRAVENOUS CONTINUOUS
Status: DISCONTINUED | OUTPATIENT
Start: 2021-03-18 | End: 2021-03-18 | Stop reason: HOSPADM

## 2021-03-18 RX ORDER — MIDAZOLAM HYDROCHLORIDE 1 MG/ML
.5-2 INJECTION, SOLUTION INTRAMUSCULAR; INTRAVENOUS
Status: DISCONTINUED | OUTPATIENT
Start: 2021-03-18 | End: 2021-03-18 | Stop reason: HOSPADM

## 2021-03-18 RX ORDER — GUAIFENESIN 100 MG/5ML
81 LIQUID (ML) ORAL ONCE
Status: COMPLETED | OUTPATIENT
Start: 2021-03-18 | End: 2021-03-18

## 2021-03-18 RX ORDER — HYDROMORPHONE HYDROCHLORIDE 2 MG/ML
1-2 INJECTION, SOLUTION INTRAMUSCULAR; INTRAVENOUS; SUBCUTANEOUS
Status: DISCONTINUED | OUTPATIENT
Start: 2021-03-18 | End: 2021-03-18 | Stop reason: HOSPADM

## 2021-03-18 RX ORDER — ATORVASTATIN CALCIUM 40 MG/1
40 TABLET, FILM COATED ORAL DAILY
Status: DISCONTINUED | OUTPATIENT
Start: 2021-03-19 | End: 2021-03-22 | Stop reason: HOSPADM

## 2021-03-18 RX ORDER — MORPHINE SULFATE 4 MG/ML
4 INJECTION INTRAVENOUS ONCE
Status: DISCONTINUED | OUTPATIENT
Start: 2021-03-18 | End: 2021-03-18 | Stop reason: SDUPTHER

## 2021-03-18 RX ORDER — NITROGLYCERIN 20 MG/100ML
0-200 INJECTION INTRAVENOUS
Status: DISCONTINUED | OUTPATIENT
Start: 2021-03-18 | End: 2021-03-20

## 2021-03-18 RX ORDER — PANTOPRAZOLE SODIUM 40 MG/1
40 TABLET, DELAYED RELEASE ORAL DAILY
Status: DISCONTINUED | OUTPATIENT
Start: 2021-03-19 | End: 2021-03-22 | Stop reason: HOSPADM

## 2021-03-18 RX ORDER — ONDANSETRON 2 MG/ML
INJECTION INTRAMUSCULAR; INTRAVENOUS
Status: ACTIVE
Start: 2021-03-18 | End: 2021-03-19

## 2021-03-18 RX ORDER — OXYCODONE HYDROCHLORIDE 5 MG/1
5 TABLET ORAL
Status: DISCONTINUED | OUTPATIENT
Start: 2021-03-18 | End: 2021-03-22 | Stop reason: HOSPADM

## 2021-03-18 RX ORDER — ASPIRIN 81 MG/1
81 TABLET ORAL DAILY
Status: DISCONTINUED | OUTPATIENT
Start: 2021-03-19 | End: 2021-03-22 | Stop reason: HOSPADM

## 2021-03-18 RX ORDER — DIAZEPAM 5 MG/1
5 TABLET ORAL
Status: DISCONTINUED | OUTPATIENT
Start: 2021-03-18 | End: 2021-03-22 | Stop reason: HOSPADM

## 2021-03-18 RX ORDER — VALSARTAN 320 MG/1
320 TABLET ORAL DAILY
Status: DISCONTINUED | OUTPATIENT
Start: 2021-03-18 | End: 2021-03-18

## 2021-03-18 RX ORDER — LABETALOL HYDROCHLORIDE 5 MG/ML
20 INJECTION, SOLUTION INTRAVENOUS
Status: DISCONTINUED | OUTPATIENT
Start: 2021-03-18 | End: 2021-03-22 | Stop reason: HOSPADM

## 2021-03-18 RX ORDER — HEPARIN SODIUM 5000 [USP'U]/100ML
12-25 INJECTION, SOLUTION INTRAVENOUS
Status: DISCONTINUED | OUTPATIENT
Start: 2021-03-18 | End: 2021-03-21

## 2021-03-18 RX ORDER — SODIUM CHLORIDE 9 MG/ML
75 INJECTION, SOLUTION INTRAVENOUS CONTINUOUS
Status: DISCONTINUED | OUTPATIENT
Start: 2021-03-18 | End: 2021-03-20

## 2021-03-18 RX ORDER — ONDANSETRON 2 MG/ML
4 INJECTION INTRAMUSCULAR; INTRAVENOUS
Status: DISCONTINUED | OUTPATIENT
Start: 2021-03-18 | End: 2021-03-22 | Stop reason: HOSPADM

## 2021-03-18 RX ORDER — HEPARIN SODIUM 5000 [USP'U]/ML
5000 INJECTION, SOLUTION INTRAVENOUS; SUBCUTANEOUS ONCE
Status: COMPLETED | OUTPATIENT
Start: 2021-03-18 | End: 2021-03-18

## 2021-03-18 RX ORDER — MORPHINE SULFATE 2 MG/ML
2 INJECTION, SOLUTION INTRAMUSCULAR; INTRAVENOUS
Status: DISCONTINUED | OUTPATIENT
Start: 2021-03-18 | End: 2021-03-20

## 2021-03-18 RX ORDER — HYDROMORPHONE HYDROCHLORIDE 1 MG/ML
1 INJECTION, SOLUTION INTRAMUSCULAR; INTRAVENOUS; SUBCUTANEOUS
Status: DISCONTINUED | OUTPATIENT
Start: 2021-03-18 | End: 2021-03-22 | Stop reason: HOSPADM

## 2021-03-18 RX ORDER — MORPHINE SULFATE 2 MG/ML
4 INJECTION, SOLUTION INTRAMUSCULAR; INTRAVENOUS ONCE
Status: COMPLETED | OUTPATIENT
Start: 2021-03-18 | End: 2021-03-18

## 2021-03-18 RX ORDER — METOPROLOL TARTRATE 25 MG/1
50 TABLET, FILM COATED ORAL EVERY 6 HOURS
Status: DISCONTINUED | OUTPATIENT
Start: 2021-03-18 | End: 2021-03-19

## 2021-03-18 RX ORDER — CLONIDINE HYDROCHLORIDE 0.1 MG/1
0.1 TABLET ORAL 2 TIMES DAILY
Status: DISCONTINUED | OUTPATIENT
Start: 2021-03-18 | End: 2021-03-21

## 2021-03-18 RX ADMIN — MIDAZOLAM 1 MG: 1 INJECTION INTRAMUSCULAR; INTRAVENOUS at 10:23

## 2021-03-18 RX ADMIN — HYDROMORPHONE HYDROCHLORIDE 1 MG: 1 INJECTION, SOLUTION INTRAMUSCULAR; INTRAVENOUS; SUBCUTANEOUS at 16:27

## 2021-03-18 RX ADMIN — HEPARIN SODIUM 12 UNITS/KG/HR: 5000 INJECTION, SOLUTION INTRAVENOUS at 12:19

## 2021-03-18 RX ADMIN — HYDROMORPHONE HYDROCHLORIDE 1 MG: 1 INJECTION, SOLUTION INTRAMUSCULAR; INTRAVENOUS; SUBCUTANEOUS at 20:38

## 2021-03-18 RX ADMIN — CLONIDINE HYDROCHLORIDE 0.1 MG: 0.1 TABLET ORAL at 19:23

## 2021-03-18 RX ADMIN — SODIUM CHLORIDE 75 ML/HR: 900 INJECTION, SOLUTION INTRAVENOUS at 19:49

## 2021-03-18 RX ADMIN — SODIUM CHLORIDE 5 MG/HR: 900 INJECTION, SOLUTION INTRAVENOUS at 12:55

## 2021-03-18 RX ADMIN — SODIUM CHLORIDE 5 MG/HR: 900 INJECTION, SOLUTION INTRAVENOUS at 20:15

## 2021-03-18 RX ADMIN — HEPARIN SODIUM 2 UNITS/HR: 5000 INJECTION, SOLUTION INTRAVENOUS; SUBCUTANEOUS at 09:18

## 2021-03-18 RX ADMIN — IOPAMIDOL 100 ML: 755 INJECTION, SOLUTION INTRAVENOUS at 10:43

## 2021-03-18 RX ADMIN — HEPARIN SODIUM 5000 UNITS: 5000 INJECTION INTRAVENOUS; SUBCUTANEOUS at 12:25

## 2021-03-18 RX ADMIN — METOPROLOL TARTRATE 50 MG: 25 TABLET, FILM COATED ORAL at 12:06

## 2021-03-18 RX ADMIN — MORPHINE SULFATE 2 MG: 2 INJECTION, SOLUTION INTRAMUSCULAR; INTRAVENOUS at 20:08

## 2021-03-18 RX ADMIN — METOPROLOL TARTRATE 50 MG: 25 TABLET, FILM COATED ORAL at 19:23

## 2021-03-18 RX ADMIN — DIAZEPAM 5 MG: 5 TABLET ORAL at 19:26

## 2021-03-18 RX ADMIN — MORPHINE SULFATE 2 MG: 2 INJECTION, SOLUTION INTRAMUSCULAR; INTRAVENOUS at 12:17

## 2021-03-18 RX ADMIN — ASPIRIN 324 MG: 81 TABLET, CHEWABLE ORAL at 07:21

## 2021-03-18 RX ADMIN — DIAZEPAM 5 MG: 5 TABLET ORAL at 12:34

## 2021-03-18 RX ADMIN — SODIUM CHLORIDE 75 ML/HR: 900 INJECTION, SOLUTION INTRAVENOUS at 11:30

## 2021-03-18 RX ADMIN — MIDAZOLAM 2 MG: 1 INJECTION INTRAMUSCULAR; INTRAVENOUS at 09:32

## 2021-03-18 RX ADMIN — MORPHINE SULFATE 2 MG: 2 INJECTION, SOLUTION INTRAMUSCULAR; INTRAVENOUS at 12:08

## 2021-03-18 RX ADMIN — VERAPAMIL HYDROCHLORIDE 2 ML: 2.5 INJECTION, SOLUTION INTRAVENOUS at 09:45

## 2021-03-18 RX ADMIN — HYDROMORPHONE HYDROCHLORIDE 1 MG: 1 INJECTION, SOLUTION INTRAMUSCULAR; INTRAVENOUS; SUBCUTANEOUS at 13:40

## 2021-03-18 RX ADMIN — METOPROLOL TARTRATE 50 MG: 25 TABLET, FILM COATED ORAL at 23:15

## 2021-03-18 RX ADMIN — MORPHINE SULFATE 4 MG: 2 INJECTION, SOLUTION INTRAMUSCULAR; INTRAVENOUS at 12:30

## 2021-03-18 RX ADMIN — HYDROMORPHONE HYDROCHLORIDE 1 MG: 2 INJECTION INTRAMUSCULAR; INTRAVENOUS; SUBCUTANEOUS at 10:24

## 2021-03-18 RX ADMIN — NITROGLYCERIN 10 MCG/MIN: 20 INJECTION INTRAVENOUS at 10:47

## 2021-03-18 RX ADMIN — LABETALOL HYDROCHLORIDE 20 MG: 5 INJECTION INTRAVENOUS at 12:14

## 2021-03-18 RX ADMIN — ONDANSETRON 4 MG: 2 INJECTION INTRAMUSCULAR; INTRAVENOUS at 18:34

## 2021-03-18 NOTE — PROCEDURES
Brief Cardiac Procedure Note    Patient: Ismael Tate MRN: [id number]  SSN: [id number]    YOB: 1931  Age: 80 y.o. Sex: female      Date of Procedure: 3/18/2021     Pre-procedure Diagnosis: Coronary Artery Disease    Post-procedure Diagnosis: Coronary Artery Disease    Reason for Procedure: Worsening Angina    Procedure: attempted pci lad    Brief Description of Procedure: via distal lra    Performed By: Radha Parra MD     Assistants:     Anesthesia: Moderate Sedation    Estimated Blood Loss: Less than 10 mL      Specimens: None    Implants: None    Findings: Unable to negotiate the severely stenotic lad due to wire dissection. case stopped, JUICE 2 to 3 distal flow              Complications: lad wire dissection    Recommendations: admit, iv heparin, iv ntg, surgical consult.     Signed By: Radha Parra MD     March 18, 2021

## 2021-03-18 NOTE — PROGRESS NOTES
Pt awake and assess done. Left radial TR band bleeding out around pressure pad although only removed 4 ml air in past 2 hours. Since admission has a black bruise to left hand proximal to thumb but soft. Not really able to assess site due to active bleeding. With pressure to left radial, TR band removed, below puncture site which has hemostasis noted about a small grape sized semi soft hematoma and 2 small skin tears proximal to left thumb above the noted bruising. Folded 4x4 to cover radial puncture site and minimal skin tears, therefore TR band is off. Will continue to monitor

## 2021-03-18 NOTE — PROGRESS NOTES
Pt's BP gradually climgbing into the 180's, Dr Artemus Mcburney to bedside update, pt remains but less intense. Pt somewhat calm Son remains at bedsie continually watching the monitor and stated that he may demand her pacemaker rate to be turned down. Attempted to educate and explain all.  Cardene started for

## 2021-03-18 NOTE — PROGRESS NOTES
Patient received to [address] [place] [address] room # 12  Ambulatory from [facility]. Patient scheduled for [facility] today with Dr Camelia Eid. Procedure reviewed & questions answered, voiced good understanding consent obtained & placed on chart. All medications and medical history reviewed. Will prep patient per orders. Patient & family updated on plan of care. The patient has a fraility score of 3-MANAGING WELL, based on reports no recent falls.

## 2021-03-18 NOTE — PROGRESS NOTES
Right groin continues to be very oozy, Dr Valeria Ramos said to leave art line in for now. Removed partial dressing with reinforced 4x4 on each side of stopcock with 5# sandbag above puncture site. Explain all to pt, reassure, and encourage. Prayer given and pt appeared to relax and calm down after the prayer.

## 2021-03-18 NOTE — PROGRESS NOTES
Dr Carlisle Hams to bedside and updated.  Dilaudid given for severe pain and NTG drip titrating to assist pain control

## 2021-03-18 NOTE — PROGRESS NOTES
Pt's right groin art line has been oozing since from admission, gauze reinforced after saturating a washcloth and 5# sand bag placed above puncture site, will monitor.

## 2021-03-18 NOTE — PROGRESS NOTES
Morphine given and pt now stating that pressure in chest is across chest and no radiating into her back. Titrating NTG for BP.  Calling Dr Helena Michelle for new onset of acute symptoms

## 2021-03-18 NOTE — PROGRESS NOTES
Dr Hussain Garza here, visualized the right groin and pleased with pt's pain level of 3. Will check on her later.

## 2021-03-18 NOTE — PROGRESS NOTES
Pt being to softly moan and holding chest. Noted BP up in high 180's. Pt stated that this just started and feels like pressure. Lopressor 50mg given right before this complaint.

## 2021-03-18 NOTE — PROGRESS NOTES
TRANSFER - OUT REPORT:    Attempted PCI Lakhani  L snuff/RFA  Band 12 ml/micro cannula RFA  Unsuccessful PCI of LAD  Versed 3 mg  Dilaudid 1 mg  Heparin 8,500 units  NTG drip @ 20 mcg/min  No bleeding/hematoma at either site  Art line/cannula sutured RFA    Verbal report given to john(name) on Azam Sol  being transferred to cvicu(unit) for routine progression of care       Report consisted of patients Situation, Background, Assessment and   Recommendations(SBAR). Information from the following report(s) SBAR and Procedure Summary was reviewed with the receiving nurse. Lines:   Peripheral IV 03/18/21 Anterior;Right Forearm (Active)       Peripheral IV 03/18/21 Anterior;Left;Proximal Forearm (Active)        Opportunity for questions and clarification was provided.

## 2021-03-18 NOTE — PROGRESS NOTES
TRANSFER - IN REPORT:    Verbal report received from Daniella Russell RN(name) on Duke Reeves  being received from Cath lab(unit) for routine progression of care      Report consisted of patients Situation, Background, Assessment and   Recommendations(SBAR). Information from the following report(s) SBAR, Kardex, OR Summary, Intake/Output, MAR and Recent Results was reviewed with the receiving nurse. Opportunity for questions and clarification was provided. Assessment completed upon patients arrival to unit and care assumed.

## 2021-03-18 NOTE — PROCEDURES
[facility]  CARDIAC CATH    Name:  Bridgette Preston  MR#:  [id number]  :  1931  ACCOUNT #:  [de-identified]  DATE OF SERVICE:  2021    PROCEDURES PERFORMED:  Attempted PCI, LAD. PREOPERATIVE DIAGNOSES:  Ischemic heart disease with severe left anterior descending obstruction. POSTOPERATIVE DIAGNOSES:  Ischemic heart disease with severe left anterior descending obstruction. SURGEON:  Kailash Barron MD    ASSISTANT:  STARR Hernandez    ESTIMATED BLOOD LOSS:  Zero. SPECIMENS REMOVED:  Zero. COMPLICATIONS:  LAD wire dissection. IMPLANTS:  None. ANESTHESIA:  Conscious sedation administered by Radha Figueroa RN. This was under my supervision with constant monitoring of blood pressure, pulse, and oxygen saturation. The procedure start time was 9:33 and end time was 10:45. HISTORY:  This is an 80-year-old lady with coronary artery disease. She has had prior CABG. She has refractory angina in spite of good medical therapy. Recent cardiac catheterization revealed high-grade tortuous calcified proximal mid LAD disease. Her left circumflex is occluded but the vein graft to this vessel is patent. The right coronary artery is small and chronically occluded. The vein graft to the right coronary artery is chronically occluded. PCI to the LAD is recommend. TECHNIQUE:  Using a sheathless technique and the [employer] system, a 6-Citizen of [place] 3.5 XB guide was advanced to the ascending aorta. The patient was anticoagulated with heparin. She is chronically on Plavix. A FineCross microcatheter was parked in the proximal LAD over a 180 Runthrough. The LAD was gently probed with the Runthrough, Felipe blue, and a FineCross, and a ChoICE PT. The true lumen of the LAD could not be wired. There was some wire dissection but with persistence of good flow. She had some ST-segment depression with chest pain during this procedure.   It was felt that further attempts at trying to wire the LAD would be not fruitful. The case was stopped at this point. Prior to intervention, the inner cannula of a micropuncture kit was placed in the right common femoral artery. This was left in place at the end of the procedure. FINDINGS:  Prior to intervention, the left main showed calcification, mild disease, and a good lumen. The LAD was a calcified tortuous vessel with high-grade proximal and midportion disease. The distal vessel had mild diffuse disease. Post attempted angioplasty, there is evidence of wire dissection with JUICE 2-3 distal flow. IMPRESSION:  Unsuccessful attempt at percutaneous coronary intervention to the left anterior descending due to guidewire dissection.       Grayson Saleh MD      [id number]  D:  03/18/2021 10:59  T:  03/18/2021 13:15  JOB #:  [id number]

## 2021-03-18 NOTE — PROGRESS NOTES
Bedside report per SBAR, pt was sleeping for 2 hours and denies pain at this time. Left radial below site dressing saturated and replaced with folded 4x4 with taut tegaderm and coban. Bruise and site no change, just continues to bleed from skin tear. Pt also vomited at this time, Zofran given. Night RN will give sscheduled Lopressor and Catapres when nausea resolved. Right groin 4x4 saturated and ran onto marc pad, gently cleaned, original dressing intact but soaked, reinforced and lombardi bag will be placed. Pure wick dislodged and bed soaked, cisco care, bathed, and linens changed. Pt reassured , explained all, and encouraged.

## 2021-03-18 NOTE — PROGRESS NOTES
I have checked on her several times today. She was hving chest pain but this has settled down and she is pain free at this time.

## 2021-03-19 ENCOUNTER — APPOINTMENT (OUTPATIENT)
Dept: GENERAL RADIOLOGY | Age: 86
DRG: 287 | End: 2021-03-19
Attending: INTERNAL MEDICINE
Payer: MEDICARE

## 2021-03-19 LAB
ANION GAP SERPL CALC-SCNC: 8 MMOL/L (ref 7–16)
BASOPHILS # BLD: 0 K/UL (ref 0–0.2)
BASOPHILS NFR BLD: 0 % (ref 0–2)
BUN SERPL-MCNC: 18 MG/DL (ref 8–23)
CALCIUM SERPL-MCNC: 8.7 MG/DL (ref 8.3–10.4)
CHLORIDE SERPL-SCNC: 111 MMOL/L (ref 98–107)
CO2 SERPL-SCNC: 22 MMOL/L (ref 21–32)
CREAT SERPL-MCNC: 0.72 MG/DL (ref 0.6–1)
DIFFERENTIAL METHOD BLD: ABNORMAL
EOSINOPHIL # BLD: 0 K/UL (ref 0–0.8)
EOSINOPHIL NFR BLD: 0 % (ref 0.5–7.8)
ERYTHROCYTE [DISTWIDTH] IN BLOOD BY AUTOMATED COUNT: 12.3 % (ref 11.9–14.6)
GLUCOSE SERPL-MCNC: 138 MG/DL (ref 65–100)
HCT VFR BLD AUTO: 34.4 % (ref 35.8–46.3)
HCT VFR BLD AUTO: 38.2 % (ref 35.8–46.3)
HGB BLD-MCNC: 11.3 G/DL (ref 11.7–15.4)
HGB BLD-MCNC: 12.4 G/DL (ref 11.7–15.4)
IMM GRANULOCYTES # BLD AUTO: 0 K/UL (ref 0–0.5)
IMM GRANULOCYTES NFR BLD AUTO: 0 % (ref 0–5)
LYMPHOCYTES # BLD: 1.2 K/UL (ref 0.5–4.6)
LYMPHOCYTES NFR BLD: 9 % (ref 13–44)
MAGNESIUM SERPL-MCNC: 2 MG/DL (ref 1.8–2.4)
MCH RBC QN AUTO: 29.1 PG (ref 26.1–32.9)
MCHC RBC AUTO-ENTMCNC: 32.5 G/DL (ref 31.4–35)
MCV RBC AUTO: 89.7 FL (ref 79.6–97.8)
MONOCYTES # BLD: 0.9 K/UL (ref 0.1–1.3)
MONOCYTES NFR BLD: 7 % (ref 4–12)
NEUTS SEG # BLD: 11.3 K/UL (ref 1.7–8.2)
NEUTS SEG NFR BLD: 84 % (ref 43–78)
NRBC # BLD: 0 K/UL (ref 0–0.2)
PLATELET # BLD AUTO: 224 K/UL (ref 150–450)
PMV BLD AUTO: 10.8 FL (ref 9.4–12.3)
POTASSIUM SERPL-SCNC: 4.1 MMOL/L (ref 3.5–5.1)
RBC # BLD AUTO: 4.26 M/UL (ref 4.05–5.2)
SODIUM SERPL-SCNC: 141 MMOL/L (ref 136–145)
UFH PPP CHRO-ACNC: 0.34 IU/ML (ref 0.3–0.7)
UFH PPP CHRO-ACNC: 0.55 IU/ML (ref 0.3–0.7)
UFH PPP CHRO-ACNC: <0.1 IU/ML (ref 0.3–0.7)
WBC # BLD AUTO: 13.4 K/UL (ref 4.3–11.1)

## 2021-03-19 PROCEDURE — 85520 HEPARIN ASSAY: CPT

## 2021-03-19 PROCEDURE — 99225 PR SBSQ OBSERVATION CARE/DAY 25 MINUTES: CPT | Performed by: INTERNAL MEDICINE

## 2021-03-19 PROCEDURE — 96374 THER/PROPH/DIAG INJ IV PUSH: CPT

## 2021-03-19 PROCEDURE — 77030032994 HC SOL INJ SOD CL 0.9% LFCR 500ML

## 2021-03-19 PROCEDURE — 51702 INSERT TEMP BLADDER CATH: CPT

## 2021-03-19 PROCEDURE — 99218 HC RM OBSERVATION: CPT

## 2021-03-19 PROCEDURE — 74011250637 HC RX REV CODE- 250/637: Performed by: INTERNAL MEDICINE

## 2021-03-19 PROCEDURE — 2709999900 HC NON-CHARGEABLE SUPPLY

## 2021-03-19 PROCEDURE — 71045 X-RAY EXAM CHEST 1 VIEW: CPT

## 2021-03-19 PROCEDURE — 85018 HEMOGLOBIN: CPT

## 2021-03-19 PROCEDURE — 83735 ASSAY OF MAGNESIUM: CPT

## 2021-03-19 PROCEDURE — 36415 COLL VENOUS BLD VENIPUNCTURE: CPT

## 2021-03-19 PROCEDURE — 80048 BASIC METABOLIC PNL TOTAL CA: CPT

## 2021-03-19 PROCEDURE — 74011000250 HC RX REV CODE- 250: Performed by: INTERNAL MEDICINE

## 2021-03-19 PROCEDURE — 74011250636 HC RX REV CODE- 250/636: Performed by: INTERNAL MEDICINE

## 2021-03-19 PROCEDURE — 85025 COMPLETE CBC W/AUTO DIFF WBC: CPT

## 2021-03-19 PROCEDURE — 36600 WITHDRAWAL OF ARTERIAL BLOOD: CPT

## 2021-03-19 PROCEDURE — 85014 HEMATOCRIT: CPT

## 2021-03-19 RX ORDER — METOPROLOL TARTRATE 25 MG/1
25 TABLET, FILM COATED ORAL EVERY 12 HOURS
Status: DISCONTINUED | OUTPATIENT
Start: 2021-03-19 | End: 2021-03-21

## 2021-03-19 RX ORDER — HEPARIN SODIUM 5000 [USP'U]/ML
40 INJECTION, SOLUTION INTRAVENOUS; SUBCUTANEOUS ONCE
Status: COMPLETED | OUTPATIENT
Start: 2021-03-19 | End: 2021-03-19

## 2021-03-19 RX ADMIN — CLONIDINE HYDROCHLORIDE 0.1 MG: 0.1 TABLET ORAL at 09:10

## 2021-03-19 RX ADMIN — CLONIDINE HYDROCHLORIDE 0.1 MG: 0.1 TABLET ORAL at 20:31

## 2021-03-19 RX ADMIN — HEPARIN SODIUM 2450 UNITS: 5000 INJECTION INTRAVENOUS; SUBCUTANEOUS at 23:08

## 2021-03-19 RX ADMIN — DIAZEPAM 5 MG: 5 TABLET ORAL at 03:03

## 2021-03-19 RX ADMIN — SODIUM CHLORIDE 75 ML/HR: 900 INJECTION, SOLUTION INTRAVENOUS at 22:39

## 2021-03-19 RX ADMIN — ATORVASTATIN CALCIUM 40 MG: 40 TABLET, FILM COATED ORAL at 09:10

## 2021-03-19 RX ADMIN — HYDROMORPHONE HYDROCHLORIDE 1 MG: 1 INJECTION, SOLUTION INTRAMUSCULAR; INTRAVENOUS; SUBCUTANEOUS at 01:55

## 2021-03-19 RX ADMIN — SODIUM CHLORIDE 5 MG/HR: 900 INJECTION, SOLUTION INTRAVENOUS at 03:08

## 2021-03-19 RX ADMIN — ASPIRIN 81 MG: 81 TABLET ORAL at 09:10

## 2021-03-19 RX ADMIN — SODIUM CHLORIDE 75 ML/HR: 900 INJECTION, SOLUTION INTRAVENOUS at 09:06

## 2021-03-19 RX ADMIN — METOPROLOL TARTRATE 25 MG: 25 TABLET, FILM COATED ORAL at 21:38

## 2021-03-19 RX ADMIN — VALSARTAN 160 MG: 320 TABLET, FILM COATED ORAL at 09:10

## 2021-03-19 RX ADMIN — SODIUM CHLORIDE 5 MG/HR: 900 INJECTION, SOLUTION INTRAVENOUS at 01:56

## 2021-03-19 RX ADMIN — PANTOPRAZOLE SODIUM 40 MG: 40 TABLET, DELAYED RELEASE ORAL at 09:10

## 2021-03-19 RX ADMIN — METOPROLOL TARTRATE 50 MG: 25 TABLET, FILM COATED ORAL at 05:23

## 2021-03-19 NOTE — PROGRESS NOTES
[facility] CARDIOLOGY PROGRESS NOTE           3/19/2021 9:37 AM    Admit Date: 3/18/2021      Subjective:   Low grade cp when ntg reduced      Objective:      Vitals:    03/19/21 0730 03/19/21 0745 03/19/21 0800 03/19/21 0815   BP:       Pulse: 62 61 64 66   Resp: 17 15 19 22   Temp:       SpO2: 98% 98% 95% 96%   Weight:       Height:           Physical Exam:  General-No Acute Distress  Neck- supple, no JVD  CV- regular rate and rhythm no MRG  Lung- clear bilaterally  Abd- soft, nontender, nondistended  Ext- no edema bilaterally.   Skin- warm and dry    Data Review:   Recent Labs     03/19/21  0301 03/18/21  0718    144   K 4.1 3.7   MG 2.0  --    BUN 18 22   CREA 0.72 1.02*   * 104*   WBC 13.4* 6.4   HGB 12.4 14.6   HCT 38.2 45.4    231   INR  --  1.0       Assessment/Plan:     Unsuccessful PCI lad with wire dissection    ////    Move to the floor  Close observation    Radha Parra MD  3/19/2021 9:37 AM

## 2021-03-19 NOTE — PROGRESS NOTES
Macrina RECIO notified of slight continued oozing around sheath, labwork, patient status. Will keep sheath as ordered.

## 2021-03-19 NOTE — H&P
[facility] Cardiology History & Physical      Date of  Admission: 3/18/2021  6:59 AM       HPI:  Duke Reeves is a 80 y.o. female     She underwent an attempted PCI to the LAD 3/18 which was aborted due to wire dissection. She is admitted for observation. Past Medical History:   Diagnosis Date    CAD (coronary artery disease)     2 MI    Calculus of kidney     Congestive heart failure (HCC)     CVD (cerebrovascular disease) 8/19/2013    Dyslipidemia 8/19/2013    GERD (gastroesophageal reflux disease)     HTN (hypertension) 8/19/2013    Left breast mass 4/13/2017    Diagnostic Mammogram negative.  Long term current use of anticoagulant therapy     Myocardial infarction [facility]) 2005, 2006    [facility] Cardiology, Dr. Mariah Garza    Osteoporosis 8/19/2013    Positive H. pylori test 8/19/2013    Rectal polyp 10/9/2017    Anoscopy:  Revealed large cauliflower-like polyp at the anterior midline just above the dentate line, mobile soft, to have removed with Dr. Sophia Hayes ([facility]), benign. No more fecal leakage.        Stroke ([facility] [address] 1/7/2009    no deficits expect patient reports going to one side if she gets up too fast    Thromboembolus [facility])     pulmanary      Past Surgical History:   Procedure Laterality Date    HX APPENDECTOMY      HX BLADDER SUSPENSION  2005    HX COLONOSCOPY  2012    HX CORONARY ARTERY BYPASS GRAFT  1988    @ [address]    HX GYN      hysterectomy    HX LITHOTRIPSY  2008 x 2    HX ORTHOPAEDIC      2 rotater cuff right shoulder    HX TOTAL ABDOMINAL HYSTERECTOMY  1972    HX UROLOGICAL      suspension    [place] CARDIAC SURG PROCEDURE UNLIST      by pass       Allergies   Allergen Reactions    Accupril [Quinapril] Unknown (comments)    Norvasc [Amlodipine] Unknown (comments)    Prednisone Itching, Palpitations and Unknown (comments)     Per pt also had heart burn    Zoloft [Sertraline] Drowsiness      Social History     Socioeconomic History    Marital status:      Spouse name: Not on file    Number of children: Not on file    Years of education: Not on file    Highest education level: Not on file   Occupational History    Not on file   Social Needs    Financial resource strain: Not on file    Food insecurity     Worry: Not on file     Inability: Not on file    Transportation needs     Medical: Not on file     Non-medical: Not on file   Tobacco Use    Smoking status: Never Smoker    Smokeless tobacco: Never Used   Substance and Sexual Activity    Alcohol use: No    Drug use: No    Sexual activity: Not on file   Lifestyle    Physical activity     Days per week: Not on file     Minutes per session: Not on file    Stress: Not on file   Relationships    Social connections     Talks on phone: Not on file     Gets together: Not on file     Attends Cheondoism service: Not on file     Active member of club or organization: Not on file     Attends meetings of clubs or organizations: Not on file     Relationship status: Not on file    Intimate partner violence     Fear of current or ex partner: Not on file     Emotionally abused: Not on file     Physically abused: Not on file     Forced sexual activity: Not on file   Other Topics Concern    Not on file   Social History Narrative    Not on file     Family History   Problem Relation Age of Onset    Heart Disease Mother     Stroke Mother     Heart Disease Father     Heart Disease Brother     Cancer Brother         prostate    Heart Disease Brother     Heart Disease Brother     Heart Disease Brother     Malignant Hyperthermia Neg Hx     Pseudocholinesterase Deficiency Neg Hx     Delayed Awakening Neg Hx     Post-op Nausea/Vomiting Neg Hx     Emergence Delirium Neg Hx     Post-op Cognitive Dysfunction Neg Hx     Other Neg Hx     Breast Cancer Neg Hx         Current Facility-Administered Medications   Medication Dose Route Frequency    lip protectant (BLISTEX) ointment 1 Each  1 Each Topical PRN    0.9% sodium chloride infusion  75 mL/hr IntraVENous CONTINUOUS    nitroglycerin (Tridil) 200 mcg/ml infusion  0-200 mcg/min IntraVENous TITRATE    heparin 25,000 units in dextrose 500 mL infusion  12-25 Units/kg/hr IntraVENous TITRATE    aspirin delayed-release tablet 81 mg  81 mg Oral DAILY    atorvastatin (LIPITOR) tablet 40 mg  40 mg Oral DAILY    cloNIDine HCL (CATAPRES) tablet 0.1 mg  0.1 mg Oral BID    pantoprazole (PROTONIX) tablet 40 mg  40 mg Oral DAILY    labetaloL (NORMODYNE;TRANDATE) injection 20 mg  20 mg IntraVENous Q6H PRN    diazePAM (VALIUM) tablet 5 mg  5 mg Oral Q6H PRN    oxyCODONE IR (ROXICODONE) tablet 5 mg  5 mg Oral Q6H PRN    morphine injection 2 mg  2 mg IntraVENous Q3H PRN    valsartan (DIOVAN) tablet 160 mg  160 mg Oral DAILY    metoprolol tartrate (LOPRESSOR) tablet 50 mg  50 mg Oral Q6H    niCARdipine (CARDENE) 25 mg in 0.9% sodium chloride (MBP/ADV) 250 mL infusion  5-15 mg/hr IntraVENous CONTINUOUS    HYDROmorphone (DILAUDID) injection 1 mg  1 mg IntraVENous Q2H PRN    ondansetron (ZOFRAN) injection 4 mg  4 mg IntraVENous Q6H PRN       Review of Systems    Review of Systems   All other systems reviewed and are negative. Subjective:     Visit Vitals  BP (!) 85/50   Pulse 64   Temp 97.3 °F (36.3 °C)   Resp 20   Ht 5' 6\" (1.676 m)   Wt 61 kg (134 lb 7.7 oz)   SpO2 100%   Breastfeeding No   BMI 21.71 kg/m²       No intake/output data recorded.   03/17 1901 - 03/19 0700  In: 2710.7 [P.O.:390; I.V.:2320.7]  Out: 1750 [Urine:1550]    Physical Exam:  General: Well Developed, Well Nourished, No Acute Distress  HEENT: pupils equal and round, no abnormalities noted  Neck: supple, no JVD, no carotid bruits  Heart: S1S2 with RRR without murmurs or gallops  Lungs: Clear throughout auscultation bilaterally without adventitious sounds  Abd: soft, nontender, nondistended, with good bowel sounds  Ext: warm, no edema, calves supple/nontender, pulses 2+ bilaterally  Skin: warm and dry  Psychiatric: Normal mood and affect  Neurologic: Alert and oriented X 3    Cardiographics  Labs:   Recent Results (from the past 24 hour(s))   HEPARIN XA UFH    Collection Time: 03/18/21  6:36 PM   Result Value Ref Range    Heparin Xa UFH >1.1 (H) 0.3 - 0.7 IU/mL   PTT    Collection Time: 03/18/21  6:36 PM   Result Value Ref Range    aPTT >200.0 (HH) 24.1 - 35.1 SEC   CBC WITH AUTOMATED DIFF    Collection Time: 03/19/21  3:01 AM   Result Value Ref Range    WBC 13.4 (H) 4.3 - 11.1 K/uL    RBC 4.26 4.05 - 5.2 M/uL    HGB 12.4 11.7 - 15.4 g/dL    HCT 38.2 35.8 - 46.3 %    MCV 89.7 79.6 - 97.8 FL    MCH 29.1 26.1 - 32.9 PG    MCHC 32.5 31.4 - 35.0 g/dL    RDW 12.3 11.9 - 14.6 %    PLATELET 315 168 - 525 K/uL    MPV 10.8 9.4 - 12.3 FL    ABSOLUTE NRBC 0.00 0.0 - 0.2 K/uL    DF AUTOMATED      NEUTROPHILS 84 (H) 43 - 78 %    LYMPHOCYTES 9 (L) 13 - 44 %    MONOCYTES 7 4.0 - 12.0 %    EOSINOPHILS 0 (L) 0.5 - 7.8 %    BASOPHILS 0 0.0 - 2.0 %    IMMATURE GRANULOCYTES 0 0.0 - 5.0 %    ABS. NEUTROPHILS 11.3 (H) 1.7 - 8.2 K/UL    ABS. LYMPHOCYTES 1.2 0.5 - 4.6 K/UL    ABS. MONOCYTES 0.9 0.1 - 1.3 K/UL    ABS. EOSINOPHILS 0.0 0.0 - 0.8 K/UL    ABS. BASOPHILS 0.0 0.0 - 0.2 K/UL    ABS. IMM.  GRANS. 0.0 0.0 - 0.5 K/UL   HEPARIN XA UFH    Collection Time: 03/19/21  3:01 AM   Result Value Ref Range    Heparin Xa UFH 0.55 0.3 - 0.7 IU/mL   MAGNESIUM    Collection Time: 03/19/21  3:01 AM   Result Value Ref Range    Magnesium 2.0 1.8 - 2.4 mg/dL   METABOLIC PANEL, BASIC    Collection Time: 03/19/21  3:01 AM   Result Value Ref Range    Sodium 141 136 - 145 mmol/L    Potassium 4.1 3.5 - 5.1 mmol/L    Chloride 111 (H) 98 - 107 mmol/L    CO2 22 21 - 32 mmol/L    Anion gap 8 7 - 16 mmol/L    Glucose 138 (H) 65 - 100 mg/dL    BUN 18 8 - 23 MG/DL    Creatinine 0.72 0.6 - 1.0 MG/DL    GFR est AA >60 >60 ml/min/1.73m2    GFR est non-AA >60 >60 ml/min/1.73m2    Calcium 8.7 8.3 - 10.4 MG/DL   HEPARIN XA UFH    Collection Time: 03/19/21  9:15 AM   Result Value Ref Range    Heparin Xa UFH 0.34 0.3 - 0.7 IU/mL   HEMOGLOBIN    Collection Time: 03/19/21  9:15 AM   Result Value Ref Range    HGB 11.3 (L) 11.7 - 15.4 g/dL   HEMATOCRIT    Collection Time: 03/19/21  9:15 AM   Result Value Ref Range    HCT 34.4 (L) 35.8 - 46.3 %            Assessment/Plan:       Active Problems:    Chest pain (3/18/2021)      Ischemic heart disease (3/18/2021)    ////    Admit for observation.         Joey Leong MD  3/19/2021 5:22 PM

## 2021-03-19 NOTE — PROGRESS NOTES
Patient dressing changed to groin, very slow ooze continued. No hematoma. Patient without complaint.

## 2021-03-19 NOTE — PROGRESS NOTES
Sacrum benign with optifoam intact. POC provided. Attempted to place crump with fellow RN without success. 24 hour chart check done.

## 2021-03-19 NOTE — PROGRESS NOTES
R groin arterial sheath pulled at this time. Manual pressure held for 25 minutes until hemostasis achieved. No bleeding, or hematoma observed. Sandbag place over site. Patient instructed to call if any changes felt. Will continue to monitor closely.

## 2021-03-20 PROBLEM — I25.10 CAD (CORONARY ARTERY DISEASE): Status: ACTIVE | Noted: 2021-03-20

## 2021-03-20 LAB
ANION GAP SERPL CALC-SCNC: 6 MMOL/L (ref 7–16)
BASOPHILS # BLD: 0 K/UL (ref 0–0.2)
BASOPHILS NFR BLD: 0 % (ref 0–2)
BUN SERPL-MCNC: 15 MG/DL (ref 8–23)
CALCIUM SERPL-MCNC: 9 MG/DL (ref 8.3–10.4)
CHLORIDE SERPL-SCNC: 112 MMOL/L (ref 98–107)
CO2 SERPL-SCNC: 23 MMOL/L (ref 21–32)
CREAT SERPL-MCNC: 0.82 MG/DL (ref 0.6–1)
DIFFERENTIAL METHOD BLD: ABNORMAL
EOSINOPHIL # BLD: 0 K/UL (ref 0–0.8)
EOSINOPHIL NFR BLD: 0 % (ref 0.5–7.8)
ERYTHROCYTE [DISTWIDTH] IN BLOOD BY AUTOMATED COUNT: 12.3 % (ref 11.9–14.6)
GLUCOSE SERPL-MCNC: 131 MG/DL (ref 65–100)
HCT VFR BLD AUTO: 32.5 % (ref 35.8–46.3)
HGB BLD-MCNC: 10.4 G/DL (ref 11.7–15.4)
IMM GRANULOCYTES # BLD AUTO: 0.1 K/UL (ref 0–0.5)
IMM GRANULOCYTES NFR BLD AUTO: 1 % (ref 0–5)
LYMPHOCYTES # BLD: 1.6 K/UL (ref 0.5–4.6)
LYMPHOCYTES NFR BLD: 13 % (ref 13–44)
MCH RBC QN AUTO: 29.3 PG (ref 26.1–32.9)
MCHC RBC AUTO-ENTMCNC: 32 G/DL (ref 31.4–35)
MCV RBC AUTO: 91.5 FL (ref 79.6–97.8)
MONOCYTES # BLD: 1.5 K/UL (ref 0.1–1.3)
MONOCYTES NFR BLD: 12 % (ref 4–12)
NEUTS SEG # BLD: 8.6 K/UL (ref 1.7–8.2)
NEUTS SEG NFR BLD: 73 % (ref 43–78)
NRBC # BLD: 0 K/UL (ref 0–0.2)
PLATELET # BLD AUTO: 146 K/UL (ref 150–450)
PMV BLD AUTO: 11.7 FL (ref 9.4–12.3)
POTASSIUM SERPL-SCNC: 4 MMOL/L (ref 3.5–5.1)
RBC # BLD AUTO: 3.55 M/UL (ref 4.05–5.2)
SODIUM SERPL-SCNC: 141 MMOL/L (ref 136–145)
UFH PPP CHRO-ACNC: 0.52 IU/ML (ref 0.3–0.7)
UFH PPP CHRO-ACNC: 0.6 IU/ML (ref 0.3–0.7)
WBC # BLD AUTO: 11.7 K/UL (ref 4.3–11.1)

## 2021-03-20 PROCEDURE — 77010033678 HC OXYGEN DAILY

## 2021-03-20 PROCEDURE — 2709999900 HC NON-CHARGEABLE SUPPLY

## 2021-03-20 PROCEDURE — C8929 TTE W OR WO FOL WCON,DOPPLER: HCPCS

## 2021-03-20 PROCEDURE — 74011250637 HC RX REV CODE- 250/637: Performed by: INTERNAL MEDICINE

## 2021-03-20 PROCEDURE — 94760 N-INVAS EAR/PLS OXIMETRY 1: CPT

## 2021-03-20 PROCEDURE — 85520 HEPARIN ASSAY: CPT

## 2021-03-20 PROCEDURE — 65270000029 HC RM PRIVATE

## 2021-03-20 PROCEDURE — 36415 COLL VENOUS BLD VENIPUNCTURE: CPT

## 2021-03-20 PROCEDURE — 80048 BASIC METABOLIC PNL TOTAL CA: CPT

## 2021-03-20 PROCEDURE — 74011000250 HC RX REV CODE- 250: Performed by: INTERNAL MEDICINE

## 2021-03-20 PROCEDURE — 74011250636 HC RX REV CODE- 250/636: Performed by: INTERNAL MEDICINE

## 2021-03-20 PROCEDURE — 85025 COMPLETE CBC W/AUTO DIFF WBC: CPT

## 2021-03-20 PROCEDURE — 99218 HC RM OBSERVATION: CPT

## 2021-03-20 PROCEDURE — 99232 SBSQ HOSP IP/OBS MODERATE 35: CPT | Performed by: INTERNAL MEDICINE

## 2021-03-20 RX ORDER — ISOSORBIDE MONONITRATE 30 MG/1
30 TABLET, EXTENDED RELEASE ORAL DAILY
Status: DISCONTINUED | OUTPATIENT
Start: 2021-03-20 | End: 2021-03-22 | Stop reason: HOSPADM

## 2021-03-20 RX ADMIN — ASPIRIN 81 MG: 81 TABLET ORAL at 08:44

## 2021-03-20 RX ADMIN — HEPARIN SODIUM 13 UNITS/KG/HR: 5000 INJECTION, SOLUTION INTRAVENOUS at 08:19

## 2021-03-20 RX ADMIN — HYDROMORPHONE HYDROCHLORIDE 1 MG: 1 INJECTION, SOLUTION INTRAMUSCULAR; INTRAVENOUS; SUBCUTANEOUS at 08:44

## 2021-03-20 RX ADMIN — ATORVASTATIN CALCIUM 40 MG: 40 TABLET, FILM COATED ORAL at 08:44

## 2021-03-20 RX ADMIN — PERFLUTREN 1 ML: 6.52 INJECTION, SUSPENSION INTRAVENOUS at 08:15

## 2021-03-20 RX ADMIN — ISOSORBIDE MONONITRATE 30 MG: 60 TABLET, EXTENDED RELEASE ORAL at 14:17

## 2021-03-20 RX ADMIN — CLONIDINE HYDROCHLORIDE 0.1 MG: 0.1 TABLET ORAL at 08:44

## 2021-03-20 RX ADMIN — PANTOPRAZOLE SODIUM 40 MG: 40 TABLET, DELAYED RELEASE ORAL at 08:44

## 2021-03-20 RX ADMIN — CLONIDINE HYDROCHLORIDE 0.1 MG: 0.1 TABLET ORAL at 21:53

## 2021-03-20 RX ADMIN — METOPROLOL TARTRATE 25 MG: 25 TABLET, FILM COATED ORAL at 08:44

## 2021-03-20 RX ADMIN — METOPROLOL TARTRATE 25 MG: 25 TABLET, FILM COATED ORAL at 21:29

## 2021-03-20 NOTE — PROGRESS NOTES
Son at bedside, patient remains intermittently confused and seems angry. Son confirms this. Awaiting telemetry room assignment. Silva catheter removed. 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 Son dismissed to go to the car and retrieve her stuff.

## 2021-03-20 NOTE — PROGRESS NOTES
[facility] CARDIOLOGY PROGRESS NOTE           3/20/2021 12:52 PM    Admit Date: 3/18/2021      Subjective:   Some confusion overnight. Alert and oriented now. No chest pain. Awaiting bed to move out of CV-ICU.     ROS:  Cardiovascular:  As noted above    Objective:      Vitals:    03/20/21 0822 03/20/21 0831 03/20/21 0844 03/20/21 0901   BP:  (!) 140/65 (!) 140/65 139/64   Pulse:  69 67 67   Resp:  (!) 31  13   Temp:       SpO2: 96%      Weight:       Height:           Physical Exam:  General-No Acute Distress  Neck- supple, no JVD  CV- regular rate and rhythm no MRG  Lung- clear bilaterally  Abd- soft, nontender, nondistended  Ext- no edema bilaterally. Skin- warm and dry      Data Review:   Recent Labs     03/20/21  0302 03/19/21  0915 03/19/21  0301 03/18/21  0718     --  141 144   K 4.0  --  4.1 3.7   MG  --   --  2.0  --    BUN 15  --  18 22   CREA 0.82  --  0.72 1.02*   *  --  138* 104*   WBC 11.7*  --  13.4* 6.4   HGB 10.4* 11.3* 12.4 14.6   HCT 32.5* 34.4* 38.2 45.4   *  --  224 231   INR  --   --   --  1.0      No results found for: VIJAYA Dahl    Assessment/Plan:     Active Problems:    Chest pain (3/18/2021)    Due to wire dissection. Improved. Echo today. Continue IV heparin today. Likely stop tomorrow. HTN (hypertension) (8/19/2013)    BP mildly elevated. Start Imdur. Ischemic heart disease (3/18/2021)    See above.                    Kely Dutton MD  3/20/2021 12:52 PM

## 2021-03-20 NOTE — PROGRESS NOTES
TRANSFER - IN REPORT:    Verbal report received from [facility] (name) on Azam Sol  being received from CVICU (unit) for routine progression of care      Report consisted of patients Situation, Background, Assessment and   Recommendations(SBAR). Information from the following report(s) SBAR, Kardex, Procedure Summary, Intake/Output, MAR, Recent Results and Cardiac Rhythm paced was reviewed with the receiving nurse. Opportunity for questions and clarification was provided. Assessment to be completed upon patients arrival to unit and care to be assumed.

## 2021-03-20 NOTE — PROGRESS NOTES
Bedside report given to Raquel Martinez, [address] [facility].

## 2021-03-20 NOTE — PROGRESS NOTES
Problem: Falls - Risk of  Goal: *Absence of Falls  Description: Document Daniel Cortez Fall Risk and appropriate interventions in the flowsheet. Outcome: Progressing Towards Goal  Note: Fall Risk Interventions:  Mobility Interventions: Bed/chair exit alarm, Communicate number of staff needed for ambulation/transfer, Patient to call before getting OOB    Mentation Interventions: Bed/chair exit alarm, Increase mobility    Medication Interventions: Bed/chair exit alarm, Patient to call before getting OOB, Teach patient to arise slowly    Elimination Interventions: Bed/chair exit alarm, Call light in reach, Patient to call for help with toileting needs, Toilet paper/wipes in reach, Toileting schedule/hourly rounds              Problem: Pressure Injury - Risk of  Goal: *Prevention of pressure injury  Description: Document Fred Scale and appropriate interventions in the flowsheet.   Outcome: Progressing Towards Goal  Note: Pressure Injury Interventions:       Moisture Interventions: Minimize layers, Apply protective barrier, creams and emollients    Activity Interventions: Increase time out of bed, Pressure redistribution bed/mattress(bed type)    Mobility Interventions: HOB 30 degrees or less, Pressure redistribution bed/mattress (bed type)    Nutrition Interventions: Document food/fluid/supplement intake, Offer support with meals,snacks and hydration    Friction and Shear Interventions: Minimize layers, Foam dressings/transparent film/skin sealants                Problem: Cath Lab Procedures: Discharge Outcomes  Goal: *Stable cardiac rhythm  Outcome: Progressing Towards Goal  Goal: *Hemodynamically stable  Outcome: Progressing Towards Goal  Goal: *Pulses palpable, skin color within defined limits, skin temperature warm  Outcome: Progressing Towards Goal  Goal: *Lungs clear or at baseline  Outcome: Progressing Towards Goal     Problem: Pain  Goal: *Control of Pain  Outcome: Progressing Towards Goal     Problem: Delirium Treatment  Goal: *Absence of falls  Outcome: Progressing Towards Goal

## 2021-03-20 NOTE — PROGRESS NOTES
Dual skin assessment performed. Skin warm and dry, fragile. Sacrum and heels intact, no redness or breakdown noted. Midline scar from previous CABG. Right groin site, gauze and tegaderm in place, no bleeding or hematoma noted, ecchymosis present. Site appears clean, dry and intact. Left radial cath site,  gauze and Kerlix in pace, no hematoma or bleeding noted, appears cleans dry and intact, swelling and ecchymosis present.

## 2021-03-20 NOTE — PROGRESS NOTES
TRANSFER - OUT REPORT:    Verbal report given to Morgan Kaiser RN on Bonita Tapia  being transferred to [facility] for routine progression of care       Report consisted of patients Situation, Background, Assessment and   Recommendations(SBAR). Information from the following report(s) SBAR, Procedure Summary, Intake/Output, MAR, Recent Results and Cardiac Rhythm A-Paced was reviewed with the receiving nurse. Lines:   Peripheral IV 03/18/21 Anterior;Right Forearm (Active)   Site Assessment Clean, dry, & intact 03/20/21 0701   Phlebitis Assessment 0 03/20/21 0701   Infiltration Assessment 0 03/20/21 0701   Dressing Status Clean, dry, & intact 03/20/21 0701   Dressing Type Transparent;Tape 03/20/21 0701   Hub Color/Line Status Pink; Infusing 03/20/21 0701   Alcohol Cap Used No 03/19/21 1100       Peripheral IV 03/18/21 Anterior;Left;Proximal Forearm (Active)   Site Assessment Clean, dry, & intact 03/20/21 0701   Phlebitis Assessment 0 03/20/21 0701   Infiltration Assessment 0 03/20/21 0701   Dressing Status Clean, dry, & intact 03/20/21 0701   Dressing Type Transparent;Tape 03/20/21 0701   Hub Color/Line Status Pink;Flushed 03/20/21 0701   Alcohol Cap Used No 03/19/21 1100        Opportunity for questions and clarification was provided.       Patient transported with:   Monitor  Registered Nurse

## 2021-03-21 LAB
ANION GAP SERPL CALC-SCNC: 3 MMOL/L (ref 7–16)
BASOPHILS # BLD: 0 K/UL (ref 0–0.2)
BASOPHILS NFR BLD: 0 % (ref 0–2)
BUN SERPL-MCNC: 13 MG/DL (ref 8–23)
CALCIUM SERPL-MCNC: 9.1 MG/DL (ref 8.3–10.4)
CHLORIDE SERPL-SCNC: 111 MMOL/L (ref 98–107)
CO2 SERPL-SCNC: 26 MMOL/L (ref 21–32)
CREAT SERPL-MCNC: 0.7 MG/DL (ref 0.6–1)
DIFFERENTIAL METHOD BLD: ABNORMAL
EOSINOPHIL # BLD: 0.1 K/UL (ref 0–0.8)
EOSINOPHIL NFR BLD: 1 % (ref 0.5–7.8)
ERYTHROCYTE [DISTWIDTH] IN BLOOD BY AUTOMATED COUNT: 12.4 % (ref 11.9–14.6)
GLUCOSE SERPL-MCNC: 110 MG/DL (ref 65–100)
HCT VFR BLD AUTO: 29 % (ref 35.8–46.3)
HGB BLD-MCNC: 9.2 G/DL (ref 11.7–15.4)
IMM GRANULOCYTES # BLD AUTO: 0.1 K/UL (ref 0–0.5)
IMM GRANULOCYTES NFR BLD AUTO: 1 % (ref 0–5)
LYMPHOCYTES # BLD: 2 K/UL (ref 0.5–4.6)
LYMPHOCYTES NFR BLD: 21 % (ref 13–44)
MCH RBC QN AUTO: 29.3 PG (ref 26.1–32.9)
MCHC RBC AUTO-ENTMCNC: 31.7 G/DL (ref 31.4–35)
MCV RBC AUTO: 92.4 FL (ref 79.6–97.8)
MONOCYTES # BLD: 1 K/UL (ref 0.1–1.3)
MONOCYTES NFR BLD: 11 % (ref 4–12)
NEUTS SEG # BLD: 6.4 K/UL (ref 1.7–8.2)
NEUTS SEG NFR BLD: 67 % (ref 43–78)
NRBC # BLD: 0 K/UL (ref 0–0.2)
PLATELET # BLD AUTO: 121 K/UL (ref 150–450)
PMV BLD AUTO: 11.7 FL (ref 9.4–12.3)
POTASSIUM SERPL-SCNC: 3.6 MMOL/L (ref 3.5–5.1)
RBC # BLD AUTO: 3.14 M/UL (ref 4.05–5.2)
SODIUM SERPL-SCNC: 140 MMOL/L (ref 136–145)
UFH PPP CHRO-ACNC: 0.2 IU/ML (ref 0.3–0.7)
WBC # BLD AUTO: 9.6 K/UL (ref 4.3–11.1)

## 2021-03-21 PROCEDURE — 74011250637 HC RX REV CODE- 250/637: Performed by: INTERNAL MEDICINE

## 2021-03-21 PROCEDURE — 74011250636 HC RX REV CODE- 250/636: Performed by: INTERNAL MEDICINE

## 2021-03-21 PROCEDURE — 85025 COMPLETE CBC W/AUTO DIFF WBC: CPT

## 2021-03-21 PROCEDURE — 65270000029 HC RM PRIVATE

## 2021-03-21 PROCEDURE — 99232 SBSQ HOSP IP/OBS MODERATE 35: CPT | Performed by: INTERNAL MEDICINE

## 2021-03-21 PROCEDURE — 36415 COLL VENOUS BLD VENIPUNCTURE: CPT

## 2021-03-21 PROCEDURE — 97161 PT EVAL LOW COMPLEX 20 MIN: CPT

## 2021-03-21 PROCEDURE — 2709999900 HC NON-CHARGEABLE SUPPLY

## 2021-03-21 PROCEDURE — 80048 BASIC METABOLIC PNL TOTAL CA: CPT

## 2021-03-21 PROCEDURE — 97110 THERAPEUTIC EXERCISES: CPT

## 2021-03-21 PROCEDURE — 85520 HEPARIN ASSAY: CPT

## 2021-03-21 RX ORDER — ACETAMINOPHEN 325 MG/1
650 TABLET ORAL
Status: DISCONTINUED | OUTPATIENT
Start: 2021-03-21 | End: 2021-03-22 | Stop reason: HOSPADM

## 2021-03-21 RX ORDER — POLYETHYLENE GLYCOL 3350 17 G/17G
17 POWDER, FOR SOLUTION ORAL DAILY
Status: DISCONTINUED | OUTPATIENT
Start: 2021-03-21 | End: 2021-03-22 | Stop reason: HOSPADM

## 2021-03-21 RX ORDER — METOPROLOL TARTRATE 50 MG/1
50 TABLET ORAL EVERY 12 HOURS
Status: DISCONTINUED | OUTPATIENT
Start: 2021-03-21 | End: 2021-03-22 | Stop reason: HOSPADM

## 2021-03-21 RX ORDER — HEPARIN SODIUM 5000 [USP'U]/ML
20 INJECTION, SOLUTION INTRAVENOUS; SUBCUTANEOUS ONCE
Status: COMPLETED | OUTPATIENT
Start: 2021-03-21 | End: 2021-03-21

## 2021-03-21 RX ADMIN — ATORVASTATIN CALCIUM 40 MG: 40 TABLET, FILM COATED ORAL at 08:30

## 2021-03-21 RX ADMIN — POLYETHYLENE GLYCOL 3350 17 G: 17 POWDER, FOR SOLUTION ORAL at 15:25

## 2021-03-21 RX ADMIN — ASPIRIN 81 MG: 81 TABLET ORAL at 08:30

## 2021-03-21 RX ADMIN — ISOSORBIDE MONONITRATE 30 MG: 60 TABLET, EXTENDED RELEASE ORAL at 08:30

## 2021-03-21 RX ADMIN — HEPARIN SODIUM 15 UNITS/KG/HR: 5000 INJECTION, SOLUTION INTRAVENOUS at 06:54

## 2021-03-21 RX ADMIN — OXYCODONE 5 MG: 5 TABLET ORAL at 07:06

## 2021-03-21 RX ADMIN — HEPARIN SODIUM 1200 UNITS: 5000 INJECTION INTRAVENOUS; SUBCUTANEOUS at 06:29

## 2021-03-21 RX ADMIN — METOPROLOL TARTRATE 25 MG: 25 TABLET, FILM COATED ORAL at 09:44

## 2021-03-21 RX ADMIN — ACETAMINOPHEN 650 MG: 325 TABLET ORAL at 22:02

## 2021-03-21 RX ADMIN — METOPROLOL TARTRATE 50 MG: 50 TABLET, FILM COATED ORAL at 21:17

## 2021-03-21 RX ADMIN — PANTOPRAZOLE SODIUM 40 MG: 40 TABLET, DELAYED RELEASE ORAL at 08:30

## 2021-03-21 NOTE — PROGRESS NOTES
Patient's son David Owens called, updated on patient status and plan of care. States he has no questions at this time.

## 2021-03-21 NOTE — PROGRESS NOTES
[facility] CARDIOLOGY PROGRESS NOTE           3/21/2021 12:52 PM    Admit Date: 3/18/2021      Subjective:   Patient denies any chest pain. Echo with mild to moderate LV dysfunction. Remains on IV heparin. Nurse feels some instability with moving around room. ROS:  Cardiovascular:  As noted above    Objective:      Vitals:    03/21/21 0352 03/21/21 0359 03/21/21 0712 03/21/21 0937   BP: (!) 107/56  129/60 (!) 113/56   Pulse: 64  68 62   Resp: 16  20    Temp: 98.6 °F (37 °C)  99.7 °F (37.6 °C)    SpO2: 97%  94%    Weight:  137 lb 5.6 oz (62.3 kg)     Height:           Physical Exam:  General-No Acute Distress  Neck- supple, no JVD  CV- regular rate and rhythm no MRG  Lung- clear bilaterally  Abd- soft, nontender, nondistended  Ext- no edema bilaterally. Skin- warm and dry      Data Review:   Recent Labs     03/21/21  0454 03/20/21  0302 03/19/21  0301 03/19/21  0301    141  --  141   K 3.6 4.0  --  4.1   MG  --   --   --  2.0   BUN 13 15  --  18   CREA 0.70 0.82  --  0.72   * 131*  --  138*   WBC 9.6 11.7*  --  13.4*   HGB 9.2* 10.4*   < > 12.4   HCT 29.0* 32.5*   < > 38.2   * 146*  --  224    < > = values in this interval not displayed. No results found for: VIJAYA Harvey      Echo  (3/19/20): -  Left ventricle: Systolic function was mildly to moderately reduced. Ejection fraction was estimated to be 40 %. There was hypokinesis of the mid-apical anterior, mid anteroseptal, and apical wall(s).    -  Mitral valve: There was mild annular calcification. Assessment/Plan:     Active Problems:    Chest pain (3/18/2021)   Resolved. Echo with mild to moderate LV dysfunction post cath. Medical therapy. Attempt to maximize B-blocker. Increase Lopressor and Stop Clonidine. Stop IV heparin. HTN (hypertension) (8/19/2013)    See above. Stop clonidine. Increase Lopressor. Ischemic heart disease (3/18/2021)    See above. PT consult. Emelia Montana MD  3/21/2021 12:52 PM

## 2021-03-21 NOTE — PROGRESS NOTES
[facility] CARDIOLOGY PROGRESS NOTE           3/21/2021 12:52 PM    Admit Date: 3/18/2021      Subjective:   Patient denies any chest pain. Echo with mild to moderate LV dysfunction. Remains on IV heparin. Nurse feels some instability with moving around room. ROS:  Cardiovascular:  As noted above    Objective:      Vitals:    03/21/21 0352 03/21/21 0359 03/21/21 0712 03/21/21 0937   BP: (!) 107/56  129/60 (!) 113/56   Pulse: 64  68 62   Resp: 16  20    Temp: 98.6 °F (37 °C)  99.7 °F (37.6 °C)    SpO2: 97%  94%    Weight:  137 lb 5.6 oz (62.3 kg)     Height:           Physical Exam:  General-No Acute Distress  Neck- supple, no JVD  CV- regular rate and rhythm no MRG  Lung- clear bilaterally  Abd- soft, nontender, nondistended  Ext- no edema bilaterally. Skin- warm and dry      Data Review:   Recent Labs     03/21/21  0454 03/20/21  0302 03/19/21  0301 03/19/21  0301    141  --  141   K 3.6 4.0  --  4.1   MG  --   --   --  2.0   BUN 13 15  --  18   CREA 0.70 0.82  --  0.72   * 131*  --  138*   WBC 9.6 11.7*  --  13.4*   HGB 9.2* 10.4*   < > 12.4   HCT 29.0* 32.5*   < > 38.2   * 146*  --  224    < > = values in this interval not displayed. No results found for: Mahendra Collazo, [facility]      Echo  (3/19/20): -  Left ventricle: Systolic function was mildly to moderately reduced. Ejection fraction was estimated to be 40 %. There was hypokinesis of the mid-apical anterior, mid anteroseptal, and apical wall(s).    -  Mitral valve: There was mild annular calcification. Assessment/Plan:     Active Problems:    Chest pain (3/18/2021)   Resolved. Echo with mild to moderate LV dysfunction post cath. Medical therapy. Attempt to maximize B-blocker. Increase Lopressor and Stop Clonidine. Stop IV heparin. HTN (hypertension) (8/19/2013)    See above. Stop clonidine. Increase Lopressor. Ischemic heart disease (3/18/2021)    See above.                    Veronique Romero MD [place]  3/21/2021 12:52 PM

## 2021-03-21 NOTE — PROGRESS NOTES
ACUTE PHYSICAL THERAPY GOALS:  (Developed with and agreed upon by patient and/or caregiver. )  LTG:  (1.)Ms. Wynnegg will move from supine to sit and sit to supine, scoot up and down and roll side to side in bed with INDEPENDENT within 7 day(s). (2.)Ms. Arvind will transfer from bed to chair and chair to bed with INDEPENDENT using the least restrictive device within 7 day(s). (3.)Ms. Arvind will ambulate with modified INDEPENDENCE for 500+ feet with the least restrictive/no device within 7 day(s). (4.)Ms. Arvind will perform standing static and dynamic balance activities x 12 minutes with SUPERVISION to improve safety within 7 day(s). (5.)Ms. Arvind will ascend and descend 2-10 stairs using one hand rail(s) with SUPERVISION to improve functional mobility and safety within 7 day(s). PHYSICAL THERAPY ASSESSMENT: Initial Assessment and Daily Note PT Treatment Day # 1      Rosalie Francois is a 80 y.o. female   PRIMARY DIAGNOSIS: <principal problem not specified>  Chest pain [R07.9]  Ischemic heart disease [I25.9]  CAD (coronary artery disease) [I25.10]       Reason for Referral:     ICD-10: Treatment Diagnosis: Other abnormalities of gait and mobility (R26.89)  INPATIENT: Payor: [employer]A MEDICARE / Plan: Tommy Ochoa / Product Type: Managed Care Medicare /     ASSESSMENT:     REHAB RECOMMENDATIONS:   Recommendation to date pending progress:  [address] [place] [address]   vs [facility]  Equipment:    To Be Determined     PRIOR LEVEL OF FUNCTION:  (Prior to Hospitalization) INITIAL/CURRENT LEVEL OF FUNCTION:  (Most Recently Demonstrated)   Bed Mobility:   Independent  Sit to Stand:   Independent  Transfers:   Independent  Gait/Mobility:   Independent Bed Mobility:   Minimal Assistance  Sit to Stand:   Contact Guard Assistance  Transfers:   Contact Guard Assistance  Gait/Mobility:   Contact Guard Assistance     ASSESSMENT:  Ms. Kendra Harris lives alone and is independent and quite active.   Patient has declined slightly in functional mobility. Ms. Carlita Burks would benefit from skilled physical therapy (medically necessary) to address her deficits and maximize her function. SUBJECTIVE:   Ms. Carlita Burks states, \"I'm from [place]. \"    SOCIAL HISTORY/LIVING ENVIRONMENT: Ms. Carlita Burks lives alone and is independent and quite active.   Home Environment: Private residence  One/Two Story Residence: One story  Living Alone: Yes  Support Systems: Friends \ neighbors, Child(birgit)  OBJECTIVE:     PAIN: VITAL SIGNS: LINES/DRAINS:   Pre Treatment: Pain Screen  Pain Scale 1: Numeric (0 - 10)  Pain Intensity 1: 3  Pain Location 1: Hand  Pain Orientation 1: Left  Pain Intervention(s) 1: Repositioned  Post Treatment: 3   monitor  O2 Device: Room air     GROSS EVALUATION:  B LE's Within Functional Limits Abnormal/ Functional Abnormal/ Non-Functional (see comments) Not Tested Comments:   AROM [] [x] [] []    PROM [] [] [] []    Strength [] [x] [] []    Balance [] [x] [] []    Posture [] [x] [] []    Sensation [] [] [] []    Coordination [] [] [] []    Tone [x] [] [] []    Edema [] [] [] []    Activity Tolerance [] [x] [] []     [] [] [] []      COGNITION/  PERCEPTION: Intact Impaired   (see comments) Comments:   Orientation [x] []    Vision [x] []    Hearing [x] []    Command Following [x] []    Safety Awareness [] []     [] []      MOBILITY: I Mod I S SBA CGA Min Mod Max Total  NT x2 Comments:   Bed Mobility    Rolling [] [] [] [] [] [] [] [] [] [] []    Supine to Sit [] [] [] [] [] [] [] [] [] [] []    Scooting [] [] [] [] [x] [] [] [] [] [] []    Sit to Supine [] [] [] [] [] [x] [] [] [] [] []    Transfers    Sit to Stand [] [] [] [] [x] [] [] [] [] [] []    Bed to Chair [] [] [] [] [] [] [] [] [] [] [x]    Stand to Sit [] [] [] [] [x] [] [] [] [] [] []    I=Independent, Mod I=Modified Independent, S=Supervision, SBA=Standby Assistance, CGA=Contact Guard Assistance,   Min=Minimal Assistance, Mod=Moderate Assistance, Max=Maximal Assistance, Total=Total Assistance, NT=Not Tested  GAIT: I Mod I S SBA CGA Min Mod Max Total  NT x2 Comments:   Level of Assistance [] [] [] [] [x] [] [] [] [] [] []    Distance 250'    DME Gait Belt    Gait Quality Slow, decreased step length, decreased reciprocal arm swing    Weightbearing Status N/A     I=Independent, Mod I=Modified Independent, S=Supervision, SBA=Standby Assistance, CGA=Contact Guard Assistance,   Min=Minimal Assistance, Mod=Moderate Assistance, Max=Maximal Assistance, Total=Total Assistance, NT=Not [facility]       How much difficulty does the patient currently have. .. Unable A Lot A Little None   1. Turning over in bed (including adjusting bedclothes, sheets and blankets)? [] 1   [] 2   [x] 3   [] 4   2. Sitting down on and standing up from a chair with arms ( e.g., wheelchair, bedside commode, etc.)   [] 1   [] 2   [x] 3   [] 4   3. Moving from lying on back to sitting on the side of the bed? [] 1   [] 2   [x] 3   [] 4   How much help from another person does the patient currently need. .. Total A Lot A Little None   4. Moving to and from a bed to a chair (including a wheelchair)? [] 1   [] 2   [x] 3   [] 4   5. Need to walk in hospital room? [] 1   [] 2   [x] 3   [] 4   6. Climbing 3-5 steps with a railing? [] 1   [] 2   [x] 3   [] 4   © 2007, Trustees of [facility] [employer], under license to [employer]. All rights reserved     Score:  Initial: 18 Most Recent: X (Date: -- )    Interpretation of Tool:  Represents activities that are increasingly more difficult (i.e. Bed mobility, Transfers, Gait). PLAN:   FREQUENCY/DURATION: PT Plan of Care: 3 times/week for duration of hospital stay or until stated goals are met, whichever comes first.    PROBLEM LIST:   (Skilled intervention is medically necessary to address:)  1. Decreased Activity Tolerance  2. Decreased Balance  3.  Decreased Gait Ability  4. Decreased Transfer Abilities  5. Increased Pain   INTERVENTIONS PLANNED:   (Benefits and precautions of physical therapy have been discussed with the patient.)  1. Therapeutic Activity  2. Therapeutic Exercise/HEP  3. Neuromuscular Re-education  4. Gait Training  5. Manual Therapy  6. Education     TREATMENT:     EVALUATION: Low Complexity : (Untimed Charge)    TREATMENT:   ($$ Therapeutic Exercises: 8-22 mins    )  Therapeutic Exercise (8 Minutes): Therapeutic exercises noted below to improve functional activity tolerance, strength and mobility.      TREATMENT GRID:       Date: 3/21/21        Ambulation  Device  assistance x200'  Gait belt  CGA        Partial Squats         Hip Abduction/ Adduction Standing         Heel Raises  Standing         Toe Raises Standing         Hip Flexion Standing         Sit to Stand         Key:  R=right, L=left, B=bilaterally, A=active, AA=active assisted, P=passive    AFTER TREATMENT POSITION/PRECAUTIONS:  Alarm Activated, Bed, Needs within reach and RN notified    INTERDISCIPLINARY COLLABORATION:  RN/PCT and PT/PTA    TOTAL TREATMENT DURATION:  PT Patient Time In/Time Out  Time In: 1340  Time Out: 77 Tommy Stevens PT, DPT

## 2021-03-21 NOTE — PROGRESS NOTES
Problem: Falls - Risk of  Goal: *Absence of Falls  Description: Document Susan Minium Fall Risk and appropriate interventions in the flowsheet. Outcome: Progressing Towards Goal  Note: Fall Risk Interventions:  Mobility Interventions: Bed/chair exit alarm, Communicate number of staff needed for ambulation/transfer, Patient to call before getting OOB    Mentation Interventions: Bed/chair exit alarm, More frequent rounding, Toileting rounds, Update white board, Adequate sleep, hydration, pain control, Reorient patient    Medication Interventions: Bed/chair exit alarm, Patient to call before getting OOB, Teach patient to arise slowly    Elimination Interventions: Bed/chair exit alarm, Call light in reach, Patient to call for help with toileting needs, Toilet paper/wipes in reach, Toileting schedule/hourly rounds              Problem: Pressure Injury - Risk of  Goal: *Prevention of pressure injury  Description: Document Fred Scale and appropriate interventions in the flowsheet.   Outcome: Progressing Towards Goal  Note: Pressure Injury Interventions:       Moisture Interventions: Minimize layers    Activity Interventions: Increase time out of bed, Pressure redistribution bed/mattress(bed type)    Mobility Interventions: HOB 30 degrees or less, Pressure redistribution bed/mattress (bed type)    Nutrition Interventions: Document food/fluid/supplement intake, Offer support with meals,snacks and hydration    Friction and Shear Interventions: Minimize layers, Apply protective barrier, creams and emollients, Foam dressings/transparent film/skin sealants, HOB 30 degrees or less                Problem: Cath Lab Procedures: Discharge Outcomes  Goal: *Stable cardiac rhythm  Outcome: Progressing Towards Goal  Goal: *Hemodynamically stable  Outcome: Progressing Towards Goal  Goal: *Demonstrates ability to perform prescribed activity without shortness of breath or discomfort  Outcome: Progressing Towards Goal     Problem: Pain  Goal: *Control of Pain  Outcome: Progressing Towards Goal     Problem: Delirium Treatment  Goal: *Absence of falls  Outcome: Progressing Towards Goal

## 2021-03-22 VITALS
RESPIRATION RATE: 16 BRPM | DIASTOLIC BLOOD PRESSURE: 63 MMHG | HEIGHT: 66 IN | OXYGEN SATURATION: 98 % | SYSTOLIC BLOOD PRESSURE: 133 MMHG | TEMPERATURE: 98.9 F | WEIGHT: 142.2 LBS | BODY MASS INDEX: 22.85 KG/M2 | HEART RATE: 62 BPM

## 2021-03-22 PROCEDURE — 74011250637 HC RX REV CODE- 250/637: Performed by: INTERNAL MEDICINE

## 2021-03-22 PROCEDURE — 2709999900 HC NON-CHARGEABLE SUPPLY

## 2021-03-22 PROCEDURE — 97530 THERAPEUTIC ACTIVITIES: CPT

## 2021-03-22 PROCEDURE — 99238 HOSP IP/OBS DSCHRG MGMT 30/<: CPT | Performed by: INTERNAL MEDICINE

## 2021-03-22 RX ORDER — METOPROLOL TARTRATE 50 MG/1
50 TABLET ORAL EVERY 12 HOURS
Qty: 60 TAB | Refills: 5 | Status: SHIPPED | OUTPATIENT
Start: 2021-03-22 | End: 2021-04-13 | Stop reason: ALTCHOICE

## 2021-03-22 RX ORDER — CLOPIDOGREL BISULFATE 75 MG/1
75 TABLET ORAL DAILY
Status: DISCONTINUED | OUTPATIENT
Start: 2021-03-22 | End: 2021-03-22 | Stop reason: HOSPADM

## 2021-03-22 RX ORDER — CLOPIDOGREL BISULFATE 75 MG/1
TABLET ORAL
Qty: 90 TAB | Refills: 3 | Status: SHIPPED | OUTPATIENT
Start: 2021-03-22 | End: 2021-06-07 | Stop reason: SDUPTHER

## 2021-03-22 RX ADMIN — ASPIRIN 81 MG: 81 TABLET ORAL at 08:27

## 2021-03-22 RX ADMIN — ISOSORBIDE MONONITRATE 30 MG: 60 TABLET, EXTENDED RELEASE ORAL at 08:27

## 2021-03-22 RX ADMIN — METOPROLOL TARTRATE 50 MG: 50 TABLET, FILM COATED ORAL at 08:27

## 2021-03-22 RX ADMIN — ATORVASTATIN CALCIUM 40 MG: 40 TABLET, FILM COATED ORAL at 08:27

## 2021-03-22 RX ADMIN — CLOPIDOGREL BISULFATE 75 MG: 75 TABLET ORAL at 11:49

## 2021-03-22 RX ADMIN — PANTOPRAZOLE SODIUM 40 MG: 40 TABLET, DELAYED RELEASE ORAL at 08:27

## 2021-03-22 NOTE — PROGRESS NOTES
Discharge instructions, follow up appointments and prescriptions reviewed with patient and family. Both verbalize understanding. All personal belongings taken with patient. Family member will drive patient home. Patient escorted to discharge area via wheelchair. Patient is stable at discharge. PIV and monitor removed. RX given for Lopressor and BMP.

## 2021-03-22 NOTE — PROGRESS NOTES
[facility] CARDIOLOGY PROGRESS NOTE           3/22/2021 10:55 AM    Admit Date: 3/18/2021      Subjective:   No cp or sob      Objective:      Vitals:    03/21/21 2214 03/22/21 0243 03/22/21 0335 03/22/21 0701   BP: (!) 161/69  124/64 134/63   Pulse: 77  66 70   Resp: 16  16 20   Temp: 99.6 °F (37.6 °C)  98.6 °F (37 °C) 98.2 °F (36.8 °C)   SpO2: 94%  95% 94%   Weight:  64.5 kg (142 lb 3.2 oz)     Height:           Physical Exam:  General-No Acute Distress  Neck- supple, no JVD  CV- regular rate and rhythm no MRG  Lung- clear bilaterally  Abd- soft, nontender, nondistended  Ext- no edema bilaterally. Skin- warm and dry    Data Review:   Recent Labs     03/21/21  0454 03/20/21  0302    141   K 3.6 4.0   BUN 13 15   CREA 0.70 0.82   * 131*   WBC 9.6 11.7*   HGB 9.2* 10.4*   HCT 29.0* 32.5*   * 146*       Assessment/Plan:     Active Problems:    Chest pain (3/18/2021)          HTN (hypertension) (8/19/2013)          Ischemic heart disease (3/18/2021)          CAD (coronary artery disease) (3/20/2021)      ////    Doing well. Home today. Resume Plavix.         Alphonso Echols MD  3/22/2021 10:55 AM

## 2021-03-22 NOTE — DISCHARGE SUMMARY
[address] [place] [address] [address] Cardiology Discharge Summary     Patient ID:  Anni Adkins  [id number]  80 y.o.  5/7/1931    Admit date: 3/18/2021    Discharge date:  03/22/21    Admitting Physician: Isa Cervantes MD     Discharge Physician: Eda Dubois NP/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 ([address]    Stroke (cerebrum) [facility]    Hypertensive urgency    History of cerebellar stroke    Coronary artery disease involving coronary bypass graft of native heart without angina pectoris    Esophageal reflux    HTN (hypertension)    Mixed dyslipidemia    CVD (cerebrovascular disease)    Osteoporosis       Cardiology Procedures this admission:  Diagnostic left heart catheterization  EchoCardiogram  Consults: None    Hospital Course: Patient was seen at the office of [address] [place] [address] [address] Cardiology by Dr. Janet Sethi for complaints of CP and was subsequently scheduled for a LHC at [facility] on 3/18/2021. Patient underwent cardiac catheterization by Dr. [facility]. Patient left main showed complication mild disease in good lumen with a calcified torturous LAD and a high-grade stenosis proximally and in the midportion. There is attempt for PCI with attempt stops due to evidence of a wire dissection with JUICE to 3 distal flow. Patient had access through ACE/distal right radial artery and micropuncture sheath to the right femoral artery. Patient was transferred to CVICU for close monitoring and had delirium confusion at night but ANO during the day. Patient was able to move out of the CVICU quickly and continued on IV heparin drip. Patient's chest pain resolved with medical therapy with echo showing mild to moderate LV dysfunction post cath with a EF of 40%.   Patient's beta-blocker was increased and heparin was stopped. Patient continued to do well with no chest pain and will resume Plavix at discharge. The morning of discharge the patient was up feeling well without any complaints of chest pain or shortness of breath. Patient's right radial cath site was clean, dry and intact without hematoma or bruit. Patient's labs were WNL. Patient was seen and examined by Dr. Kaylynn Lennon and determined stable and ready for discharge. Patient was instructed on the importance of medication compliance including taking ASA and Plavix. For maximized medical therapy for CAD, patient will continue Beta Blocker, Statin and Long Acting Nitrates  as well. The patient will follow up with [address] [facility] [address] Dr. Elmon Leventhal for [facility] and has been referred to cardiac rehab. DISPOSITION: The patient is being discharged home in stable condition on a low saturated fat, low cholesterol and low salt diet. The patient is instructed to advance activities as tolerated to the limit of fatigue or shortness of breath. The patient is instructed to avoid all heavy lifting for 5 days. The patient is instructed to watch the cath site for bleeding/oozing; if seen, the patient is instructed to apply firm pressure with a clean cloth and call [address] [place] [address] [address] Cardiology at [phone]. The patient is instructed to watch for signs of infection which include: increasing area of redness, fever/hot to touch or purulent drainage at the catheterization site. The patient is instructed not to soak in a bathtub for 7-10 days, but is cleared to shower. The patient is instructed to call the office or return to the ER for immediate evaluation for any shortness of breath or chest pain not relieved by NTG.         Discharge Exam:   Visit Vitals  /63   Pulse 70   Temp 98.2 °F (36.8 °C)   Resp 20   Ht 5' 6\" (1.676 m)   Wt 64.5 kg (142 lb 3.2 oz)   SpO2 94%   Breastfeeding No   BMI 22.95 kg/m²     Patient has been seen by Dr. Kaylynn Lennon: see his progress note for exam details. No results found for this or any previous visit (from the past 24 hour(s)). Patient Instructions:     Current Discharge Medication List      START taking these medications    Details   metoprolol tartrate (LOPRESSOR) 50 mg tablet Take 1 Tab by mouth every twelve (12) hours. Qty: 60 Tab, Refills: 5         CONTINUE these medications which have CHANGED    Details   clopidogreL (PLAVIX) 75 mg tab TAKE 1 TABLET DAILY  Qty: 90 Tab, Refills: 3    Associated Diagnoses: CVD (cerebrovascular disease)         CONTINUE these medications which have NOT CHANGED    Details   APPLE CIDER VINEGAR PO Take 450 mg by mouth daily. atorvastatin (LIPITOR) 80 mg tablet TAKE 1 TABLET DAILY  Qty: 90 Tab, Refills: 3    Associated Diagnoses: CVD (cerebrovascular disease); Coronary artery disease of bypass graft of native heart with stable angina pectoris ([facility] [address] Mixed dyslipidemia      irbesartan (AVAPRO) 150 mg tablet Take 1 Tab by mouth daily. Qty: 90 Tab, Refills: 3      pantoprazole (PROTONIX) 20 mg tablet Take 1 Tab by mouth daily. Qty: 90 Tab, Refills: 1    Associated Diagnoses: Gastroesophageal reflux disease without esophagitis      isosorbide mononitrate ER (IMDUR) 60 mg CR tablet Take 1 Tab by mouth every morning. Qty: 90 Tab, Refills: 3    Associated Diagnoses: Coronary artery disease of bypass graft of native heart with stable angina pectoris (HCC)      aspirin 81 mg tablet Take 1 Tab by mouth daily. TAKE AM OF SURGERY WITH SMALL SIP OF WATER  Qty: 21 Tab, Refills: 0      TURMERIC PO Take  by mouth. cholecalciferol (D3-2000) 2,000 unit cap capsule Take 5,000 Units by mouth daily. CRANBERRY EXTRACT (CRANBERRY PO) Take  by mouth two (2) times a day. loratadine (CLARITIN) 10 mg tablet Take 10 mg by mouth.          STOP taking these medications       cloNIDine HCL (CATAPRES) 0.1 mg tablet Comments:   Reason for Stopping:                 Signed:  CASSIE Corley  3/22/2021  11:13 AM

## 2021-03-22 NOTE — PROGRESS NOTES
ACUTE PHYSICAL THERAPY GOALS:  (Developed with and agreed upon by patient and/or caregiver. )  LTG:  (1.)Ms. Wynnegg will move from supine to sit and sit to supine, scoot up and down and roll side to side in bed with INDEPENDENT within 7 day(s). (2.)Ms. Arvind will transfer from bed to chair and chair to bed with INDEPENDENT using the least restrictive device within 7 day(s). (3.)Ms. Arvind will ambulate with modified INDEPENDENCE for 500+ feet with the least restrictive/no device within 7 day(s). (4.)Ms. Arvind will perform standing static and dynamic balance activities x 12 minutes with SUPERVISION to improve safety within 7 day(s). (5.)Ms. Wynnegg will ascend and descend 2-10 stairs using one hand rail(s) with SUPERVISION to improve functional mobility and safety within 7 day(s). PHYSICAL THERAPY ASSESSMENT: Daily Note and AM PT Treatment Day # 2      Ambar Rich is a 80 y.o. female   PRIMARY DIAGNOSIS: <principal problem not specified>  Chest pain [R07.9]  Ischemic heart disease [I25.9]  CAD (coronary artery disease) [I25.10]       Reason for Referral:     ICD-10: Treatment Diagnosis: Other abnormalities of gait and mobility (R26.89)  INPATIENT: Payor: TABITHA MEDICARE / Plan: Camila Rajan / Product Type: Managed Care Medicare /     ASSESSMENT:     REHAB RECOMMENDATIONS:   Recommendation to date pending progress:  [address] [place] [address]   vs [facility]  Equipment:    To Be Determined     PRIOR LEVEL OF FUNCTION:  (Prior to Hospitalization) INITIAL/CURRENT LEVEL OF FUNCTION:  (Most Recently Demonstrated)   Bed Mobility:   Independent  Sit to Stand:   Independent  Transfers:   Independent  Gait/Mobility:   Independent Bed Mobility:   Not tested  Sit to Stand:  [place] Assistance  Transfers:   Supervision  Gait/Mobility:  [employer] [facility] [employer] [facility]     ASSESSMENT:  Ms. Izaiah Pacheco lives alone and is independent and quite active. Patient is sitting in the recliner and agreeable to therapy. Sit to stand with supervision. Gait training with slight  hand held assist x 200 feet>  Ascend/descend stairs with use of bilateral handrails. Patient is returned to the recliner and set up for lunch. Good session. Patient is making progress towards goals. Patient is quite adorable and pleasant to work with. Patient has declined slightly in functional mobility. Ms. Racheal Kauffman would benefit from skilled physical therapy (medically necessary) to address her deficits and maximize her function. Will continue PT efforts. To discharge home today. SUBJECTIVE:   Ms. Racheal Kauffman states, \"Good morning\"    SOCIAL HISTORY/LIVING ENVIRONMENT: Ms. Racheal Kauffman lives alone and is independent and quite active.   Home Environment: Private residence  One/Two Story Residence: One story  Living Alone: Yes  Support Systems: Friends \ neighbors, Child(birgit)  OBJECTIVE:     PAIN: VITAL SIGNS: LINES/DRAINS:   Pre Treatment: Pain Screen  Pain Scale 1: Numeric (0 - 10)  Pain Intensity 1: 0  Post Treatment: 3   monitor  O2 Device: Room air     MOBILITY: I Mod I S SBA CGA Min Mod Max Total  NT x2 Comments:   Bed Mobility    Rolling [] [] [] [] [] [] [] [] [] [x] []    Supine to Sit [] [] [] [] [] [] [] [] [] [x] []    Scooting [] [] [] [] [] [] [] [] [] [x] []    Sit to Supine [] [] [] [] [] [] [] [] [] [x] []    Transfers 0   Sit to Stand [] [] [] [x] [] [] [] [] [] [] []    Bed to Chair [] [] [] [] [] [] [] [] [] [x] []    Stand to Sit [] [] [] [x] [] [] [] [] [] [] []    I=Independent, Mod I=Modified Independent, S=Supervision, SBA=Standby Assistance, CGA=Contact Guard Assistance,   Min=Minimal Assistance, Mod=Moderate Assistance, Max=Maximal Assistance, Total=Total Assistance, NT=Not Tested  GAIT: I Mod I S SBA CGA Min Mod Max Total  NT x2 Comments:   Level of Assistance [] [] [] [] [x] [] [] [] [] [] [] stairs   Distance 200'    DME slight hand held assist     Gait Quality Slow, decreased step length, decreased reciprocal arm swing Weightbearing Status N/A     I=Independent, Mod I=Modified Independent, S=Supervision, SBA=Standby Assistance, CGA=Contact Guard Assistance,   Min=Minimal Assistance, Mod=Moderate Assistance, Max=Maximal Assistance, Total=Total Assistance, NT=Not [facility]       How much difficulty does the patient currently have. .. Unable A Lot A Little None   1. Turning over in bed (including adjusting bedclothes, sheets and blankets)? [] 1   [] 2   [x] 3   [] 4   2. Sitting down on and standing up from a chair with arms ( e.g., wheelchair, bedside commode, etc.)   [] 1   [] 2   [x] 3   [] 4   3. Moving from lying on back to sitting on the side of the bed? [] 1   [] 2   [x] 3   [] 4   How much help from another person does the patient currently need. .. Total A Lot A Little None   4. Moving to and from a bed to a chair (including a wheelchair)? [] 1   [] 2   [x] 3   [] 4   5. Need to walk in hospital room? [] 1   [] 2   [x] 3   [] 4   6. Climbing 3-5 steps with a railing? [] 1   [] 2   [x] 3   [] 4   © 2007, Trustees of [address] [place] [address] [address] [address], under license to [employer]. All rights reserved     Score:  Initial: 18 Most Recent: X (Date: -- )    Interpretation of Tool:  Represents activities that are increasingly more difficult (i.e. Bed mobility, Transfers, Gait). PLAN:   FREQUENCY/DURATION: PT Plan of Care: 3 times/week for duration of hospital stay or until stated goals are met, whichever comes first.    PROBLEM LIST:   (Skilled intervention is medically necessary to address:)  1. Decreased Activity Tolerance  2. Decreased Balance  3. Decreased Gait Ability  4. Decreased Transfer Abilities  5. Increased Pain   INTERVENTIONS PLANNED:   (Benefits and precautions of physical therapy have been discussed with the patient.)  1. Therapeutic Activity  2. Therapeutic Exercise/HEP  3. Neuromuscular Re-education  4. Gait Training  5.  Manual Therapy  6. Education     TREATMENT:         TREATMENT:   ($$ Therapeutic Activity: 8-22 mins    )  Therapeutic Activity (14 Minutes): Therapeutic activity included Transfer Training, Ambulation on level ground, Sitting balance  and Standing balance to improve functional Mobility, Strength and Activity tolerance. Therapeutic Exercise (0 Minutes): Therapeutic exercises noted below to improve functional activity tolerance, strength and mobility.      TREATMENT GRID:       Date: 3/21/21        Ambulation  Device  assistance x200'  Gait belt  CGA        Partial Squats         Hip Abduction/ Adduction Standing         Heel Raises  Standing         Toe Raises Standing         Hip Flexion Standing         Sit to Stand         Key:  R=right, L=left, B=bilaterally, A=active, AA=active assisted, P=passive    AFTER TREATMENT POSITION/PRECAUTIONS:  Alarm Activated, Bed, Needs within reach and RN notified    INTERDISCIPLINARY COLLABORATION:  RN/PCT and PT/PTA    TOTAL TREATMENT DURATION:  PT Patient Time In/Time Out  Time In: 1116  Time Out: 1130    Aleisha Luna PTA

## 2021-03-22 NOTE — PROGRESS NOTES
Pt with discharge orders this day. This CM met with pt this day to discuss discharge planning. Pt to return home alone with support from her son who lives 5 minutes away. We discussed the recommendation from PT richard that pt would benefit from home health at discharge - pt is agreeable to referral.  Reviewed [facility] agencies - agreeable to [facility]. Referral made this day. No additional CM needs at discharge. Milestones met. Care Management Interventions  PCP Verified by CM: Yes(Dr. Colby Frias)  Mode of Transport at Discharge:  Other (see comment)(family)  Transition of Care Consult (CM Consult): Discharge Planning, Home Health  Discharge Durable Medical Equipment: No  Physical Therapy Consult: Yes  Occupational Therapy Consult: No  Speech Therapy Consult: No  Current Support Network: Own Home, Lives Alone  Confirm Follow Up Transport: Family  The Plan for Transition of Care is Related to the Following Treatment Goals : home with home health   The Patient and/or Patient Representative was Provided with a Choice of Provider and Agrees with the Discharge Plan?: Yes  Name of the Patient Representative Who was Provided with a Choice of Provider and Agrees with the Discharge Plan: Ms. Hadley Tyrone of Choice List was Provided with Basic Dialogue that Supports the Patient's Individualized Plan of Care/Goals, Treatment Preferences and Shares the Quality Data Associated with the Providers?: Yes  [place] Resource Information Provided?: No  Discharge Location  Discharge Placement: Home with home health

## 2021-03-22 NOTE — DISCHARGE INSTRUCTIONS
Cardiac Catheterization/Angiography Discharge Instructions    *Check the puncture site frequently for swelling or bleeding. If you see any bleeding, lie down and apply pressure over the area with a clean town or washcloth. Notify your doctor for any redness, swelling, drainage or oozing from the puncture site. Notify your doctor for any fever or chills. *If the leg or arm with the puncture becomes cold, numb or painful, call [address] [facility] [address] Cardiology at  [phone]    *Activity should be limited for the next 48 hours. Climb stairs as little as possible and avoid any stooping, bending or strenuous activity for 48 hours. No heavy lifting (anything over 10 pounds) for three days. *Do not drive for 48 hours. *You may resume your usual diet. Drink more fluids than usual.    *Have a responsible person drive you home and stay with you for at least 24 hours after your heart catheterization/angiography. *You may remove the bandage from your Right and Groin in 24 hours. You may shower in 24 hours. No tub baths, hot tubs or swimming for one week. Do not place any lotions, creams, powders, ointments over the puncture site for one week. You may place a clean band-aid over the puncture site each day for 5 days. Change this daily. DISCHARGE SUMMARY from Nurse  The patient is being discharged home in stable condition on a low saturated fat, low cholesterol and low salt diet. The patient is instructed to advance activities as tolerated to the limit of fatigue or shortness of breath. The patient is instructed to avoid all heavy lifting for 5 days. The patient is instructed to watch the cath site for bleeding/oozing; if seen, the patient is instructed to apply firm pressure with a clean cloth and call [address] Cardiology at [phone]. The patient is instructed to watch for signs of infection which include: increasing area of redness, fever/hot to touch or purulent drainage at the catheterization site.  The patient is instructed not to soak in a bathtub for 7-10 days, but is cleared to shower. The patient is instructed to call the office or return to the ER for immediate evaluation for any shortness of breath or chest pain not relieved by NTG. PATIENT INSTRUCTIONS:    After general anesthesia or intravenous sedation, for 24 hours or while taking prescription Narcotics:  · Limit your activities  · Do not drive and operate hazardous machinery  · Do not make important personal or business decisions  · Do  not drink alcoholic beverages  · If you have not urinated within 8 hours after discharge, please contact your surgeon on call. Report the following to your surgeon:  · Excessive pain, swelling, redness or odor of or around the surgical area  · Temperature over 100.5  · Nausea and vomiting lasting longer than 4 hours or if unable to take medications  · Any signs of decreased circulation or nerve impairment to extremity: change in color, persistent  numbness, tingling, coldness or increase pain  · Any questions    What to do at Home:    *  Please give a list of your current medications to your Primary Care Provider. *  Please update this list whenever your medications are discontinued, doses are      changed, or new medications (including over-the-counter products) are added. *  Please carry medication information at all times in case of emergency situations. These are general instructions for a healthy lifestyle:    No smoking/ No tobacco products/ Avoid exposure to second hand smoke  Surgeon General's Warning:  Quitting smoking now greatly reduces serious risk to your health.     Obesity, smoking, and sedentary lifestyle greatly increases your risk for illness    A healthy diet, regular physical exercise & weight monitoring are important for maintaining a healthy lifestyle    You may be retaining fluid if you have a history of heart failure or if you experience any of the following symptoms:  Weight gain of 3 pounds or more overnight or 5 pounds in a week, increased swelling in our hands or feet or shortness of breath while lying flat in bed. Please call your doctor as soon as you notice any of these symptoms; do not wait until your next office visit. The discharge information has been reviewed with the patient and caregiver. The patient and caregiver verbalized understanding. Discharge medications reviewed with the patient and caregiver and appropriate educational materials and side effects teaching were provided.   ___________________________________________________________________________________________________________________________________

## 2021-04-13 ENCOUNTER — HOSPITAL ENCOUNTER (OUTPATIENT)
Dept: LAB | Age: 86
Discharge: HOME | End: 2021-04-13
Payer: MEDICARE

## 2021-04-13 ENCOUNTER — HOSPITAL ENCOUNTER (OUTPATIENT)
Dept: GENERAL RADIOLOGY | Age: 86
Discharge: HOME | End: 2021-04-13
Payer: MEDICARE

## 2021-04-13 DIAGNOSIS — R06.02 SOB (SHORTNESS OF BREATH): ICD-10-CM

## 2021-04-13 LAB
ANION GAP SERPL CALC-SCNC: 8 MMOL/L (ref 7–16)
BASOPHILS # BLD: 0.1 K/UL (ref 0–0.2)
BASOPHILS NFR BLD: 1 % (ref 0–2)
BUN SERPL-MCNC: 14 MG/DL (ref 8–23)
CALCIUM SERPL-MCNC: 10.3 MG/DL (ref 8.3–10.4)
CHLORIDE SERPL-SCNC: 106 MMOL/L (ref 98–107)
CO2 SERPL-SCNC: 26 MMOL/L (ref 21–32)
CREAT SERPL-MCNC: 0.93 MG/DL (ref 0.6–1)
DIFFERENTIAL METHOD BLD: ABNORMAL
EOSINOPHIL # BLD: 0.2 K/UL (ref 0–0.8)
EOSINOPHIL NFR BLD: 2 % (ref 0.5–7.8)
ERYTHROCYTE [DISTWIDTH] IN BLOOD BY AUTOMATED COUNT: 12.8 % (ref 11.9–14.6)
GLUCOSE SERPL-MCNC: 71 MG/DL (ref 65–100)
HCT VFR BLD AUTO: 41 % (ref 35.8–46.3)
HGB BLD-MCNC: 12.4 G/DL (ref 11.7–15.4)
IMM GRANULOCYTES # BLD AUTO: 0 K/UL (ref 0–0.5)
IMM GRANULOCYTES NFR BLD AUTO: 0 % (ref 0–5)
LYMPHOCYTES # BLD: 1.5 K/UL (ref 0.5–4.6)
LYMPHOCYTES NFR BLD: 21 % (ref 13–44)
MCH RBC QN AUTO: 27.2 PG (ref 26.1–32.9)
MCHC RBC AUTO-ENTMCNC: 30.2 G/DL (ref 31.4–35)
MCV RBC AUTO: 89.9 FL (ref 79.6–97.8)
MONOCYTES # BLD: 0.8 K/UL (ref 0.1–1.3)
MONOCYTES NFR BLD: 11 % (ref 4–12)
NEUTS SEG # BLD: 4.7 K/UL (ref 1.7–8.2)
NEUTS SEG NFR BLD: 65 % (ref 43–78)
NRBC # BLD: 0 K/UL (ref 0–0.2)
PLATELET # BLD AUTO: 318 K/UL (ref 150–450)
PMV BLD AUTO: 11.4 FL (ref 9.4–12.3)
POTASSIUM SERPL-SCNC: 3 MMOL/L (ref 3.5–5.1)
RBC # BLD AUTO: 4.56 M/UL (ref 4.05–5.2)
SODIUM SERPL-SCNC: 140 MMOL/L (ref 136–145)
TSH W FREE THYROID I,TSHELE: 2.11 UIU/ML (ref 0.36–3.74)
WBC # BLD AUTO: 7.3 K/UL (ref 4.3–11.1)

## 2021-04-13 PROCEDURE — 85025 COMPLETE CBC W/AUTO DIFF WBC: CPT

## 2021-04-13 PROCEDURE — 36415 COLL VENOUS BLD VENIPUNCTURE: CPT

## 2021-04-13 PROCEDURE — 84443 ASSAY THYROID STIM HORMONE: CPT

## 2021-04-13 PROCEDURE — 80048 BASIC METABOLIC PNL TOTAL CA: CPT

## 2021-04-21 ENCOUNTER — APPOINTMENT (OUTPATIENT)
Dept: GENERAL RADIOLOGY | Age: 86
End: 2021-04-21
Attending: PHYSICIAN ASSISTANT
Payer: MEDICARE

## 2021-04-21 ENCOUNTER — HOSPITAL ENCOUNTER (OUTPATIENT)
Age: 86
Setting detail: OBSERVATION
Discharge: HOME | End: 2021-04-24
Attending: INTERNAL MEDICINE | Admitting: INTERNAL MEDICINE
Payer: MEDICARE

## 2021-04-21 DIAGNOSIS — J90 PLEURAL EFFUSION, LEFT: ICD-10-CM

## 2021-04-21 DIAGNOSIS — E87.79 OTHER HYPERVOLEMIA: ICD-10-CM

## 2021-04-21 DIAGNOSIS — I25.9 ISCHEMIC HEART DISEASE: ICD-10-CM

## 2021-04-21 DIAGNOSIS — I25.810 CORONARY ARTERY DISEASE INVOLVING CORONARY BYPASS GRAFT OF NATIVE HEART WITHOUT ANGINA PECTORIS: ICD-10-CM

## 2021-04-21 DIAGNOSIS — I25.10 CORONARY ARTERY DISEASE INVOLVING NATIVE CORONARY ARTERY OF NATIVE HEART WITHOUT ANGINA PECTORIS: ICD-10-CM

## 2021-04-21 DIAGNOSIS — I10 ESSENTIAL HYPERTENSION: ICD-10-CM

## 2021-04-21 DIAGNOSIS — R07.1 CHEST PAIN ON BREATHING: ICD-10-CM

## 2021-04-21 PROBLEM — E87.70 VOLUME OVERLOAD: Status: ACTIVE | Noted: 2021-04-21

## 2021-04-21 LAB
ALBUMIN SERPL-MCNC: 3 G/DL (ref 3.2–4.6)
ALBUMIN/GLOB SERPL: 0.9 {RATIO} (ref 1.2–3.5)
ALP SERPL-CCNC: 59 U/L (ref 50–136)
ALT SERPL-CCNC: 17 U/L (ref 12–65)
ANION GAP SERPL CALC-SCNC: ABNORMAL MMOL/L (ref 7–16)
AST SERPL-CCNC: 15 U/L (ref 15–37)
BASOPHILS # BLD: 0 K/UL (ref 0–0.2)
BASOPHILS NFR BLD: 1 % (ref 0–2)
BILIRUB SERPL-MCNC: 0.3 MG/DL (ref 0.2–1.1)
BNP SERPL-MCNC: 3558 PG/ML
BUN SERPL-MCNC: 12 MG/DL (ref 8–23)
CALCIUM SERPL-MCNC: 9.4 MG/DL (ref 8.3–10.4)
CHLORIDE SERPL-SCNC: 113 MMOL/L (ref 98–107)
CO2 SERPL-SCNC: 25 MMOL/L (ref 21–32)
CREAT SERPL-MCNC: 0.97 MG/DL (ref 0.6–1)
DIFFERENTIAL METHOD BLD: ABNORMAL
EOSINOPHIL # BLD: 0.2 K/UL (ref 0–0.8)
EOSINOPHIL NFR BLD: 4 % (ref 0.5–7.8)
ERYTHROCYTE [DISTWIDTH] IN BLOOD BY AUTOMATED COUNT: 13.3 % (ref 11.9–14.6)
GLOBULIN SER CALC-MCNC: 3.3 G/DL (ref 2.3–3.5)
GLUCOSE SERPL-MCNC: 83 MG/DL (ref 65–100)
HCT VFR BLD AUTO: 33.9 % (ref 35.8–46.3)
HGB BLD-MCNC: 10.5 G/DL (ref 11.7–15.4)
IMM GRANULOCYTES # BLD AUTO: 0 K/UL (ref 0–0.5)
IMM GRANULOCYTES NFR BLD AUTO: 0 % (ref 0–5)
LYMPHOCYTES # BLD: 1.7 K/UL (ref 0.5–4.6)
LYMPHOCYTES NFR BLD: 32 % (ref 13–44)
MAGNESIUM SERPL-MCNC: 2.5 MG/DL (ref 1.8–2.4)
MCH RBC QN AUTO: 27.6 PG (ref 26.1–32.9)
MCHC RBC AUTO-ENTMCNC: 31 G/DL (ref 31.4–35)
MCV RBC AUTO: 89 FL (ref 79.6–97.8)
MONOCYTES # BLD: 0.4 K/UL (ref 0.1–1.3)
MONOCYTES NFR BLD: 7 % (ref 4–12)
NEUTS SEG # BLD: 3 K/UL (ref 1.7–8.2)
NEUTS SEG NFR BLD: 56 % (ref 43–78)
NRBC # BLD: 0 K/UL (ref 0–0.2)
PLATELET # BLD AUTO: 252 K/UL (ref 150–450)
PMV BLD AUTO: 11 FL (ref 9.4–12.3)
POTASSIUM SERPL-SCNC: 4.1 MMOL/L (ref 3.5–5.1)
PROT SERPL-MCNC: 6.3 G/DL (ref 6.3–8.2)
RBC # BLD AUTO: 3.81 M/UL (ref 4.05–5.2)
SODIUM SERPL-SCNC: 137 MMOL/L (ref 136–145)
WBC # BLD AUTO: 5.3 K/UL (ref 4.3–11.1)

## 2021-04-21 PROCEDURE — 80053 COMPREHEN METABOLIC PANEL: CPT

## 2021-04-21 PROCEDURE — 83880 ASSAY OF NATRIURETIC PEPTIDE: CPT

## 2021-04-21 PROCEDURE — 99223 1ST HOSP IP/OBS HIGH 75: CPT | Performed by: INTERNAL MEDICINE

## 2021-04-21 PROCEDURE — 83735 ASSAY OF MAGNESIUM: CPT

## 2021-04-21 PROCEDURE — 96374 THER/PROPH/DIAG INJ IV PUSH: CPT

## 2021-04-21 PROCEDURE — 85025 COMPLETE CBC W/AUTO DIFF WBC: CPT

## 2021-04-21 PROCEDURE — 2709999900 HC NON-CHARGEABLE SUPPLY

## 2021-04-21 PROCEDURE — 99218 HC RM OBSERVATION: CPT

## 2021-04-21 PROCEDURE — 74011250636 HC RX REV CODE- 250/636: Performed by: PHYSICIAN ASSISTANT

## 2021-04-21 PROCEDURE — 74011250637 HC RX REV CODE- 250/637: Performed by: PHYSICIAN ASSISTANT

## 2021-04-21 PROCEDURE — 71046 X-RAY EXAM CHEST 2 VIEWS: CPT

## 2021-04-21 PROCEDURE — 36415 COLL VENOUS BLD VENIPUNCTURE: CPT

## 2021-04-21 PROCEDURE — G0378 HOSPITAL OBSERVATION PER HR: HCPCS

## 2021-04-21 RX ORDER — ISOSORBIDE MONONITRATE 60 MG/1
60 TABLET, EXTENDED RELEASE ORAL DAILY
Status: DISCONTINUED | OUTPATIENT
Start: 2021-04-22 | End: 2021-04-24 | Stop reason: HOSPADM

## 2021-04-21 RX ORDER — CLONIDINE HYDROCHLORIDE 0.2 MG/1
0.1 TABLET ORAL 2 TIMES DAILY
Status: DISCONTINUED | OUTPATIENT
Start: 2021-04-21 | End: 2021-04-22

## 2021-04-21 RX ORDER — VALSARTAN 40 MG/1
40 TABLET ORAL DAILY
Status: DISCONTINUED | OUTPATIENT
Start: 2021-04-22 | End: 2021-04-22

## 2021-04-21 RX ORDER — CHOLECALCIFEROL TAB 125 MCG (5000 UNIT) 125 MCG
5000 TAB ORAL DAILY
Status: DISCONTINUED | OUTPATIENT
Start: 2021-04-22 | End: 2021-04-24 | Stop reason: HOSPADM

## 2021-04-21 RX ORDER — FUROSEMIDE 10 MG/ML
40 INJECTION INTRAMUSCULAR; INTRAVENOUS 2 TIMES DAILY
Status: DISCONTINUED | OUTPATIENT
Start: 2021-04-21 | End: 2021-04-23

## 2021-04-21 RX ORDER — GUAIFENESIN 100 MG/5ML
81 LIQUID (ML) ORAL DAILY
Status: DISCONTINUED | OUTPATIENT
Start: 2021-04-22 | End: 2021-04-24 | Stop reason: HOSPADM

## 2021-04-21 RX ORDER — LORATADINE 10 MG/1
10 TABLET ORAL DAILY
Status: DISCONTINUED | OUTPATIENT
Start: 2021-04-22 | End: 2021-04-24 | Stop reason: HOSPADM

## 2021-04-21 RX ORDER — LANOLIN ALCOHOL/MO/W.PET/CERES
100 CREAM (GRAM) TOPICAL DAILY
Status: DISCONTINUED | OUTPATIENT
Start: 2021-04-22 | End: 2021-04-24 | Stop reason: HOSPADM

## 2021-04-21 RX ORDER — CLOPIDOGREL BISULFATE 75 MG/1
75 TABLET ORAL DAILY
Status: DISCONTINUED | OUTPATIENT
Start: 2021-04-22 | End: 2021-04-24 | Stop reason: HOSPADM

## 2021-04-21 RX ORDER — SPIRONOLACTONE 25 MG/1
25 TABLET ORAL DAILY
Status: DISCONTINUED | OUTPATIENT
Start: 2021-04-22 | End: 2021-04-24 | Stop reason: HOSPADM

## 2021-04-21 RX ORDER — CARVEDILOL 3.12 MG/1
3.12 TABLET ORAL 2 TIMES DAILY WITH MEALS
Status: DISCONTINUED | OUTPATIENT
Start: 2021-04-21 | End: 2021-04-24 | Stop reason: HOSPADM

## 2021-04-21 RX ORDER — PANTOPRAZOLE SODIUM 40 MG/1
20 TABLET, DELAYED RELEASE ORAL DAILY
Status: DISCONTINUED | OUTPATIENT
Start: 2021-04-22 | End: 2021-04-24 | Stop reason: HOSPADM

## 2021-04-21 RX ADMIN — CLONIDINE HYDROCHLORIDE 0.1 MG: 0.2 TABLET ORAL at 17:51

## 2021-04-21 RX ADMIN — FUROSEMIDE 40 MG: 10 INJECTION, SOLUTION INTRAMUSCULAR; INTRAVENOUS at 15:34

## 2021-04-21 RX ADMIN — CARVEDILOL 3.12 MG: 3.12 TABLET, FILM COATED ORAL at 17:50

## 2021-04-21 NOTE — ROUTINE PROCESS
Verbal bedside report given to Tramaine Murrell onckartik RN. Patient's situation, background, assessment and recommendations provided. Opportunity for questions provided. Oncoming RN assumed care of patient.

## 2021-04-21 NOTE — ROUTINE PROCESS
Bedside and Verbal shift change report given to self (oncoming nurse) by Roseanna Diaz (offgoing nurse). Report included the following information SBAR, Kardex, MAR, Recent Results and Cardiac Rhythm NSR.

## 2021-04-21 NOTE — CONSULTS
PULMONARY/CCM INITIIAL EVALUATION:  4/21/2021    Date of Admission:  4/21/2021    The patient's chart has been reviewed and the chart has been discussed with nursing staff. Subjective: This patient has been seen and evaluated at the request of Dr. Vernon Carrel and Earlene Dick [place]. Patient is a 80 y.o.  female with PMx PE, CVA 2/2020 with PFO noted on echo, osteoporosis, CAD with MI x2 s/p stenting in 2007 and 2009, heart failure, s/p biventricular pacemaker, GERD, HTN, CABG 1988, transanal excision of villous adenoma presents as a direct admit from the cardiology office where she had CP with increased shortness of breath. Pt states her sob started after her pacemaker insertion in March. She has had issues with lying flat, feeling like after a short time, she feels pain in her left back and and \"lack of air\". She presented for an appointment with her cardiologist and on echo and xray she was noted to have moderate left pleural effusion, therefore, pulmonary was consulted to evaluate. Pt recently had cardiac cath that showed high-grade stenosis proximally and in the midportion of the LAD. There was an attempt for PCI but there was evidence of a wire dissection therefore procedure was stopped and pt was managed medically. She was placed on Plavix and ASA at D/C. She has no pulmonary history. 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       Past Surgical History:   Procedure Laterality Date    HX APPENDECTOMY      HX BLADDER SUSPENSION  2005    HX COLONOSCOPY  2012    HX CORONARY ARTERY BYPASS GRAFT  1988    @ [address]    HX GYN      hysterectomy    HX LITHOTRIPSY  2008 x 2    HX ORTHOPAEDIC      2 rotater cuff right shoulder    HX TOTAL ABDOMINAL HYSTERECTOMY  1972    HX UROLOGICAL      suspension    ME CARDIAC SURG PROCEDURE UNLIST      by pass      Social History     Tobacco Use    Smoking status: Never Smoker    Smokeless tobacco: Never Used   Substance Use Topics    Alcohol use: No      Family History   Problem Relation Age of Onset    Heart Disease Mother     Stroke Mother     Heart Disease Father     Heart Disease Brother     Cancer Brother         prostate    Heart Disease Brother     Heart Disease Brother     Heart Disease Brother     Malignant Hyperthermia Neg Hx     Pseudocholinesterase Deficiency Neg Hx     Delayed Awakening Neg Hx     Post-op Nausea/Vomiting Neg Hx     Emergence Delirium Neg Hx     Post-op Cognitive Dysfunction Neg Hx     Other Neg Hx     Breast Cancer Neg Hx       Allergies   Allergen Reactions    Accupril [Quinapril] Unknown (comments)    Norvasc [Amlodipine] Unknown (comments)    Prednisone Itching, Palpitations and Unknown (comments)     Per pt also had heart burn    Zoloft [Sertraline] Drowsiness      Prior to Admission Medications   Prescriptions Last Dose Informant Patient Reported? Taking? APPLE CIDER VINEGAR PO   Yes No   Sig: Take 450 mg by mouth daily. CRANBERRY EXTRACT (CRANBERRY PO)   Yes No   Sig: Take  by mouth two (2) times a day. TURMERIC PO   Yes No   Sig: Take  by mouth. aspirin 81 mg tablet   No No   Sig: Take 1 Tab by mouth daily. TAKE AM OF SURGERY WITH SMALL SIP OF WATER   cholecalciferol (D3-2000) 2,000 unit cap capsule   Yes No   Sig: Take 5,000 Units by mouth daily. cloNIDine HCL (CATAPRES) 0.1 mg tablet   No No   Sig: Take 1 Tab by mouth two (2) times a day. clopidogreL (PLAVIX) 75 mg tab   No No   Sig: TAKE 1 TABLET DAILY   cyanocobalamin (Vitamin B-12) 100 mcg tablet   Yes No   Sig: Take 100 mcg by mouth daily. irbesartan (AVAPRO) 75 mg tablet   No No   Sig: Take 1 Tab by mouth daily. isosorbide mononitrate ER (IMDUR) 60 mg CR tablet   No No   Sig: Take 1 Tab by mouth every morning. loratadine (CLARITIN) 10 mg tablet   Yes No   Sig: Take 10 mg by mouth.   pantoprazole (PROTONIX) 20 mg tablet   No No   Sig: Take 1 Tab by mouth daily. spironolactone (ALDACTONE) 25 mg tablet   No No   Sig: Take 1 Tab by mouth daily. Facility-Administered Medications: None       MEDS SCHEDULED:    Current Facility-Administered Medications   Medication Dose Route Frequency    [START ON 4/22/2021] aspirin chewable tablet 81 mg  81 mg Oral DAILY    [START ON 4/22/2021] cholecalciferol (VITAMIN D3) (5000 Units/125 mcg) tablet 5,000 Units  5,000 Units Oral DAILY    cloNIDine HCL (CATAPRES) tablet 0.1 mg  0.1 mg Oral BID    [START ON 4/22/2021] clopidogreL (PLAVIX) tablet 75 mg  75 mg Oral DAILY    [START ON 4/22/2021] cyanocobalamin tablet 500 mcg  500 mcg Oral DAILY    [START ON 4/22/2021] isosorbide mononitrate ER (IMDUR) tablet 60 mg  60 mg Oral DAILY    [START ON 4/22/2021] loratadine (CLARITIN) tablet 10 mg  10 mg Oral DAILY    [START ON 4/22/2021] pantoprazole (PROTONIX) tablet 40 mg  40 mg Oral DAILY    [START ON 4/22/2021] spironolactone (ALDACTONE) tablet 25 mg  25 mg Oral DAILY    furosemide (LASIX) injection 40 mg  40 mg IntraVENous BID    carvediloL (COREG) tablet 3.125 mg  3.125 mg Oral BID WITH MEALS    [START ON 4/22/2021] valsartan (DIOVAN) tablet 40 mg  40 mg Oral DAILY         Review of Systems  Constitutional: negative for fevers, chills, sweats, anorexia and weight loss  Respiratory: positive for cough or pleurisy/chest pain, negative for sputum, hemoptysis, pneumonia or wheezing  Cardiovascular: positive for chest pressure/discomfort, dyspnea, fatigue, orthopnea, negative for near-syncope, syncope, lower extremity edema, tachypnea  Gastrointestinal: negative for nausea, vomiting, diarrhea and constipation  Genitourinary:negative for frequency, dysuria and nocturia    Objective:     Vitals:    04/21/21 1249   BP: (P) 137/70   Pulse: (P) 69   Resp: (P) 16   Temp: (P) 97.3 °F (36.3 °C)   SpO2: (P) 100%   Weight: 136 lb 11.2 oz (62 kg)   Height: 5' 6\" (1.676 m)     No intake/output data recorded.   No intake/output data recorded. PHYSICAL EXAM     Physical Exam:   General:  Alert, cooperative, no acute distress, appears stated age. Eyes:  Conjunctivae/corneas clear. Nose: Nares patent and moist.    Mouth/Throat: Lips, mucosa, and tongue pink and intact. Neck: Supple, symmetrical.   Respiratory:   Coarse crackles in bases posteriorly to auscultation bilaterally on RA   Cardiovascular:  Regular rate and rhythm, S1, S2, no murmur, click, rub or gallop. Telemetry monitor:NSR   GI:   Abdomen soft, non-tender. Bowel sounds active X 4 Q. No masses,     Musculoskeletal: Extremities symmetrical, atraumatic, no cyanosis, no edema. Pulses: 2+ and symmetric all extremities. Skin: Skin color, texture, turgor normal. No rashes or lesions       Neurologic: 2+ strength bilateral upper and lower extremities, sensation throughout appropriate. Alert and oriented. Activity:  Ad rajwinder  Nutrition:  Diet as tolerated    Lines/Drains/Airways:  Peripheral IV    CHEST X-RAYS:   4/21 PA and Lat with moderate appearing left effusion, ?small right. 4/13 larger left effusion   3/19-small left effusion noted      CULTURES:none    ECHO 4/19/21:    LABS    No results for input(s): WBC, HGB, HCT, PLT, HGBEXT, HCTEXT, PLTEXT, HGBEXT, HCTEXT, PLTEXT in the last 72 hours. No results for input(s): NA, K, CL, GLU, CO2, BUN, CREA, MG, PHOS, TROIQ, INR, INREXT, INREXT in the last 72 hours. No lab exists for component: TROIP  No results for input(s): PH, PCO2, PO2, HCO3 in the last 72 hours. Assessment:     Hospital Problems  Date Reviewed: 3/29/2021          Codes Class Noted POA    Volume overload ICD-10-CM: E87.70  ICD-9-CM: 276.69  4/21/2021 Unknown        Pleural effusion, left ICD-10-CM: J90  ICD-9-CM: 511.9  4/21/2021 Unknown              Plan:     --CXR PA and LAT shows left with possible right pleural effusions. --Will need 5 days off Plavix if thoracentesis needed.   [id number] with Dr Tomas Leahy now  --initial labs all pending including BNP, CBC, CMP  --Lasix 40mg IV BID initiated. Continue per Cardiology. --1500 ml fluid restriction.  --daily chest xray    Gilbert Dsouza NP      More than 50% of time documented was spent in face-to-face contact with the patient and in the care of the patient on the floor/unit where the patient is located. Lungs:  Decreased BS at bases  Heart:  RRR with no Murmur/Rubs/Gallops    Additional Comments:  81 yo F with CAD s/p recent PCI with inability to open LAD now s/p PCM. Pt reports dyspnea began just after PCM placed. Evaluation with reduced EF on echo with multiple WMA and pleural effusions noted. CXR with effusions and BNP increased > 3000. Likely has heart failure as etiology of effusions but temporal relationship to [facility] placement suggests possible injury to thoracic duct as it inserts into L SCV. Will diuresis and follow CXR. Tap if effusions failure to resolve. Also needs fluid restriction. I have spoken with and examined the patient. I agree with the above assessment and plan as documented.     Angelic Arrieta MD

## 2021-04-21 NOTE — ROUTINE PROCESS
Primary Nurse Wendy Acosta and Danielle Powers, RN performed a dual skin assessment on this patient No impairment noted Fred score is 22 Pt skin is fragile and she is somewhat pale. Pt coccys and heals are without redness or excoriation

## 2021-04-21 NOTE — H&P
[address] [facility] [address], [address], [address] [facility]   PHONE: [phone]   SUBJECTIVE:   Karsten Hansen is a 80 y.o. female 5/7/1931   No chief complaint on file. History of Present Illness: 80 y.o. female recent permanent pacemaker placed attempted CT PCI of LAD failed. Off schedule for SOB that is worse 04/21/21 . Last programming changes that were made were not helpful. 0 % RV pacing. Echocardiogram was performed showing only diminished left ventricular systolic function no pericardial effusion. Here 4/21/2021 with no improvement of symptoms despite Aldactone use. Chest radiograph with small left pleural effusion this was evident on echocardiogram as well   Interval Hx   She was previously seen by Dr. Maddy Gibbs and Reuben Banks. She had been lost to followup for several years due to illness of her . He unfortunately passed in 06/2017. She originally presented with stable symptoms. She is planned for colorectal surgery. No shortness of breath. Of concern was 2:1 AV block noted on ECG. No recent syncope. She had planned colorectal surgery in 2017 which was delayed. She obtained an echocardiogram, as well as cardiac monitoring to evaluate 2:1 AV block. The patient presented 06/21/2018 with no complaints. She was still taking part in yard work, working around [employer]. Unfortunately, she lost her  within the last year. 04/21/21 chest pain noted during house work new sx. Lacunar stroke 2/2026 elevated blood pressure with systolic pressures greater than 200 at the time of presentation. Positive bubble study on echocardiogram.   Cardiac History:   1. Cardiac catheterization in 2006 - severe three vessel coronary artery disease, occluded vein graft to LAD, patent (no targets) supplies aneurysm per notes and not myocardium vein graft to ramus and OM sequential, EF 25%. 2. Echocardiogram 2009 - ejection fraction 60%. 3. ECG 08/29/2017 - marked sinus bradycardia, 2:1 AV block. 4. Echocardiogram 12/2017 - normal left ventricular systolic function. Cardiac monitor was placed with lowest heart rates of 50 beats per minute, no AV block, no significant bradycardia  5. . MRI right knee 5/2019 Large insufficiency fracture along the central aspect of the lateral femoral condyle with marked surrounding marrow edema throughout the underlying bone. Complex, degenerative tear through the body and posterior horn of the latera meniscus. 6. 2/2020 STROKE lacunar infarct in the right thalamus. 7. 2/16/2020 cardiogram normal left ventricular systolic function RVSP 30 to 35 mmHg large left to right shunt evidenced by bubble study  8. 1/28/2021 sinus Bradycardia EKG -nondiagnostic. Negative precordial T-waves -May be normal -consider anteroseptal ischemia. 9. 3/2021 attempted PCI to LAD through   10. 4/2021 echocardiogram performed in office no pericardial effusion left pleural effusion noted EF around 40%  Assessment and Plan:   1. Coronary disease not controlled  Failed PCI to LAD    Med Rx   Nitrate therapy beta-blocker   Decreased ARB due to hypotension on nitrate therapy   Irbesartan changed to 75 mg daily  2. History of cardiomyopathy. Symptoms now worse possible volume overload from. Plan to initiate IV diuretics        Key CAD CHF Meds            spironolactone (ALDACTONE) 25 mg tablet Take 1 Tab by mouth daily. cloNIDine HCL (CATAPRES) 0.1 mg tablet Take 1 Tab by mouth two (2) times a day. isosorbide mononitrate ER (IMDUR) 60 mg CR tablet Take 1 Tab by mouth every morning. irbesartan (AVAPRO) 75 mg tablet Take 1 Tab by mouth daily. clopidogreL (PLAVIX) 75 mg tab TAKE 1 TABLET DAILY    aspirin 81 mg tablet Take 1 Tab by mouth daily. TAKE AM OF SURGERY WITH SMALL SIP OF WATER          3. Sinus bradycardia,  Controlled   DCPM placed   programming changes made to limit CLS   0% V pacing   4. Hyperlipidemia. On statin therapy. No sx.   Continue medications at doses listed below.       Key Antihyperlipidemia Meds       The patient is on no antihyperlipidemia meds. 5. Hypertension   Decreased irbesartan due to hypotension on antianginal therapies        Key CAD CHF Meds            spironolactone (ALDACTONE) 25 mg tablet Take 1 Tab by mouth daily. cloNIDine HCL (CATAPRES) 0.1 mg tablet Take 1 Tab by mouth two (2) times a day. isosorbide mononitrate ER (IMDUR) 60 mg CR tablet Take 1 Tab by mouth every morning. irbesartan (AVAPRO) 75 mg tablet Take 1 Tab by mouth daily. clopidogreL (PLAVIX) 75 mg tab TAKE 1 TABLET DAILY    aspirin 81 mg tablet Take 1 Tab by mouth daily. TAKE AM OF SURGERY WITH SMALL SIP OF WATER          Shortness of breath is uncontrolled most likely multifactorial radiographic evidence of COPD as well as left pleural effusion which appeared too small for thoracentesis at the time of last x-ray. Plan to initiate IV diuretics consult pulmonary for further evaluation of left pleural effusion. echocardiogram performed with no pericardial effusion following pacemaker placement. Pacemaker syndrome less likely due to 0% RV pacing. Multiple programming changes have been made to alter atrial pacing these have been unsuccessful   Past Medical History, Past Surgical History, Family history, Social History, and Medications were all reviewed with the patient today and updated as necessary.          Allergies   Allergen Reactions    Accupril [Quinapril] Unknown (comments)    Norvasc [Amlodipine] Unknown (comments)    Prednisone Itching, Palpitations and Unknown (comments)     Per pt also had heart burn    Zoloft [Sertraline] Drowsiness          Past Medical History:   Diagnosis Date    CAD (coronary artery disease)     2 MI    Calculus of kidney     Congestive heart failure (HCC)     CVD (cerebrovascular disease) 8/19/2013    Dyslipidemia 8/19/2013    GERD (gastroesophageal reflux disease)     HTN (hypertension) 8/19/2013    Left breast mass 4/13/2017 Diagnostic Mammogram negative.  Long term current use of anticoagulant therapy     Myocardial infarction [facility]) 2005, 2006    [facility], Dr. Sunil Ruiz    Osteoporosis 8/19/2013    Positive H. pylori test 8/19/2013    Rectal polyp 10/9/2017    Anoscopy: Revealed large cauliflower-like polyp at the anterior midline just above the dentate line, mobile soft, to have removed with Dr. Mee Kiser ([facility]), benign. No more fecal leakage.  Stroke ([address] 1/7/2009    no deficits expect patient reports going to one side if she gets up too fast    Thromboembolus [facility])     pulmanary           Past Surgical History:   Procedure Laterality Date    HX APPENDECTOMY      HX BLADDER SUSPENSION  2005    HX COLONOSCOPY  2012    HX CORONARY ARTERY BYPASS GRAFT  1988    @ [facility] HX GYN      hysterectomy    HX LITHOTRIPSY  2008 x 2    HX ORTHOPAEDIC      2 rotater cuff right shoulder    HX TOTAL ABDOMINAL HYSTERECTOMY  1972    HX UROLOGICAL      suspension    SC CARDIAC SURG PROCEDURE UNLIST      by pass           Family History   Problem Relation Age of Onset    Heart Disease Mother     Stroke Mother     Heart Disease Father     Heart Disease Brother     Cancer Brother     prostate    Heart Disease Brother     Heart Disease Brother     Heart Disease Brother     Malignant Hyperthermia Neg Hx     Pseudocholinesterase Deficiency Neg Hx     Delayed Awakening Neg Hx     Post-op Nausea/Vomiting Neg Hx     Emergence Delirium Neg Hx     Post-op Cognitive Dysfunction Neg Hx     Other Neg Hx     Breast Cancer Neg Hx      Social History          Tobacco Use    Smoking status: Never Smoker    Smokeless tobacco: Never Used   Substance Use Topics    Alcohol use: No     ROS:   Review of Systems   Constitution: Negative for decreased appetite and fever. HENT: Negative for congestion and nosebleeds. Eyes: Negative for blurred vision.    Respiratory: Negative for cough and hemoptysis. + SOB  Endocrine: Negative for polydipsia. Hematologic/Lymphatic: Negative for adenopathy and bleeding problem. Skin: Negative for flushing. Musculoskeletal: Negative for falls. Gastrointestinal: Negative for abdominal pain. Genitourinary: Negative for frequency. Neurological: Negative for seizures. Psychiatric/Behavioral: Negative for suicidal ideas. Allergic/Immunologic: Negative for hives. PHYSICAL EXAM:   There were no vitals taken for this visit. Physical Exam   Constitutional: She is oriented to person, place, and time. HENT:   Head: Normocephalic and atraumatic. Eyes: Pupils are equal, round, and reactive to light. Conjunctivae are normal.   Neck: Normal range of motion. No JVD present. Cardiovascular: Normal rate and normal heart sounds. No murmur heard. Pulmonary/Chest: Effort normal. She has no wheezes. diminished sounds in left base of lung   Abdominal: She exhibits no distension. Musculoskeletal:   General: No edema. Neurological: She is alert and oriented to person, place, and time. No cranial nerve deficit. Skin: Skin is warm and dry. No rash noted. She is not diaphoretic. Psychiatric: She has a normal mood and affect. Medical problems and test results were reviewed with the patient today. No results found for any visits on 04/21/21.    Recent Results Lab Results   Component Value Date/Time    Cholesterol, total 139 03/29/2021 09:02 AM    HDL Cholesterol 34 (L) 03/29/2021 09:02 AM    LDL, calculated 88 03/29/2021 09:02 AM    LDL, calculated 155.4 (H) 02/17/2020 05:23 AM    VLDL, calculated 17 03/29/2021 09:02 AM    VLDL, calculated 35.6 (H) 02/17/2020 05:23 AM    Triglyceride 90 03/29/2021 09:02 AM    CHOL/HDL Ratio 6.3 02/17/2020 05:23 AM     PLAN   Diagnoses and all orders for this visit:   1. SOB (shortness of breath)               Thank you for allowing me to participate in this patient's care. Please call or contact me if there are any questions or concerns regarding the above.    Abel Mesa MD   04/21/21   9:38 AM

## 2021-04-21 NOTE — H&P (VIEW-ONLY)
PULMONARY/CCM INITIIAL EVALUATION:  4/21/2021    Date of Admission:  4/21/2021    The patient's chart has been reviewed and the chart has been discussed with nursing staff. Subjective: This patient has been seen and evaluated at the request of Dr. Alvina Branch and Gary Barragan. Patient is a 80 y.o.  female with PMx PE, CVA 2/2020 with PFO noted on echo, osteoporosis, CAD with MI x2 s/p stenting in 2007 and 2009, heart failure, s/p biventricular pacemaker, GERD, HTN, CABG 1988, transanal excision of villous adenoma presents as a direct admit from the cardiology office where she had CP with increased shortness of breath. Pt states her sob started after her pacemaker insertion in March. She has had issues with lying flat, feeling like after a short time, she feels pain in her left back and and \"lack of air\". She presented for an appointment with her cardiologist and on echo and xray she was noted to have moderate left pleural effusion, therefore, pulmonary was consulted to evaluate. Pt recently had cardiac cath that showed high-grade stenosis proximally and in the midportion of the LAD. There was an attempt for PCI but there was evidence of a wire dissection therefore procedure was stopped and pt was managed medically. She was placed on Plavix and ASA at D/C. She has no pulmonary history. 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       Past Surgical History:   Procedure Laterality Date    HX APPENDECTOMY      HX BLADDER SUSPENSION  2005    HX COLONOSCOPY  2012    HX CORONARY ARTERY BYPASS GRAFT  1988    @ [address]    HX GYN      hysterectomy    HX LITHOTRIPSY  2008 x 2    HX ORTHOPAEDIC      2 rotater cuff right shoulder    HX TOTAL ABDOMINAL HYSTERECTOMY  1972    HX UROLOGICAL      suspension    GA CARDIAC SURG PROCEDURE UNLIST      by pass      Social History     Tobacco Use    Smoking status: Never Smoker    Smokeless tobacco: Never Used   Substance Use Topics    Alcohol use: No      Family History   Problem Relation Age of Onset    Heart Disease Mother     Stroke Mother     Heart Disease Father     Heart Disease Brother     Cancer Brother         prostate    Heart Disease Brother     Heart Disease Brother     Heart Disease Brother     Malignant Hyperthermia Neg Hx     Pseudocholinesterase Deficiency Neg Hx     Delayed Awakening Neg Hx     Post-op Nausea/Vomiting Neg Hx     Emergence Delirium Neg Hx     Post-op Cognitive Dysfunction Neg Hx     Other Neg Hx     Breast Cancer Neg Hx       Allergies   Allergen Reactions    Accupril [Quinapril] Unknown (comments)    Norvasc [Amlodipine] Unknown (comments)    Prednisone Itching, Palpitations and Unknown (comments)     Per pt also had heart burn    Zoloft [Sertraline] Drowsiness      Prior to Admission Medications   Prescriptions Last Dose Informant Patient Reported? Taking? APPLE CIDER VINEGAR PO   Yes No   Sig: Take 450 mg by mouth daily. CRANBERRY EXTRACT (CRANBERRY PO)   Yes No   Sig: Take  by mouth two (2) times a day. TURMERIC PO   Yes No   Sig: Take  by mouth. aspirin 81 mg tablet   No No   Sig: Take 1 Tab by mouth daily. TAKE AM OF SURGERY WITH SMALL SIP OF WATER   cholecalciferol (D3-2000) 2,000 unit cap capsule   Yes No   Sig: Take 5,000 Units by mouth daily. cloNIDine HCL (CATAPRES) 0.1 mg tablet   No No   Sig: Take 1 Tab by mouth two (2) times a day. clopidogreL (PLAVIX) 75 mg tab   No No   Sig: TAKE 1 TABLET DAILY   cyanocobalamin (Vitamin B-12) 100 mcg tablet   Yes No   Sig: Take 100 mcg by mouth daily. irbesartan (AVAPRO) 75 mg tablet   No No   Sig: Take 1 Tab by mouth daily. isosorbide mononitrate ER (IMDUR) 60 mg CR tablet   No No   Sig: Take 1 Tab by mouth every morning. loratadine (CLARITIN) 10 mg tablet   Yes No   Sig: Take 10 mg by mouth.   pantoprazole (PROTONIX) 20 mg tablet   No No   Sig: Take 1 Tab by mouth daily. spironolactone (ALDACTONE) 25 mg tablet   No No   Sig: Take 1 Tab by mouth daily. Facility-Administered Medications: None       MEDS SCHEDULED:    Current Facility-Administered Medications   Medication Dose Route Frequency    [START ON 4/22/2021] aspirin chewable tablet 81 mg  81 mg Oral DAILY    [START ON 4/22/2021] cholecalciferol (VITAMIN D3) (5000 Units/125 mcg) tablet 5,000 Units  5,000 Units Oral DAILY    cloNIDine HCL (CATAPRES) tablet 0.1 mg  0.1 mg Oral BID    [START ON 4/22/2021] clopidogreL (PLAVIX) tablet 75 mg  75 mg Oral DAILY    [START ON 4/22/2021] cyanocobalamin tablet 500 mcg  500 mcg Oral DAILY    [START ON 4/22/2021] isosorbide mononitrate ER (IMDUR) tablet 60 mg  60 mg Oral DAILY    [START ON 4/22/2021] loratadine (CLARITIN) tablet 10 mg  10 mg Oral DAILY    [START ON 4/22/2021] pantoprazole (PROTONIX) tablet 40 mg  40 mg Oral DAILY    [START ON 4/22/2021] spironolactone (ALDACTONE) tablet 25 mg  25 mg Oral DAILY    furosemide (LASIX) injection 40 mg  40 mg IntraVENous BID    carvediloL (COREG) tablet 3.125 mg  3.125 mg Oral BID WITH MEALS    [START ON 4/22/2021] valsartan (DIOVAN) tablet 40 mg  40 mg Oral DAILY         Review of Systems  Constitutional: negative for fevers, chills, sweats, anorexia and weight loss  Respiratory: positive for cough or pleurisy/chest pain, negative for sputum, hemoptysis, pneumonia or wheezing  Cardiovascular: positive for chest pressure/discomfort, dyspnea, fatigue, orthopnea, negative for near-syncope, syncope, lower extremity edema, tachypnea  Gastrointestinal: negative for nausea, vomiting, diarrhea and constipation  Genitourinary:negative for frequency, dysuria and nocturia    Objective:     Vitals:    04/21/21 1249   BP: (P) 137/70   Pulse: (P) 69   Resp: (P) 16   Temp: (P) 97.3 °F (36.3 °C)   SpO2: (P) 100%   Weight: 136 lb 11.2 oz (62 kg)   Height: 5' 6\" (1.676 m)     No intake/output data recorded.   No intake/output data recorded. PHYSICAL EXAM     Physical Exam:   General:  Alert, cooperative, no acute distress, appears stated age. Eyes:  Conjunctivae/corneas clear. Nose: Nares patent and moist.    Mouth/Throat: Lips, mucosa, and tongue pink and intact. Neck: Supple, symmetrical.   Respiratory:   Coarse crackles in bases posteriorly to auscultation bilaterally on RA   Cardiovascular:  Regular rate and rhythm, S1, S2, no murmur, click, rub or gallop. Telemetry monitor:NSR   GI:   Abdomen soft, non-tender. Bowel sounds active X 4 Q. No masses,     Musculoskeletal: Extremities symmetrical, atraumatic, no cyanosis, no edema. Pulses: 2+ and symmetric all extremities. Skin: Skin color, texture, turgor normal. No rashes or lesions       Neurologic: 2+ strength bilateral upper and lower extremities, sensation throughout appropriate. Alert and oriented. Activity:  Ad rajwinder  Nutrition:  Diet as tolerated    Lines/Drains/Airways:  Peripheral IV    CHEST X-RAYS:   4/21 PA and Lat with moderate appearing left effusion, ?small right. 4/13 larger left effusion   3/19-small left effusion noted      CULTURES:none    ECHO 4/19/21:    LABS    No results for input(s): WBC, HGB, HCT, PLT, HGBEXT, HCTEXT, PLTEXT, HGBEXT, HCTEXT, PLTEXT in the last 72 hours. No results for input(s): NA, K, CL, GLU, CO2, BUN, CREA, MG, PHOS, TROIQ, INR, INREXT, INREXT in the last 72 hours. No lab exists for component: TROIP  No results for input(s): PH, PCO2, PO2, HCO3 in the last 72 hours. Assessment:     Hospital Problems  Date Reviewed: 3/29/2021          Codes Class Noted POA    Volume overload ICD-10-CM: E87.70  ICD-9-CM: 276.69  4/21/2021 Unknown        Pleural effusion, left ICD-10-CM: J90  ICD-9-CM: 511.9  4/21/2021 Unknown              Plan:     --CXR PA and LAT shows left with possible right pleural effusions. --Will need 5 days off Plavix if thoracentesis needed.   Will Phan with Dr Ramirez Never now  --initial labs all pending including BNP, CBC, CMP  --Lasix 40mg IV BID initiated. Continue per Cardiology. --1500 ml fluid restriction.  --daily chest xray    King Thompson NP      More than 50% of time documented was spent in face-to-face contact with the patient and in the care of the patient on the floor/unit where the patient is located. Lungs:  Decreased BS at bases  Heart:  RRR with no Murmur/Rubs/Gallops    Additional Comments:  81 yo F with CAD s/p recent PCI with inability to open LAD now s/p PCM. Pt reports dyspnea began just after PCM placed. Evaluation with reduced EF on echo with multiple WMA and pleural effusions noted. CXR with effusions and BNP increased > 3000. Likely has heart failure as etiology of effusions but temporal relationship to [facility] placement suggests possible injury to thoracic duct as it inserts into L SCV. Will diuresis and follow CXR. Tap if effusions failure to resolve. Also needs fluid restriction. I have spoken with and examined the patient. I agree with the above assessment and plan as documented.     Lisa Mckee MD

## 2021-04-21 NOTE — ROUTINE PROCESS
Patient received to room 322 as direct admit. Patient oriented to room, call light and plan of care.

## 2021-04-22 ENCOUNTER — APPOINTMENT (OUTPATIENT)
Dept: GENERAL RADIOLOGY | Age: 86
End: 2021-04-22
Attending: NURSE PRACTITIONER
Payer: MEDICARE

## 2021-04-22 LAB
ANION GAP SERPL CALC-SCNC: 5 MMOL/L (ref 7–16)
BUN SERPL-MCNC: 15 MG/DL (ref 8–23)
CALCIUM SERPL-MCNC: 9.6 MG/DL (ref 8.3–10.4)
CHLORIDE SERPL-SCNC: 110 MMOL/L (ref 98–107)
CO2 SERPL-SCNC: 27 MMOL/L (ref 21–32)
CREAT SERPL-MCNC: 1.05 MG/DL (ref 0.6–1)
GLUCOSE SERPL-MCNC: 97 MG/DL (ref 65–100)
MAGNESIUM SERPL-MCNC: 2.5 MG/DL (ref 1.8–2.4)
POTASSIUM SERPL-SCNC: 3.8 MMOL/L (ref 3.5–5.1)
SODIUM SERPL-SCNC: 142 MMOL/L (ref 136–145)

## 2021-04-22 PROCEDURE — 96376 TX/PRO/DX INJ SAME DRUG ADON: CPT

## 2021-04-22 PROCEDURE — 74011250637 HC RX REV CODE- 250/637: Performed by: INTERNAL MEDICINE

## 2021-04-22 PROCEDURE — 93005 ELECTROCARDIOGRAM TRACING: CPT | Performed by: INTERNAL MEDICINE

## 2021-04-22 PROCEDURE — 36415 COLL VENOUS BLD VENIPUNCTURE: CPT

## 2021-04-22 PROCEDURE — 80048 BASIC METABOLIC PNL TOTAL CA: CPT

## 2021-04-22 PROCEDURE — 99218 HC RM OBSERVATION: CPT

## 2021-04-22 PROCEDURE — 99233 SBSQ HOSP IP/OBS HIGH 50: CPT | Performed by: INTERNAL MEDICINE

## 2021-04-22 PROCEDURE — 99232 SBSQ HOSP IP/OBS MODERATE 35: CPT | Performed by: INTERNAL MEDICINE

## 2021-04-22 PROCEDURE — 74011250636 HC RX REV CODE- 250/636: Performed by: PHYSICIAN ASSISTANT

## 2021-04-22 PROCEDURE — 74011250637 HC RX REV CODE- 250/637: Performed by: PHYSICIAN ASSISTANT

## 2021-04-22 PROCEDURE — 83735 ASSAY OF MAGNESIUM: CPT

## 2021-04-22 PROCEDURE — G0378 HOSPITAL OBSERVATION PER HR: HCPCS

## 2021-04-22 PROCEDURE — 71045 X-RAY EXAM CHEST 1 VIEW: CPT

## 2021-04-22 RX ORDER — VALSARTAN 40 MG/1
80 TABLET ORAL DAILY
Status: DISCONTINUED | OUTPATIENT
Start: 2021-04-23 | End: 2021-04-24 | Stop reason: HOSPADM

## 2021-04-22 RX ADMIN — VALSARTAN 40 MG: 40 TABLET, FILM COATED ORAL at 08:20

## 2021-04-22 RX ADMIN — ISOSORBIDE MONONITRATE 60 MG: 60 TABLET, EXTENDED RELEASE ORAL at 08:20

## 2021-04-22 RX ADMIN — FUROSEMIDE 40 MG: 10 INJECTION, SOLUTION INTRAMUSCULAR; INTRAVENOUS at 17:04

## 2021-04-22 RX ADMIN — ASPIRIN 81 MG: 81 TABLET, CHEWABLE ORAL at 08:22

## 2021-04-22 RX ADMIN — CHOLECALCIFEROL TAB 125 MCG (5000 UNIT) 5000 UNITS: 125 TAB at 08:23

## 2021-04-22 RX ADMIN — SPIRONOLACTONE 25 MG: 25 TABLET ORAL at 08:21

## 2021-04-22 RX ADMIN — LORATADINE 10 MG: 10 TABLET ORAL at 08:21

## 2021-04-22 RX ADMIN — CARVEDILOL 3.12 MG: 3.12 TABLET, FILM COATED ORAL at 17:04

## 2021-04-22 RX ADMIN — FUROSEMIDE 40 MG: 10 INJECTION, SOLUTION INTRAMUSCULAR; INTRAVENOUS at 08:19

## 2021-04-22 RX ADMIN — CLONIDINE HYDROCHLORIDE 0.1 MG: 0.2 TABLET ORAL at 08:20

## 2021-04-22 RX ADMIN — CARVEDILOL 3.12 MG: 3.12 TABLET, FILM COATED ORAL at 08:21

## 2021-04-22 NOTE — PROGRESS NOTES
[facility] Opportunity for questions, concerns. Patient involved.

## 2021-04-22 NOTE — ROUTINE PROCESS
Verbal bedside report given to oncoming RN, Heather Cervantes. Patient's situation, background, assessment and recommendations provided. Opportunity for questions provided. Oncoming RN assumed care of patient.

## 2021-04-22 NOTE — ROUTINE PROCESS
IP consult to Cardiac Rehab. Per MD notes pt EF around 40% on Echo completed 4/19/21. EF must be 35% or less to qualify for Cardiac Rehab with diagnosis of chronic heart failure. We will contact the patient if a qualifying referral is received. Thank you, JAZMINE KimballN, RN Cardiac Rehab Nurse Liaison

## 2021-04-22 NOTE — PROGRESS NOTES
Problem: Falls - Risk of  Goal: *Absence of Falls  Description: Document Candace Luis Fall Risk and appropriate interventions in the flowsheet.   Outcome: Progressing Towards Goal  Note: Fall Risk Interventions:  Mobility Interventions: Patient to call before getting OOB         Medication Interventions: Evaluate medications/consider consulting pharmacy, Patient to call before getting OOB, Teach patient to arise slowly

## 2021-04-22 NOTE — PROGRESS NOTES
PULMONARYPROGRESS NOTE  4/22/2021    Date of Admission:  4/21/2021    The patient's chart has been reviewed and the chart has been discussed with nursing staff. Patient is a 80 y.o.  female with PMx PE, CVA 2/2020 with PFO noted on echo, osteoporosis, CAD with MI x2 s/p stenting in 2007 and 2009, heart failure, s/p biventricular pacemaker, GERD, HTN, CABG 1988, transanal excision of villous adenoma presents as a direct admit from the cardiology office where she had CP with increased shortness of breath. Pt states her sob started after her pacemaker insertion in March. She has had issues with lying flat, feeling like after a short time, she feels pain in her left back and and \"lack of air\". She presented for an appointment with her cardiologist and on echo and xray she was noted to have moderate left pleural effusion, therefore, pulmonary was consulted to evaluate. Pt recently had cardiac cath that showed high-grade stenosis proximally and in the midportion of the LAD. There was an attempt for PCI but there was evidence of a wire dissection therefore procedure was stopped and pt was managed medically. She was placed on Plavix and ASA at D/C. She has no pulmonary history. 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 Subjective:     -2020 cc in past 24h. Feels better, still had orthopnea overnight.         Past Surgical History:   Procedure Laterality Date    HX APPENDECTOMY      HX BLADDER SUSPENSION  2005    HX COLONOSCOPY  2012    HX CORONARY ARTERY BYPASS GRAFT  1988    @ [place] [id number]    HX GYN      hysterectomy    HX LITHOTRIPSY  2008 x 2    HX ORTHOPAEDIC      2 rotater cuff right shoulder    HX TOTAL ABDOMINAL HYSTERECTOMY  1972    HX UROLOGICAL      suspension    [place] CARDIAC SURG PROCEDURE UNLIST      by pass      Social History     Tobacco Use    Smoking status: Never Smoker    Smokeless tobacco: Never Used   Substance Use Topics    Alcohol use: No      Family History   Problem Relation Age of Onset    Heart Disease Mother     Stroke Mother     Heart Disease Father     Heart Disease Brother     Cancer Brother         prostate    Heart Disease Brother     Heart Disease Brother     Heart Disease Brother     Malignant Hyperthermia Neg Hx     Pseudocholinesterase Deficiency Neg Hx     Delayed Awakening Neg Hx     Post-op Nausea/Vomiting Neg Hx     Emergence Delirium Neg Hx     Post-op Cognitive Dysfunction Neg Hx     Other Neg Hx     Breast Cancer Neg Hx       Allergies   Allergen Reactions    Accupril [Quinapril] Unknown (comments)    Norvasc [Amlodipine] Unknown (comments)    Prednisone Itching, Palpitations and Unknown (comments)     Per pt also had heart burn    Zoloft [Sertraline] Drowsiness      Prior to Admission Medications   Prescriptions Last Dose Informant Patient Reported? Taking? APPLE CIDER VINEGAR PO   Yes No   Sig: Take 450 mg by mouth daily. CRANBERRY EXTRACT (CRANBERRY PO)   Yes No   Sig: Take  by mouth two (2) times a day. TURMERIC PO   Yes No   Sig: Take  by mouth. aspirin 81 mg tablet   No No   Sig: Take 1 Tab by mouth daily. TAKE AM OF SURGERY WITH SMALL SIP OF WATER   cholecalciferol (D3-2000) 2,000 unit cap capsule   Yes No   Sig: Take 5,000 Units by mouth daily. cloNIDine HCL (CATAPRES) 0.1 mg tablet   No No   Sig: Take 1 Tab by mouth two (2) times a day. clopidogreL (PLAVIX) 75 mg tab   No No   Sig: TAKE 1 TABLET DAILY   cyanocobalamin (Vitamin B-12) 100 mcg tablet   Yes No   Sig: Take 100 mcg by mouth daily. irbesartan (AVAPRO) 75 mg tablet   No No   Sig: Take 1 Tab by mouth daily. isosorbide mononitrate ER (IMDUR) 60 mg CR tablet   No No   Sig: Take 1 Tab by mouth every morning. loratadine (CLARITIN) 10 mg tablet   Yes No   Sig: Take 10 mg by mouth.   pantoprazole (PROTONIX) 20 mg tablet   No No   Sig: Take 1 Tab by mouth daily.    spironolactone (ALDACTONE) 25 mg tablet   No No   Sig: Take 1 Tab by mouth daily. Facility-Administered Medications: None       MEDS SCHEDULED:    Current Facility-Administered Medications   Medication Dose Route Frequency    aspirin chewable tablet 81 mg  81 mg Oral DAILY    cholecalciferol (VITAMIN D3) (5000 Units/125 mcg) tablet 5,000 Units  5,000 Units Oral DAILY    cloNIDine HCL (CATAPRES) tablet 0.1 mg  0.1 mg Oral BID    [Held by provider] clopidogreL (PLAVIX) tablet 75 mg  75 mg Oral DAILY    cyanocobalamin tablet 500 mcg  500 mcg Oral DAILY    isosorbide mononitrate ER (IMDUR) tablet 60 mg  60 mg Oral DAILY    loratadine (CLARITIN) tablet 10 mg  10 mg Oral DAILY    pantoprazole (PROTONIX) tablet 40 mg  40 mg Oral DAILY    spironolactone (ALDACTONE) tablet 25 mg  25 mg Oral DAILY    furosemide (LASIX) injection 40 mg  40 mg IntraVENous BID    carvediloL (COREG) tablet 3.125 mg  3.125 mg Oral BID WITH MEALS    valsartan (DIOVAN) tablet 40 mg  40 mg Oral DAILY         Review of Systems  Constitutional: negative for fevers, chills, sweats, anorexia and weight loss  Respiratory: positive for cough or pleurisy/chest pain, negative for sputum, hemoptysis, pneumonia or wheezing  Cardiovascular: positive for chest pressure/discomfort, dyspnea, fatigue, orthopnea, negative for near-syncope, syncope, lower extremity edema, tachypnea  Gastrointestinal: negative for nausea, vomiting, diarrhea and constipation  Genitourinary:negative for frequency, dysuria and nocturia    Objective:     Vitals:    04/22/21 0400 04/22/21 0535 04/22/21 0824 04/22/21 0900   BP:  132/70 135/66 135/66   Pulse: 65 65 62 73   Resp:  18  18   Temp:  98.6 °F (37 °C)  98.3 °F (36.8 °C)   SpO2:  97%     Weight:       Height:         04/22 0701 - 04/22 1900  In: -   Out: 350 [Urine:350]  04/20 1901 - 04/22 0700  In: 180 [P.O.:180]  Out: 2200 [Urine:2200]      PHYSICAL EXAM     Physical Exam:   General:  Alert, cooperative, no acute distress, appears stated age. Eyes:  Conjunctivae/corneas clear. Nose: Nares patent and moist.    Mouth/Throat: Lips, mucosa, and tongue pink and intact. Neck: Supple, symmetrical.   Respiratory:   Coarse crackles in bases posteriorly to auscultation bilaterally on RA   Cardiovascular:  Regular rate and rhythm, S1, S2, no murmur, click, rub or gallop. Telemetry monitor:NSR   GI:   Abdomen soft, non-tender. Bowel sounds active X 4 Q. No masses,     Musculoskeletal: Extremities symmetrical, atraumatic, no cyanosis, no edema. Pulses: 2+ and symmetric all extremities. Skin: Skin color, texture, turgor normal. No rashes or lesions       Neurologic: 2+ strength bilateral upper and lower extremities, sensation throughout appropriate. Alert and oriented. Activity:  Ad rajwinder  Nutrition:  Diet as tolerated    Lines/Drains/Airways:  Peripheral IV    CHEST X-RAYS:   4/22 4/13 larger left effusion   3/19-small left effusion noted      CULTURES:none    ECHO 4/19/21:    LABS    Recent Labs     04/21/21  1353   WBC 5.3   HGB 10.5*   HCT 33.9*        Recent Labs     04/22/21  0615 04/21/21  1353    137   K 3.8 4.1   * 113*   GLU 97 83   CO2 27 25   BUN 15 12   CREA 1.05* 0.97   MG 2.5* 2.5*     No results for input(s): PH, PCO2, PO2, HCO3 in the last 72 hours. Assessment:     Hospital Problems  Date Reviewed: 3/29/2021          Codes Class Noted POA    Volume overload ICD-10-CM: E87.70  ICD-9-CM: 276.69  4/21/2021 Unknown        Pleural effusion, left ICD-10-CM: J90  ICD-9-CM: 511.9  4/21/2021 Unknown              Plan:     --CXR with persistent bilateral L>R pleural effusions  -- although creatinine is up some it is still WNL and GFR is >60- continue diuresis  --Lasix 40mg IV BID   --1500 ml fluid restriction.  --daily chest xray    Emili Morejon MD      More than 50% of time documented was spent in face-to-face contact with the patient and in the care of the patient on the floor/unit where the patient is located. Maia Money

## 2021-04-22 NOTE — ROUTINE PROCESS
Bedside and Verbal shift change report given to self (oncoming nurse) by Bell Vaughnort (offgoing nurse). Report included the following information SBAR, Kardex, MAR and Recent Results.

## 2021-04-22 NOTE — ROUTINE PROCESS
Bedside and Verbal shift change report given to self (oncoming nurse) by Amelia Simons RN (offgoing nurse). Report included the following information SBAR, Kardex, Intake/Output and MAR.

## 2021-04-22 NOTE — PROGRESS NOTES
Care Management Interventions  PCP Verified by CM: Yes(Dr Augusto Batres)  Last Visit to PCP: 03/29/21  Mode of Transport at Discharge: Other (see comment)(Jose Samuels Child [phone] )  Transition of Care Consult (CM Consult): Discharge Planning  Physical Therapy Consult: No  Occupational Therapy Consult: No  Confirm Follow Up Transport: Family  Discharge Location  Discharge Placement: Home    Pt admitted to 3rd floor tele for volume overload, SOB. CM met with pt to discuss CM needs & DCP. Pt is A&Ox4. Pt is indep at home with all ADLS. Pt lives with alone. Pt has no DME needs. Pt has no difficulty with obtaining medications or transport. DCP home with son's assistance. No further needs noted. CM to continue to monitor.

## 2021-04-22 NOTE — PROGRESS NOTES
[facility] CARDIOLOGY PROGRESS NOTE           4/22/2021 10:05 AM    Admit Date: 4/21/2021      Subjective:     Improved dyspnea. Mostly issues with increased weakness/fatigue; apprehensive about therapy added on for left ventricular dysfunction. ~2.2 L net negative. ROS:  Cardiovascular:  As noted above    Objective:      Vitals:    04/22/21 0400 04/22/21 0535 04/22/21 0824 04/22/21 0900   BP:  132/70 135/66 135/66   Pulse: 65 65 62 73   Resp:  18  18   Temp:  98.6 °F (37 °C)  98.3 °F (36.8 °C)   SpO2:  97%     Weight:       Height:           Physical Exam:  General-No Acute Distress  Neck- supple, no JVD  CV- regular rate and rhythm no MRG  Lung- clear bilaterally  Abd- soft, nontender, nondistended  Ext- no edema bilaterally. Skin- warm and dry    Data Review:   Recent Labs     04/22/21  0615 04/21/21  1353    137   K 3.8 4.1   MG 2.5* 2.5*   BUN 15 12   CREA 1.05* 0.97   GLU 97 83   WBC  --  5.3   HGB  --  10.5*   HCT  --  33.9*   PLT  --  252       Assessment/Plan:     Active Problems:    Volume overload (4/21/2021)   -Improved. Continue diuresis for now. Coronary disease  -Prior CABG  -Coronary angiogram from 3/18/2021 with attempted PCI of high-grade mid LAD lesion which was unsuccessful due to wire dissection; RCA small and chronically occluded; occluded left circumflex with patent vein graft to the circumflex/ramus.  -Maintained on aspirin/Plavix post procedure     Cardiomyopathy  -Ischemic; echo from 2/2021 with preserved EF and subsequent echoes with mild to moderate left ventricular dysfunction with apical WMA; EF ~40%. -Currently on Coreg/Aldactone/valsartan; was on irbesartan prior to admission. Titrate therapy for left ventricular dysfunction as tolerated. Continue IV Lasix 40 mg twice daily.  -Noted on clonidine 0.1 mg twice daily and plan stopping and titrating up therapy for left ventricular dysfunction.       Bradycardia  -Symptomatic bradycardia status post pacemaker placement; no significant RV pacing. Pleural effusion, left (4/21/2021)  -Reassess with diuresis. Pulmonology evaluating for possible thoracentesis.           Julissa Hook MD  4/22/2021 10:06 AM

## 2021-04-22 NOTE — ROUTINE PROCESS
Bedside and verbal report given to [facility] by Alma Hand. Report included the following information SBAR, Kardex, Intake/Output and MAR. Oncoming RN assumed care of the patient.

## 2021-04-23 ENCOUNTER — APPOINTMENT (OUTPATIENT)
Dept: GENERAL RADIOLOGY | Age: 86
End: 2021-04-23
Attending: NURSE PRACTITIONER
Payer: MEDICARE

## 2021-04-23 LAB
ANION GAP SERPL CALC-SCNC: 8 MMOL/L (ref 7–16)
ATRIAL RATE: 69 BPM
BUN SERPL-MCNC: 21 MG/DL (ref 8–23)
CALCIUM SERPL-MCNC: 9.7 MG/DL (ref 8.3–10.4)
CALCULATED P AXIS, ECG09: 73 DEGREES
CALCULATED R AXIS, ECG10: 60 DEGREES
CALCULATED T AXIS, ECG11: 65 DEGREES
CHLORIDE SERPL-SCNC: 106 MMOL/L (ref 98–107)
CO2 SERPL-SCNC: 27 MMOL/L (ref 21–32)
CREAT SERPL-MCNC: 1.1 MG/DL (ref 0.6–1)
DIAGNOSIS, 93000: NORMAL
GLUCOSE SERPL-MCNC: 99 MG/DL (ref 65–100)
MAGNESIUM SERPL-MCNC: 2.3 MG/DL (ref 1.8–2.4)
P-R INTERVAL, ECG05: 156 MS
POTASSIUM SERPL-SCNC: 3.5 MMOL/L (ref 3.5–5.1)
Q-T INTERVAL, ECG07: 478 MS
QRS DURATION, ECG06: 168 MS
QTC CALCULATION (BEZET), ECG08: 512 MS
SODIUM SERPL-SCNC: 141 MMOL/L (ref 136–145)
VENTRICULAR RATE, ECG03: 69 BPM

## 2021-04-23 PROCEDURE — 65660000000 HC RM CCU STEPDOWN

## 2021-04-23 PROCEDURE — 76604 US EXAM CHEST: CPT | Performed by: INTERNAL MEDICINE

## 2021-04-23 PROCEDURE — 99232 SBSQ HOSP IP/OBS MODERATE 35: CPT | Performed by: INTERNAL MEDICINE

## 2021-04-23 PROCEDURE — 74011250636 HC RX REV CODE- 250/636: Performed by: NURSE PRACTITIONER

## 2021-04-23 PROCEDURE — 74011250637 HC RX REV CODE- 250/637: Performed by: INTERNAL MEDICINE

## 2021-04-23 PROCEDURE — 74011250636 HC RX REV CODE- 250/636: Performed by: PHYSICIAN ASSISTANT

## 2021-04-23 PROCEDURE — G0378 HOSPITAL OBSERVATION PER HR: HCPCS

## 2021-04-23 PROCEDURE — 74011250637 HC RX REV CODE- 250/637: Performed by: PHYSICIAN ASSISTANT

## 2021-04-23 PROCEDURE — 83735 ASSAY OF MAGNESIUM: CPT

## 2021-04-23 PROCEDURE — 2709999900 HC NON-CHARGEABLE SUPPLY

## 2021-04-23 PROCEDURE — 71045 X-RAY EXAM CHEST 1 VIEW: CPT

## 2021-04-23 PROCEDURE — 99218 HC RM OBSERVATION: CPT

## 2021-04-23 PROCEDURE — 80048 BASIC METABOLIC PNL TOTAL CA: CPT

## 2021-04-23 PROCEDURE — 96376 TX/PRO/DX INJ SAME DRUG ADON: CPT

## 2021-04-23 PROCEDURE — 36415 COLL VENOUS BLD VENIPUNCTURE: CPT

## 2021-04-23 RX ORDER — ROSUVASTATIN CALCIUM 20 MG/1
20 TABLET, COATED ORAL
Status: DISCONTINUED | OUTPATIENT
Start: 2021-04-23 | End: 2021-04-24 | Stop reason: HOSPADM

## 2021-04-23 RX ORDER — FUROSEMIDE 10 MG/ML
20 INJECTION INTRAMUSCULAR; INTRAVENOUS ONCE
Status: COMPLETED | OUTPATIENT
Start: 2021-04-23 | End: 2021-04-23

## 2021-04-23 RX ORDER — FUROSEMIDE 10 MG/ML
20 INJECTION INTRAMUSCULAR; INTRAVENOUS 2 TIMES DAILY
Status: DISCONTINUED | OUTPATIENT
Start: 2021-04-23 | End: 2021-04-24 | Stop reason: HOSPADM

## 2021-04-23 RX ADMIN — FUROSEMIDE 40 MG: 10 INJECTION, SOLUTION INTRAMUSCULAR; INTRAVENOUS at 10:01

## 2021-04-23 RX ADMIN — ROSUVASTATIN CALCIUM 20 MG: 20 TABLET, COATED ORAL at 22:29

## 2021-04-23 RX ADMIN — PANTOPRAZOLE SODIUM 40 MG: 40 TABLET, DELAYED RELEASE ORAL at 09:57

## 2021-04-23 RX ADMIN — FUROSEMIDE 20 MG: 10 INJECTION, SOLUTION INTRAMUSCULAR; INTRAVENOUS at 00:21

## 2021-04-23 RX ADMIN — ISOSORBIDE MONONITRATE 60 MG: 60 TABLET, EXTENDED RELEASE ORAL at 09:57

## 2021-04-23 RX ADMIN — ASPIRIN 81 MG: 81 TABLET, CHEWABLE ORAL at 09:56

## 2021-04-23 RX ADMIN — CYANOCOBALAMIN TAB 1000 MCG 500 MCG: 1000 TAB at 09:58

## 2021-04-23 RX ADMIN — SPIRONOLACTONE 25 MG: 25 TABLET ORAL at 09:58

## 2021-04-23 RX ADMIN — FUROSEMIDE 20 MG: 10 INJECTION, SOLUTION INTRAMUSCULAR; INTRAVENOUS at 17:04

## 2021-04-23 RX ADMIN — CARVEDILOL 3.12 MG: 3.12 TABLET, FILM COATED ORAL at 09:57

## 2021-04-23 RX ADMIN — LORATADINE 10 MG: 10 TABLET ORAL at 09:57

## 2021-04-23 RX ADMIN — VALSARTAN 80 MG: 40 TABLET, FILM COATED ORAL at 09:56

## 2021-04-23 RX ADMIN — CARVEDILOL 3.12 MG: 3.12 TABLET, FILM COATED ORAL at 17:04

## 2021-04-23 NOTE — PROGRESS NOTES
[facility] CARDIOLOGY PROGRESS NOTE           4/23/2021 9:29 AM    Admit Date: 4/21/2021      Subjective:   Acutely worsened dyspnea overnight, somewhat improved this AM. Denies cough or leg edema. States frequent urination this AM.     ROS:  Cardiovascular:  As noted above    Objective:      Vitals:    04/23/21 0000 04/23/21 0100 04/23/21 0445 04/23/21 0808   BP: (!) 154/76 122/72 117/72 139/74   Pulse: 71 72 70 69   Resp: 24 22 20 18   Temp:  98 °F (36.7 °C) 98.1 °F (36.7 °C) 98.2 °F (36.8 °C)   SpO2: 96% 91% 96% 97%   Weight:   130 lb (59 kg)    Height:           Physical Exam:  General-No Acute Distress, awake , alert   Neck- supple, no JVD  CV- regular rate and rhythm no MRG  Lung- decreased at left base with rales , minimal rales right base   Abd- soft, nontender, nondistended  Ext- no edema bilaterally in legs   Skin- warm and dry    Data Review:   Recent Labs     04/23/21  0514 04/22/21  0615 04/21/21  1353    142 137   K 3.5 3.8 4.1   MG 2.3 2.5* 2.5*   BUN 21 15 12   CREA 1.10* 1.05* 0.97   GLU 99 97 83   WBC  --   --  5.3   HGB  --   --  10.5*   HCT  --   --  33.9*   PLT  --   --  252       Assessment/Plan:     Active Problems:    Volume overload (4/21/2021)   -Improved. Continue diuresis for now.       Coronary disease  -Prior CABG  -Coronary angiogram from 3/18/2021 with attempted PCI of high-grade mid LAD lesion which was unsuccessful due to wire dissection; RCA small and chronically occluded; occluded left circumflex with patent vein graft to the circumflex/ramus.  -Maintained on aspirin/Plavix post procedure; plavix has been held for possible thoracentesis . IF not planned will resume DAPT.      Cardiomyopathy  -Ischemic; echo from 2/2021 with preserved EF and subsequent echoes with mild to moderate left ventricular dysfunction with apical WMA; EF ~40%. -Currently on Coreg/Aldactone/valsartan;   Titrate therapy for left ventricular dysfunction as tolerated.    - Continue IV Lasix 40 mg twice daily. - cont diuresis today and supplemental oxygen as needed, pleural effusion appears improved on CXR today       Essential HTN    - adequate control, intermittently low     - monitor and adjust meds while inpatient       Mixed Hyperlipidemia    - not on statin at home, start statin and increase as possible as tolerated        Bradycardia  -Symptomatic bradycardia, status post pacemaker placement; no significant RV pacing.  - hemodynamically and electrically stable        Pleural effusion, left (4/21/2021)  - Reassess with diuresis. Pulmonology evaluating for possible thoracentesis.   - improved per CXR today            Prisca AugustDO  4/23/2021 9:29 AM

## 2021-04-23 NOTE — ROUTINE PROCESS
Verbal bedside report given to oncoming RN, Seven Neri. Patient's situation, background, assessment and recommendations provided. Opportunity for questions provided. Oncoming RN assumed care of patient.

## 2021-04-23 NOTE — PROGRESS NOTES
Bilateral Ultra Sound done of the chest, pictures taken and placed in chart, Dr Rucker Linear here and not enough fluid to perform Thoracentesis.

## 2021-04-23 NOTE — PROGRESS NOTES
Called by primary RN reporting patient was sleeping tonight and just woke up with acute shortness of breath. Patient was admitted for volume overload and is currently on IV Lasix BID with good UOP. Pulmonary has seen the patient with plan for possible thoracentesis, Plavix is being held. Plan:  -- give 20mg IV lasix now. If breathing does not improve, primary RN advised to call pulmonary for further recommendations.      Audrey Rowell NP  12:05 AM

## 2021-04-23 NOTE — INTERVAL H&P NOTE
Update History & Physical    The Patient's History and Physical of April 23,   Chart was reviewed with the patient and I examined the patient. There was no change. The surgical site was confirmed by the patient and me. Plan:  The risk, benefits, expected outcome, and alternative to the recommended procedure have been discussed with the patient. Patient understands and wants to proceed with the procedure.     Electronically signed by Wing Ana MD on 4/23/2021 at 4:09 PM

## 2021-04-23 NOTE — ROUTINE PROCESS
Bedside and Verbal shift change report given to self (oncoming nurse) by  Brunilda Escalona and Brandan Champagne RN (offgoing nurse). Report included the following information SBAR, Kardex, Intake/Output, MAR, and Recent Results.

## 2021-04-23 NOTE — PROGRESS NOTES
US done and not effusion fluid to drain with regards to Risks > benefits. Since small effusion and only 72 hour off plavix.  Would continue to diurese and will f/u on Monday    Madeleine MD Donte

## 2021-04-23 NOTE — PROCEDURES
Summary:    After informed consent was obtained, the patient was positioned upright in the usual fashion.  Ultrasound was used to identify the optimal spot for pleural drainage. Small effusion noted in left  Chest about 250 ml or less. No effusion on Right chest. No attempt made to drain. Will just continue to diurese.     EBL --none    Patient stable post procedure    No samples sent.     Signed By: Noah Bowles MD

## 2021-04-23 NOTE — PROGRESS NOTES
Care Management Interventions  PCP Verified by CM: Yes(Dr Mishel Murphy)  Last Visit to PCP: 03/29/21  Mode of Transport at Discharge: Other (see comment)(Jose Samuels Child [phone] )  Transition of Care Consult (CM Consult): Discharge Planning  Physical Therapy Consult: No  Occupational Therapy Consult: No  Confirm Follow Up Transport: Family  Discharge Location  Discharge Placement: Home  CM reviewed clinical record. Pt continues with c/o SOB despite good diuresis. Pulm consulted. Pt does not qualify for CPRC. EF 40%. CM to continue to follow pt and monitor for CM needs.

## 2021-04-23 NOTE — ROUTINE PROCESS
RN summoned to room per pt. Pt states, \"just woke up, can't catch my breath, feel weak and feel like I'm going to die. \" Skin warm & dry to touch. /76,HR 71, RR 24, O2 sat 96% on RA. No changes noted per EKG. Lungs auscultated & increase in crackles noted to R side. Notified Eliseo Sofia NP with [facility] Cardiology & orders received for a now dose 20mg IVP Lasix.

## 2021-04-23 NOTE — ROUTINE PROCESS
Pt reports \"feeling much better. \" 400 cc's clear yellow urine noted in Purewick canister. /50, HR 60, RR 20. Pt appears to be resting more comfortably.

## 2021-04-23 NOTE — PROGRESS NOTES
PULMONARYPROGRESS NOTE  4/23/2021    Date of Admission:  4/21/2021    The patient's chart has been reviewed and the chart has been discussed with nursing staff. Patient is a 80 y.o.  female with PMx PE, CVA 2/2020 with PFO noted on echo, osteoporosis, CAD with MI x2 s/p stenting in 2007 and 2009, heart failure, s/p biventricular pacemaker, GERD, HTN, CABG 1988, transanal excision of villous adenoma presents as a direct admit from the cardiology office where she had CP with increased shortness of breath. Pt states her sob started after her pacemaker insertion in March. She has had issues with lying flat, feeling like after a short time, she feels pain in her left back and and \"lack of air\". She presented for an appointment with her cardiologist and on echo and xray she was noted to have moderate left pleural effusion, therefore, pulmonary was consulted to evaluate. Pt recently had cardiac cath that showed high-grade stenosis proximally and in the midportion of the LAD. There was an attempt for PCI but there was evidence of a wire dissection therefore procedure was stopped and pt was managed medically. She was placed on Plavix and ASA at D/C. She has no pulmonary history. 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 Subjective:     Acute episode overnight of shortness of breath. Got a single dose of IV Lasix 20 mg with good urine output and felt better after. Pt states she was lying flat when she woke up suddenly. Pt weaned off NC this morning. Feels tired this am.      Now with net -1940 over 24 hours and nearing -4L since admit.         Past Surgical History:   Procedure Laterality Date    HX APPENDECTOMY      HX BLADDER SUSPENSION  2005    HX COLONOSCOPY  2012    HX CORONARY ARTERY BYPASS GRAFT  1988    @ [facility] [address]    HX GYN      hysterectomy    HX LITHOTRIPSY  2008 x 2    HX ORTHOPAEDIC      2 rotater cuff right shoulder  HX TOTAL ABDOMINAL HYSTERECTOMY  1972    HX UROLOGICAL      suspension    NM CARDIAC SURG PROCEDURE UNLIST      by pass      Social History     Tobacco Use    Smoking status: Never Smoker    Smokeless tobacco: Never Used   Substance Use Topics    Alcohol use: No      Family History   Problem Relation Age of Onset    Heart Disease Mother     Stroke Mother     Heart Disease Father     Heart Disease Brother     Cancer Brother         prostate    Heart Disease Brother     Heart Disease Brother     Heart Disease Brother     Malignant Hyperthermia Neg Hx     Pseudocholinesterase Deficiency Neg Hx     Delayed Awakening Neg Hx     Post-op Nausea/Vomiting Neg Hx     Emergence Delirium Neg Hx     Post-op Cognitive Dysfunction Neg Hx     Other Neg Hx     Breast Cancer Neg Hx       Allergies   Allergen Reactions    Accupril [Quinapril] Unknown (comments)    Norvasc [Amlodipine] Unknown (comments)    Prednisone Itching, Palpitations and Unknown (comments)     Per pt also had heart burn    Zoloft [Sertraline] Drowsiness      Prior to Admission Medications   Prescriptions Last Dose Informant Patient Reported? Taking? APPLE CIDER VINEGAR PO   Yes No   Sig: Take 450 mg by mouth daily. CRANBERRY EXTRACT (CRANBERRY PO)   Yes No   Sig: Take  by mouth two (2) times a day. TURMERIC PO   Yes No   Sig: Take  by mouth. aspirin 81 mg tablet   No No   Sig: Take 1 Tab by mouth daily. TAKE AM OF SURGERY WITH SMALL SIP OF WATER   cholecalciferol (D3-2000) 2,000 unit cap capsule   Yes No   Sig: Take 5,000 Units by mouth daily. cloNIDine HCL (CATAPRES) 0.1 mg tablet   No No   Sig: Take 1 Tab by mouth two (2) times a day. clopidogreL (PLAVIX) 75 mg tab   No No   Sig: TAKE 1 TABLET DAILY   cyanocobalamin (Vitamin B-12) 100 mcg tablet   Yes No   Sig: Take 100 mcg by mouth daily. irbesartan (AVAPRO) 75 mg tablet   No No   Sig: Take 1 Tab by mouth daily.    isosorbide mononitrate ER (IMDUR) 60 mg CR tablet   No No   Sig: Take 1 Tab by mouth every morning. loratadine (CLARITIN) 10 mg tablet   Yes No   Sig: Take 10 mg by mouth.   pantoprazole (PROTONIX) 20 mg tablet   No No   Sig: Take 1 Tab by mouth daily. spironolactone (ALDACTONE) 25 mg tablet   No No   Sig: Take 1 Tab by mouth daily. Facility-Administered Medications: None       MEDS SCHEDULED:    Current Facility-Administered Medications   Medication Dose Route Frequency    rosuvastatin (CRESTOR) tablet 20 mg  20 mg Oral QHS    valsartan (DIOVAN) tablet 80 mg  80 mg Oral DAILY    aspirin chewable tablet 81 mg  81 mg Oral DAILY    cholecalciferol (VITAMIN D3) (5000 Units/125 mcg) tablet 5,000 Units  5,000 Units Oral DAILY    [Held by provider] clopidogreL (PLAVIX) tablet 75 mg  75 mg Oral DAILY    cyanocobalamin tablet 500 mcg  500 mcg Oral DAILY    isosorbide mononitrate ER (IMDUR) tablet 60 mg  60 mg Oral DAILY    loratadine (CLARITIN) tablet 10 mg  10 mg Oral DAILY    pantoprazole (PROTONIX) tablet 40 mg  40 mg Oral DAILY    spironolactone (ALDACTONE) tablet 25 mg  25 mg Oral DAILY    furosemide (LASIX) injection 40 mg  40 mg IntraVENous BID    carvediloL (COREG) tablet 3.125 mg  3.125 mg Oral BID WITH MEALS         Review of Systems  Review of Systems   Constitutional: Positive for malaise/fatigue. Negative for chills and fever. Respiratory: Positive for shortness of breath. Negative for cough, hemoptysis and sputum production. Cardiovascular: Negative for leg swelling. Gastrointestinal: Negative for constipation, diarrhea, nausea and vomiting.        Objective:     Vitals:    04/23/21 0000 04/23/21 0100 04/23/21 0445 04/23/21 0808   BP: (!) 154/76 122/72 117/72 139/74   Pulse: 71 72 70 69   Resp: 24 22 20 18   Temp:  98 °F (36.7 °C) 98.1 °F (36.7 °C) 98.2 °F (36.8 °C)   SpO2: 96% 91% 96% 97%   Weight:   130 lb (59 kg)    Height:           Intake/Output Summary (Last 24 hours) at 4/23/2021 1215  Last data filed at 4/23/2021 0600  Gross per 24 hour   Intake 540 ml   Output 2250 ml   Net -1710 ml       PHYSICAL EXAM     Physical Exam:   General:  Alert, cooperative, no acute distress, appears stated age. Eyes:  Conjunctivae/corneas clear. Nose: Nares patent and moist.    Mouth/Throat: Lips, mucosa, and tongue pink and intact. Neck: Supple, symmetrical.   Respiratory:   clear posteriorly to auscultation bilaterally on RA   Cardiovascular:  Regular rate and rhythm, S1, S2, no murmur, click, rub or gallop. Telemetry monitor:NSR   GI:   Abdomen soft, non-tender. Bowel sounds active X 4 Q. No masses,     Musculoskeletal: Extremities symmetrical, atraumatic, no cyanosis, no edema. Pulses: 2+ and symmetric all extremities. Skin: Skin color, texture, turgor normal. No rashes or lesions       Neurologic: 2+ strength bilateral upper and lower extremities, sensation throughout appropriate. Alert and oriented. Activity:  Ad rajwinder    Nutrition:  Diet as tolerated    Lines/Drains/Airways:  Peripheral IV    CHEST X-RAYS:   4/23--pleural effusion continued improvement  4/22 4/13 larger left effusion   3/19-small left effusion noted      CULTURES:none    ECHO 4/19/21:    LABS    Recent Labs     04/21/21  1353   WBC 5.3   HGB 10.5*   HCT 33.9*        Recent Labs     04/23/21  0514 04/22/21  0615 04/21/21  1353    142 137   K 3.5 3.8 4.1    110* 113*   GLU 99 97 83   CO2 27 27 25   BUN 21 15 12   CREA 1.10* 1.05* 0.97   MG 2.3 2.5* 2.5*     No results for input(s): PH, PCO2, PO2, HCO3 in the last 72 hours. Assessment:     Hospital Problems  Date Reviewed: 3/29/2021          Codes Class Noted POA    Volume overload ICD-10-CM: E87.70  ICD-9-CM: 276.69  4/21/2021 Unknown        Pleural effusion, left ICD-10-CM: J90  ICD-9-CM: 511.9  4/21/2021 Unknown              Plan:     --CXR with consistent improvement daily with ongoing diuresis. May not need thoracentesis.   Can eval with US for more accurate idea, but if thoracentesis not needed, would like to restart Plavix ASAP. --creatinine climbing GFR 50. May need to back off lasix to 20mg BID and continue monitoring.    --continue 1500 ml fluid restriction.  --daily chest xray and labs    Juan Hurd NP    More than 50% of time documented was spent in face-to-face contact with the patient and in the care of the patient on the floor/unit where the patient is located. .  Lungs: decreased sounds in the bases. No wheezing  Heart S1 and S2 audible, no murmers or rubs appreciated  Other     Continue diureses for now. Can check with US if have enough time today but will need to be off plavix for 5 days prior to any tap. Today she is off for 3 days. I have spoken with and examined the patient. I have reviewed the history, examination, assessment, and plan and agree with the above. Zoë Funez MD      This note was signed electronically. Errors are unfortunately her likely due to dictation software.

## 2021-04-24 ENCOUNTER — APPOINTMENT (OUTPATIENT)
Dept: GENERAL RADIOLOGY | Age: 86
End: 2021-04-24
Attending: NURSE PRACTITIONER
Payer: MEDICARE

## 2021-04-24 VITALS
WEIGHT: 129 LBS | SYSTOLIC BLOOD PRESSURE: 103 MMHG | DIASTOLIC BLOOD PRESSURE: 60 MMHG | OXYGEN SATURATION: 95 % | TEMPERATURE: 97.5 F | HEART RATE: 64 BPM | RESPIRATION RATE: 18 BRPM | HEIGHT: 66 IN | BODY MASS INDEX: 20.73 KG/M2

## 2021-04-24 LAB
ANION GAP SERPL CALC-SCNC: 6 MMOL/L (ref 7–16)
BUN SERPL-MCNC: 22 MG/DL (ref 8–23)
CALCIUM SERPL-MCNC: 9.7 MG/DL (ref 8.3–10.4)
CHLORIDE SERPL-SCNC: 105 MMOL/L (ref 98–107)
CO2 SERPL-SCNC: 30 MMOL/L (ref 21–32)
CREAT SERPL-MCNC: 1.19 MG/DL (ref 0.6–1)
GLUCOSE SERPL-MCNC: 97 MG/DL (ref 65–100)
MAGNESIUM SERPL-MCNC: 2.3 MG/DL (ref 1.8–2.4)
POTASSIUM SERPL-SCNC: 3.5 MMOL/L (ref 3.5–5.1)
SODIUM SERPL-SCNC: 141 MMOL/L (ref 136–145)

## 2021-04-24 PROCEDURE — 99238 HOSP IP/OBS DSCHRG MGMT 30/<: CPT | Performed by: INTERNAL MEDICINE

## 2021-04-24 PROCEDURE — 83735 ASSAY OF MAGNESIUM: CPT

## 2021-04-24 PROCEDURE — 36415 COLL VENOUS BLD VENIPUNCTURE: CPT

## 2021-04-24 PROCEDURE — 74011250637 HC RX REV CODE- 250/637: Performed by: INTERNAL MEDICINE

## 2021-04-24 PROCEDURE — 80048 BASIC METABOLIC PNL TOTAL CA: CPT

## 2021-04-24 PROCEDURE — 74011250637 HC RX REV CODE- 250/637: Performed by: PHYSICIAN ASSISTANT

## 2021-04-24 PROCEDURE — 71045 X-RAY EXAM CHEST 1 VIEW: CPT

## 2021-04-24 PROCEDURE — G0378 HOSPITAL OBSERVATION PER HR: HCPCS

## 2021-04-24 PROCEDURE — 74011250636 HC RX REV CODE- 250/636: Performed by: NURSE PRACTITIONER

## 2021-04-24 RX ORDER — CARVEDILOL 3.12 MG/1
3.12 TABLET ORAL 2 TIMES DAILY WITH MEALS
Qty: 60 TAB | Refills: 5 | Status: SHIPPED | OUTPATIENT
Start: 2021-04-24 | End: 2021-04-28 | Stop reason: SDUPTHER

## 2021-04-24 RX ORDER — ROSUVASTATIN CALCIUM 20 MG/1
20 TABLET, COATED ORAL
Qty: 30 TAB | Refills: 5 | Status: SHIPPED | OUTPATIENT
Start: 2021-04-24 | End: 2021-04-28 | Stop reason: SDUPTHER

## 2021-04-24 RX ORDER — FUROSEMIDE 40 MG/1
40 TABLET ORAL DAILY
Qty: 30 TAB | Refills: 5 | Status: SHIPPED | OUTPATIENT
Start: 2021-04-24 | End: 2021-04-28 | Stop reason: SDUPTHER

## 2021-04-24 RX ADMIN — CHOLECALCIFEROL TAB 125 MCG (5000 UNIT) 5000 UNITS: 125 TAB at 09:23

## 2021-04-24 RX ADMIN — ASPIRIN 81 MG: 81 TABLET, CHEWABLE ORAL at 09:25

## 2021-04-24 RX ADMIN — PANTOPRAZOLE SODIUM 40 MG: 40 TABLET, DELAYED RELEASE ORAL at 09:26

## 2021-04-24 RX ADMIN — ISOSORBIDE MONONITRATE 60 MG: 60 TABLET, EXTENDED RELEASE ORAL at 09:25

## 2021-04-24 RX ADMIN — VALSARTAN 80 MG: 40 TABLET, FILM COATED ORAL at 09:23

## 2021-04-24 RX ADMIN — CARVEDILOL 3.12 MG: 3.12 TABLET, FILM COATED ORAL at 09:23

## 2021-04-24 RX ADMIN — SPIRONOLACTONE 25 MG: 25 TABLET ORAL at 09:25

## 2021-04-24 RX ADMIN — FUROSEMIDE 20 MG: 10 INJECTION, SOLUTION INTRAMUSCULAR; INTRAVENOUS at 09:26

## 2021-04-24 RX ADMIN — CYANOCOBALAMIN TAB 1000 MCG 500 MCG: 1000 TAB at 09:25

## 2021-04-24 RX ADMIN — LORATADINE 10 MG: 10 TABLET ORAL at 09:25

## 2021-04-24 NOTE — ROUTINE PROCESS
Bedside and Verbal shift change report given to Clement Hartley, SRAVANTHI and Caleb Bird RN (oncoming nurse) by self Yuliet jamison). Report included the following information SBAR, Kardex, Intake/Output, MAR, and Recent Results.

## 2021-04-24 NOTE — PROGRESS NOTES
Problem: Falls - Risk of  Goal: *Absence of Falls  Description: Document Orestes Lemon Fall Risk and appropriate interventions in the flowsheet.   Outcome: Progressing Towards Goal  Note: Fall Risk Interventions:  Mobility Interventions: Communicate number of staff needed for ambulation/transfer, Patient to call before getting OOB         Medication Interventions: Patient to call before getting OOB, Teach patient to arise slowly

## 2021-04-24 NOTE — PROGRESS NOTES
CM contacted patient via phone in room, to discuss discharge needs. Patient denies any needs at this time. CM asked if [facility] services are needed. Patient confirmed HH is not needed at this time. If services are needed in the near future, the patient will inform her PCP of this information. CM was receptive. Patient to discharge home this day and to be transported by family. Please consult or notify CM if any needs shall arise. CM remains available. Care Management Interventions  PCP Verified by CM: Yes(Dr Yao Sherman)  Last Visit to PCP: 03/29/21  Mode of Transport at Discharge:  Other (see comment)(Jose Samuels Child [phone] )  Transition of Care Consult (CM Consult): Discharge Planning  Physical Therapy Consult: No  Occupational Therapy Consult: No  Confirm Follow Up Transport: Family  Discharge Location  Discharge Placement: Home

## 2021-04-24 NOTE — PROGRESS NOTES
Discharge instructions reviewed with patient. Prescriptions given for new meds and med info sheets provided for all new medications. Opportunity for questions provided. patient voiced understanding of all discharge instructions. IV and monitor removed.

## 2021-04-24 NOTE — DISCHARGE SUMMARY
[facility] Cardiology Discharge Summary     Patient ID:  Jus Sizer  [id number]  12 y.o.  5/7/1931    Admit date: 4/21/2021    Discharge date:  04/24/21     Admitting Physician: Jane Lopez MD     Discharge Physician: Lena Anne NP/Dr. Israel Sanchez    Admission Diagnoses: Volume overload [E87.70]    Discharge Diagnoses:   Patient Active Problem List    Diagnosis Date Noted    Chest pain 03/18/2021     Priority: 1 - One    Volume overload 04/21/2021    Pleural effusion, left 04/21/2021    CAD (coronary artery disease) 03/20/2021    Ischemic heart disease 03/18/2021    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 ([facility] [address] 09/24/2020    Stroke (cerebrum) ([facility] [address] 02/16/2020    Hypertensive urgency 02/16/2020    History of cerebellar stroke 10/22/2018    Coronary artery disease involving coronary bypass graft of native heart without angina pectoris 12/14/2015    Esophageal reflux 02/18/2014    HTN (hypertension) 08/19/2013    Mixed dyslipidemia 08/19/2013    CVD (cerebrovascular disease) 08/19/2013    Osteoporosis 08/19/2013       Cardiology Procedures this admission:  None  Consults: Pulmonary/Intensive care    Hospital Course: Patient was seen in office by Dr. Shayna Guerra for SOB with known LV dysfunction per echo in our office. Patient was a direct admit for volume overload. The patient was started on IV Lasix for diuresis. She diuresed well (total of 6 liters) and felt better. Pulmonary was consulted for pleural effusion and plavix placed for possible thoracentesis. US obtained the following day and no thoracentesis needed and continue IV diuresis. The morning of 4/24/2021, the patient was up feeling well without any complaints shortness of breath. Patient's labs were stable with creatinine of 1.19. Patient was seen and examined by Dr. Justina Coe and determined stable and ready for discharge.  Patient was instructed on the importance of medication compliance, low sodium diet, 2 liter per day fluid restriction and daily weights. For maximized medical therapy of congestive heart failure, patient will continue use of BB, ARB, aldactone and lasix. The patient will need BM/Mg level prior to appt in our office. The patient will have close transitional care follow up with [facility] Dr. Laurel Santamaria (Tc-[facility]- office will call patient Monday with appt time). The patient was referred to cardiac rehab. DISPOSITION: The patient is being discharged home in stable condition on a low saturated fat, low cholesterol and low salt diet. The patient is instructed to advance activities as tolerated to the limit of fatigue or shortness of breath. The patient is informed to monitor daily weights and maintain a 2 liter per day fluid restriction. The patient is instructed to call the office for any shortness of breath, weight gain, or increased peripheral edema. Discharge Exam:   Visit Vitals  /67 (BP 1 Location: Left upper arm, BP Patient Position: At rest)   Pulse 71   Temp 97.6 °F (36.4 °C)   Resp 18   Ht 5' 6\" (1.676 m)   Wt 58.5 kg (129 lb)   SpO2 98%   BMI 20.82 kg/m²     Patient has been seen by Dr. Rupa Schaeffer : see his progress note for exam details.     Recent Results (from the past 24 hour(s))   METABOLIC PANEL, BASIC    Collection Time: 04/24/21  5:30 AM   Result Value Ref Range    Sodium 141 136 - 145 mmol/L    Potassium 3.5 3.5 - 5.1 mmol/L    Chloride 105 98 - 107 mmol/L    CO2 30 21 - 32 mmol/L    Anion gap 6 (L) 7 - 16 mmol/L    Glucose 97 65 - 100 mg/dL    BUN 22 8 - 23 MG/DL    Creatinine 1.19 (H) 0.6 - 1.0 MG/DL    GFR est AA 55 (L) >60 ml/min/1.73m2    GFR est non-AA 45 (L) >60 ml/min/1.73m2    Calcium 9.7 8.3 - 10.4 MG/DL   MAGNESIUM    Collection Time: 04/24/21  5:30 AM   Result Value Ref Range    Magnesium 2.3 1.8 - 2.4 mg/dL         Patient Instructions:   Current Discharge Medication List      START taking these medications    Details   carvediloL (COREG) 3.125 mg tablet Take 1 Tab by mouth two (2) times daily (with meals). Qty: 60 Tab, Refills: 5      furosemide (LASIX) 40 mg tablet Take 1 Tab by mouth daily. Qty: 30 Tab, Refills: 5      rosuvastatin (CRESTOR) 20 mg tablet Take 1 Tab by mouth nightly. Qty: 30 Tab, Refills: 5         CONTINUE these medications which have NOT CHANGED    Details   spironolactone (ALDACTONE) 25 mg tablet Take 1 Tab by mouth daily. Qty: 90 Tab, Refills: 3      cyanocobalamin (Vitamin B-12) 100 mcg tablet Take 100 mcg by mouth daily. loratadine (CLARITIN) 10 mg tablet Take 10 mg by mouth.      pantoprazole (PROTONIX) 20 mg tablet Take 1 Tab by mouth daily. Qty: 90 Tab, Refills: 3    Associated Diagnoses: Gastroesophageal reflux disease without esophagitis      isosorbide mononitrate ER (IMDUR) 60 mg CR tablet Take 1 Tab by mouth every morning. Qty: 90 Tab, Refills: 3    Associated Diagnoses: Coronary artery disease of bypass graft of native heart with stable angina pectoris (HCC)      irbesartan (AVAPRO) 75 mg tablet Take 1 Tab by mouth daily. Qty: 90 Tab, Refills: 3      clopidogreL (PLAVIX) 75 mg tab TAKE 1 TABLET DAILY  Qty: 90 Tab, Refills: 3    Associated Diagnoses: CVD (cerebrovascular disease)      APPLE CIDER VINEGAR PO Take 450 mg by mouth daily. aspirin 81 mg tablet Take 1 Tab by mouth daily. TAKE AM OF SURGERY WITH SMALL SIP OF WATER  Qty: 21 Tab, Refills: 0      TURMERIC PO Take  by mouth. cholecalciferol (D3-2000) 2,000 unit cap capsule Take 5,000 Units by mouth daily. CRANBERRY EXTRACT (CRANBERRY PO) Take  by mouth two (2) times a day.          STOP taking these medications       cloNIDine HCL (CATAPRES) 0.1 mg tablet Comments:   Reason for Stopping:                 Signed:  CASSIE Dutta  4/24/2021  9:25 AM

## 2021-04-24 NOTE — PROGRESS NOTES
4/24/2021 9:13 AM    Admit Date: 4/21/2021        Subjective:     Ambar Rich reports feeling much better Has walked around halls No SOB. Objective:      Visit Vitals  /67 (BP 1 Location: Left upper arm, BP Patient Position: At rest)   Pulse 71   Temp 97.6 °F (36.4 °C)   Resp 18   Ht 5' 6\" (1.676 m)   Wt 129 lb (58.5 kg)   SpO2 98%   BMI 20.82 kg/m²       Physical Exam:  Heart: regular rate and rhythm  Lungs: clear to auscultation bilaterally  Abdomen: soft, non-tender.  Bowel sounds normal. No masses,  no organomegaly    Data Review:   Labs:    Recent Results (from the past 24 hour(s))   METABOLIC PANEL, BASIC    Collection Time: 04/24/21  5:30 AM   Result Value Ref Range    Sodium 141 136 - 145 mmol/L    Potassium 3.5 3.5 - 5.1 mmol/L    Chloride 105 98 - 107 mmol/L    CO2 30 21 - 32 mmol/L    Anion gap 6 (L) 7 - 16 mmol/L    Glucose 97 65 - 100 mg/dL    BUN 22 8 - 23 MG/DL    Creatinine 1.19 (H) 0.6 - 1.0 MG/DL    GFR est AA 55 (L) >60 ml/min/1.73m2    GFR est non-AA 45 (L) >60 ml/min/1.73m2    Calcium 9.7 8.3 - 10.4 MG/DL   MAGNESIUM    Collection Time: 04/24/21  5:30 AM   Result Value Ref Range    Magnesium 2.3 1.8 - 2.4 mg/dL       Telemetry: normal sinus rhythm      Radiology:resolving eff          Assessment:     Patient Active Problem List    Diagnosis Date Noted    Chest pain 03/18/2021     Priority: 1 - One    Volume overload better 04/21/2021    Pleural effusion, left improved 04/21/2021    CAD (coronary artery disease) 03/20/2021    Ischemic heart disease 03/18/2021    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 (HCC) no angina had unsuccessful attempt at PCI in past month 09/24/2020    Stroke (cerebrum) ([facility] [address] 02/16/2020    Hypertensive urgency 02/16/2020    History of cerebellar stroke 10/22/2018    Coronary artery disease involving coronary bypass graft of native heart without angina pectoris  12/14/2015    Esophageal reflux 02/18/2014    HTN (hypertension) 08/19/2013    Mixed dyslipidemia 08/19/2013    CVD (cerebrovascular disease) 08/19/2013    Osteoporosis 08/19/2013       Plan:     Pt can go home Get back on Po lasix

## 2021-04-24 NOTE — DISCHARGE INSTRUCTIONS
Patient Education   The patient is being discharged home in stable condition on a low saturated fat, low cholesterol and low salt diet. The patient is instructed to advance activities as tolerated to the limit of fatigue or shortness of breath. The patient is informed to monitor daily weights and maintain a 2 liter per day fluid restriction. The patient is instructed to call the office for any shortness of breath, weight gain, or increased peripheral edema. Heart Failure: Care Instructions  Your Care Instructions     Heart failure occurs when your heart does not pump as much blood as the body needs. Failure does not mean that the heart has stopped pumping but rather that it is not pumping as well as it should. Over time, this causes fluid buildup in your lungs and other parts of your body. Fluid buildup can cause shortness of breath, fatigue, swollen ankles, and other problems. By taking medicines regularly, reducing sodium (salt) in your diet, checking your weight every day, and making lifestyle changes, you can feel better and live longer. Follow-up care is a key part of your treatment and safety. Be sure to make and go to all appointments, and call your doctor if you are having problems. It's also a good idea to know your test results and keep a list of the medicines you take. How can you care for yourself at home? Medicines    · Be safe with medicines. Take your medicines exactly as prescribed. Call your doctor if you think you are having a problem with your medicine.     · Do not take any vitamins, over-the-counter medicine, or herbal products without talking to your doctor first. Ivethjena Cunninghamring not take ibuprofen (Advil or Motrin) and naproxen (Aleve) without talking to your doctor first. They could make your heart failure worse.     · You may take some of the following medicine. ?  Angiotensin-converting enzyme inhibitors (ACEIs) or angiotensin II receptor blockers (ARBs) reduce the heart's workload, lower blood pressure, and reduce swelling. Taking an ACEI or ARB may lower your chance of needing to be hospitalized. ? Beta-blockers can slow heart rate, decrease blood pressure, and improve your condition. Taking a beta-blocker may lower your chance of needing to be hospitalized. ? Diuretics, also called water pills, reduce swelling. You will get more details on the specific medicines your doctor prescribes. Diet    · Your doctor may suggest that you limit sodium. Your doctor can tell you how much sodium is right for you. An example is less than 3,000 mg a day. This includes all the salt you eat in cooking or in packaged foods. People get most of their sodium from processed foods. Fast food and restaurant meals also tend to be very high in sodium.     · Ask your doctor how much liquid you can drink each day. You may have to limit liquids. Weight    · Weigh yourself without clothing at the same time each day. Record your weight. Call your doctor if you have a sudden weight gain, such as more than 2 to 3 pounds in a day or 5 pounds in a week. (Your doctor may suggest a different range of weight gain.) A sudden weight gain may mean that your heart failure is getting worse. Activity level    · Start light exercise (if your doctor says it is okay). Even if you can only do a small amount, exercise will help you get stronger, have more energy, and manage your weight and your stress. Walking is an easy way to get exercise. Start out by walking a little more than you did before. Bit by bit, increase the amount you walk.     · When you exercise, watch for signs that your heart is working too hard. You are pushing yourself too hard if you cannot talk while you are exercising.  If you become short of breath or dizzy or have chest pain, stop, sit down, and rest.     · If you feel \"wiped out\" the day after you exercise, walk slower or for a shorter distance until you can work up to a better pace.     · Get enough rest at night. Sleeping with 1 or 2 pillows under your upper body and head may help you breathe easier. Lifestyle changes    · Do not smoke. Smoking can make a heart condition worse. If you need help quitting, talk to your doctor about stop-smoking programs and medicines. These can increase your chances of quitting for good. Quitting smoking may be the most important step you can take to protect your heart.     · Limit alcohol to 2 drinks a day for men and 1 drink a day for women. Too much alcohol can cause health problems.     · Avoid getting sick from colds and the flu. Get a pneumococcal vaccine shot. If you have had one before, ask your doctor whether you need another dose. Get a flu shot each year. If you must be around people with colds or the flu, wash your hands often. When should you call for help? Call 911 if you have symptoms of sudden heart failure such as:    · You have severe trouble breathing.     · You cough up pink, foamy mucus.     · You have a new irregular or rapid heartbeat. Call your doctor now or seek immediate medical care if:    · You have new or increased shortness of breath.     · You are dizzy or lightheaded, or you feel like you may faint.     · You have sudden weight gain, such as more than 2 to 3 pounds in a day or 5 pounds in a week. (Your doctor may suggest a different range of weight gain.)     · You have increased swelling in your legs, ankles, or feet.     · You are suddenly so tired or weak that you cannot do your usual activities. Watch closely for changes in your health, and be sure to contact your doctor if you develop new symptoms. Where can you learn more? Go to http://www.gray.com/  Enter T407 in the search box to learn more about \"Heart Failure: Care Instructions. \"  Current as of: August 31, 2020               Content Version: 12.8  © 0809-7330 [employer].    Care instructions adapted under license by [employer] (which disclaims liability or warranty for this information). If you have questions about a medical condition or this instruction, always ask your healthcare professional. [address] any warranty or liability for your use of this information. Patient Education      Carvedilol (Coreg, Coreg CR, Hypertenevide-12.5) - (By mouth)   Why this medicine is used:   Treats high blood pressure and heart failure. Contact a nurse or doctor right away if you have:  · Change in how much or how often you urinate  · Leg pain when you walk, legs and feet that feel cold or numb  · Lightheadedness, dizziness, fainting  · Rapid weight gain, swelling in your hands, ankles, or feet     Common side effects:  · Mild dizziness  · Tiredness  · Trouble having sex  · Diarrhea  © 2017 [facility] Information is for End User's use only and may not be sold, redistributed or otherwise used for commercial purposes. Patient Education      Furosemide (Lasix) - (By mouth)   Why this medicine is used:   Treats fluid retention and high blood pressure. Contact a nurse or doctor right away if you have:  · Blistering, peeling, red skin rash  · Lightheadedness, dizziness, fainting  · Dry mouth, increased thirst, muscle cramps, nausea or vomiting  · Uneven heartbeat  · Hearing loss, ringing in the ears     Common side effects:  · Loss of appetite, stomach cramps  © 2017  [employer] [facility] Information is for End User's use only and may not be sold, redistributed or otherwise used for commercial purposes. Patient Education      Furosemide (Lasix) - (By mouth)   Why this medicine is used:   Treats fluid retention and high blood pressure.   Contact a nurse or doctor right away if you have:  · Blistering, peeling, red skin rash  · Lightheadedness, dizziness, fainting  · Dry mouth, increased thirst, muscle cramps, nausea or vomiting  · Uneven heartbeat  · Hearing loss, ringing in the ears     Common side effects:  · Loss of appetite, stomach cramps  © 2017 [facility] Information is for End User's use only and may not be sold, redistributed or otherwise used for commercial purposes. Patient Education        Reducing Risk of Another Heart Attack With Medicine: Care Instructions  Your Care Instructions     After a heart attack, medicines help lower your risk of having another one. These medicines include:  · ACE inhibitors or ARBs. These are types of blood pressure medicines. · Statins and other cholesterol medicines. These lower cholesterol. · Aspirin and other antiplatelets. These medicines prevent blood clots from forming in your blood vessels. This can help prevent a heart attack. · Beta-blocker medicines. These are a type of blood pressure and heart medicine. All medicines can cause side effects. So it is important to understand the pros and cons of any medicine you take. It is also important to take your medicines exactly as your doctor tells you to. Follow-up care is a key part of your treatment and safety. Be sure to make and go to all appointments, and call your doctor if you are having problems. It's also a good idea to know your test results and keep a list of the medicines you take. ACE inhibitors  ACE (angiotensin-converting enzyme) inhibitors are used for three main reasons. They lower blood pressure. They protect the kidneys. And they prevent heart attacks and strokes. Examples include:  · Benazepril (Lotensin). · Lisinopril (Prinivil). · Ramipril (Altace). An angiotensin II receptor blocker (ARB) may be used instead of an ACE inhibitor. ARBs help you in the same ways as ACE inhibitors. Examples include:  · Candesartan (Atacand). · Irbesartan (Avapro). · Losartan (Cozaar). Before you start taking an ACE inhibitor or an ARB, make sure your doctor knows if you:  · Take water pills (diuretics). · Take potassium pills or use salt substitutes.   · Are pregnant or breastfeeding. · Had a kidney transplant or other kidney problems. ACE inhibitors and ARBs can cause side effects. Call your doctor right away if you have:  · Trouble breathing. · Swelling in your face, head, neck, or tongue. Statins  Statins can help lower your risk for a heart attack and stroke. This medicine lowers your cholesterol. Examples include:  · Atorvastatin (Lipitor). · Lovastatin (Mevacor). · Pravastatin (Pravachol). · Simvastatin (Zocor). Before you start taking a statin, talk to your doctor. Make sure your doctor knows if:  · You have had a kidney transplant or other kidney problems. · You have liver disease. · You take any other prescription medicine, over-the-counter medicine, vitamins, supplements, or herbal remedies. · You are pregnant or breastfeeding. Statins can cause side effects. Call your doctor right away if you have:  · New, severe muscle aches. · Brown urine. Aspirin  After a heart attack, aspirin can help lower your risk of having another one. Most heart attacks are caused by a blood clot that blocks a coronary artery. When this happens, oxygen can't get to the heart muscle, and part of the heart dies. Aspirin can help prevent blood clots that can block the blood vessels. You may not be able to use aspirin if you:  · Have asthma or certain other health conditions. · Have an ulcer or other stomach problem. · Take some other medicine (called a blood thinner) that prevents blood clots. · Are allergic to aspirin. Your doctor may recommend that you take one low-dose aspirin (81 mg) tablet each day, with a meal and a full glass of water. Aspirin can also cause serious bleeding. Be sure you get instructions about how to take aspirin safely. Call your doctor right away if you have:  · Unusual bleeding. · Nausea, vomiting, or heartburn. · Black or bloody stools. Beta-blockers  Beta-blockers are used for three main reasons. They lower blood pressure.  They relieve angina symptoms (such as chest pain or pressure). And they reduce the chances of a second heart attack. They include:  · Atenolol (Tenormin). · Carvedilol (Coreg). · Metoprolol (Lopressor). Before you start taking a beta-blocker, make sure your doctor knows if you have:  · Severe asthma or frequent asthma attacks. · A very slow pulse. (This is less than 55 beats a minute.)  Beta-blockers can cause side effects. Call your doctor right away if you:  · Wheeze or have trouble breathing. · Feel dizzy or lightheaded. · Have asthma that gets worse. When should you call for help? Watch closely for changes in your health, and be sure to contact your doctor if you have any problems. Where can you learn more? Go to http://www.gray.com/  Enter R428 in the search box to learn more about \"Reducing Risk of Another Heart Attack With Medicine: Care Instructions. \"  Current as of: August 31, 2020               Content Version: 12.8  © 2006-2021 [employer]. Care instructions adapted under license by [employer] (which disclaims liability or warranty for this information). If you have questions about a medical condition or this instruction, always ask your healthcare professional. [address] any warranty or liability for your use of this information.

## 2021-04-26 NOTE — ADT AUTH CERT NOTES
Pleural Effusion - Care Day  (4/23/2021) by Alyce Austin RN 
 
  
Review Status Review Entered Completed 4/23/2021 10:02  
  
Criteria Review Care Day: 3 Care Date: 4/23/2021 Level of Care: Telemetry Guideline Day 3 Level Of Care (X) Floor to discharge 4/23/2021 10:02:51 EDT by Sana Duckworth   
  telemetry Clinical Status   
(X) * Hemodynamic stability 4/23/2021 10:02:51 EDT by Sana Duckworth   
  [id number] 139/74  97% 2l/nc   
( ) * CXR or ultrasound shows stability or improvement 4/23/2021 10:02:51 EDT by Kiesha Ann unchanged ( ) * Infection absent or outpatient treatment planned ( ) * Tachypnea absent 4/23/2021 10:02:51 EDT by Sana Duckworth   
  RR 24,22   
( ) * Hypoxemia absent 4/23/2021 10:02:51 EDT by Sana Duckworth   
  91% 2l/nc   
(X) * Pain absent or managed ( ) * Discharge plans and education understood Activity ( ) * Ambulatory or acceptable for next level of care Routes   
(X) * Oral hydration, medications, and diet 4/23/2021 10:02:51 EDT by Sana Duckworth   
  cardiac diet 2Gm NA  FR 2000ml, asa 81mg po qd,coreg 3.125mg po bid,Vit D3 5000U qd po,lasix 40mg iv BID,imdur 60mg po qd, claritin 10mg po qd,protonix 40mg po qd, aldactone 25mg po qd ,diovan 80mg po qd,crestor 20mg po qhs Interventions ( ) * Oxygen absent or at baseline need 4/23/2021 10:02:51 EDT by Sana Duckworth   
  2L n/c   
( ) * Chest tube absent or outpatient care arranged (X) CXR   
4/23/2021 10:02:51 EDT by Sana Duckworth   
  CXr:Lungs: Unchanged moderate left pleural effusion and left basilar atelectasis. Unchanged trace right pleural effusion. 4/23/2021 10:02:51 EDT by Sana Duckworth Subject: Additional Clinical Information Labs: crea 1.10 Orders: consult case management,weigh qd,cardiac monitoring,scds,oximetry prn,cxr qd,mag qd,bmp qd,lasix 20mg iv x 1   
  
* Milestone Additional Notes 4/23/21 IP Telemetry Subjective:  
Acutely worsened dyspnea overnight, somewhat improved this AM. Denies cough or leg edema. States frequent urination this AM.   
   
ROS:  
Cardiovascular:  As noted above Physical Exam:  
General-No Acute Distress, awake , alert Neck- supple, no JVD  
CV- regular rate and rhythm no MRG Lung- decreased at left base with rales , minimal rales right base Abd- soft, nontender, nondistended Ext- no edema bilaterally in legs Skin- warm and dry Assessment/Plan:  
   
Active Problems:  
  Volume overload (4/21/2021)  -Improved.  Continue diuresis for now.  
   
  Coronary disease  
-Prior CABG  
-Coronary angiogram from 3/18/2021 with attempted PCI of high-grade mid LAD lesion which was unsuccessful due to wire dissection; RCA small and chronically occluded; occluded left circumflex with patent vein graft to the circumflex/ramus.  
-Maintained on aspirin/Plavix post procedure; plavix has been held for possible thoracentesis . IF not planned will resume DAPT.    
 Cardiomyopathy  
-Ischemic; echo from 2/2021 with preserved EF and subsequent echoes with mild to moderate left ventricular dysfunction with apical WMA; EF ~40%. -Currently on Coreg/Aldactone/valsartan;   Titrate therapy for left ventricular dysfunction as tolerated. - Continue IV Lasix 40 mg twice daily. - cont diuresis today and supplemental oxygen as needed, pleural effusion appears improved on CXR today   
   
  Essential HTN  
  - adequate control, intermittently low   
  - monitor and adjust meds while inpatient   
   
  Mixed Hyperlipidemia  
  - not on statin at home, start statin and increase as possible as tolerated   
   
  Bradycardia  
-Symptomatic bradycardia, status post pacemaker placement; no significant RV pacing.  
- hemodynamically and electrically stable   
   
  Pleural effusion, left (4/21/2021) - Reassess with diuresis.  Pulmonology evaluating for possible thoracentesis.   
- improved per CXR today   
   
   
  
  Pleural Effusion - Care Day (4/22/2021) by Michael Mann RN 
 
  
Review Status Review Entered Completed 4/22/2021 13:14  
  
Criteria Review Care Day: 2 Care Date: 4/22/2021 Level of Care: Telemetry Guideline Day 2 Level Of Care (X) Floor 4/22/2021 13:14:36 EDT by Madyson Abraham   
  obs telemetry Clinical Status   
(X) * Hemodynamic stability 4/22/2021 13:14:37 EDT by Rosalinda Burgess   
  97.3 70 135/66 18 97% room air   
(X) * Respiratory distress absent 4/22/2021 13:14:37 EDT by Madyson Abraham   
  room air no tachypnea Routes   
(X) Oral hydration, medications 4/22/2021 13:14:37 EDT by Madyson Abraham   
  tolerating PO (X) Usual diet 4/22/2021 13:14:37 EDT by Madyson Abraham   
  cardiac with 2000 cc fluid restriction * Milestone Additional Notes 4/22/2021 LOC OBS TELEMETRY  
VS  97.3 70 135/66 18 97% room air LABS     
Chloride: 110 (H)  
CO2: 27 Anion gap: 5 (L) Glucose: 97 BUN: 15  
Creatinine: 1.05 (H) Calcium: 9.6 Magnesium: 2.5 (H) GFR est non-AA: 52 (L) CXR   
   
IMPRESSION  
   
1. Bilateral pleural effusions, similar to prior exam.  
  
  
  
MEDS Aspirin 81mg po qday Vitamin d 5000 units po qday Coreg 3.125mg po bid Lasix 40mg iv bid Imdur 60mg po qday Claritin 10mg po qday Aldactone 25mg po qday Catapres 0.1mg po bid Diovan 40mg po qday ATTENDING NOTE Improved dyspnea.  Mostly issues with increased weakness/fatigue; apprehensive about therapy added on for left ventricular dysfunction. ~2.2 L net negative. Physical Exam:  
General-No Acute Distress Neck- supple, no JVD  
CV- regular rate and rhythm no MRG Lung- clear bilaterally Abd- soft, nontender, nondistended Ext- no edema bilaterally. Skin- warm and dry Active Problems:  
  Volume overload (4/21/2021)  -Improved.  Continue diuresis for now.  
   
  Coronary disease  
-Prior CABG  
-Coronary angiogram from 3/18/2021 with attempted PCI of high-grade mid LAD lesion which was unsuccessful due to wire dissection; RCA small and chronically occluded; occluded left circumflex with patent vein graft to the circumflex/ramus.  
-Maintained on aspirin/Plavix post procedure  
   
 Cardiomyopathy  
-Ischemic; echo from 2/2021 with preserved EF and subsequent echoes with mild to moderate left ventricular dysfunction with apical WMA; EF ~40%. -Currently on Coreg/Aldactone/valsartan; was on irbesartan prior to admission.  Titrate therapy for left ventricular dysfunction as tolerated.  Continue IV Lasix 40 mg twice daily.  
-Noted on clonidine 0.1 mg twice daily and plan stopping and titrating up therapy for left ventricular dysfunction.  
   
  Bradycardia  
-Symptomatic bradycardia status post pacemaker placement; no significant RV pacing.  
   
  Pleural effusion, left (4/21/2021) -Reassess with diuresis.  Pulmonology evaluating for possible thoracentesis.  
   
PULMONARY DISEASE Subjective:  
   
-2020 cc in past 24h. Feels better, still had orthopnea overnight.  
 Plan:  
   
--CXR with persistent bilateral L>R pleural effusions -- although creatinine is up some it is still WNL and GFR is >60- continue diuresis --Lasix 40mg IV BID   
--1500 ml fluid restriction.  
--daily chest xray  
  
  
Pleural Effusion - Care Day  (4/21/2021) by Mike Pineda RN 
 
  
Review Status Review Entered Completed 4/22/2021 13:09  
  
Criteria Review Care Day: 1 Care Date: 4/21/2021 Level of Care: Telemetry Guideline Day 1 Level Of Care (X) ICU [D] or floor 4/22/2021 13:09:39 EDT by Addie Sevilla   
   telemetry Clinical Status ( ) * Clinical Indications met [E] Routes   
(X) Oral hydration, medications   
(X) Usual diet 4/22/2021 13:09:39 EDT by Addie Sevilla   
  cardiac diet with fluid rest 2000ml * Milestone Additional Notes 4/21/2021 LOC OBS TELEMETRY VS  97.7 67 151/80 18 99% ROOM AIR  
LABS    
WBC: 5.3  
RBC: 3.81 (L) HGB: 10.5 (L) HCT: 33.9 (L) MCV: 89.0 MCH: 27.6 MCHC: 31.0 (L) RDW: 13.3 PLATELET: 221 Chloride: 113 (H)  
CO2: 25 Anion gap: Cannot be calculated Glucose: 83 BUN: 12 Creatinine: 0.97 Calcium: 9.4 Magnesium: 2.5 (H) GFR est non-AA: 57 (L) GFR est AA: >60 Bilirubin, total: 0.3 Protein, total: 6.3 Albumin: 3.0 (L) Globulin: 3.3 A-G Ratio: 0.9 (L) ALT: 17 AST: 15 Alk. phosphatase: 59 NT pro-BNP: 3,558 (H) CXR IMPRESSION Stable bilateral pleural effusions with bibasilar atelectasis. MEDS Coreg 3.125mg po bid Lasix 40mg iv bid Catapres 0.1mg po bid PLAN: Cardiac monitoring, cardiac diet fluid rest 2000ml, oximetry spot check, strict intake/output, daily weights H&P History of Present Illness: 80 y.o. female recent permanent pacemaker placed attempted CT PCI of LAD failed. Off schedule for SOB that is worse 04/21/21 . Last programming changes that were made were not helpful. 0 % RV pacing. Echocardiogram was performed showing only diminished left ventricular systolic function no pericardial effusion. Here 4/21/2021 with no improvement of symptoms despite Aldactone use. Chest radiograph with small left pleural effusion this was evident on echocardiogram as well Interval Hx Assessment and Plan: 1. Coronary disease not controlled \" Failed PCI to LAD  \" Med Rx \" Nitrate therapy beta-blocker \" Decreased ARB due to hypotension on nitrate therapy \" Irbesartan changed to 75 mg daily 2. History of cardiomyopathy. \" Symptoms now worse possible volume overload from. Plan to initiate IV diuretics Shortness of breath is uncontrolled most likely multifactorial radiographic evidence of COPD as well as left pleural effusion which appeared too small for thoracentesis at the time of last x-ray.  Plan to initiate IV diuretics consult pulmonary for further evaluation of left pleural effusion. echocardiogram performed with no pericardial effusion following pacemaker placement. Pacemaker syndrome less likely due to 0% RV pacing. Multiple programming changes have been made to alter atrial pacing these have been unsuccessful PHYSICAL EXAM:   
There were no vitals taken for this visit. Physical Exam   
Constitutional: She is oriented to person, place, and time. HENT:   
Head: Normocephalic and atraumatic. Eyes: Pupils are equal, round, and reactive to light. Conjunctivae are normal.   
Neck: Normal range of motion. No JVD present. Cardiovascular: Normal rate and normal heart sounds. No murmur heard. Pulmonary/Chest: Effort normal. She has no wheezes. diminished sounds in left base of lung Abdominal: She exhibits no distension. Musculoskeletal:   
General: No edema. Neurological: She is alert and oriented to person, place, and time. No cranial nerve deficit. Skin: Skin is warm and dry. No rash noted. She is not diaphoretic. Psychiatric: She has a normal mood and affect. PULMONARY CONSULT Patient is a 80 y.o.  female with PMx PE, CVA 2/2020 with PFO noted on echo, osteoporosis, CAD with MI x2 s/p stenting in 2007 and 2009, heart failure, s/p biventricular pacemaker, GERD, HTN, CABG 1988, transanal excision of villous adenoma presents as a direct admit from the cardiology office where she had CP with increased shortness of breath.  Pt states her sob started after her pacemaker insertion in March.  She has had issues with lying flat, feeling like after a short time, she feels pain in her left back and and \"lack of air\".   She presented for an appointment with her cardiologist and on echo and xray she was noted to have moderate left pleural effusion, therefore, pulmonary was consulted to evaluate.    
Pt recently had cardiac cath that showed high-grade stenosis proximally and in the midportion of the LAD.  There was an attempt for PCI but there was evidence of a wire dissection therefore procedure was stopped and pt was managed medically.  She was placed on Plavix and ASA at D/C.    
She has no pulmonary history.  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    
--CXR PA and LAT shows left with possible right pleural effusions. --Will need 5 days off Plavix if thoracentesis needed.  Ok with Dr Yony Kumar now  
--initial labs all pending including BNP, CBC, CMP  
--Lasix 40mg IV BID initiated.  Continue per Cardiology.    
--1500 ml fluid restriction.  
--daily chest xray 79 yo F with CAD s/p recent PCI with inability to open LAD now s/p PCM. Pt reports dyspnea began just after PCM placed. Evaluation with reduced EF on echo with multiple WMA and pleural effusions noted. CXR with effusions and BNP increased > 3000. Likely has heart failure as etiology of effusions but temporal relationship to [facility] placement suggests possible injury to thoracic duct as it inserts into L SCV. Will diuresis and follow CXR. Tap if effusions failure to resolve. Also needs fluid restriction.  
  
  
Pleural Effusion - Clinical Indications for Admission to Inpatient Care by Nikunj Little RN 
 
  
Review Status Review Entered Completed 4/22/2021 13:08  
  
Criteria Review Clinical Indications for Admission to Inpatient Care Most Recent : Thuy Poole Most Recent Date: 4/22/2021 13:08:49 EDT

## 2021-04-27 ENCOUNTER — HOSPITAL ENCOUNTER (OUTPATIENT)
Dept: LAB | Age: 86
Discharge: HOME | End: 2021-04-27
Payer: MEDICARE

## 2021-04-27 LAB
ANION GAP SERPL CALC-SCNC: 5 MMOL/L (ref 7–16)
BUN SERPL-MCNC: 17 MG/DL (ref 8–23)
CALCIUM SERPL-MCNC: 10.7 MG/DL (ref 8.3–10.4)
CHLORIDE SERPL-SCNC: 106 MMOL/L (ref 98–107)
CO2 SERPL-SCNC: 28 MMOL/L (ref 21–32)
CREAT SERPL-MCNC: 1.08 MG/DL (ref 0.6–1)
GLUCOSE SERPL-MCNC: 89 MG/DL (ref 65–100)
MAGNESIUM SERPL-MCNC: 2.6 MG/DL (ref 1.8–2.4)
POTASSIUM SERPL-SCNC: 3.9 MMOL/L (ref 3.5–5.1)
SODIUM SERPL-SCNC: 139 MMOL/L (ref 136–145)

## 2021-04-27 PROCEDURE — 80048 BASIC METABOLIC PNL TOTAL CA: CPT

## 2021-04-27 PROCEDURE — 83735 ASSAY OF MAGNESIUM: CPT

## 2021-04-27 PROCEDURE — 36415 COLL VENOUS BLD VENIPUNCTURE: CPT

## 2021-05-18 ENCOUNTER — HOSPITAL ENCOUNTER (EMERGENCY)
Age: 86
Discharge: HOME | End: 2021-05-18
Attending: EMERGENCY MEDICINE
Payer: MEDICARE

## 2021-05-18 ENCOUNTER — APPOINTMENT (OUTPATIENT)
Dept: GENERAL RADIOLOGY | Age: 86
End: 2021-05-18
Attending: EMERGENCY MEDICINE
Payer: MEDICARE

## 2021-05-18 VITALS
DIASTOLIC BLOOD PRESSURE: 76 MMHG | OXYGEN SATURATION: 100 % | HEIGHT: 66 IN | HEART RATE: 69 BPM | SYSTOLIC BLOOD PRESSURE: 132 MMHG | WEIGHT: 130 LBS | TEMPERATURE: 97.5 F | BODY MASS INDEX: 20.89 KG/M2 | RESPIRATION RATE: 16 BRPM

## 2021-05-18 DIAGNOSIS — M79.672 LEFT FOOT PAIN: Primary | ICD-10-CM

## 2021-05-18 DIAGNOSIS — S92.355A CLOSED NONDISPLACED FRACTURE OF FIFTH METATARSAL BONE OF LEFT FOOT, INITIAL ENCOUNTER: ICD-10-CM

## 2021-05-18 PROCEDURE — 73630 X-RAY EXAM OF FOOT: CPT

## 2021-05-18 PROCEDURE — 99283 EMERGENCY DEPT VISIT LOW MDM: CPT

## 2021-05-18 RX ORDER — TRAMADOL HYDROCHLORIDE 50 MG/1
25 TABLET ORAL
Qty: 10 TAB | Refills: 0 | Status: SHIPPED | OUTPATIENT
Start: 2021-05-18 | End: 2021-05-23

## 2021-05-18 NOTE — DISCHARGE INSTRUCTIONS
Please follow-up with the orthopedic doctor. Call their office today or tomorrow to schedule a follow-up appointment. A wheelchair will be delivered to your home, you should be nonweightbearing until you see the orthopedic doctor. You may take Tylenol every 4-6 hours for pain. May take tramadol as needed for severe pain, start by taking 1/2 tablet and you may increase the dose to a full tablet every 6 hours if needed for severe pain. Return for emergent or severely worsening symptoms.

## 2021-05-18 NOTE — ED PROVIDER NOTES
Patient is a 80-year-old female who comes in with foot pain to the dorsal aspect of the proximal left foot. Patient reports that she was sitting in her recliner and went to stand up and her foot had \"fallen asleep\" and she went to step on it and twisted it wrong and felt a pop. She says that it is very painful especially when she bears any weight on the foot. She denies numbness tingling or weakness and denies any other trauma. Past Medical History:   Diagnosis Date    CAD (coronary artery disease)     2 MI    Calculus of kidney     Congestive heart failure (HCC)     CVD (cerebrovascular disease) 8/19/2013    Dyslipidemia 8/19/2013    GERD (gastroesophageal reflux disease)     HTN (hypertension) 8/19/2013    Left breast mass 4/13/2017    Diagnostic Mammogram negative.  Long term current use of anticoagulant therapy     Myocardial infarction [facility]) 2005, 2006    [facility], Dr. Kyara Rosales    Osteoporosis 8/19/2013    Positive H. pylori test 8/19/2013    Rectal polyp 10/9/2017    Anoscopy:  Revealed large cauliflower-like polyp at the anterior midline just above the dentate line, mobile soft, to have removed with Dr. Caputo Channel ([facility]), benign. No more fecal leakage.       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       Past Surgical History:   Procedure Laterality Date    HX APPENDECTOMY      HX BLADDER SUSPENSION  2005    HX COLONOSCOPY  2012    HX CORONARY ARTERY BYPASS GRAFT  1988    @ [facility]    HX GYN      hysterectomy    HX LITHOTRIPSY  2008 x 2    HX ORTHOPAEDIC      2 rotater cuff right shoulder    HX TOTAL ABDOMINAL HYSTERECTOMY  1972    HX UROLOGICAL      suspension    PA CARDIAC SURG PROCEDURE UNLIST      by pass         Family History:   Problem Relation Age of Onset    Heart Disease Mother     Stroke Mother     Heart Disease Father     Heart Disease Brother     Cancer Brother         prostate    Heart Disease Brother     Heart Disease Brother     Heart Disease Brother     Malignant Hyperthermia Neg Hx     Pseudocholinesterase Deficiency Neg Hx     Delayed Awakening Neg Hx     Post-op Nausea/Vomiting Neg Hx     Emergence Delirium Neg Hx     Post-op Cognitive Dysfunction Neg Hx     Other Neg Hx     Breast Cancer Neg Hx        Social History     Socioeconomic History    Marital status:      Spouse name: Not on file    Number of children: Not on file    Years of education: Not on file    Highest education level: Not on file   Occupational History    Not on file   Social Needs    Financial resource strain: Not on file    Food insecurity     Worry: Not on file     Inability: Not on file    Transportation needs     Medical: Not on file     Non-medical: Not on file   Tobacco Use    Smoking status: Never Smoker    Smokeless tobacco: Never Used   Substance and Sexual Activity    Alcohol use: No    Drug use: No    Sexual activity: Not on file   Lifestyle    Physical activity     Days per week: Not on file     Minutes per session: Not on file    Stress: Not on file   Relationships    Social connections     Talks on phone: Not on file     Gets together: Not on file     Attends Zoroastrian service: Not on file     Active member of club or organization: Not on file     Attends meetings of clubs or organizations: Not on file     Relationship status: Not on file    Intimate partner violence     Fear of current or ex partner: Not on file     Emotionally abused: Not on file     Physically abused: Not on file     Forced sexual activity: Not on file   Other Topics Concern    Not on file   Social History Narrative    Not on file         ALLERGIES: Accupril [quinapril], Norvasc [amlodipine], Prednisone, and Zoloft [sertraline]    Review of Systems   Constitutional: Negative for chills and fever. Respiratory: Negative for cough and shortness of breath. Cardiovascular: Negative for chest pain. Musculoskeletal: Positive for arthralgias and gait problem. Skin: Negative for color change. All other systems reviewed and are negative. Vitals:    05/18/21 1103   BP: 132/76   Pulse: 69   Resp: 16   Temp: 97.5 °F (36.4 °C)   SpO2: 100%   Weight: 59 kg (130 lb)   Height: 5' 6.25\" (1.683 m)            Physical Exam  Vitals signs and nursing note reviewed. Constitutional:       General: She is not in acute distress. Appearance: Normal appearance. She is not ill-appearing, toxic-appearing or diaphoretic. HENT:      Head: Normocephalic and atraumatic. Eyes:      Conjunctiva/sclera: Conjunctivae normal.   Cardiovascular:      Rate and Rhythm: Normal rate and regular rhythm. Pulses: Normal pulses. Posterior tibial pulses are 2+ on the right side and 2+ on the left side. Pulmonary:      Effort: Pulmonary effort is normal. No respiratory distress. Breath sounds: Normal air entry. No stridor, decreased air movement or transmitted upper airway sounds. No decreased breath sounds. Musculoskeletal:      Right foot: Normal range of motion. Left foot: Decreased range of motion. No deformity. Feet:    Feet:      Right foot:      Skin integrity: Skin integrity normal.      Left foot:      Skin integrity: Skin integrity normal.      Comments: Tenderness to palpation of the proximal dorsal lateral left foot. No crepitus, step-off or deformity. Normal capillary refill, no skin changes. Skin:     General: Skin is warm and dry. Neurological:      General: No focal deficit present. Mental Status: She is alert and oriented to person, place, and time. Mental status is at baseline. MDM  Number of Diagnoses or Management Options  Closed nondisplaced fracture of fifth metatarsal bone of left foot, initial encounter: new and requires workup  Left foot pain: new and requires workup  Diagnosis management comments:  This patient is a 25-year-old female who comes in with concern for pain to the dorsum of her left lateral foot. She says it started last night when she stood up and her foot had gone numb and she felt a pop in the foot after twisting it. An x-ray shows a nondisplaced fracture of the proximal left fifth metatarsal.  I contacted the on-call orthopedic specialist who recommended following up on an outpatient basis with them and placing the patient in a postoperative shoe. Patient will need a wheelchair given her age and mobility issues. She does not have a wheelchair at home and needs to be nonweightbearing. Will prescribe tramadol for severe breakthrough pain and patient may take Tylenol and is advised to rest and immobilize the foot and elevate it above her heart. This patient has a mobility limitation that was significantly impair her mobility including activities of daily living such as dressing grooming and bathing. The mobility limitation cannot be sufficiently resolved by the use of a cane or walker given the patient's age and fracture. The use of a manual wheelchair will significantly improve her ability to participate in activities of daily living and the patient will need to use a wheelchair on a regular basis at home. She has not expressed any unwillingness to use the wheelchair and she does have sufficient upper extremity function needed to self propel the wheelchair in addition to having a son who is available and willing to provide assistance when needed with the wheelchair.          Procedures

## 2021-05-18 NOTE — ED NOTES
I have reviewed discharge instructions with the patient. The patient verbalized understanding. Patient left ED via Discharge Method: wheelchair to Home with family. Opportunity for questions and clarification provided. Patient given 1 scripts. To continue your aftercare when you leave the hospital, you may receive an automated call from our care team to check in on how you are doing. This is a free service and part of our promise to provide the best care and service to meet your aftercare needs.  If you have questions, or wish to unsubscribe from this service please call [phone]. Thank you for Choosing our [facility] Department.

## 2021-05-18 NOTE — ED TRIAGE NOTES
Pt here with left side foot pain after standing up from recliner last night and feeling something pop. Denies falling. Masked.

## 2021-05-19 NOTE — PROGRESS NOTES
Per P.A. pt is in need of a manual wheelchair.  Documents support pt's need, clinicals have been faxed to [place] [phone].

## 2021-06-21 ENCOUNTER — TRANSCRIBE ORDER (OUTPATIENT)
Dept: SCHEDULING | Age: 86
End: 2021-06-21

## 2021-06-21 DIAGNOSIS — Z12.31 SCREENING MAMMOGRAM FOR HIGH-RISK PATIENT: Primary | ICD-10-CM

## 2021-07-09 ENCOUNTER — HOSPITAL ENCOUNTER (OUTPATIENT)
Dept: MAMMOGRAPHY | Age: 86
Discharge: HOME | End: 2021-07-09
Attending: INTERNAL MEDICINE

## 2021-07-09 DIAGNOSIS — Z12.31 SCREENING MAMMOGRAM FOR HIGH-RISK PATIENT: ICD-10-CM

## 2021-08-10 ENCOUNTER — HOSPITAL ENCOUNTER (OUTPATIENT)
Dept: LAB | Age: 86
Discharge: HOME | End: 2021-08-10
Payer: MEDICARE

## 2021-08-10 DIAGNOSIS — R06.02 SOB (SHORTNESS OF BREATH): ICD-10-CM

## 2021-08-10 LAB
ANION GAP SERPL CALC-SCNC: 3 MMOL/L (ref 7–16)
BUN SERPL-MCNC: 14 MG/DL (ref 8–23)
CALCIUM SERPL-MCNC: 9.9 MG/DL (ref 8.3–10.4)
CHLORIDE SERPL-SCNC: 110 MMOL/L (ref 98–107)
CO2 SERPL-SCNC: 29 MMOL/L (ref 21–32)
CREAT SERPL-MCNC: 0.9 MG/DL (ref 0.6–1)
GLUCOSE SERPL-MCNC: 69 MG/DL (ref 65–100)
POTASSIUM SERPL-SCNC: 3.8 MMOL/L (ref 3.5–5.1)
SODIUM SERPL-SCNC: 142 MMOL/L (ref 136–145)

## 2021-08-10 PROCEDURE — 80048 BASIC METABOLIC PNL TOTAL CA: CPT

## 2021-08-25 ENCOUNTER — APPOINTMENT (OUTPATIENT)
Dept: GENERAL RADIOLOGY | Age: 86
End: 2021-08-25
Attending: EMERGENCY MEDICINE
Payer: MEDICARE

## 2021-08-25 ENCOUNTER — HOSPITAL ENCOUNTER (EMERGENCY)
Age: 86
Discharge: HOME | End: 2021-08-25
Attending: STUDENT IN AN ORGANIZED HEALTH CARE EDUCATION/TRAINING PROGRAM
Payer: MEDICARE

## 2021-08-25 VITALS
HEART RATE: 70 BPM | TEMPERATURE: 98 F | RESPIRATION RATE: 16 BRPM | WEIGHT: 134 LBS | OXYGEN SATURATION: 99 % | SYSTOLIC BLOOD PRESSURE: 133 MMHG | DIASTOLIC BLOOD PRESSURE: 64 MMHG | BODY MASS INDEX: 21.63 KG/M2

## 2021-08-25 DIAGNOSIS — S52.502A CLOSED FRACTURE OF DISTAL END OF LEFT RADIUS, UNSPECIFIED FRACTURE MORPHOLOGY, INITIAL ENCOUNTER: Primary | ICD-10-CM

## 2021-08-25 PROCEDURE — 99283 EMERGENCY DEPT VISIT LOW MDM: CPT

## 2021-08-25 PROCEDURE — 75810000053 HC SPLINT APPLICATION

## 2021-08-25 PROCEDURE — 73110 X-RAY EXAM OF WRIST: CPT

## 2021-08-25 PROCEDURE — 99282 EMERGENCY DEPT VISIT SF MDM: CPT

## 2021-08-25 NOTE — PROGRESS NOTES
SW met with patient who confirmed demographic information, states that she lives alone but her son lives five minutes or less away. She is seen by Dr. Felicia Adam for primary care and is current. Patient states she uses a rollator to ambulate at home, is independent in her ADLs, and has had several falls over the past few weeks. Patient injured her left heel in May, is currently wearing a boot and is followed by Dr. Omaira Gongora. Patient endorses feelings of weakness and unbalance, is receptive to a [facility] referral which this SW arranged per her preference (previously serviced by). Will arranged [facility] RN, PT, and Aide.      Joe Garnett, PERFECTO    [facility]    * [email or web address]

## 2021-08-25 NOTE — ED PROVIDER NOTES
77-year-old female patient presenting to the emergency department with reports of swelling and pain to the left wrist.  Patient suffered a fall 3 days ago. She describes sitting in a rolling walker when she attempted to lean forward, lost her balance and fell into a seated position with her hand outstretched behind her. She denies head strike or loss of consciousness. She reports no ongoing headache, dizzy or lightheaded feeling prior to or following the event. She has noted swelling and increasing pain in the left wrist extending into the hand. This is worse with palpation and movement. She is attempted several over-the-counter means of pain control without effect. Past Medical History:   Diagnosis Date    CAD (coronary artery disease)     2 MI    Calculus of kidney     Congestive heart failure (HCC)     CVD (cerebrovascular disease) 8/19/2013    Dyslipidemia 8/19/2013    GERD (gastroesophageal reflux disease)     HTN (hypertension) 8/19/2013    Left breast mass 4/13/2017    Diagnostic Mammogram negative.  Long term current use of anticoagulant therapy     Myocardial infarction [facility]) 2005, 2006    [facility], Dr. Bryant Schaumann    Osteoporosis 8/19/2013    Positive H. pylori test 8/19/2013    Rectal polyp 10/9/2017    Anoscopy:  Revealed large cauliflower-like polyp at the anterior midline just above the dentate line, mobile soft, to have removed with Dr. Nani Traylor ([facility]), benign. No more fecal leakage.       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       Past Surgical History:   Procedure Laterality Date    HX APPENDECTOMY      HX BLADDER SUSPENSION  2005    HX COLONOSCOPY  2012    HX CORONARY ARTERY BYPASS GRAFT  1988    @ [address]    HX GYN      hysterectomy    HX LITHOTRIPSY  2008 x 2    HX ORTHOPAEDIC      2 rotater cuff right shoulder    HX TOTAL ABDOMINAL HYSTERECTOMY  1972  HX UROLOGICAL      suspension    MI CARDIAC SURG PROCEDURE UNLIST      by pass         Family History:   Problem Relation Age of Onset    Heart Disease Mother     Stroke Mother     Heart Disease Father     Heart Disease Brother     Cancer Brother         prostate    Heart Disease Brother     Heart Disease Brother     Heart Disease Brother     Malignant Hyperthermia Neg Hx     Pseudocholinesterase Deficiency Neg Hx     Delayed Awakening Neg Hx     Post-op Nausea/Vomiting Neg Hx     Emergence Delirium Neg Hx     Post-op Cognitive Dysfunction Neg Hx     Other Neg Hx     Breast Cancer Neg Hx        Social History     Socioeconomic History    Marital status:      Spouse name: Not on file    Number of children: Not on file    Years of education: Not on file    Highest education level: Not on file   Occupational History    Not on file   Tobacco Use    Smoking status: Never Smoker    Smokeless tobacco: Never Used   Vaping Use    Vaping Use: Never used   Substance and Sexual Activity    Alcohol use: No    Drug use: No    Sexual activity: Not on file   Other Topics Concern    Not on file   Social History Narrative    Not on file     Social Determinants of Health     Financial Resource Strain:     Difficulty of Paying Living Expenses:    Food Insecurity:     Worried About Running Out of Food in the Last Year:     Ran Out of Food in the Last Year:    Transportation Needs:     Lack of Transportation (Medical):      Lack of Transportation (Non-Medical):    Physical Activity:     Days of Exercise per Week:     Minutes of Exercise per Session:    Stress:     Feeling of Stress :    Social Connections:     Frequency of Communication with Friends and Family:     Frequency of Social Gatherings with Friends and Family:     Attends [employer] Services:     Active Member of Clubs or Organizations:     Attends Club or Organization Meetings:     Marital Status:    Intimate Partner Violence:     Fear of Current or Ex-Partner:     Emotionally Abused:     Physically Abused:     Sexually Abused: ALLERGIES: Accupril [quinapril], Norvasc [amlodipine], Prednisone, and Zoloft [sertraline]    Review of Systems   Constitutional: Negative for chills, diaphoresis and fever. HENT: Negative for congestion, sneezing and sore throat. Eyes: Negative for visual disturbance. Respiratory: Negative for cough, chest tightness, shortness of breath and wheezing. Cardiovascular: Negative for chest pain and leg swelling. Gastrointestinal: Negative for abdominal pain, blood in stool, diarrhea, nausea and vomiting. Endocrine: Negative for polyuria. Genitourinary: Negative for difficulty urinating, dysuria, flank pain, hematuria and urgency. Musculoskeletal: Positive for joint swelling. Negative for back pain, myalgias, neck pain and neck stiffness. Skin: Negative for color change and rash. Neurological: Negative for dizziness, syncope, speech difficulty, weakness, light-headedness, numbness and headaches. Psychiatric/Behavioral: Negative for behavioral problems. All other systems reviewed and are negative. Vitals:    08/25/21 1147   BP: 133/64   Pulse: 70   Resp: 16   Temp: 98 °F (36.7 °C)   SpO2: 99%   Weight: 60.8 kg (134 lb)            Physical Exam  Vitals and nursing note reviewed. Constitutional:       General: She is not in acute distress. Appearance: She is well-developed. She is not diaphoretic. Comments: Alert and oriented to person place and time. No acute distress, speaks in clear, fluid sentences. HENT:      Head: Normocephalic and atraumatic. Right Ear: External ear normal.      Left Ear: External ear normal.      Nose: Nose normal.   Eyes:      Pupils: Pupils are equal, round, and reactive to light. Cardiovascular:      Rate and Rhythm: Normal rate and regular rhythm. Heart sounds: Normal heart sounds. No murmur heard.    No friction rub. No gallop. Pulmonary:      Effort: Pulmonary effort is normal. No respiratory distress. Breath sounds: Normal breath sounds. No stridor. No decreased breath sounds, wheezing, rhonchi or rales. Chest:      Chest wall: No tenderness. Abdominal:      General: There is no distension. Palpations: Abdomen is soft. There is no mass. Tenderness: There is no abdominal tenderness. There is no guarding or rebound. Hernia: No hernia is present. Musculoskeletal:         General: No tenderness or deformity. Normal range of motion. Cervical back: Normal range of motion. Comments: Diffuse swelling and subtle ecchymosis noted to the left wrist.  This extends into the hand though no significant pain is noted at the snuffbox. Distal pulses are intact with brisk capillary refill present. Skin:     General: Skin is warm and dry. Neurological:      Mental Status: She is alert and oriented to person, place, and time. Cranial Nerves: No cranial nerve deficit. MDM  Number of Diagnoses or Management Options  Closed fracture of distal end of left radius, unspecified fracture morphology, initial encounter: new and requires workup  Diagnosis management comments: Your imaging shows concern for chronic fracture with superimposed acute injury to the distal radius. Discussed patient case with on-call Ortho will arrange follow-up in the office. Patient placed in sugar tong splint, sling and given instructions for follow-up. Voices understanding and agreement.        Amount and/or Complexity of Data Reviewed  Tests in the radiology section of CPT®: ordered and reviewed  Discuss the patient with other providers: yes  Independent visualization of images, tracings, or specimens: yes    Risk of Complications, Morbidity, and/or Mortality  Presenting problems: moderate  Diagnostic procedures: low  Management options: moderate    Patient Progress  Patient progress: stable Procedures

## 2021-08-25 NOTE — DISCHARGE INSTRUCTIONS
Leave the splint in place until follow-up with the orthopedic specialist.  Use your tramadol as needed for pain. You can apply ice to the wrist to help with discomfort. Return for worsening symptoms, concerns or questions.

## 2021-08-26 ENCOUNTER — HOME HEALTH ADMISSION (OUTPATIENT)
Dept: HOME HEALTH SERVICES | Facility: HOME HEALTH | Age: 86
End: 2021-08-26
Payer: MEDICARE

## 2021-08-27 ENCOUNTER — HOME CARE VISIT (OUTPATIENT)
Dept: SCHEDULING | Facility: HOME HEALTH | Age: 86
End: 2021-08-27
Payer: MEDICARE

## 2021-08-27 VITALS
SYSTOLIC BLOOD PRESSURE: 118 MMHG | HEART RATE: 66 BPM | DIASTOLIC BLOOD PRESSURE: 58 MMHG | OXYGEN SATURATION: 95 % | RESPIRATION RATE: 18 BRPM | TEMPERATURE: 97.7 F

## 2021-08-27 PROCEDURE — 400013 HH SOC

## 2021-08-27 PROCEDURE — G0299 HHS/HOSPICE OF RN EA 15 MIN: HCPCS

## 2021-08-30 ENCOUNTER — HOME CARE VISIT (OUTPATIENT)
Dept: SCHEDULING | Facility: HOME HEALTH | Age: 86
End: 2021-08-30
Payer: MEDICARE

## 2021-08-30 PROCEDURE — G0299 HHS/HOSPICE OF RN EA 15 MIN: HCPCS

## 2021-08-31 ENCOUNTER — HOME CARE VISIT (OUTPATIENT)
Dept: SCHEDULING | Facility: HOME HEALTH | Age: 86
End: 2021-08-31
Payer: MEDICARE

## 2021-08-31 VITALS
RESPIRATION RATE: 16 BRPM | TEMPERATURE: 97.6 F | HEART RATE: 73 BPM | OXYGEN SATURATION: 97 % | SYSTOLIC BLOOD PRESSURE: 126 MMHG | DIASTOLIC BLOOD PRESSURE: 72 MMHG

## 2021-08-31 VITALS
DIASTOLIC BLOOD PRESSURE: 60 MMHG | OXYGEN SATURATION: 92 % | RESPIRATION RATE: 14 BRPM | TEMPERATURE: 98.3 F | SYSTOLIC BLOOD PRESSURE: 104 MMHG | HEART RATE: 70 BPM

## 2021-08-31 PROCEDURE — G0151 HHCP-SERV OF PT,EA 15 MIN: HCPCS

## 2021-08-31 NOTE — HOME HEALTH
Situation/Background: 80year old female patient with PMH of CAD, CHF, CVD, dyslipidemia, GERD, HTN, L breast mass, long term current use of anticoagulant therapy, MI, osteoporosis, positive H. pylori test, rectal polyp, stroke, thromboembolus. Lives alone in multi level home with several steps to enter. Patient seen in ED 8/25/21 due to pain and swelling to left wrist following fall on 8/22/21. XRAY showed closed fracture of distal end of L radius - chronic fracture with acute injury to distal radius. Per patient she has fallen 3 times since May. She had injury to L foot and was wearing walking boot. Patient also found to have stress fracture to L heel and was told to continue wearing walking boot. Patient has splint on L wrist and hand that may be removed only to exercise and shower. Per Dr Preet Mason note from visit earlier today patient may perform full active and passive ROM to all fingers on L hand and L wrist and she can do light strengthening exercises with no more than 10 pounds of resistance. Patient to return to Dr Nida Hill on 9/21/21.    Assessment: Patient using rollator for all mobility; presents with BLE weakness, decreased balance, decreased L wrist mobility  Recommendation: 2 week 3; BLE seated HEP and therex, L wrist and hand HEP and therex, gait balance and transfer training

## 2021-08-31 NOTE — Clinical Note
Situation/Background: 80year old female patient with PMH of CAD, CHF, CVD, dyslipidemia, GERD, HTN, L breast mass, long term current use of anticoagulant therapy, MI, osteoporosis, positive H. pylori test, rectal polyp, stroke, thromboembolus. Lives alone in multi level home with several steps to enter. Patient seen in ED 8/25/21 due to pain and swelling to left wrist following fall on 8/22/21. XRAY showed closed fracture of distal end of L radius - chronic fracture with acute injury to distal radius. Per patient she has fallen 3 times since May. She had injury to L foot and was wearing walking boot. Patient also found to have stress fracture to L heel and was told to continue wearing walking boot. Patient has splint on L wrist and hand that may be removed only to exercise and shower. Per Dr Kathia Avlia note from visit earlier today patient may perform full active and passive ROM to all fingers on L hand and L wrist and she can do light strengthening exercises with no more than 10 pounds of resistance. Patient to return to Dr Erica Fernandez on 9/21/21.    Assessment: Patient using rollator for all mobility; presents with BLE weakness, decreased balance, decreased L wrist mobility  Recommendation: 2 week 3; BLE seated HEP and therex, L wrist and hand HEP and therex, gait balance and transfer training

## 2021-09-02 ENCOUNTER — HOSPITAL ENCOUNTER (OUTPATIENT)
Dept: MRI IMAGING | Age: 86
Discharge: HOME | End: 2021-09-02
Attending: ORTHOPAEDIC SURGERY
Payer: MEDICARE

## 2021-09-02 DIAGNOSIS — S92.352D CLOSED DISPLACED FRACTURE OF FIFTH METATARSAL BONE OF LEFT FOOT WITH ROUTINE HEALING, SUBSEQUENT ENCOUNTER: ICD-10-CM

## 2021-09-02 DIAGNOSIS — M84.375A STRESS FRACTURE OF LEFT FOOT, INITIAL ENCOUNTER: ICD-10-CM

## 2021-09-02 DIAGNOSIS — M79.672 LEFT FOOT PAIN: ICD-10-CM

## 2021-09-02 PROCEDURE — 73721 MRI JNT OF LWR EXTRE W/O DYE: CPT

## 2021-09-03 ENCOUNTER — HOME CARE VISIT (OUTPATIENT)
Dept: SCHEDULING | Facility: HOME HEALTH | Age: 86
End: 2021-09-03
Payer: MEDICARE

## 2021-09-03 VITALS
OXYGEN SATURATION: 97 % | RESPIRATION RATE: 18 BRPM | HEART RATE: 78 BPM | DIASTOLIC BLOOD PRESSURE: 70 MMHG | SYSTOLIC BLOOD PRESSURE: 90 MMHG | TEMPERATURE: 98.3 F

## 2021-09-03 VITALS
DIASTOLIC BLOOD PRESSURE: 74 MMHG | RESPIRATION RATE: 18 BRPM | HEART RATE: 74 BPM | TEMPERATURE: 97.7 F | SYSTOLIC BLOOD PRESSURE: 120 MMHG

## 2021-09-03 PROCEDURE — G0156 HHCP-SVS OF AIDE,EA 15 MIN: HCPCS

## 2021-09-03 PROCEDURE — G0157 HHC PT ASSISTANT EA 15: HCPCS

## 2021-09-06 ENCOUNTER — HOME CARE VISIT (OUTPATIENT)
Dept: SCHEDULING | Facility: HOME HEALTH | Age: 86
End: 2021-09-06
Payer: MEDICARE

## 2021-09-06 VITALS
TEMPERATURE: 98.6 F | HEART RATE: 70 BPM | WEIGHT: 128 LBS | BODY MASS INDEX: 20.66 KG/M2 | OXYGEN SATURATION: 96 % | DIASTOLIC BLOOD PRESSURE: 78 MMHG | SYSTOLIC BLOOD PRESSURE: 130 MMHG | RESPIRATION RATE: 20 BRPM

## 2021-09-06 PROCEDURE — G0299 HHS/HOSPICE OF RN EA 15 MIN: HCPCS

## 2021-09-07 ENCOUNTER — HOME CARE VISIT (OUTPATIENT)
Dept: SCHEDULING | Facility: HOME HEALTH | Age: 86
End: 2021-09-07
Payer: MEDICARE

## 2021-09-07 VITALS
SYSTOLIC BLOOD PRESSURE: 110 MMHG | HEART RATE: 73 BPM | RESPIRATION RATE: 17 BRPM | DIASTOLIC BLOOD PRESSURE: 62 MMHG | TEMPERATURE: 97.8 F

## 2021-09-07 PROCEDURE — G0156 HHCP-SVS OF AIDE,EA 15 MIN: HCPCS

## 2021-09-08 ENCOUNTER — HOME CARE VISIT (OUTPATIENT)
Dept: SCHEDULING | Facility: HOME HEALTH | Age: 86
End: 2021-09-08
Payer: MEDICARE

## 2021-09-08 VITALS
HEART RATE: 56 BPM | DIASTOLIC BLOOD PRESSURE: 60 MMHG | SYSTOLIC BLOOD PRESSURE: 106 MMHG | RESPIRATION RATE: 16 BRPM | TEMPERATURE: 98.3 F | OXYGEN SATURATION: 96 %

## 2021-09-08 PROCEDURE — G0157 HHC PT ASSISTANT EA 15: HCPCS

## 2021-09-09 ENCOUNTER — HOME CARE VISIT (OUTPATIENT)
Dept: SCHEDULING | Facility: HOME HEALTH | Age: 86
End: 2021-09-09
Payer: MEDICARE

## 2021-09-09 VITALS
OXYGEN SATURATION: 99 % | TEMPERATURE: 98.2 F | SYSTOLIC BLOOD PRESSURE: 130 MMHG | RESPIRATION RATE: 16 BRPM | HEART RATE: 68 BPM | DIASTOLIC BLOOD PRESSURE: 62 MMHG

## 2021-09-09 VITALS
TEMPERATURE: 98.2 F | SYSTOLIC BLOOD PRESSURE: 130 MMHG | DIASTOLIC BLOOD PRESSURE: 62 MMHG | HEART RATE: 68 BPM | RESPIRATION RATE: 16 BRPM

## 2021-09-09 PROCEDURE — G0157 HHC PT ASSISTANT EA 15: HCPCS

## 2021-09-09 PROCEDURE — G0156 HHCP-SVS OF AIDE,EA 15 MIN: HCPCS

## 2021-09-13 ENCOUNTER — HOME CARE VISIT (OUTPATIENT)
Dept: SCHEDULING | Facility: HOME HEALTH | Age: 86
End: 2021-09-13
Payer: MEDICARE

## 2021-09-13 VITALS
TEMPERATURE: 98.1 F | SYSTOLIC BLOOD PRESSURE: 118 MMHG | HEART RATE: 78 BPM | DIASTOLIC BLOOD PRESSURE: 72 MMHG | RESPIRATION RATE: 18 BRPM

## 2021-09-13 PROCEDURE — G0156 HHCP-SVS OF AIDE,EA 15 MIN: HCPCS

## 2021-09-14 ENCOUNTER — HOME CARE VISIT (OUTPATIENT)
Dept: SCHEDULING | Facility: HOME HEALTH | Age: 86
End: 2021-09-14
Payer: MEDICARE

## 2021-09-14 VITALS
DIASTOLIC BLOOD PRESSURE: 56 MMHG | RESPIRATION RATE: 15 BRPM | TEMPERATURE: 98.4 F | OXYGEN SATURATION: 98 % | SYSTOLIC BLOOD PRESSURE: 108 MMHG | HEART RATE: 72 BPM

## 2021-09-14 PROCEDURE — G0157 HHC PT ASSISTANT EA 15: HCPCS

## 2021-09-16 ENCOUNTER — HOME CARE VISIT (OUTPATIENT)
Dept: SCHEDULING | Facility: HOME HEALTH | Age: 86
End: 2021-09-16
Payer: MEDICARE

## 2021-09-16 ENCOUNTER — HOME CARE VISIT (OUTPATIENT)
Dept: HOME HEALTH SERVICES | Facility: HOME HEALTH | Age: 86
End: 2021-09-16
Payer: MEDICARE

## 2021-09-16 VITALS
RESPIRATION RATE: 20 BRPM | WEIGHT: 129 LBS | TEMPERATURE: 97.9 F | HEART RATE: 77 BPM | SYSTOLIC BLOOD PRESSURE: 110 MMHG | OXYGEN SATURATION: 95 % | DIASTOLIC BLOOD PRESSURE: 68 MMHG | BODY MASS INDEX: 20.82 KG/M2

## 2021-09-16 VITALS
TEMPERATURE: 98.3 F | DIASTOLIC BLOOD PRESSURE: 62 MMHG | SYSTOLIC BLOOD PRESSURE: 98 MMHG | HEART RATE: 70 BPM | RESPIRATION RATE: 16 BRPM | OXYGEN SATURATION: 94 %

## 2021-09-16 PROCEDURE — G0299 HHS/HOSPICE OF RN EA 15 MIN: HCPCS

## 2021-09-16 PROCEDURE — G0151 HHCP-SERV OF PT,EA 15 MIN: HCPCS

## 2021-09-20 ENCOUNTER — HOME CARE VISIT (OUTPATIENT)
Dept: SCHEDULING | Facility: HOME HEALTH | Age: 86
End: 2021-09-20
Payer: MEDICARE

## 2021-09-22 ENCOUNTER — HOME CARE VISIT (OUTPATIENT)
Dept: HOME HEALTH SERVICES | Facility: HOME HEALTH | Age: 86
End: 2021-09-22
Payer: MEDICARE

## 2021-09-22 VITALS
RESPIRATION RATE: 18 BRPM | OXYGEN SATURATION: 98 % | HEART RATE: 69 BPM | BODY MASS INDEX: 20.82 KG/M2 | WEIGHT: 129 LBS | SYSTOLIC BLOOD PRESSURE: 108 MMHG | TEMPERATURE: 97.9 F | DIASTOLIC BLOOD PRESSURE: 68 MMHG

## 2021-09-22 PROCEDURE — G0299 HHS/HOSPICE OF RN EA 15 MIN: HCPCS

## 2021-09-28 ENCOUNTER — HOME CARE VISIT (OUTPATIENT)
Dept: HOME HEALTH SERVICES | Facility: HOME HEALTH | Age: 86
End: 2021-09-28
Payer: MEDICARE

## 2021-09-28 ENCOUNTER — HOME CARE VISIT (OUTPATIENT)
Dept: SCHEDULING | Facility: HOME HEALTH | Age: 86
End: 2021-09-28
Payer: MEDICARE

## 2021-09-28 VITALS
SYSTOLIC BLOOD PRESSURE: 102 MMHG | TEMPERATURE: 97.2 F | DIASTOLIC BLOOD PRESSURE: 62 MMHG | OXYGEN SATURATION: 96 % | HEART RATE: 68 BPM

## 2021-09-28 VITALS
OXYGEN SATURATION: 96 % | RESPIRATION RATE: 14 BRPM | DIASTOLIC BLOOD PRESSURE: 62 MMHG | TEMPERATURE: 98.1 F | SYSTOLIC BLOOD PRESSURE: 98 MMHG | HEART RATE: 68 BPM

## 2021-09-28 PROCEDURE — 400013 HH SOC

## 2021-09-28 PROCEDURE — G0151 HHCP-SERV OF PT,EA 15 MIN: HCPCS

## 2021-09-28 PROCEDURE — G0152 HHCP-SERV OF OT,EA 15 MIN: HCPCS

## 2021-09-28 NOTE — HOME HEALTH
Situation/Background: 80year old female patient with PMH of CAD, CHF, CVD, dyslipidemia, GERD, HTN, L breast mass, long term current use of anticoagulant therapy, MI, osteoporosis, positive H. pylori test, rectal polyp, stroke, thromboembolus. Lives alone in multi level home with several steps to enter. Patient recently discharged from [address] due to being independent with mobility in her home. Patient had follow up with Dr Yfn Swift who ordered PT and OT for L wrist strengthening and ROM. Assessment: Patient continues to be independent with household mobility. PT reviewed BLE HEP with patient. Reviewed donning and doffing wrist brace with patient and she demonstrated understanding. Recommendation: No further HHPT indicated.  HHOT to continue with L wrist therapy

## 2021-09-28 NOTE — Clinical Note
Situation/Background: 80year old female patient with PMH of CAD, CHF, CVD, dyslipidemia, GERD, HTN, L breast mass, long term current use of anticoagulant therapy, MI, osteoporosis, positive H. pylori test, rectal polyp, stroke, thromboembolus. Lives alone in multi level home with several steps to enter. Patient recently discharged from [address] due to being independent with mobility in her home. Patient had follow up with Dr Theodora Alves who ordered PT and OT for L wrist strengthening and ROM. Assessment: Patient continues to be independent with household mobility. PT reviewed BLE HEP with patient. Reviewed donning and doffing wrist brace with patient and she demonstrated understanding. Recommendation: No further HHPT indicated.  HHOT to continue with L wrist therapy

## 2021-10-07 ENCOUNTER — HOME CARE VISIT (OUTPATIENT)
Dept: HOME HEALTH SERVICES | Facility: HOME HEALTH | Age: 86
End: 2021-10-07
Payer: MEDICARE

## 2022-01-05 PROBLEM — M17.0 PRIMARY OSTEOARTHRITIS OF BOTH KNEES: Status: ACTIVE | Noted: 2020-03-16

## 2022-01-05 PROBLEM — E87.70 VOLUME OVERLOAD: Status: RESOLVED | Noted: 2021-04-21 | Resolved: 2022-01-05

## 2022-01-05 PROBLEM — I63.9 STROKE (CEREBRUM) (HCC): Status: RESOLVED | Noted: 2020-02-16 | Resolved: 2022-01-05

## 2022-01-05 PROBLEM — J90 PLEURAL EFFUSION, LEFT: Status: RESOLVED | Noted: 2021-04-21 | Resolved: 2022-01-05

## 2022-01-05 PROBLEM — R07.9 CHEST PAIN: Status: RESOLVED | Noted: 2021-03-18 | Resolved: 2022-01-05

## 2022-01-05 PROBLEM — S92.355A CLOSED NONDISPLACED FRACTURE OF FIFTH LEFT METATARSAL BONE: Status: RESOLVED | Noted: 2021-05-18 | Resolved: 2022-01-05

## 2022-03-18 PROBLEM — M17.0 PRIMARY OSTEOARTHRITIS OF BOTH KNEES: Status: ACTIVE | Noted: 2020-03-16

## 2022-03-18 PROBLEM — I16.0 HYPERTENSIVE URGENCY: Status: ACTIVE | Noted: 2020-02-16

## 2022-03-18 PROBLEM — I25.118 CORONARY ARTERY DISEASE WITH STABLE ANGINA PECTORIS (HCC): Status: ACTIVE | Noted: 2020-09-24

## 2022-03-18 PROBLEM — I25.9 ISCHEMIC HEART DISEASE: Status: ACTIVE | Noted: 2021-03-18

## 2022-03-19 PROBLEM — M79.672 LEFT FOOT PAIN: Status: ACTIVE | Noted: 2021-05-18

## 2022-03-19 PROBLEM — I25.10 CAD (CORONARY ARTERY DISEASE): Status: ACTIVE | Noted: 2021-03-20

## 2022-03-20 PROBLEM — Z86.73 HISTORY OF CEREBELLAR STROKE: Status: ACTIVE | Noted: 2018-10-22

## 2022-04-01 NOTE — PROGRESS NOTES
Discharge instructions were reviewed with patient. An opportunity was given for questions. All medications were reviewed, and information was given on the new medications. Patient verbalized understanding, and has no questions at this time. Left SC incision site C/D/I without bleeding or hematoma. Site bruised but soft. Chest x ray negative for pneumothorax. IV abx given per eMAR.
Patient received to [address] [place] [address] room # 10  Ambulatory from [facility]. Patient scheduled for PPM today with Dr Kierra Jacob. Procedure reviewed & questions answered, voiced good understanding consent obtained & placed on chart. All medications and medical history reviewed. Will prep patient per orders. Patient & family updated on plan of care.       The patient has a fraility score of 4-VULNERABLE, based on age
Pre-procedure call completed. Instructed to arrive at 12 pm on 2/18/21 for PPM. Instructed could have a piece of dried toast with clear liquid in am to take medications with. Instructed to HOLD all vitamins, supplements and Plavix in am. Instructed to remain NPO after MN.
Pt with elevated BP. Spoke with Dr. Helena Michelle and received orders for 20mg IVP labatelol.        02/18/21 1500   Vital Signs   Pulse (Heart Rate) 61   O2 Sat (%) 99 %   BP (!) 181/78   MAP (Monitor) 105   MAP (Calculated) 112   Oxygen Therapy   Pulse via Oximetry 61 beats per minute
TRANSFER - IN REPORT:    Verbal report received from Arnel Porterruthie Fields on [employer] being received from [facility] for routine progression of care      Report consisted of patients Situation, Background, Assessment and Recommendations(SBAR). Information from the following report(s) Procedure Summary was reviewed with the receiving nurse. Opportunity for questions and clarification was provided. Assessment completed upon patients arrival to unit and care assumed.
TRANSFER - OUT REPORT:    Dual chamber PPM placement with Dr Evelyn Cortez DDDR   Versed 7 mg  Dilaudid 1 mg  Site closed with [employer] covered with Primaseal    Loop removed   Site Sutured  Site covered with Ted Yarbrough report given to Sara(name) on Rosalie Francois  being transferred to CPRU(unit) for routine progression of care       Report consisted of patients Situation, Background, Assessment and   Recommendations(SBAR). Information from the following report(s) Procedure Summary and MAR was reviewed with the receiving nurse. Lines:   Peripheral IV 02/18/21 Anterior;Left;Proximal Forearm (Active)        Opportunity for questions and clarification was provided.       Patient transported with:   Registered Nurse
none

## 2022-06-06 NOTE — TELEPHONE ENCOUNTER
Dr. Fredsi Cloud sent in 90 tablets with 1 refill to [facility] on 1/5/22. LVTODD on 6/6/22 @ 4:06 PM to inform pt.

## 2022-06-07 RX ORDER — PANTOPRAZOLE SODIUM 20 MG/1
20 TABLET, DELAYED RELEASE ORAL DAILY
Qty: 90 TABLET | Refills: 3 | Status: SHIPPED | OUTPATIENT
Start: 2022-06-07 | End: 2022-06-23 | Stop reason: SDUPTHER

## 2022-06-07 NOTE — TELEPHONE ENCOUNTER
Patient requesting refill for Pantoprazole sent to [facility]    Prescription sent to [facility] and patient notified done

## 2022-06-10 RX ORDER — IRBESARTAN 75 MG/1
TABLET ORAL
Qty: 90 TABLET | Refills: 0 | Status: SHIPPED | OUTPATIENT
Start: 2022-06-10 | End: 2022-06-23 | Stop reason: SDUPTHER

## 2022-06-23 ENCOUNTER — OFFICE VISIT (OUTPATIENT)
Dept: CARDIOLOGY CLINIC | Age: 87
End: 2022-06-23

## 2022-06-23 ENCOUNTER — NURSE ONLY (OUTPATIENT)
Dept: CARDIOLOGY CLINIC | Age: 87
End: 2022-06-23
Payer: MEDICARE

## 2022-06-23 VITALS
HEIGHT: 66 IN | BODY MASS INDEX: 21.69 KG/M2 | DIASTOLIC BLOOD PRESSURE: 62 MMHG | SYSTOLIC BLOOD PRESSURE: 102 MMHG | HEART RATE: 60 BPM | WEIGHT: 135 LBS

## 2022-06-23 DIAGNOSIS — I15.9 SECONDARY HYPERTENSION: ICD-10-CM

## 2022-06-23 DIAGNOSIS — E78.5 HYPERLIPIDEMIA LDL GOAL <70: ICD-10-CM

## 2022-06-23 DIAGNOSIS — I25.119 CORONARY ARTERY DISEASE INVOLVING NATIVE HEART WITH ANGINA PECTORIS, UNSPECIFIED VESSEL OR LESION TYPE (HCC): Primary | ICD-10-CM

## 2022-06-23 DIAGNOSIS — I49.5 SSS (SICK SINUS SYNDROME) (HCC): Primary | ICD-10-CM

## 2022-06-23 PROCEDURE — 99214 OFFICE O/P EST MOD 30 MIN: CPT | Performed by: INTERNAL MEDICINE

## 2022-06-23 PROCEDURE — 93288 INTERROG EVL PM/LDLS PM IP: CPT | Performed by: INTERNAL MEDICINE

## 2022-06-23 PROCEDURE — 1123F ACP DISCUSS/DSCN MKR DOCD: CPT | Performed by: INTERNAL MEDICINE

## 2022-06-23 RX ORDER — PANTOPRAZOLE SODIUM 20 MG/1
20 TABLET, DELAYED RELEASE ORAL DAILY
Qty: 90 TABLET | Refills: 3 | Status: SHIPPED | OUTPATIENT
Start: 2022-06-23

## 2022-06-23 RX ORDER — ASPIRIN 81 MG/1
81 TABLET ORAL DAILY
COMMUNITY

## 2022-06-23 RX ORDER — IRBESARTAN 75 MG/1
TABLET ORAL
Qty: 90 TABLET | Refills: 0 | Status: SHIPPED | OUTPATIENT
Start: 2022-06-23

## 2022-06-23 ASSESSMENT — ENCOUNTER SYMPTOMS
EYE PAIN: 0
COUGH: 0
APHONIA: 0
NAIL CHANGES: 0
ABDOMINAL PAIN: 0
STRIDOR: 0

## 2022-06-23 NOTE — PROGRESS NOTES
[address], [address] [place] [address]  PHONE: [phone]    SUBJECTIVE:   Mamadou Merino is a 80 y.o. female 5/7/1931   seen for a follow up visit regarding the following:     Chief Complaint   Patient presents with    Bradycardia    Device Check         History of present illness: 80 y.o. female presented for follow-up 6/23/22  Hx of permanent pacemaker placed attempted CT PCI of LAD failed. Now with CAM boot off. Doing very well. Interval Hx   She was previously seen by Dr. Candice Chakraborty and Dariusz Gagnon. She had been lost to followup for several years due to illness of her . He unfortunately passed in 06/2017. She originally presented with stable symptoms. She is planned for colorectal surgery. No shortness of breath. Of concern was 2:1 AV block noted on ECG. No recent syncope. She had planned colorectal surgery in 2017  which was delayed. She obtained an echocardiogram, as well as cardiac monitoring to evaluate 2:1 AV block. The patient presented 06/21/2018 with no complaints. She was still taking part in yard work, working around [employer]. Unfortunately, she lost her  within the last year. 12/20/21 chest pain noted during house work new sx. Lacunar stroke 2/2026 elevated blood pressure with systolic pressures greater than 200 at the time of presentation. Off schedule for SOB that is worse 08/19/21 Admitted for volume overload pleural effusions improved with diuresis       Positive bubble study on echocardiogram.    BP Readings from Last 3 Encounters:   04/19/22 134/74   01/05/22 132/76   12/20/21 122/60       Cardiac History:   1. Cardiac catheterization in 2006 - severe three vessel coronary artery disease, occluded vein graft to LAD, patent (no targets) supplies aneurysm  per notes and not myocardium  vein graft to ramus and OM sequential, EF 25%. 2.   Echocardiogram 2009 - ejection fraction 60%. 3.   ECG 08/29/2017 - marked sinus bradycardia, 2:1 AV block. 4.   Echocardiogram 12/2017 - normal left ventricular systolic function. Cardiac monitor was placed with lowest heart rates of 50 beats per minute, no AV block, no significant bradycardia  5.   . MRI right knee  5/2019  Large insufficiency fracture along the central aspect of the lateral femoral condyle with marked surrounding marrow edema throughout the underlying bone. Complex, degenerative tear through the body and posterior horn of the latera  meniscus. 6.   2/2020 STROKE  lacunar infarct in the right thalamus. 7.   2/16/2020 cardiogram normal left ventricular systolic function RVSP 30 to 35 mmHg large left to right shunt evidenced by bubble study  8.   1/28/2021 sinus Bradycardia EKG -nondiagnostic. Negative precordial T-waves  -May be normal -consider anteroseptal ischemia. 9.   3/2021 attempted PCI to LAD through   10.   4/2021 echocardiogram performed in office no pericardial effusion left pleural effusion noted EF around 40%  11. 6/23/2022 pacemaker interrogation less than 1% right ventricular pacing      Assessment and Plan:   1. Coronary disease not controlled  ·   Failed PCI to LAD   ·   Med Rx  ·   Nitrate therapy beta-blocker  ·   Stay on DAPT for now for stoke per another provider. 2.   History of cardiomyopathy. ·    ideal weight is 130 pounds. OFF LASIX ON ALDCTONE  Key CAD CHF Meds          irbesartan (AVAPRO) 75 MG tablet    Sig: TAKE 1 TABLET DAILY    carvedilol (COREG) 3.125 MG tablet    Class: Historical Med    rosuvastatin (CRESTOR) 20 MG tablet    Class: Historical Med    spironolactone (ALDACTONE) 25 MG tablet    Class: Historical Med         3. Pacemaker in situ  ·   Device is MRI Safe    ·   programming changes made to limit CLS   ·   0% V pacing   4. Hyperlipidemia. On statin therapy. No sx. ·   Continue medications at doses listed below. clopidogrel - 75 MG  · rosuvastatin - 20 MG   5. Hypertension   Key CAD CHF Meds          irbesartan (AVAPRO) 75 MG tablet    Sig: TAKE 1 TABLET DAILY    carvedilol (COREG) 3.125 MG tablet    Class: Historical Med    rosuvastatin (CRESTOR) 20 MG tablet    Class: Historical Med    spironolactone (ALDACTONE) 25 MG tablet    Class: Historical Med                    Current Outpatient Medications   Medication Sig    irbesartan (AVAPRO) 75 MG tablet TAKE 1 TABLET DAILY    pantoprazole (PROTONIX) 20 MG tablet Take 1 tablet by mouth daily    APPLE CIDER VINEGAR PO Take 480 mg by mouth daily    TURMERIC PO Take 500 mg by mouth daily    carvedilol (COREG) 3.125 MG tablet TAKE 1 TABLET TWICE DAILY WITH MEALS    Cholecalciferol 50 MCG (2000 UT) CAPS Take 2,000 Units by mouth daily    clopidogrel (PLAVIX) 75 MG tablet The details of the medication are not available because there are pending changes by a home health clinician.  cyanocobalamin 100 MCG tablet Take 100 mcg by mouth daily    fluticasone (FLONASE) 50 MCG/ACT nasal spray 2 sprays by Nasal route daily    guaiFENesin (MUCINEX) 600 MG extended release tablet Take 600 mg by mouth 2 times daily    isosorbide mononitrate (IMDUR) 60 MG extended release tablet Take 60 mg by mouth    Lidocaine, Anorectal, 5 % CREA Actual prescription: Lidocaine 5%: Neurontin/gabapentin 5% combined topical cream/gelApply to affected area up to 4 times a day as needed for pain    loratadine (CLARITIN) 10 MG tablet Take 10 mg by mouth daily    montelukast (SINGULAIR) 10 MG tablet Take 10 mg by mouth daily    rosuvastatin (CRESTOR) 20 MG tablet TAKE 1 TABLET NIGHTLY    spironolactone (ALDACTONE) 25 MG tablet Take 25 mg by mouth daily     No current facility-administered medications for this visit. Past Medical History, Past Surgical History, Family history, Social History, and Medications were all reviewed with the patient today and updated as necessary.        Allergies   Allergen Reactions    Amlodipine Other (See Heart Disease Mother     Post-op Cognitive Dysfunction Neg Hx     Other Neg Hx     Breast Cancer Neg Hx     Heart Disease Brother     Heart Disease Brother     [address] Hypertherm Neg Hx     Pseudochol. Deficiency Neg Hx     Delayed Awakening Neg Hx     Post-op Nausea/Vomiting Neg Hx       Social History     Tobacco Use    Smoking status: Never Smoker    Smokeless tobacco: Never Used   Substance Use Topics    Alcohol use: No       ROS:    Review of Systems   Constitutional: Negative for fever. HENT: Negative for stridor. Eyes: Negative for pain. Cardiovascular: Negative for chest pain. Respiratory: Negative for cough. Endocrine: Negative for cold intolerance. Skin: Negative for nail changes. Musculoskeletal: Negative for arthritis. Gastrointestinal: Negative for abdominal pain. Genitourinary: Negative for dysuria. Neurological: Negative for aphonia. Psychiatric/Behavioral: Negative for altered mental status. Allergic/Immunologic: Negative for hives. PHYSICAL EXAM:    /62   Pulse 60   Ht 5' 6\" (1.676 m)   Wt 135 lb (61.2 kg)   BMI 21.79 kg/m²        Wt Readings from Last 3 Encounters:   04/19/22 136 lb 12.8 oz (62.1 kg)   01/05/22 135 lb (61.2 kg)   12/20/21 134 lb 11.2 oz (61.1 kg)     BP Readings from Last 3 Encounters:   04/19/22 134/74   01/05/22 132/76   12/20/21 122/60         Physical Exam  Vitals reviewed. HENT:      Head: Normocephalic. Right Ear: External ear normal.      Left Ear: External ear normal.      Nose: Nose normal.   Eyes:      General: No scleral icterus. Pulmonary:      Effort: Pulmonary effort is normal.   Abdominal:      General: There is no distension. Musculoskeletal:      Cervical back: Neck supple. Skin:     General: Skin is warm. Neurological:      Mental Status: She is alert. Mental status is at baseline. Medical problems and test results were reviewed with the patient today.            No results found for this or any previous visit (from the past 672 hour(s)). Lab Results   Component Value Date    CHOL 167 12/30/2021    HDL 48 12/30/2021    VLDL 17 12/30/2021              PLAN  Lucretia Riser was seen today for bradycardia and device check. Diagnoses and all orders for this visit:    Coronary artery disease involving native heart with angina pectoris, unspecified vessel or lesion type ([facility] [address]    Secondary hypertension    Hyperlipidemia LDL goal <70    Other orders  -     pantoprazole (PROTONIX) 20 MG tablet; Take 1 tablet by mouth daily  -     irbesartan (AVAPRO) 75 MG tablet; TAKE 1 TABLET DAILY      Return in about 6 months (around 12/23/2022).        Pedro Rivas MD  6/23/2022  8:12 AM

## 2022-06-23 NOTE — RESULT ENCOUNTER NOTE
I have personally reviewed the device interrogation.      Electronically signed,     Yobani Parra MD  06/23/22  10:52 AM

## 2022-06-23 NOTE — PROGRESS NOTES
This note will not be viewable in [address] IN OFFICE CHECK    Preliminary Impression: All normal function. No changes were made. Following MD: Dr. Hilary Urbina  Implanting MD: Dr. Leonardo Harrison presented to the [address] [place] [address] Drive today for a device check. No episodes. RV pacing 0%. Stable pacemaker function. The device was interrogated, and the results were forwarded to the ordering provider for review.      Dawit Gregorio RN  6/23/2022  10:13 AM

## 2022-07-01 DIAGNOSIS — Z86.73 HISTORY OF CEREBELLAR STROKE: ICD-10-CM

## 2022-07-01 DIAGNOSIS — E78.2 MIXED DYSLIPIDEMIA: ICD-10-CM

## 2022-07-01 DIAGNOSIS — I10 PRIMARY HYPERTENSION: ICD-10-CM

## 2022-07-01 DIAGNOSIS — K21.9 GASTROESOPHAGEAL REFLUX DISEASE WITHOUT ESOPHAGITIS: ICD-10-CM

## 2022-07-01 DIAGNOSIS — I25.708 CORONARY ARTERY DISEASE OF BYPASS GRAFT OF NATIVE HEART WITH STABLE ANGINA PECTORIS (HCC): Primary | ICD-10-CM

## 2022-07-05 ENCOUNTER — NURSE ONLY (OUTPATIENT)
Dept: INTERNAL MEDICINE CLINIC | Facility: CLINIC | Age: 87
End: 2022-07-05

## 2022-07-05 DIAGNOSIS — K21.9 GASTROESOPHAGEAL REFLUX DISEASE WITHOUT ESOPHAGITIS: ICD-10-CM

## 2022-07-05 DIAGNOSIS — I10 PRIMARY HYPERTENSION: ICD-10-CM

## 2022-07-05 DIAGNOSIS — I25.708 CORONARY ARTERY DISEASE OF BYPASS GRAFT OF NATIVE HEART WITH STABLE ANGINA PECTORIS (HCC): ICD-10-CM

## 2022-07-05 DIAGNOSIS — E78.2 MIXED DYSLIPIDEMIA: ICD-10-CM

## 2022-07-05 DIAGNOSIS — Z86.73 HISTORY OF CEREBELLAR STROKE: ICD-10-CM

## 2022-07-05 LAB
ALBUMIN SERPL-MCNC: 4 G/DL (ref 3.2–4.6)
ALBUMIN/GLOB SERPL: 1.3 {RATIO} (ref 1.2–3.5)
ALP SERPL-CCNC: 51 U/L (ref 50–136)
ALT SERPL-CCNC: 26 U/L (ref 12–65)
ANION GAP SERPL CALC-SCNC: 5 MMOL/L (ref 7–16)
AST SERPL-CCNC: 17 U/L (ref 15–37)
BASOPHILS # BLD: 0.1 K/UL (ref 0–0.2)
BASOPHILS NFR BLD: 1 % (ref 0–2)
BILIRUB SERPL-MCNC: 0.5 MG/DL (ref 0.2–1.1)
BUN SERPL-MCNC: 19 MG/DL (ref 8–23)
CALCIUM SERPL-MCNC: 9.9 MG/DL (ref 8.3–10.4)
CHLORIDE SERPL-SCNC: 109 MMOL/L (ref 98–107)
CHOLEST SERPL-MCNC: 154 MG/DL
CO2 SERPL-SCNC: 28 MMOL/L (ref 21–32)
CREAT SERPL-MCNC: 1 MG/DL (ref 0.6–1)
DIFFERENTIAL METHOD BLD: ABNORMAL
EOSINOPHIL # BLD: 0.3 K/UL (ref 0–0.8)
EOSINOPHIL NFR BLD: 5 % (ref 0.5–7.8)
ERYTHROCYTE [DISTWIDTH] IN BLOOD BY AUTOMATED COUNT: 13.2 % (ref 11.9–14.6)
GLOBULIN SER CALC-MCNC: 3.1 G/DL (ref 2.3–3.5)
GLUCOSE SERPL-MCNC: 87 MG/DL (ref 65–100)
HCT VFR BLD AUTO: 47.9 % (ref 35.8–46.3)
HDLC SERPL-MCNC: 47 MG/DL (ref 40–60)
HDLC SERPL: 3.3 {RATIO}
HGB BLD-MCNC: 15 G/DL (ref 11.7–15.4)
IMM GRANULOCYTES # BLD AUTO: 0 K/UL (ref 0–0.5)
IMM GRANULOCYTES NFR BLD AUTO: 0 % (ref 0–5)
LDLC SERPL CALC-MCNC: 82.2 MG/DL
LYMPHOCYTES # BLD: 2 K/UL (ref 0.5–4.6)
LYMPHOCYTES NFR BLD: 30 % (ref 13–44)
MCH RBC QN AUTO: 29.1 PG (ref 26.1–32.9)
MCHC RBC AUTO-ENTMCNC: 31.3 G/DL (ref 31.4–35)
MCV RBC AUTO: 92.8 FL (ref 79.6–97.8)
MONOCYTES # BLD: 0.5 K/UL (ref 0.1–1.3)
MONOCYTES NFR BLD: 8 % (ref 4–12)
NEUTS SEG # BLD: 3.7 K/UL (ref 1.7–8.2)
NEUTS SEG NFR BLD: 56 % (ref 43–78)
NRBC # BLD: 0 K/UL (ref 0–0.2)
PLATELET # BLD AUTO: 219 K/UL (ref 150–450)
PMV BLD AUTO: 10.8 FL (ref 9.4–12.3)
POTASSIUM SERPL-SCNC: 4.5 MMOL/L (ref 3.5–5.1)
PROT SERPL-MCNC: 7.1 G/DL (ref 6.3–8.2)
RBC # BLD AUTO: 5.16 M/UL (ref 4.05–5.2)
SODIUM SERPL-SCNC: 142 MMOL/L (ref 136–145)
TRIGL SERPL-MCNC: 124 MG/DL (ref 35–150)
TSH, 3RD GENERATION: 1.86 UIU/ML (ref 0.36–3.74)
VLDLC SERPL CALC-MCNC: 24.8 MG/DL (ref 6–23)
WBC # BLD AUTO: 6.6 K/UL (ref 4.3–11.1)

## 2022-07-12 ENCOUNTER — OFFICE VISIT (OUTPATIENT)
Dept: INTERNAL MEDICINE CLINIC | Facility: CLINIC | Age: 87
End: 2022-07-12
Payer: MEDICARE

## 2022-07-12 VITALS
DIASTOLIC BLOOD PRESSURE: 68 MMHG | TEMPERATURE: 97.9 F | SYSTOLIC BLOOD PRESSURE: 112 MMHG | WEIGHT: 135 LBS | BODY MASS INDEX: 21.69 KG/M2 | HEART RATE: 62 BPM | HEIGHT: 66 IN | OXYGEN SATURATION: 94 %

## 2022-07-12 DIAGNOSIS — I50.22 CHRONIC SYSTOLIC (CONGESTIVE) HEART FAILURE (HCC): ICD-10-CM

## 2022-07-12 DIAGNOSIS — I25.708 CORONARY ARTERY DISEASE OF BYPASS GRAFT OF NATIVE HEART WITH STABLE ANGINA PECTORIS (HCC): ICD-10-CM

## 2022-07-12 DIAGNOSIS — N18.31 STAGE 3A CHRONIC KIDNEY DISEASE (HCC): ICD-10-CM

## 2022-07-12 DIAGNOSIS — K21.9 GASTROESOPHAGEAL REFLUX DISEASE WITHOUT ESOPHAGITIS: ICD-10-CM

## 2022-07-12 DIAGNOSIS — Z23 ENCOUNTER FOR IMMUNIZATION: Primary | ICD-10-CM

## 2022-07-12 DIAGNOSIS — E78.2 MIXED DYSLIPIDEMIA: ICD-10-CM

## 2022-07-12 DIAGNOSIS — I10 PRIMARY HYPERTENSION: ICD-10-CM

## 2022-07-12 DIAGNOSIS — Z86.73 HISTORY OF CEREBELLAR STROKE: ICD-10-CM

## 2022-07-12 PROBLEM — N18.30 CHRONIC RENAL DISEASE, STAGE III (HCC): Status: ACTIVE | Noted: 2022-07-12

## 2022-07-12 PROCEDURE — 99214 OFFICE O/P EST MOD 30 MIN: CPT | Performed by: INTERNAL MEDICINE

## 2022-07-12 PROCEDURE — 90677 PCV20 VACCINE IM: CPT | Performed by: INTERNAL MEDICINE

## 2022-07-12 PROCEDURE — 1123F ACP DISCUSS/DSCN MKR DOCD: CPT | Performed by: INTERNAL MEDICINE

## 2022-07-12 PROCEDURE — 90471 IMMUNIZATION ADMIN: CPT | Performed by: INTERNAL MEDICINE

## 2022-07-12 RX ORDER — ROSUVASTATIN CALCIUM 20 MG/1
20 TABLET, COATED ORAL DAILY
Qty: 90 TABLET | Refills: 3 | Status: SHIPPED | OUTPATIENT
Start: 2022-07-12

## 2022-07-12 RX ORDER — CLOPIDOGREL BISULFATE 75 MG/1
75 TABLET ORAL DAILY
Qty: 90 TABLET | Refills: 3 | Status: SHIPPED | OUTPATIENT
Start: 2022-07-12

## 2022-07-12 RX ORDER — SPIRONOLACTONE 25 MG/1
25 TABLET ORAL DAILY
Qty: 90 TABLET | Refills: 3 | Status: SHIPPED | OUTPATIENT
Start: 2022-07-12

## 2022-07-12 RX ORDER — CARVEDILOL 3.12 MG/1
3.12 TABLET ORAL 2 TIMES DAILY
Qty: 180 TABLET | Refills: 3 | Status: SHIPPED | OUTPATIENT
Start: 2022-07-12

## 2022-07-12 RX ORDER — ISOSORBIDE MONONITRATE 60 MG/1
60 TABLET, EXTENDED RELEASE ORAL DAILY
Qty: 90 TABLET | Refills: 3 | Status: SHIPPED | OUTPATIENT
Start: 2022-07-12

## 2022-07-12 ASSESSMENT — ENCOUNTER SYMPTOMS
SHORTNESS OF BREATH: 0
CHEST TIGHTNESS: 0

## 2022-07-12 NOTE — PROGRESS NOTES
ASSESSMENT/PLAN:    1. Encounter for immunization     - Pneumococcal, PCV20, PREVNAR 21, (age 25 yrs+), IM, PF  - CBC; Future  - Basic Metabolic Panel; Future  - Lipid Panel; Future    2. Coronary artery disease of bypass graft of native heart with stable angina pectoris ([facility] [address]  Treatment regimen is effective. - clopidogrel (PLAVIX) 75 MG tablet; Take 1 tablet by mouth daily The details of the medication are not available because there are pending changes by a home health clinician. Dispense: 90 tablet; Refill: 3  - isosorbide mononitrate (IMDUR) 60 MG extended release tablet; Take 1 tablet by mouth daily  Dispense: 90 tablet; Refill: 3  - CBC; Future  - Basic Metabolic Panel; Future  - Lipid Panel; Future    3. History of cerebellar stroke  Treatment regimen is effective. - CBC; Future  - Basic Metabolic Panel; Future  - Lipid Panel; Future    4. Primary hypertension  Treatment regimen is effective. - carvedilol (COREG) 3.125 MG tablet; Take 1 tablet by mouth 2 times daily TAKE 1 TABLET TWICE DAILY WITH MEALS  Dispense: 180 tablet; Refill: 3  - spironolactone (ALDACTONE) 25 MG tablet; Take 1 tablet by mouth daily  Dispense: 90 tablet; Refill: 3  - CBC; Future  - Basic Metabolic Panel; Future  - Lipid Panel; Future    5. Mixed dyslipidemia  Treatment regimen is effective. - rosuvastatin (CRESTOR) 20 MG tablet; Take 1 tablet by mouth daily TAKE 1 TABLET NIGHTLY  Dispense: 90 tablet; Refill: 3  - CBC; Future  - Basic Metabolic Panel; Future  - Lipid Panel; Future    6. Gastroesophageal reflux disease without esophagitis  Treatment regimen is effective. - CBC; Future  - Basic Metabolic Panel; Future  - Lipid Panel; Future    7. Chronic systolic (congestive) heart failure  Treatment regimen is effective. - carvedilol (COREG) 3.125 MG tablet; Take 1 tablet by mouth 2 times daily TAKE 1 TABLET TWICE DAILY WITH MEALS  Dispense: 180 tablet;  Refill: 3  - spironolactone (ALDACTONE) 25 MG tablet; Take 1 tablet by mouth daily  Dispense: 90 tablet; Refill: 3  - CBC; Future  - Basic Metabolic Panel; Future  - Lipid Panel; Future    8. Stage 3a chronic kidney disease (HCC)  Treatment regimen is effective. - CBC; Future  - Basic Metabolic Panel; Future  - Lipid Panel; Future        Evaluation and management of the chronic condition(s) delineated. No negative side effects reported. I have reviewed all the lab results. There are some abnormalities that are either expected or not critical to the patient's health, and are discussed in the office today and are addressed. Please refer to the above assessement and plan narrative and orders and follow up plan. Medication discussed and refilled as needed. Physical exam findings are stable unless otherwise indicated and this is addressed. The most recent lab work and imaging and consultant/urgent care visits and imaging are reviewed and discussed and considered during this visit encounter. 1. Encounter for immunization  -     Pneumococcal, PCV20, PREVNAR 20, (age 25 yrs+), IM, PF  -     CBC; Future  -     Basic Metabolic Panel; Future  -     Lipid Panel; Future  2. Coronary artery disease of bypass graft of native heart with stable angina pectoris (HCC)  -     clopidogrel (PLAVIX) 75 MG tablet; Take 1 tablet by mouth daily The details of the medication are not available because there are pending changes by a home health clinician., Disp-90 tablet, R-3Normal  -     isosorbide mononitrate (IMDUR) 60 MG extended release tablet; Take 1 tablet by mouth daily, Disp-90 tablet, R-3Normal  -     CBC; Future  -     Basic Metabolic Panel; Future  -     Lipid Panel; Future  3. History of cerebellar stroke  -     CBC; Future  -     Basic Metabolic Panel; Future  -     Lipid Panel; Future  4. Primary hypertension  -     carvedilol (COREG) 3.125 MG tablet;  Take 1 tablet by mouth 2 times daily TAKE 1 TABLET TWICE DAILY WITH MEALS, Disp-180 tablet, R-3Normal  - spironolactone (ALDACTONE) 25 MG tablet; Take 1 tablet by mouth daily, Disp-90 tablet, R-3Normal  -     CBC; Future  -     Basic Metabolic Panel; Future  -     Lipid Panel; Future  5. Mixed dyslipidemia  -     rosuvastatin (CRESTOR) 20 MG tablet; Take 1 tablet by mouth daily TAKE 1 TABLET NIGHTLY, Disp-90 tablet, R-3Normal  -     CBC; Future  -     Basic Metabolic Panel; Future  -     Lipid Panel; Future  6. Gastroesophageal reflux disease without esophagitis  -     CBC; Future  -     Basic Metabolic Panel; Future  -     Lipid Panel; Future  7. Chronic systolic (congestive) heart failure  -     carvedilol (COREG) 3.125 MG tablet; Take 1 tablet by mouth 2 times daily TAKE 1 TABLET TWICE DAILY WITH MEALS, Disp-180 tablet, R-3Normal  -     spironolactone (ALDACTONE) 25 MG tablet; Take 1 tablet by mouth daily, Disp-90 tablet, R-3Normal  -     CBC; Future  -     Basic Metabolic Panel; Future  -     Lipid Panel; Future  8. Stage 3a chronic kidney disease (HCC)  -     CBC; Future  -     Basic Metabolic Panel; Future  -     Lipid Panel; Future         On this date, 22, I have spent 30 minutes reviewing previous notes, test results and face to face with the patient discussing the diagnosis and importance of compliance with the treatment plan as well as documenting on the day of the visit. An electronic signature was used to authenticate this note. -- Ardis Boast, MD     Return in about 6 months (around 2023).     SUBJECTIVE/OBJECTIVE:  Chief Complaint   Patient presents with    Coronary Artery Disease     6 month recheck    Hypertension    Cholesterol Problem    Results     lab review       HPI:   Anny Wilson (: 1931 is a 80 y.o. female, here for evaluation of the following chief complaint(s):   Chief Complaint   Patient presents with    Coronary Artery Disease     6 month recheck    Hypertension    Cholesterol Problem    Results     lab review       Patient is here for follow-up and management of chronic medical conditions, review of recent labs, review of any imaging completed since our last office visit and discuss any consultants opinions or management changes. Feeling well. Gardening. Independent. Still drives. [place] She has good family support with her home and yard. Hypertension - stable, well controlled, takes medications as prescribed. denies chest pain, shortness of breath, abdominal pain, nausea, vomiting, diarrhea, dizziness or fainting, headaches or vision changes. Key CAD CHF Meds          carvedilol (COREG) 3.125 MG tablet (Taking)    Sig - Route: Take 1 tablet by mouth 2 times daily TAKE 1 TABLET TWICE DAILY WITH MEALS - Oral    rosuvastatin (CRESTOR) 20 MG tablet (Taking)    Sig - Route: Take 1 tablet by mouth daily TAKE 1 TABLET NIGHTLY - Oral    spironolactone (ALDACTONE) 25 MG tablet (Taking)    Sig - Route: Take 1 tablet by mouth daily - Oral    irbesartan (AVAPRO) 75 MG tablet (Taking)    Sig: TAKE 1 TABLET DAILY          Hyperlipidemia - stable, well controlled, takes medication as prescribed, denies any chest pain, shortness of breath, dizziness, headaches or tia symptoms. No reported myalgias on treatment. clopidogrel - 75 MG  rosuvastatin - 20 MG  Lab Results   Component Value Date    TRIG 124 07/05/2022     Lab Results   Component Value Date    HDL 47 07/05/2022     Lab Results   Component Value Date    LDLCALC 82.2 07/05/2022     Lab Results   Component Value Date    CHOLHDLRATIO 3.3 07/05/2022       CHF, no cough or sob or pnd or syncope  Pacer device check 6/23/2022 . No episodes. RV pacing 0%. Stable pacemaker function. Mild age related CKD. Hx of kidney stones. No flank pain or hematuria or dysuria. Labs reviewed and discussed and medication refilled as needed for chronic medications during ov or adjusted based on lab results and/or our discussion as appropriate. See discussion.     The patient's available records and electronic chart records are reviewed. The PMH, PSH, medications, allergies, medications, FH, health maintenance and vaccination status are all reviewed and updated as appropriate. Records from outside providers have been reviewed, summarized, and considered as noted in the history of present illness, past medical history, and objective data of this note and encounter. Health Maintenance   Topic Date Due    Shingles vaccine (1 of 2) Never done    COVID-19 Vaccine (2 - Pfizer 3-dose series) 02/10/2021    Flu vaccine (1) 09/01/2022    Annual Wellness Visit (AWV)  01/06/2023    Depression Screen  04/19/2023    Lipids  07/05/2023    DTaP/Tdap/Td vaccine (2 - Td or Tdap) 09/02/2023    Pneumococcal 65+ years Vaccine  Completed    Hepatitis A vaccine  Aged Out    Hepatitis B vaccine  Aged Out    Hib vaccine  Aged Out    Meningococcal (ACWY) vaccine  Aged Out     Patient Active Problem List   Diagnosis    Mixed dyslipidemia    Osteoporosis    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 ([facility] [address]    Primary osteoarthritis of both knees    Ischemic heart disease    Hypertensive urgency    Esophageal reflux    CAD (coronary artery disease)    Left foot pain    HTN (hypertension)    CVD (cerebrovascular disease)    History of cerebellar stroke    Chronic renal disease, stage III (HCC) [956607]    Chronic systolic (congestive) heart failure       Review of Systems   Constitutional: Negative for activity change and fatigue. Eyes: Negative for visual disturbance. Respiratory: Negative for chest tightness and shortness of breath. Cardiovascular: Negative for chest pain, palpitations and leg swelling. Endocrine: Negative for polydipsia and polyuria. Genitourinary: Negative for dysuria. Musculoskeletal: Negative for myalgias. Skin: Negative for wound. Neurological: Negative for headaches. Psychiatric/Behavioral: Negative for dysphoric mood.        Lab Results Component Value Date/Time    WBC 6.6 07/05/2022 01:51 PM    HGB 15.0 07/05/2022 01:51 PM    HCT 47.9 07/05/2022 01:51 PM    MCV 92.8 07/05/2022 01:51 PM    RDW 13.2 07/05/2022 01:51 PM     07/05/2022 01:51 PM    NEUTOPHILPCT 56 04/21/2021 01:53 PM    LYMPHOPCT 30 07/05/2022 01:51 PM    LYMPHOPCT 32 04/21/2021 01:53 PM    MONOPCT 8 07/05/2022 01:51 PM    MONOPCT 7 04/21/2021 01:53 PM    EOSRELPCT 5 07/05/2022 01:51 PM    BASOPCT 1 07/05/2022 01:51 PM    BASOPCT 1 04/21/2021 01:53 PM    LYMPHSABS 2.0 07/05/2022 01:51 PM    LYMPHSABS 1.7 04/21/2021 01:53 PM    MONOSABS 0.5 07/05/2022 01:51 PM    MONOSABS 0.4 04/21/2021 01:53 PM    EOSABS 0.3 07/05/2022 01:51 PM    EOSABS 0.2 04/21/2021 01:53 PM    BASOSABS 0.1 07/05/2022 01:51 PM    IMMGRAN 0 07/05/2022 01:51 PM    GRANULOCYTEABSOLUTECOUNT 0.0 04/21/2021 01:53 PM       Lab Results   Component Value Date/Time     07/05/2022 01:51 PM    K 4.5 07/05/2022 01:51 PM     07/05/2022 01:51 PM    CO2 28 07/05/2022 01:51 PM    ANIONGAP 5 07/05/2022 01:51 PM    GLUCOSE 87 07/05/2022 01:51 PM    BUN 19 07/05/2022 01:51 PM    CREATININE 1.00 07/05/2022 01:51 PM    GFRAA >60 07/05/2022 01:51 PM    LABGLOM 55 07/05/2022 01:51 PM    CALCIUM 9.9 07/05/2022 01:51 PM    BILITOT 0.5 07/05/2022 01:51 PM    ALT 26 07/05/2022 01:51 PM    AST 17 07/05/2022 01:51 PM    ALKPHOS 51 07/05/2022 01:51 PM    ALKPHOS 57 12/30/2021 11:05 AM    PROT 7.1 07/05/2022 01:51 PM    LABALBU 4.0 07/05/2022 01:51 PM    GLOB 3.1 07/05/2022 01:51 PM    ALBUMIN 1.3 07/05/2022 01:51 PM       Lab Results   Component Value Date/Time    CHOL 154 07/05/2022 01:51 PM    HDL 47 07/05/2022 01:51 PM    TRIG 124 07/05/2022 01:51 PM    LDLCALC 82.2 07/05/2022 01:51 PM    VLDL 17 12/30/2021 11:05 AM       Lab Results   Component Value Date/Time    LABA1C 5.9 02/17/2020 05:23 AM       Lab Results   Component Value Date/Time    TSH 2.290 12/30/2021 11:05 AM       No results found for: PSA    Lab Results

## 2022-08-03 ENCOUNTER — HOSPITAL ENCOUNTER (OUTPATIENT)
Dept: MAMMOGRAPHY | Age: 87
Discharge: HOME | End: 2022-08-06
Payer: MEDICARE

## 2022-08-03 DIAGNOSIS — Z12.31 VISIT FOR SCREENING MAMMOGRAM: ICD-10-CM

## 2022-08-03 PROCEDURE — 77063 BREAST TOMOSYNTHESIS BI: CPT

## 2022-08-31 PROCEDURE — 93296 REM INTERROG EVL PM/IDS: CPT | Performed by: INTERNAL MEDICINE

## 2022-08-31 PROCEDURE — 93294 REM INTERROG EVL PM/LDLS PM: CPT | Performed by: INTERNAL MEDICINE

## 2022-10-14 DIAGNOSIS — Z95.0 PACEMAKER: ICD-10-CM

## 2022-10-14 DIAGNOSIS — R00.1 BRADYCARDIA: ICD-10-CM

## 2022-10-14 DIAGNOSIS — R00.1 BRADYCARDIA: Primary | ICD-10-CM

## 2022-12-07 ENCOUNTER — TELEPHONE (OUTPATIENT)
Dept: INTERNAL MEDICINE CLINIC | Facility: CLINIC | Age: 87
End: 2022-12-07
Payer: MEDICARE

## 2022-12-07 DIAGNOSIS — N30.00 ACUTE CYSTITIS WITHOUT HEMATURIA: Primary | ICD-10-CM

## 2022-12-07 PROCEDURE — 99441 PR PHYS/QHP TELEPHONE EVALUATION 5-10 MIN: CPT | Performed by: INTERNAL MEDICINE

## 2022-12-07 RX ORDER — IRBESARTAN 75 MG/1
TABLET ORAL
Qty: 90 TABLET | Refills: 3 | Status: SHIPPED | OUTPATIENT
Start: 2022-12-07

## 2022-12-07 NOTE — TELEPHONE ENCOUNTER
----- Message from Forest Shauna sent at 12/7/2022 10:01 AM EST -----  Subject: Message to Provider    QUESTIONS  Information for Provider? Patient states she has a UTI. Wants Dr. Soto Daniel   to call in a prescription for her. Her son is in the hospital so she does   not want to come for an appt if she does not have to. Please let her know. Pharmacy is [facility] [phone].   ---------------------------------------------------------------------------  --------------  Vic Harrington INFO  [phone]; OK to leave message on voicemail  ---------------------------------------------------------------------------  --------------  SCRIPT ANSWERS  Relationship to Patient?  Self

## 2022-12-08 RX ORDER — CIPROFLOXACIN 250 MG/1
250 TABLET, FILM COATED ORAL 2 TIMES DAILY
Qty: 10 TABLET | Refills: 0 | Status: SHIPPED | OUTPATIENT
Start: 2022-12-08 | End: 2022-12-13

## 2022-12-08 RX ORDER — PHENAZOPYRIDINE HYDROCHLORIDE 100 MG/1
100 TABLET, FILM COATED ORAL 2 TIMES DAILY PRN
Qty: 6 TABLET | Refills: 0 | Status: SHIPPED | OUTPATIENT
Start: 2022-12-08 | End: 2022-12-11

## 2022-12-08 NOTE — TELEPHONE ENCOUNTER
Adalgisa Medina is a 80 y.o. female evaluated via telephone on 12/7/2022 for Urinary Tract Infection  . Documentation:    I communicated with the patient and/or health care decision maker about UTI. Details of this discussion including any medical advice provided:   Urinary Tract Infection: Patient complains of dysuria, frequency, urgency She has had symptoms for 2 days. Patient denies fever and vaginal discharge. Patient does not have a history of recurrent UTI. Patient does not have a history of pyelonephritis. Diagnoses and all orders for this visit:  Acute cystitis without hematuria  -     ciprofloxacin (CIPRO) 250 MG tablet; Take 1 tablet by mouth 2 times daily for 5 days  -     phenazopyridine (PYRIDIUM) 100 MG tablet; Take 1 tablet by mouth 2 times daily as needed for Pain        Total Time: minutes: 5-10 minutes    Adalgisa Medina was evaluated through a synchronous (real-time) audio encounter. Patient identification was verified at the start of the visit. She (or guardian if applicable) is aware that this is a billable service, which includes applicable co-pays. This visit was conducted with the patient's (and/or legal guardian's) verbal consent. She has not had a related appointment within my department in the past 7 days or scheduled within the next 24 hours. The patient was located at Home: [place] [id number]. The provider was located at [facility] ([place] [address]): [address] [place] [address] [address] [facility]  [place],  [address] [facility]. Moise Tapia MD

## 2023-01-07 ENCOUNTER — HOSPITAL ENCOUNTER (EMERGENCY)
Dept: GENERAL RADIOLOGY | Age: 88
End: 2023-01-07
Payer: COMMERCIAL

## 2023-01-07 ENCOUNTER — HOSPITAL ENCOUNTER (EMERGENCY)
Age: 88
Discharge: HOME | End: 2023-01-07
Attending: EMERGENCY MEDICINE
Payer: COMMERCIAL

## 2023-01-07 VITALS
TEMPERATURE: 98.4 F | HEART RATE: 65 BPM | BODY MASS INDEX: 21.69 KG/M2 | OXYGEN SATURATION: 98 % | RESPIRATION RATE: 19 BRPM | WEIGHT: 135 LBS | SYSTOLIC BLOOD PRESSURE: 147 MMHG | DIASTOLIC BLOOD PRESSURE: 77 MMHG | HEIGHT: 66 IN

## 2023-01-07 DIAGNOSIS — N30.01 ACUTE CYSTITIS WITH HEMATURIA: ICD-10-CM

## 2023-01-07 DIAGNOSIS — S39.012A STRAIN OF LUMBAR REGION, INITIAL ENCOUNTER: Primary | ICD-10-CM

## 2023-01-07 LAB
APPEARANCE UR: CLEAR
BACTERIA URNS QL MICRO: 0 /HPF
BILIRUB UR QL: NEGATIVE
COLOR UR: ABNORMAL
EPI CELLS #/AREA URNS HPF: ABNORMAL /HPF
GLUCOSE UR STRIP.AUTO-MCNC: NEGATIVE MG/DL
HGB UR QL STRIP: ABNORMAL
KETONES UR QL STRIP.AUTO: ABNORMAL MG/DL
LEUKOCYTE ESTERASE UR QL STRIP.AUTO: ABNORMAL
NITRITE UR QL STRIP.AUTO: NEGATIVE
PH UR STRIP: 6.5 (ref 5–9)
PROT UR STRIP-MCNC: ABNORMAL MG/DL
RBC #/AREA URNS HPF: ABNORMAL /HPF
SP GR UR REFRACTOMETRY: 1.01 (ref 1–1.02)
UROBILINOGEN UR QL STRIP.AUTO: 1 EU/DL (ref 0.2–1)
WBC URNS QL MICRO: ABNORMAL /HPF

## 2023-01-07 PROCEDURE — 72100 X-RAY EXAM L-S SPINE 2/3 VWS: CPT

## 2023-01-07 PROCEDURE — 99284 EMERGENCY DEPT VISIT MOD MDM: CPT

## 2023-01-07 PROCEDURE — 87086 URINE CULTURE/COLONY COUNT: CPT

## 2023-01-07 PROCEDURE — 81001 URINALYSIS AUTO W/SCOPE: CPT

## 2023-01-07 RX ORDER — NITROFURANTOIN 25; 75 MG/1; MG/1
100 CAPSULE ORAL 2 TIMES DAILY
Qty: 14 CAPSULE | Refills: 0 | Status: SHIPPED | OUTPATIENT
Start: 2023-01-07 | End: 2023-01-12 | Stop reason: ALTCHOICE

## 2023-01-07 RX ORDER — LIDOCAINE 4 G/G
1 PATCH TOPICAL DAILY
Qty: 30 PATCH | Refills: 0 | Status: SHIPPED | OUTPATIENT
Start: 2023-01-07 | End: 2023-02-06

## 2023-01-07 ASSESSMENT — PAIN - FUNCTIONAL ASSESSMENT: PAIN_FUNCTIONAL_ASSESSMENT: 0-10

## 2023-01-07 ASSESSMENT — PAIN SCALES - GENERAL: PAINLEVEL_OUTOF10: 10

## 2023-01-07 NOTE — ED TRIAGE NOTES
Patient ambulatory to triage, patient c\o L sided lower back pain that began yesterday. Patient denies any fall or trauma.

## 2023-01-07 NOTE — ED PROVIDER NOTES
[employer] Emergency Department Provider Note                   PCP:                Belal Lindsay MD               Age: 80 y.o. Sex: female       ICD-10-CM    1. Strain of lumbar region, initial encounter  S39.012A       2. Acute cystitis with hematuria  N30.01           DISPOSITION Decision To Discharge 01/07/2023 01:37:04 PM       New Prescriptions    LIDOCAINE 4 % EXTERNAL PATCH    Place 1 patch onto the skin daily    NITROFURANTOIN, MACROCRYSTAL-MONOHYDRATE, (MACROBID) 100 MG CAPSULE    Take 1 capsule by mouth 2 times daily for 7 days       Orders Placed This Encounter   Procedures    Culture, Urine    XR LUMBAR SPINE (2-3 VIEWS)    Urinalysis w rflx microscopic    POCT Urine Dipstick         Matilda Busby is a 80 y.o. female who presents to the Emergency Department with chief complaint of    Chief Complaint   Patient presents with    Back Pain      Patient to ER complaining of low back pain started 1 to 2 days ago. She does have a history of UTIs but she also states for the past few days she has been putting up Sonya decorations and cleaning. She denies any fever no numbness or tingling into the legs. She is use some topical medications with minimal relief. Past Medical History:  No date: CAD (coronary artery disease)      Comment:  2 MI  No date: Calculus of kidney  5/18/2021: Closed nondisplaced fracture of fifth left metatarsal bone  No date: Congestive heart failure ([facility] [address]  12/14/2015: Coronary artery disease involving coronary bypass graft   of native heart without angina pectoris  8/19/2013: CVD (cerebrovascular disease)  8/19/2013: Dyslipidemia  No date: GERD (gastroesophageal reflux disease)  8/19/2013: HTN (hypertension)  4/13/2017: Left breast mass      Comment:  Diagnostic Mammogram negative.   No date: Long term current use of anticoagulant therapy  2005, 2006: Myocardial infarction [facility])      Comment:  [facility], Dr. Marlo Chen  8/19/2013: Osteoporosis  8/19/2013: Positive H. pylori test  10/9/2017: Rectal polyp      Comment:  Anoscopy:  Revealed large cauliflower-like polyp at the                anterior midline just above the dentate line, mobile                soft, to have removed with Dr. Radha Montiel ([facility]), benign. No                more fecal leakage. 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) ([facility] [address]  1/7/2009: Stroke ([facility] [address]      Comment:  no deficits expect patient reports going to one side if                she gets up too fast  No date: Thromboembolus ([facility] [address]      Comment:  pulmanary     Past Surgical History:  No date: APPENDECTOMY  2005: BLADDER SUSPENSION  2012: COLONOSCOPY  1988: CORONARY ARTERY BYPASS GRAFT      Comment:  @ [facility]  No date: GYN      Comment:  hysterectomy  1972: HYSTERECTOMY, TOTAL ABDOMINAL (CERVIX REMOVED)  2008 x 2: LITHOTRIPSY  No date: ORTHOPEDIC SURGERY      Comment:  2 rotater cuff right shoulder  No date: [place] CARDIAC SURG PROCEDURE UNLIST      Comment:  by pass  No date: UROLOGICAL SURGERY      Comment:  suspension         Review of Systems   All other systems reviewed and are negative. All other systems reviewed and are negative. Past Medical History:   Diagnosis Date    CAD (coronary artery disease)     2 MI    Calculus of kidney     Closed nondisplaced fracture of fifth left metatarsal bone 5/18/2021    Congestive heart failure (HCC)     Coronary artery disease involving coronary bypass graft of native heart without angina pectoris 12/14/2015    CVD (cerebrovascular disease) 8/19/2013    Dyslipidemia 8/19/2013    GERD (gastroesophageal reflux disease)     HTN (hypertension) 8/19/2013    Left breast mass 4/13/2017    Diagnostic Mammogram negative.     Long term current use of anticoagulant therapy     Myocardial infarction [facility]) 2005, 2006    [facility] Cardiology, Dr. Reji Dean    Osteoporosis 8/19/2013    Positive H. pylori test 8/19/2013    Rectal polyp 10/9/2017    Anoscopy:  Revealed large cauliflower-like polyp at the anterior midline just above the dentate line, mobile soft, to have removed with Dr. Gustav Sandifer ([facility]), benign. No more fecal leakage. Stroke (cerebrum) ([facility] [address] 2/16/2020    Stroke ([facility] [address] 1/7/2009    no deficits expect patient reports going to one side if she gets up too fast    Thromboembolus [facility])     [address]        Past Surgical History:   Procedure Laterality Date    APPENDECTOMY      BLADDER SUSPENSION  2005    COLONOSCOPY  2012    [address]    @ [address]      hysterectomy    HYSTERECTOMY, TOTAL ABDOMINAL ([address])  1972    LITHOTRIPSY  2008 x 2    ORTHOPEDIC SURGERY      2 rotater cuff right shoulder    [place] CARDIAC SURG PROCEDURE UNLIST      by pass    UROLOGICAL SURGERY      suspension        Family History   Problem Relation Age of Onset    Emergence Delirium Neg Hx     Heart Disease Brother     Cancer Brother         prostate    Heart Disease Brother     Heart Disease Father     Stroke Mother     Heart Disease Mother     Post-op Cognitive Dysfunction Neg Hx     Other Neg Hx     Breast Cancer Neg Hx     Heart Disease Brother     Heart Disease Brother     [address] Hypertherm Neg Hx     Pseudochol. Deficiency Neg Hx     Delayed Awakening Neg Hx     Post-op Nausea/Vomiting Neg Hx         Social Connections: Not on file        Allergies   Allergen Reactions    Amlodipine Other (See Comments)     Other reaction(s): Unknown-Unspecified    Quinapril Other (See Comments)     Other reaction(s): Cough-Intolerance    Sertraline Other (See Comments)     Other reaction(s): Drowsiness    Prednisone Itching, Palpitations and Other (See Comments)     Per pt also had heart burn        Vitals signs and nursing note reviewed. Patient Vitals for the past 4 hrs:   Temp Pulse Resp BP SpO2   01/07/23 1115 98.4 °F (36.9 °C) 65 19 (!) 147/77 98 %          Physical Exam  Vitals and nursing note reviewed. Constitutional:       Appearance: Normal appearance. She is normal weight. HENT:      Head: Normocephalic and atraumatic. Right Ear: External ear normal.      Left Ear: External ear normal.      Nose: Nose normal.      Mouth/Throat:      Mouth: Mucous membranes are moist.      Pharynx: Oropharynx is clear. Eyes:      Extraocular Movements: Extraocular movements intact. Pupils: Pupils are equal, round, and reactive to light. Cardiovascular:      Rate and Rhythm: Normal rate and regular rhythm. Pulses: Normal pulses. Heart sounds: Normal heart sounds. Pulmonary:      Effort: Pulmonary effort is normal.      Breath sounds: Normal breath sounds. No rales. Chest:      Chest wall: No tenderness. Abdominal:      General: Abdomen is flat. Palpations: Abdomen is soft. Tenderness: There is no abdominal tenderness. Hernia: No hernia is present. Musculoskeletal:         General: Tenderness present. Normal range of motion. Cervical back: Normal range of motion and neck supple. Comments: Lumbar spine area without skin changes. There is diffuse soreness across the lower back area no radiation to the abdominal area no radiation of pain into the buttocks or legs. Pain is worse with motion   Skin:     General: Skin is warm and dry. Neurological:      General: No focal deficit present. Mental Status: She is alert. Psychiatric:         Mood and Affect: Mood normal.         Behavior: Behavior normal.        MDM  Number of Diagnoses or Management Options  Acute cystitis with hematuria  Strain of lumbar region, initial encounter  Diagnosis management comments: Labs Reviewed  URINALYSIS - Abnormal; Notable for the following components:     Protein, UA                   TRACE (*)               Ketones, Urine                TRACE (*)               Blood, Urine                  LARGE (*)               Leukocyte Esterase, Urine     TRACE (*)            All other components within normal limits     XR LUMBAR SPINE (2-3 VIEWS)   Final Result    There is no evidence of fracture or subluxation.  The vertebrae are    well aligned. No bony lesions are seen. Bones appear slightly demineralized. Multilevel degenerative disc changes are present with the worst levels at L4-5    and L5-S1. Degenerative facet changes noted at L4-5 and L5-S1. Calcified aortic    plaque is present without obvious aneurysm. Complexity of Problem: 2 or more self-limited or minor problems. (3)    I have conducted an independent ordering and review of Labs. I have conducted an independent ordering and review of X-rays. I have reviewed records from an external source: provider visit notes from PCP. Urine with trace bacteria will treat due to age and potential for further complications. Also feel some of her pain may be muscular due to her change in activity. Patient requested lidocaine patches for her back we will send a prescription for antibiotics and lidocaine patches to her pharmacy       Amount and/or Complexity of Data Reviewed  Clinical lab tests: ordered and reviewed  Tests in the radiology section of CPT®: ordered and reviewed  Review and summarize past medical records: yes    Risk of Complications, Morbidity, and/or Mortality  Presenting problems: moderate  Diagnostic procedures: moderate  Management options: low    Patient Progress  Patient progress: improved      Procedures    Labs Reviewed   URINALYSIS - Abnormal; Notable for the following components:       Result Value    Protein, UA TRACE (*)     Ketones, Urine TRACE (*)     Blood, Urine LARGE (*)     Leukocyte Esterase, Urine TRACE (*)     All other components within normal limits   CULTURE, URINE        XR LUMBAR SPINE (2-3 VIEWS)   Final Result   There is no evidence of fracture or subluxation. The vertebrae are   well aligned. No bony lesions are seen. Bones appear slightly demineralized. Multilevel degenerative disc changes are present with the worst levels at L4-5   and L5-S1. Degenerative facet changes noted at L4-5 and L5-S1.  Calcified aortic plaque is present without obvious aneurysm. Jarrett Coma Scale  Eye Opening: Spontaneous  Best Verbal Response: Oriented  Best Motor Response: Obeys commands  [place] Coma Scale Score: 15                     Voice dictation software was used during the making of this note. This software is not perfect and grammatical and other typographical errors may be present. This note has not been completely proofread for errors.      GIOVANY Renae  01/07/23 S[place], [address]  01/07/23 [id number]

## 2023-01-08 LAB
BACTERIA SPEC CULT: NORMAL
SERVICE CMNT-IMP: NORMAL

## 2023-01-09 ENCOUNTER — HOSPITAL ENCOUNTER (OUTPATIENT)
Dept: CT IMAGING | Age: 88
Discharge: HOME | End: 2023-01-12
Payer: COMMERCIAL

## 2023-01-09 ENCOUNTER — OFFICE VISIT (OUTPATIENT)
Dept: INTERNAL MEDICINE CLINIC | Facility: CLINIC | Age: 88
End: 2023-01-09
Payer: COMMERCIAL

## 2023-01-09 VITALS
WEIGHT: 137 LBS | OXYGEN SATURATION: 97 % | BODY MASS INDEX: 22.02 KG/M2 | HEART RATE: 70 BPM | HEIGHT: 66 IN | TEMPERATURE: 99.1 F | SYSTOLIC BLOOD PRESSURE: 130 MMHG | DIASTOLIC BLOOD PRESSURE: 68 MMHG

## 2023-01-09 DIAGNOSIS — I49.5 SICK SINUS SYNDROME (HCC): ICD-10-CM

## 2023-01-09 DIAGNOSIS — I50.22 CHRONIC SYSTOLIC (CONGESTIVE) HEART FAILURE (HCC): ICD-10-CM

## 2023-01-09 DIAGNOSIS — M54.50 ACUTE BILATERAL LOW BACK PAIN WITHOUT SCIATICA: Primary | ICD-10-CM

## 2023-01-09 DIAGNOSIS — R31.29 OTHER MICROSCOPIC HEMATURIA: ICD-10-CM

## 2023-01-09 DIAGNOSIS — N18.31 STAGE 3A CHRONIC KIDNEY DISEASE (HCC): ICD-10-CM

## 2023-01-09 DIAGNOSIS — I25.708 CORONARY ARTERY DISEASE OF BYPASS GRAFT OF NATIVE HEART WITH STABLE ANGINA PECTORIS (HCC): ICD-10-CM

## 2023-01-09 DIAGNOSIS — M54.50 ACUTE BILATERAL LOW BACK PAIN WITHOUT SCIATICA: ICD-10-CM

## 2023-01-09 DIAGNOSIS — R11.0 NAUSEA: ICD-10-CM

## 2023-01-09 PROCEDURE — 96372 THER/PROPH/DIAG INJ SC/IM: CPT | Performed by: INTERNAL MEDICINE

## 2023-01-09 PROCEDURE — 99214 OFFICE O/P EST MOD 30 MIN: CPT | Performed by: INTERNAL MEDICINE

## 2023-01-09 PROCEDURE — 74176 CT ABD & PELVIS W/O CONTRAST: CPT

## 2023-01-09 PROCEDURE — 1123F ACP DISCUSS/DSCN MKR DOCD: CPT | Performed by: INTERNAL MEDICINE

## 2023-01-09 RX ORDER — KETOROLAC TROMETHAMINE 30 MG/ML
30 INJECTION, SOLUTION INTRAMUSCULAR; INTRAVENOUS EVERY 6 HOURS PRN
Status: SHIPPED | OUTPATIENT
Start: 2023-01-09 | End: 2023-01-14

## 2023-01-09 RX ORDER — ONDANSETRON 4 MG/1
4 TABLET, ORALLY DISINTEGRATING ORAL 3 TIMES DAILY PRN
Qty: 21 TABLET | Refills: 0 | Status: SHIPPED | OUTPATIENT
Start: 2023-01-09

## 2023-01-09 RX ORDER — KETOROLAC TROMETHAMINE 15 MG/ML
15 INJECTION, SOLUTION INTRAMUSCULAR; INTRAVENOUS
Status: DISCONTINUED | OUTPATIENT
Start: 2023-01-09 | End: 2023-01-09

## 2023-01-09 RX ORDER — KETOROLAC TROMETHAMINE 30 MG/ML
15 INJECTION, SOLUTION INTRAMUSCULAR; INTRAVENOUS EVERY 6 HOURS PRN
Status: SHIPPED | OUTPATIENT
Start: 2023-01-09 | End: 2023-01-14

## 2023-01-09 RX ORDER — HYDROCODONE BITARTRATE AND ACETAMINOPHEN 5; 325 MG/1; MG/1
1 TABLET ORAL EVERY 4 HOURS PRN
Qty: 30 TABLET | Refills: 0 | Status: SHIPPED | OUTPATIENT
Start: 2023-01-09 | End: 2023-01-14

## 2023-01-09 RX ADMIN — KETOROLAC TROMETHAMINE 15 MG: 30 INJECTION, SOLUTION INTRAMUSCULAR; INTRAVENOUS at 15:39

## 2023-01-09 ASSESSMENT — ENCOUNTER SYMPTOMS
ABDOMINAL PAIN: 1
BACK PAIN: 1
NAUSEA: 1
CONSTIPATION: 1

## 2023-01-09 NOTE — PROGRESS NOTES
ASSESSMENT/PLAN:    Controlled Substance Monitoring:    Acute and Chronic Pain Monitoring:   RX Monitoring 1/9/2023   Periodic Controlled Substance Monitoring No signs of potential drug abuse or diversion identified. Stat CT urogram to evaluate for acute kidney stone. Toradol 15 mg IM in the office. Benefit outweighs risk. 5-day prescription for Norco for severe pain as well as Zofran for any nausea either from pain medication and/or the acute process. I will plan to see her and work her in or she will see my nurse practitioner in about a week and check on her pending any results or further management as needed in the interim. Broad differential considered. Doubt compression fracture, doubt aortic dissection given current blood pressure readings although she does have a densely calcified aorta on the x-rays I personally reviewed. Evaluation and management of the chronic condition(s) delineated. No negative side effects reported. I have reviewed all the lab results. There are some abnormalities that are either expected or not critical to the patient's health, and are discussed in the office today and are addressed. Please refer to the above assessement and plan narrative and orders and follow up plan. Medication discussed and refilled as needed. Physical exam findings are stable unless otherwise indicated and this is addressed. The most recent lab work and imaging and consultant/urgent care visits and imaging are reviewed and discussed and considered during this visit encounter. 1. Acute bilateral low back pain without sciatica  -     HYDROcodone-acetaminophen (NORCO) 5-325 MG per tablet; Take 1 tablet by mouth every 4 hours as needed for Pain for up to 5 days. Intended supply: 5 days. Take lowest dose possible to manage pain Max Daily Amount: 6 tablets, Disp-30 tablet, R-0Normal  -     CT ABDOMEN PELVIS RENAL STONE; Future  2.  Other microscopic hematuria  -     CT ABDOMEN PELVIS RENAL STONE; Future  3. Chronic systolic (congestive) heart failure ([facility] [address]  4. Sick sinus syndrome ([facility] [address]  5. Coronary artery disease of bypass graft of native heart with stable angina pectoris (HCC)  6. Stage 3a chronic kidney disease ([facility] [address]  7. Nausea  -     ondansetron (ZOFRAN-ODT) 4 MG disintegrating tablet; Take 1 tablet by mouth 3 times daily as needed for Nausea or Vomiting, Disp-21 tablet, R-0Normal            An electronic signature was used to authenticate this note. -- Stef Mittal MD     Return in about 1 week (around 2023). SUBJECTIVE/OBJECTIVE:      HPI:   Hasmukh Lugo (: 1931 is a 80 y.o. female, here for evaluation of the following chief complaint(s):   Chief Complaint   Patient presents with    Back Pain     Treated at ER on 2023     This patient was seen at the emergency room on Saturday. Back pain. She has more left flank pain than right. Blood in the urine. Urine culture less than 10,000 colonies mixed skin alecia. Her urine was dark yellow. She had a large amount of blood in the urine. Ketones were present. Protein was present, trace. Trace leukocytes. She is very uncomfortable today. Her pain is approximately 10 out of 10. She has had kidney stones in the past couple of years. She had an x-ray of the lumbar spine at the emergency room without obvious fracture. Patient is here for follow-up and management of chronic medical conditions, review of recent labs, review of any imaging completed since our last office visit and discuss any consultants opinions or management changes. Results for orders placed or performed during the hospital encounter of 23 (from the past 2160 hour(s))   Culture, Urine    Specimen: Urine, clean catch   Result Value Ref Range    Special Requests NO SPECIAL REQUESTS      Culture <10,000 COLONIES/mL MIXED SKIN ALECIA ISOLATED     XR LUMBAR SPINE (2-3 VIEWS)    Impression    There is no evidence of fracture or subluxation.  The vertebrae are  well aligned. No bony lesions are seen. Bones appear slightly demineralized. Multilevel degenerative disc changes are present with the worst levels at L4-5  and L5-S1. Degenerative facet changes noted at L4-5 and L5-S1. Calcified aortic  plaque is present without obvious aneurysm. Urinalysis w rflx microscopic   Result Value Ref Range    Color, UA DARK YELLOW      Appearance CLEAR      Specific Gravity, UA 1.015 1.001 - 1.023      pH, Urine 6.5 5.0 - 9.0      Protein, UA TRACE (A) NEG mg/dL    Glucose, UA Negative mg/dL    Ketones, Urine TRACE (A) NEG mg/dL    Bilirubin Urine Negative NEG      Blood, Urine LARGE (A) NEG      Urobilinogen, Urine 1.0 0.2 - 1.0 EU/dL    Nitrite, Urine Negative NEG      Leukocyte Esterase, Urine TRACE (A) NEG      WBC, UA 0-3 0 /hpf    RBC, UA 20-50 0 /hpf    Epithelial Cells UA 0-3 0 /hpf    BACTERIA, URINE 0 0 /hpf     Xray Result (most recent):  XR LUMBAR SPINE (2-3 VIEWS) 01/07/2023    Narrative  XR LUMBAR SPINE (2-3 VIEWS) 1/7/2023 1:09 PM    HISTORY: Left lower back pain without history of injury. COMPARISON: Lumbar spine x-ray 9/1/2020. AP and lateral views of the lumbar spine were obtained    Impression  There is no evidence of fracture or subluxation. The vertebrae are  well aligned. No bony lesions are seen. Bones appear slightly demineralized. Multilevel degenerative disc changes are present with the worst levels at L4-5  and L5-S1. Degenerative facet changes noted at L4-5 and L5-S1. Calcified aortic  plaque is present without obvious aneurysm.     Lab Results   Component Value Date/Time     07/05/2022 01:51 PM    K 4.5 07/05/2022 01:51 PM     07/05/2022 01:51 PM    CO2 28 07/05/2022 01:51 PM    BUN 19 07/05/2022 01:51 PM    CREATININE 1.00 07/05/2022 01:51 PM    GLUCOSE 87 07/05/2022 01:51 PM    CALCIUM 9.9 07/05/2022 01:51 PM      Lab Results   Component Value Date    WBC 6.6 07/05/2022    HGB 15.0 07/05/2022    HCT 47.9 (H) 07/05/2022 MCV 92.8 07/05/2022     07/05/2022         Labs reviewed and discussed and medication refilled as needed for chronic medications during ov or adjusted based on lab results and/or our discussion as appropriate. See discussion. The patient's available records and electronic chart records are reviewed. The PMH, PSH, medications, allergies, medications, FH, health maintenance and vaccination status are all reviewed and updated as appropriate. Records from outside providers have been reviewed, summarized, and considered as noted in the history of present illness, past medical history, and objective data of this note and encounter. Health Maintenance   Topic Date Due    Shingles vaccine (1 of 2) Never done    COVID-19 Vaccine (2 - Pfizer series) 02/10/2021    Annual Wellness Visit (AWV)  01/06/2023    Depression Screen  04/19/2023    Lipids  07/05/2023    DTaP/Tdap/Td vaccine (2 - Td or Tdap) 09/02/2023    Flu vaccine  Completed    Pneumococcal 65+ years Vaccine  Completed    Hepatitis A vaccine  Aged Out    Hib vaccine  Aged Out    Meningococcal (ACWY) vaccine  Aged Out     Patient Active Problem List   Diagnosis    Mixed dyslipidemia    Osteoporosis    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 ([facility] [address]    Primary osteoarthritis of both knees    Ischemic heart disease    Hypertensive urgency    Esophageal reflux    CAD (coronary artery disease)    Left foot pain    HTN (hypertension)    CVD (cerebrovascular disease)    History of cerebellar stroke    Chronic renal disease, stage III (HCC) [110641]    Chronic systolic (congestive) heart failure    Strain of lumbar region    Acute cystitis with hematuria       Review of Systems   Constitutional:  Positive for chills. Negative for fever. Gastrointestinal:  Positive for abdominal pain, constipation and nausea. Genitourinary:  Positive for difficulty urinating and hematuria. Musculoskeletal:  Positive for back pain.      Lab Results Component Value Date/Time    WBC 6.6 07/05/2022 01:51 PM    HGB 15.0 07/05/2022 01:51 PM    HCT 47.9 07/05/2022 01:51 PM    MCV 92.8 07/05/2022 01:51 PM    RDW 13.2 07/05/2022 01:51 PM     07/05/2022 01:51 PM    NEUTOPHILPCT 56 04/21/2021 01:53 PM    LYMPHOPCT 30 07/05/2022 01:51 PM    LYMPHOPCT 32 04/21/2021 01:53 PM    MONOPCT 8 07/05/2022 01:51 PM    MONOPCT 7 04/21/2021 01:53 PM    EOSRELPCT 5 07/05/2022 01:51 PM    BASOPCT 1 07/05/2022 01:51 PM    BASOPCT 1 04/21/2021 01:53 PM    LYMPHSABS 2.0 07/05/2022 01:51 PM    LYMPHSABS 1.7 04/21/2021 01:53 PM    MONOSABS 0.5 07/05/2022 01:51 PM    MONOSABS 0.4 04/21/2021 01:53 PM    EOSABS 0.3 07/05/2022 01:51 PM    EOSABS 0.2 04/21/2021 01:53 PM    BASOSABS 0.1 07/05/2022 01:51 PM    IMMGRAN 0 07/05/2022 01:51 PM    GRANULOCYTEABSOLUTECOUNT 0.0 04/21/2021 01:53 PM       Lab Results   Component Value Date/Time     07/05/2022 01:51 PM    K 4.5 07/05/2022 01:51 PM     07/05/2022 01:51 PM    CO2 28 07/05/2022 01:51 PM    ANIONGAP 5 07/05/2022 01:51 PM    GLUCOSE 87 07/05/2022 01:51 PM    BUN 19 07/05/2022 01:51 PM    CREATININE 1.00 07/05/2022 01:51 PM    GFRAA >60 07/05/2022 01:51 PM    LABGLOM 55 07/05/2022 01:51 PM    CALCIUM 9.9 07/05/2022 01:51 PM    BILITOT 0.5 07/05/2022 01:51 PM    ALT 26 07/05/2022 01:51 PM    AST 17 07/05/2022 01:51 PM    ALKPHOS 51 07/05/2022 01:51 PM    ALKPHOS 57 12/30/2021 11:05 AM    PROT 7.1 07/05/2022 01:51 PM    LABALBU 4.0 07/05/2022 01:51 PM    GLOB 3.1 07/05/2022 01:51 PM    ALBUMIN 1.3 07/05/2022 01:51 PM       Lab Results   Component Value Date/Time    CHOL 154 07/05/2022 01:51 PM    HDL 47 07/05/2022 01:51 PM    TRIG 124 07/05/2022 01:51 PM    LDLCALC 82.2 07/05/2022 01:51 PM    VLDL 17 12/30/2021 11:05 AM       Lab Results   Component Value Date/Time    LABA1C 5.9 02/17/2020 05:23 AM       Lab Results   Component Value Date/Time    TSH 2.290 12/30/2021 11:05 AM       No results found for: PSA    Lab Results Component Value Date/Time    SPECGRAV 1.015 01/07/2023 12:16 PM    PHUR 6.0 12/30/2021 11:05 AM    COLORU DARK YELLOW 01/07/2023 12:16 PM    CLARITYU Clear 12/30/2021 11:05 AM    LEUKOCYTESUR TRACE 01/07/2023 12:16 PM    PROTEINU TRACE 01/07/2023 12:16 PM    GLUCOSEU Negative 01/07/2023 12:16 PM    KETUA TRACE 01/07/2023 12:16 PM    BLOODU LARGE 01/07/2023 12:16 PM    BILIRUBINUR Negative 01/07/2023 12:16 PM    BILIRUBINUR Negative 12/30/2021 11:05 AM    UROBILINOGEN 1.0 01/07/2023 12:16 PM    NITRU Negative 01/07/2023 12:16 PM    LABMICR See additional order 12/30/2021 11:05 AM           Vitals:    01/09/23 1433   BP: 130/68   Site: Right Upper Arm   Position: Sitting   Cuff Size: Small Adult   Pulse: 70   Temp: 99.1 °F (37.3 °C)   TempSrc: Skin   SpO2: 97%   Weight: 137 lb (62.1 kg)   Height: 5' 6\" (1.676 m)     Wt Readings from Last 3 Encounters:   01/09/23 137 lb (62.1 kg)   01/07/23 135 lb (61.2 kg)   07/12/22 135 lb (61.2 kg)     BP Readings from Last 3 Encounters:   01/09/23 130/68   01/07/23 (!) 147/77   07/12/22 112/68     Physical Exam

## 2023-01-10 ENCOUNTER — TELEPHONE (OUTPATIENT)
Dept: INTERNAL MEDICINE CLINIC | Facility: CLINIC | Age: 88
End: 2023-01-10

## 2023-01-10 DIAGNOSIS — Z95.0 PACEMAKER: ICD-10-CM

## 2023-01-10 DIAGNOSIS — R00.1 BRADYCARDIA: ICD-10-CM

## 2023-01-10 LAB
BACTERIA SPEC CULT: NORMAL
SERVICE CMNT-IMP: NORMAL

## 2023-01-10 NOTE — TELEPHONE ENCOUNTER
Pt called requiring results of the CT scan of the abdomen that was done on yesterday. Informed pt after scan is read, she will receive a call with results. Please advise    Thank you    Ashley Davis

## 2023-01-10 NOTE — RESULT ENCOUNTER NOTE
Images personally reviewed. No obvious obstructing kidney stone. No obvious fractures in the spine. No obvious acute abnormality. Chronic changes as noted in report. Due to vascular calcifications, I cannot say with certainty she has not passed a stone. Please call and check on her and see how she is feeling today.

## 2023-01-12 ENCOUNTER — TELEPHONE (OUTPATIENT)
Dept: INTERNAL MEDICINE CLINIC | Facility: CLINIC | Age: 88
End: 2023-01-12

## 2023-01-12 DIAGNOSIS — R31.29 OTHER MICROSCOPIC HEMATURIA: ICD-10-CM

## 2023-01-12 DIAGNOSIS — N30.00 ACUTE CYSTITIS WITHOUT HEMATURIA: ICD-10-CM

## 2023-01-12 DIAGNOSIS — M54.50 ACUTE BILATERAL LOW BACK PAIN WITHOUT SCIATICA: Primary | ICD-10-CM

## 2023-01-12 DIAGNOSIS — N18.31 STAGE 3A CHRONIC KIDNEY DISEASE (HCC): ICD-10-CM

## 2023-01-12 RX ORDER — CIPROFLOXACIN 250 MG/1
250 TABLET, FILM COATED ORAL 2 TIMES DAILY
Qty: 14 TABLET | Refills: 0 | Status: SHIPPED | OUTPATIENT
Start: 2023-01-12 | End: 2023-01-19

## 2023-01-12 NOTE — TELEPHONE ENCOUNTER
Please contact pt to schedule a virtual or telephone call only visit with first available provider to discuss need for muscle relaxer. Thank you.

## 2023-01-12 NOTE — TELEPHONE ENCOUNTER
----- Message from Rohit Wong sent at 1/11/2023 10:17 AM EST -----  Subject: Message to Provider    QUESTIONS  Information for Provider? Patient complains of severe back pain. She   states that she cant get in and out of bed and is having a hard to walking   around due to the pain. The pain makes her feel very nauseous. Patient   wants to see if she can get a muscle relaxer called in to her pharmacy. Please advise [id number] [address], [address] [facility] [address] Diana Barron [phone] [place] [phone]  ---------------------------------------------------------------------------  --------------  Heaven CROWLEY  [phone]; OK to leave message on voicemail  ---------------------------------------------------------------------------  --------------  SCRIPT ANSWERS  Relationship to Patient?  Self

## 2023-01-12 NOTE — PROGRESS NOTES
Call and let her know. Orders Placed This Encounter    NM BONE SCAN LIMITED     Standing Status:   Future     Standing Expiration Date:   1/12/2024     Order Specific Question:   Reason for exam:     Answer:   back pain, flank pain, CT urogram negative, pain started after physical activity. Evaluate for acute compression fracture.     ciprofloxacin (CIPRO) 250 MG tablet     Sig: Take 1 tablet by mouth 2 times daily for 7 days     Dispense:  14 tablet     Refill:  0    Russell Turner MD

## 2023-01-13 ENCOUNTER — TELEMEDICINE (OUTPATIENT)
Dept: INTERNAL MEDICINE CLINIC | Facility: CLINIC | Age: 88
End: 2023-01-13
Payer: COMMERCIAL

## 2023-01-13 DIAGNOSIS — M54.50 ACUTE MIDLINE LOW BACK PAIN WITHOUT SCIATICA: Primary | ICD-10-CM

## 2023-01-13 PROCEDURE — 99442 PR PHYS/QHP TELEPHONE EVALUATION 11-20 MIN: CPT | Performed by: INTERNAL MEDICINE

## 2023-01-13 RX ORDER — TIZANIDINE 2 MG/1
2 TABLET ORAL EVERY 8 HOURS PRN
Qty: 30 TABLET | Refills: 0 | Status: SHIPPED | OUTPATIENT
Start: 2023-01-13

## 2023-01-13 ASSESSMENT — ENCOUNTER SYMPTOMS
RESPIRATORY NEGATIVE: 1
BACK PAIN: 1

## 2023-01-13 NOTE — PROGRESS NOTES
[facility]      2023    Patient Name: Silvia Abad  :  1931    Subjective:    Chief Complaint:  Chief Complaint   Patient presents with    Back Pain         HPI I was at home while conducting this encounter. Consent:  She and/or her healthcare decision maker is aware that this patient-initiated Telehealth encounter is a billable service, with coverage as determined by her insurance carrier. She is aware that she may receive a bill and has provided verbal consent to proceed: Yes    This virtual visit was conducted telephone encounter only. -  I affirm this is a Patient Initiated Episode with an Established Patient who has not had a related appointment within my department in the past 7 days or scheduled within the next 24 hours. Note: this encounter is not billable if this call serves to triage the patient into an appointment for the relevant concern. Total Time: minutes: 11-20 minutes. C/o low back pain, polished furniture last weekend; she saw Dr. Jefrfey Orellana in the office 23, CT urogram ordered to evaluate for kidney stone; she was prescribed Norco 5 q4 hours prn, #30 at that time, and she has pills remaining; she is using Asper cream, heat, ice packs; CT abd/pelv 23 revealed bilateral renal calcifications which could be vascular or represent non-obstructing renal calculi; no evidence of obstructive uropathy; report reviewed; she denies blood in urine, drinking plenty of water; she denies shooting pain down her legs, but does have some numbness in her hands and feet; lumbar xray 23 negative for fracture or sublaxation, does have mulilevel degenerative disc changes at L4-5, L5-S1; degenerative facet changes noted at L4-5 and L5-S1; calcified aortic plaque present wo obvious aneurysm; she is no longer taking Cipro;      Past Medical History:   Diagnosis Date    CAD (coronary artery disease)     2 MI    Calculus of kidney     Closed nondisplaced fracture of fifth left metatarsal bone 5/18/2021    Congestive heart failure (HCC)     Coronary artery disease involving coronary bypass graft of native heart without angina pectoris 12/14/2015    CVD (cerebrovascular disease) 8/19/2013    Dyslipidemia 8/19/2013    GERD (gastroesophageal reflux disease)     HTN (hypertension) 8/19/2013    Left breast mass 4/13/2017    Diagnostic Mammogram negative. Long term current use of anticoagulant therapy     Myocardial infarction [facility]) 2005, 2006    [facility], Dr. Daisy Griffin    Osteoporosis 8/19/2013    Positive H. pylori test 8/19/2013    Rectal polyp 10/9/2017    Anoscopy:  Revealed large cauliflower-like polyp at the anterior midline just above the dentate line, mobile soft, to have removed with Dr. Patria Schumacher ([facility]), benign. No more fecal leakage. 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/2020    Stroke ([address] 1/7/2009    no deficits expect patient reports going to one side if she gets up too fast    Thromboembolus [facility])     [address]       Past Surgical History:   Procedure Laterality Date    APPENDECTOMY      BLADDER SUSPENSION  2005    COLONOSCOPY  2012    [address]    @ [address]    GYN      hysterectomy    HYSTERECTOMY, TOTAL ABDOMINAL ([address] [facility])  1972    LITHOTRIPSY  2008 x 2    ORTHOPEDIC SURGERY      2 rotater cuff right shoulder    [place] UNLISTED PROCEDURE CARDIAC SURGERY      by pass    UROLOGICAL SURGERY      suspension       Family History   Problem Relation Age of Onset    Emergence Delirium Neg Hx     Heart Disease Brother     Cancer Brother         prostate    Heart Disease Brother     Heart Disease Father     Stroke Mother     Heart Disease Mother     Post-op Cognitive Dysfunction Neg Hx     Other Neg Hx     Breast Cancer Neg Hx     Heart Disease Brother     Heart Disease Brother     [address] Hypertherm Neg Hx     Pseudochol.  Deficiency Neg Hx     Delayed Awakening Neg Hx     Post-op Nausea/Vomiting Neg Hx        Social History     Tobacco Use    Smoking status: Never    Smokeless tobacco: Never   Vaping Use    Vaping Use: Never used   Substance Use Topics    Alcohol use: No    Drug use: No                 Current Outpatient Medications:     tiZANidine (ZANAFLEX) 2 MG tablet, Take 1 tablet by mouth every 8 hours as needed (back pain), Disp: 30 tablet, Rfl: 0    ciprofloxacin (CIPRO) 250 MG tablet, Take 1 tablet by mouth 2 times daily for 7 days, Disp: 14 tablet, Rfl: 0    HYDROcodone-acetaminophen (NORCO) 5-325 MG per tablet, Take 1 tablet by mouth every 4 hours as needed for Pain for up to 5 days. Intended supply: 5 days.  Take lowest dose possible to manage pain Max Daily Amount: 6 tablets, Disp: 30 tablet, Rfl: 0    ondansetron (ZOFRAN-ODT) 4 MG disintegrating tablet, Take 1 tablet by mouth 3 times daily as needed for Nausea or Vomiting, Disp: 21 tablet, Rfl: 0    lidocaine 4 % external patch, Place 1 patch onto the skin daily (Patient not taking: Reported on 1/9/2023), Disp: 30 patch, Rfl: 0    irbesartan (AVAPRO) 75 MG tablet, TAKE 1 TABLET DAILY, Disp: 90 tablet, Rfl: 3    carvedilol (COREG) 3.125 MG tablet, Take 1 tablet by mouth 2 times daily TAKE 1 TABLET TWICE DAILY WITH MEALS, Disp: 180 tablet, Rfl: 3    clopidogrel (PLAVIX) 75 MG tablet, Take 1 tablet by mouth daily The details of the medication are not available because there are pending changes by a home health clinician., Disp: 90 tablet, Rfl: 3    isosorbide mononitrate (IMDUR) 60 MG extended release tablet, Take 1 tablet by mouth daily, Disp: 90 tablet, Rfl: 3    rosuvastatin (CRESTOR) 20 MG tablet, Take 1 tablet by mouth daily TAKE 1 TABLET NIGHTLY, Disp: 90 tablet, Rfl: 3    spironolactone (ALDACTONE) 25 MG tablet, Take 1 tablet by mouth daily, Disp: 90 tablet, Rfl: 3    Cranberry 250 MG TABS, Take 4,200 mg by mouth 2 times daily, Disp: , Rfl:     Multiple Vitamins-Minerals (HAIR SKIN AND NAILS FORMULA PO), Take by mouth, Disp: , Rfl:     aspirin 81 MG EC tablet, Take 81 mg by mouth daily, Disp: , Rfl: pantoprazole (PROTONIX) 20 MG tablet, Take 1 tablet by mouth daily, Disp: 90 tablet, Rfl: 3    APPLE CIDER VINEGAR PO, Take 480 mg by mouth daily, Disp: , Rfl:     TURMERIC PO, Take 500 mg by mouth daily, Disp: , Rfl:     Cholecalciferol 50 MCG (2000 UT) CAPS, Take 2,000 Units by mouth daily, Disp: , Rfl:     cyanocobalamin 100 MCG tablet, Take 100 mcg by mouth daily, Disp: , Rfl:     Lidocaine, Anorectal, 5 % CREA, Actual prescription: Lidocaine 5%: Neurontin/gabapentin 5% combined topical cream/gelApply to affected area up to 4 times a day as needed for pain (Patient not taking: Reported on 1/9/2023), Disp: , Rfl:     loratadine (CLARITIN) 10 MG tablet, Take 10 mg by mouth daily, Disp: , Rfl:     montelukast (SINGULAIR) 10 MG tablet, Take 10 mg by mouth daily (Patient not taking: No sig reported), Disp: , Rfl:     Allergies   Allergen Reactions    Amlodipine Other (See Comments)     Other reaction(s): Unknown-Unspecified    Quinapril Other (See Comments)     Other reaction(s): Cough-Intolerance    Sertraline Other (See Comments)     Other reaction(s): Drowsiness    Prednisone Itching, Palpitations and Other (See Comments)     Per pt also had heart burn       Review of Systems   Constitutional: Negative. HENT: Negative. Respiratory: Negative. Musculoskeletal:  Positive for back pain and myalgias. Objective: There were no vitals taken for this visit. Examination:  Physical Exam  Neurological:      Mental Status: She is alert. Psychiatric:         Mood and Affect: Mood normal.         Thought Content: Thought content normal.         Judgment: Judgment normal.         Assessment/Plan:    Des Reyes was seen today for back pain. Diagnoses and all orders for this visit:    Acute midline low back pain without sciatica  Instructed to take medications as prescribed, and to call if no improvement in symptoms. -     tiZANidine (ZANAFLEX) 2 MG tablet;  Take 1 tablet by mouth every 8 hours as needed (back pain)        Follow-up and Dispositions    Return if symptoms worsen or fail to improve. Medication Reconciliation:  Current Medications Verified: Current medications/ immunizations were reviewed, including purpose, with patient. Family History, Social History, Current and Past Medical History was reviewed with patient and updated at today's office visit. Medication Reconciliation list was given to patient/ family. Patient was advised to discard old medication lists and provide all providers with current list at each visit and carry list with them in case of emergency.     Completed By:   Marco Juan MD    [facility] & [address], [address] [facility] [address]  [place], [address]  [phone]  [phone] fax  4:07 PM

## 2023-01-16 ENCOUNTER — TELEPHONE (OUTPATIENT)
Dept: INTERNAL MEDICINE CLINIC | Facility: CLINIC | Age: 88
End: 2023-01-16

## 2023-01-16 DIAGNOSIS — N30.00 ACUTE CYSTITIS WITHOUT HEMATURIA: ICD-10-CM

## 2023-01-16 DIAGNOSIS — M54.50 ACUTE BILATERAL LOW BACK PAIN WITHOUT SCIATICA: ICD-10-CM

## 2023-01-16 RX ORDER — CIPROFLOXACIN 250 MG/1
250 TABLET, FILM COATED ORAL 2 TIMES DAILY
Qty: 14 TABLET | Refills: 0 | Status: SHIPPED | OUTPATIENT
Start: 2023-01-16 | End: 2023-01-23

## 2023-01-16 NOTE — TELEPHONE ENCOUNTER
Spoke with pt who states that she did not receive the abx that Dr. Deshaun Jimenez sent in for her. Checked pt's chart and it was sent to mail order pharmacy instead of local. Notified pt that we will send to local [facility] and pt verbalized understanding. Pt also notified to return radiology scheduling to schedule the bone scan that Dr. Deshaun Jimenez ordered. Rad scheduling number given to pt and pt verbalized understanding. Pt will go to ER if pain becomes unbearable.

## 2023-01-16 NOTE — TELEPHONE ENCOUNTER
Pt states she was told to call back if not feeling better after appt on Friday 1/13/23. Pt was given prescription for given tizanidine 2mg.

## 2023-01-16 NOTE — TELEPHONE ENCOUNTER
Would like to speak w/ an MA regarding pain. Asks if she should go to ER. Would like confirmation before going.

## 2023-01-16 NOTE — TELEPHONE ENCOUNTER
Orders Placed This Encounter    ciprofloxacin (CIPRO) 250 MG tablet     Sig: Take 1 tablet by mouth 2 times daily for 7 days     Dispense:  14 tablet     Refill:  0         Moise Cote MD

## 2023-01-17 ENCOUNTER — HOSPITAL ENCOUNTER (OUTPATIENT)
Dept: NUCLEAR MEDICINE | Age: 88
Discharge: HOME | End: 2023-01-20
Payer: COMMERCIAL

## 2023-01-17 DIAGNOSIS — M54.50 ACUTE BILATERAL LOW BACK PAIN WITHOUT SCIATICA: ICD-10-CM

## 2023-01-17 PROCEDURE — 3430000000 HC RX DIAGNOSTIC RADIOPHARMACEUTICAL: Performed by: INTERNAL MEDICINE

## 2023-01-17 PROCEDURE — A9503 TC99M MEDRONATE: HCPCS | Performed by: INTERNAL MEDICINE

## 2023-01-17 PROCEDURE — 78306 BONE IMAGING WHOLE BODY: CPT | Performed by: INTERNAL MEDICINE

## 2023-01-17 RX ORDER — TC 99M MEDRONATE 20 MG/10ML
25 INJECTION, POWDER, LYOPHILIZED, FOR SOLUTION INTRAVENOUS
Status: COMPLETED | OUTPATIENT
Start: 2023-01-17 | End: 2023-01-17

## 2023-01-17 RX ORDER — TC 99M MEDRONATE 20 MG/10ML
25 INJECTION, POWDER, LYOPHILIZED, FOR SOLUTION INTRAVENOUS
OUTPATIENT
Start: 2023-01-17

## 2023-01-17 RX ADMIN — TC 99M MEDRONATE 28 MILLICURIE: 20 INJECTION, POWDER, LYOPHILIZED, FOR SOLUTION INTRAVENOUS at 10:45

## 2023-01-17 NOTE — TELEPHONE ENCOUNTER
Called and lvm informing pt [facility]

## 2023-01-19 ENCOUNTER — TELEPHONE (OUTPATIENT)
Dept: INTERNAL MEDICINE CLINIC | Facility: CLINIC | Age: 88
End: 2023-01-19

## 2023-01-19 NOTE — TELEPHONE ENCOUNTER
Patient called inquiring about when Dr. Brad Pike is going to call her to discuss results of the NM Bone Scan she had last week. Please call her asap.

## 2023-01-25 DIAGNOSIS — E78.2 MIXED DYSLIPIDEMIA: ICD-10-CM

## 2023-01-25 DIAGNOSIS — Z86.73 HISTORY OF CEREBELLAR STROKE: ICD-10-CM

## 2023-01-25 DIAGNOSIS — Z23 ENCOUNTER FOR IMMUNIZATION: ICD-10-CM

## 2023-01-25 DIAGNOSIS — I10 PRIMARY HYPERTENSION: ICD-10-CM

## 2023-01-25 DIAGNOSIS — N18.31 STAGE 3A CHRONIC KIDNEY DISEASE (HCC): ICD-10-CM

## 2023-01-25 DIAGNOSIS — I50.22 CHRONIC SYSTOLIC (CONGESTIVE) HEART FAILURE (HCC): ICD-10-CM

## 2023-01-25 DIAGNOSIS — I25.708 CORONARY ARTERY DISEASE OF BYPASS GRAFT OF NATIVE HEART WITH STABLE ANGINA PECTORIS (HCC): ICD-10-CM

## 2023-01-25 DIAGNOSIS — K21.9 GASTROESOPHAGEAL REFLUX DISEASE WITHOUT ESOPHAGITIS: ICD-10-CM

## 2023-01-25 LAB
ANION GAP SERPL CALC-SCNC: 10 MMOL/L (ref 2–11)
BUN SERPL-MCNC: 13 MG/DL (ref 8–23)
CALCIUM SERPL-MCNC: 10.3 MG/DL (ref 8.3–10.4)
CHLORIDE SERPL-SCNC: 107 MMOL/L (ref 101–110)
CHOLEST SERPL-MCNC: 134 MG/DL
CO2 SERPL-SCNC: 23 MMOL/L (ref 21–32)
CREAT SERPL-MCNC: 1.1 MG/DL (ref 0.6–1)
ERYTHROCYTE [DISTWIDTH] IN BLOOD BY AUTOMATED COUNT: 11.9 % (ref 11.9–14.6)
GLUCOSE SERPL-MCNC: 98 MG/DL (ref 65–100)
HCT VFR BLD AUTO: 46.8 % (ref 35.8–46.3)
HDLC SERPL-MCNC: 40 MG/DL (ref 40–60)
HDLC SERPL: 3.4
HGB BLD-MCNC: 15.6 G/DL (ref 11.7–15.4)
LDLC SERPL CALC-MCNC: 76.4 MG/DL
MCH RBC QN AUTO: 29.5 PG (ref 26.1–32.9)
MCHC RBC AUTO-ENTMCNC: 33.3 G/DL (ref 31.4–35)
MCV RBC AUTO: 88.6 FL (ref 82–102)
NRBC # BLD: 0 K/UL (ref 0–0.2)
PLATELET # BLD AUTO: 300 K/UL (ref 150–450)
PMV BLD AUTO: 10.3 FL (ref 9.4–12.3)
POTASSIUM SERPL-SCNC: 3.9 MMOL/L (ref 3.5–5.1)
RBC # BLD AUTO: 5.28 M/UL (ref 4.05–5.2)
SODIUM SERPL-SCNC: 140 MMOL/L (ref 133–143)
TRIGL SERPL-MCNC: 88 MG/DL (ref 35–150)
VLDLC SERPL CALC-MCNC: 17.6 MG/DL (ref 6–23)
WBC # BLD AUTO: 11 K/UL (ref 4.3–11.1)

## 2023-01-29 ENCOUNTER — ANESTHESIA EVENT (OUTPATIENT)
Dept: INTERVENTIONAL RADIOLOGY/VASCULAR | Age: 88
End: 2023-01-29

## 2023-01-29 RX ORDER — ONDANSETRON 2 MG/ML
4 INJECTION INTRAMUSCULAR; INTRAVENOUS
OUTPATIENT
Start: 2023-01-29 | End: 2023-01-30

## 2023-01-29 RX ORDER — MIDAZOLAM HYDROCHLORIDE 2 MG/2ML
2 INJECTION, SOLUTION INTRAMUSCULAR; INTRAVENOUS
OUTPATIENT
Start: 2023-01-29 | End: 2023-01-30

## 2023-01-29 RX ORDER — SODIUM CHLORIDE 0.9 % (FLUSH) 0.9 %
5-40 SYRINGE (ML) INJECTION PRN
OUTPATIENT
Start: 2023-01-29

## 2023-01-29 RX ORDER — IPRATROPIUM BROMIDE AND ALBUTEROL SULFATE 2.5; .5 MG/3ML; MG/3ML
1 SOLUTION RESPIRATORY (INHALATION)
OUTPATIENT
Start: 2023-01-29 | End: 2023-01-30

## 2023-01-29 RX ORDER — SODIUM CHLORIDE 0.9 % (FLUSH) 0.9 %
5-40 SYRINGE (ML) INJECTION EVERY 12 HOURS SCHEDULED
OUTPATIENT
Start: 2023-01-29

## 2023-01-29 RX ORDER — LIDOCAINE HYDROCHLORIDE 10 MG/ML
1 INJECTION, SOLUTION INFILTRATION; PERINEURAL
OUTPATIENT
Start: 2023-01-29 | End: 2023-01-30

## 2023-01-29 RX ORDER — OXYCODONE HYDROCHLORIDE 5 MG/1
5 TABLET ORAL
OUTPATIENT
Start: 2023-01-29 | End: 2023-01-30

## 2023-01-29 RX ORDER — ACETAMINOPHEN 325 MG/1
650 TABLET ORAL ONCE
OUTPATIENT
Start: 2023-01-29 | End: 2023-01-29

## 2023-01-29 RX ORDER — HYDROMORPHONE HCL 110MG/55ML
0.5 PATIENT CONTROLLED ANALGESIA SYRINGE INTRAVENOUS EVERY 10 MIN PRN
OUTPATIENT
Start: 2023-01-29

## 2023-01-29 RX ORDER — SODIUM CHLORIDE, SODIUM LACTATE, POTASSIUM CHLORIDE, CALCIUM CHLORIDE 600; 310; 30; 20 MG/100ML; MG/100ML; MG/100ML; MG/100ML
INJECTION, SOLUTION INTRAVENOUS CONTINUOUS
OUTPATIENT
Start: 2023-01-29

## 2023-01-29 RX ORDER — HALOPERIDOL 5 MG/ML
1 INJECTION INTRAMUSCULAR
OUTPATIENT
Start: 2023-01-29 | End: 2023-01-30

## 2023-01-29 RX ORDER — FENTANYL CITRATE 50 UG/ML
50 INJECTION, SOLUTION INTRAMUSCULAR; INTRAVENOUS EVERY 5 MIN PRN
OUTPATIENT
Start: 2023-01-29

## 2023-01-30 ENCOUNTER — HOSPITAL ENCOUNTER (OUTPATIENT)
Dept: INTERVENTIONAL RADIOLOGY/VASCULAR | Age: 88
Discharge: HOME | End: 2023-02-02
Payer: COMMERCIAL

## 2023-01-30 ENCOUNTER — ANESTHESIA (OUTPATIENT)
Dept: INTERVENTIONAL RADIOLOGY/VASCULAR | Age: 88
End: 2023-01-30

## 2023-01-30 VITALS
TEMPERATURE: 98.6 F | WEIGHT: 137 LBS | RESPIRATION RATE: 18 BRPM | OXYGEN SATURATION: 97 % | HEIGHT: 66 IN | BODY MASS INDEX: 22.02 KG/M2 | HEART RATE: 68 BPM | SYSTOLIC BLOOD PRESSURE: 151 MMHG | DIASTOLIC BLOOD PRESSURE: 67 MMHG

## 2023-01-30 DIAGNOSIS — S22.070S COMPRESSION FRACTURE OF T10 VERTEBRA, SEQUELA: ICD-10-CM

## 2023-01-30 PROCEDURE — C1713 ANCHOR/SCREW BN/BN,TIS/BN: HCPCS

## 2023-01-30 PROCEDURE — 3700000000 HC ANESTHESIA ATTENDED CARE

## 2023-01-30 PROCEDURE — 6360000002 HC RX W HCPCS: Performed by: NURSE ANESTHETIST, CERTIFIED REGISTERED

## 2023-01-30 PROCEDURE — 2500000003 HC RX 250 WO HCPCS: Performed by: RADIOLOGY

## 2023-01-30 PROCEDURE — 2500000003 HC RX 250 WO HCPCS: Performed by: NURSE ANESTHETIST, CERTIFIED REGISTERED

## 2023-01-30 PROCEDURE — 3700000001 HC ADD 15 MINUTES (ANESTHESIA)

## 2023-01-30 PROCEDURE — 2580000003 HC RX 258: Performed by: NURSE ANESTHETIST, CERTIFIED REGISTERED

## 2023-01-30 RX ORDER — PROPOFOL 10 MG/ML
INJECTION, EMULSION INTRAVENOUS CONTINUOUS PRN
Status: DISCONTINUED | OUTPATIENT
Start: 2023-01-30 | End: 2023-01-30 | Stop reason: SDUPTHER

## 2023-01-30 RX ORDER — KETAMINE HYDROCHLORIDE 50 MG/ML
INJECTION, SOLUTION, CONCENTRATE INTRAMUSCULAR; INTRAVENOUS PRN
Status: DISCONTINUED | OUTPATIENT
Start: 2023-01-30 | End: 2023-01-30 | Stop reason: SDUPTHER

## 2023-01-30 RX ORDER — SODIUM CHLORIDE, SODIUM LACTATE, POTASSIUM CHLORIDE, CALCIUM CHLORIDE 600; 310; 30; 20 MG/100ML; MG/100ML; MG/100ML; MG/100ML
INJECTION, SOLUTION INTRAVENOUS CONTINUOUS PRN
Status: DISCONTINUED | OUTPATIENT
Start: 2023-01-30 | End: 2023-01-30 | Stop reason: SDUPTHER

## 2023-01-30 RX ORDER — LIDOCAINE HYDROCHLORIDE 20 MG/ML
INJECTION, SOLUTION EPIDURAL; INFILTRATION; INTRACAUDAL; PERINEURAL PRN
Status: DISCONTINUED | OUTPATIENT
Start: 2023-01-30 | End: 2023-01-30 | Stop reason: SDUPTHER

## 2023-01-30 RX ORDER — LIDOCAINE HYDROCHLORIDE 20 MG/ML
INJECTION, SOLUTION INFILTRATION; PERINEURAL
Status: COMPLETED | OUTPATIENT
Start: 2023-01-30 | End: 2023-01-30

## 2023-01-30 RX ORDER — ONDANSETRON 2 MG/ML
INJECTION INTRAMUSCULAR; INTRAVENOUS PRN
Status: DISCONTINUED | OUTPATIENT
Start: 2023-01-30 | End: 2023-01-30 | Stop reason: SDUPTHER

## 2023-01-30 RX ADMIN — SODIUM CHLORIDE, SODIUM LACTATE, POTASSIUM CHLORIDE, AND CALCIUM CHLORIDE: 600; 310; 30; 20 INJECTION, SOLUTION INTRAVENOUS at 14:39

## 2023-01-30 RX ADMIN — LIDOCAINE HYDROCHLORIDE 2 ML: 20 INJECTION, SOLUTION INFILTRATION; PERINEURAL at 15:21

## 2023-01-30 RX ADMIN — PROPOFOL 100 MCG/KG/MIN: 10 INJECTION, EMULSION INTRAVENOUS at 14:43

## 2023-01-30 RX ADMIN — KETAMINE HYDROCHLORIDE 10 MG: 50 INJECTION, SOLUTION INTRAMUSCULAR; INTRAVENOUS at 14:48

## 2023-01-30 RX ADMIN — ONDANSETRON 4 MG: 2 INJECTION INTRAMUSCULAR; INTRAVENOUS at 15:01

## 2023-01-30 RX ADMIN — LIDOCAINE HYDROCHLORIDE 40 MG: 20 INJECTION, SOLUTION EPIDURAL; INFILTRATION; INTRACAUDAL; PERINEURAL at 14:43

## 2023-01-30 ASSESSMENT — PAIN - FUNCTIONAL ASSESSMENT: PAIN_FUNCTIONAL_ASSESSMENT: 0-10

## 2023-01-30 ASSESSMENT — PAIN SCALES - GENERAL: PAINLEVEL_OUTOF10: 0

## 2023-01-30 NOTE — ANESTHESIA PRE PROCEDURE
Department of Anesthesiology  Preprocedure Note       Name:  Simon Canas   Age:  80 y.o.  :  1931                                          MRN:  [id number]         Date:  2023      Surgeon: * No surgeons listed *    Procedure: * No procedures listed *    Medications prior to admission:   Prior to Admission medications    Medication Sig Start Date End Date Taking? Authorizing Provider   tiZANidine (ZANAFLEX) 2 MG tablet Take 1 tablet by mouth every 8 hours as needed (back pain) 23   Jarrod Edmonds MD   ondansetron (ZOFRAN-ODT) 4 MG disintegrating tablet Take 1 tablet by mouth 3 times daily as needed for Nausea or Vomiting 23   Norma Pacheco MD   lidocaine 4 % external patch Place 1 patch onto the skin daily  Patient not taking: Reported on 2023  GIOVANY Hayden   irbesartan (AVAPRO) 75 MG tablet TAKE 1 TABLET DAILY 22   Santo Anglin MD   carvedilol (COREG) 3.125 MG tablet Take 1 tablet by mouth 2 times daily TAKE 1 TABLET TWICE DAILY WITH MEALS 22   Norma Pacheco MD   clopidogrel (PLAVIX) 75 MG tablet Take 1 tablet by mouth daily The details of the medication are not available because there are pending changes by a home health clinician.  22   Norma Pacheco MD   isosorbide mononitrate (IMDUR) 60 MG extended release tablet Take 1 tablet by mouth daily 22   Norma Pacheco MD   rosuvastatin (CRESTOR) 20 MG tablet Take 1 tablet by mouth daily TAKE 1 TABLET NIGHTLY 22   Norma Pacheco MD   spironolactone (ALDACTONE) 25 MG tablet Take 1 tablet by mouth daily 22   Norma Pacheco MD   Cranberry 250 MG TABS Take 4,200 mg by mouth 2 times daily    Historical Provider, MD   Multiple Vitamins-Minerals (HAIR SKIN AND NAILS FORMULA PO) Take by mouth    Historical Provider, MD   aspirin 81 MG EC tablet Take 81 mg by mouth daily    Santo Anglin MD   pantoprazole (PROTONIX) 20 MG tablet Take 1 tablet by mouth daily 6/23/22   Alexandrea Gray MD   APPLE CIDER VINEGAR PO Take 480 mg by mouth daily    Ar Automatic Reconciliation   TURMERIC PO Take 500 mg by mouth daily    Ar Automatic Reconciliation   Cholecalciferol 50 MCG (2000 UT) CAPS Take 2,000 Units by mouth daily    Ar Automatic Reconciliation   cyanocobalamin 100 MCG tablet Take 100 mcg by mouth daily    Ar Automatic Reconciliation   Lidocaine, Anorectal, 5 % CREA Actual prescription: Lidocaine 5%: Neurontin/gabapentin 5% combined topical cream/gelApply to affected area up to 4 times a day as needed for pain  Patient not taking: Reported on 1/9/2023 6/28/21   Ar Automatic Reconciliation   loratadine (CLARITIN) 10 MG tablet Take 10 mg by mouth daily    Ar Automatic Reconciliation   montelukast (SINGULAIR) 10 MG tablet Take 10 mg by mouth daily  Patient not taking: No sig reported 3/31/22   Ar Automatic Reconciliation       Current medications:    Current Outpatient Medications   Medication Sig Dispense Refill    tiZANidine (ZANAFLEX) 2 MG tablet Take 1 tablet by mouth every 8 hours as needed (back pain) 30 tablet 0    ondansetron (ZOFRAN-ODT) 4 MG disintegrating tablet Take 1 tablet by mouth 3 times daily as needed for Nausea or Vomiting 21 tablet 0    lidocaine 4 % external patch Place 1 patch onto the skin daily (Patient not taking: Reported on 1/9/2023) 30 patch 0    irbesartan (AVAPRO) 75 MG tablet TAKE 1 TABLET DAILY 90 tablet 3    carvedilol (COREG) 3.125 MG tablet Take 1 tablet by mouth 2 times daily TAKE 1 TABLET TWICE DAILY WITH MEALS 180 tablet 3    clopidogrel (PLAVIX) 75 MG tablet Take 1 tablet by mouth daily The details of the medication are not available because there are pending changes by a home health clinician.  90 tablet 3    isosorbide mononitrate (IMDUR) 60 MG extended release tablet Take 1 tablet by mouth daily 90 tablet 3    rosuvastatin (CRESTOR) 20 MG tablet Take 1 tablet by mouth daily TAKE 1 TABLET NIGHTLY 90 tablet 3    spironolactone (ALDACTONE) 25 MG tablet Take 1 tablet by mouth daily 90 tablet 3    Cranberry 250 MG TABS Take 4,200 mg by mouth 2 times daily      Multiple Vitamins-Minerals (HAIR SKIN AND NAILS FORMULA PO) Take by mouth      aspirin 81 MG EC tablet Take 81 mg by mouth daily      pantoprazole (PROTONIX) 20 MG tablet Take 1 tablet by mouth daily 90 tablet 3    APPLE CIDER VINEGAR PO Take 480 mg by mouth daily      TURMERIC PO Take 500 mg by mouth daily      Cholecalciferol 50 MCG (2000 UT) CAPS Take 2,000 Units by mouth daily      cyanocobalamin 100 MCG tablet Take 100 mcg by mouth daily      Lidocaine, Anorectal, 5 % CREA Actual prescription: Lidocaine 5%: Neurontin/gabapentin 5% combined topical cream/gelApply to affected area up to 4 times a day as needed for pain (Patient not taking: Reported on 1/9/2023)      loratadine (CLARITIN) 10 MG tablet Take 10 mg by mouth daily      montelukast (SINGULAIR) 10 MG tablet Take 10 mg by mouth daily (Patient not taking: No sig reported)       No current facility-administered medications for this encounter. Allergies:     Allergies   Allergen Reactions    Amlodipine Other (See Comments)     Other reaction(s): Unknown-Unspecified    Quinapril Other (See Comments)     Other reaction(s): Cough-Intolerance    Sertraline Other (See Comments)     Other reaction(s): Drowsiness    Prednisone Itching, Palpitations and Other (See Comments)     Per pt also had heart burn       Problem List:    Patient Active Problem List   Diagnosis Code    Mixed dyslipidemia E78.2    Osteoporosis M81.0    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 I25.118    Primary osteoarthritis of both knees M17.0    Ischemic heart disease I25.9    Hypertensive urgency I16.0    Esophageal reflux K21.9    CAD (coronary artery disease) I25.10    Left foot pain M79.672    HTN (hypertension) I10    CVD (cerebrovascular disease) I67.9    History of cerebellar stroke Z86.73  Chronic renal disease, stage III ([facility] [address] [695146] N18.30    Chronic systolic (congestive) heart failure I50.22    Strain of lumbar region S39.012A    Acute cystitis with hematuria N30.01       Past Medical History:        Diagnosis Date    CAD (coronary artery disease)     2 MI    Calculus of kidney     Closed nondisplaced fracture of fifth left metatarsal bone 5/18/2021    Congestive heart failure ([facility] [address]     Coronary artery disease involving coronary bypass graft of native heart without angina pectoris 12/14/2015    CVD (cerebrovascular disease) 8/19/2013    Dyslipidemia 8/19/2013    GERD (gastroesophageal reflux disease)     HTN (hypertension) 8/19/2013    Left breast mass 4/13/2017    Diagnostic Mammogram negative.  Long term current use of anticoagulant therapy     Myocardial infarction [facility]) 2005, 2006    [address] Card[address], Dr. Hylton Cutting    Osteoporosis 8/19/2013    Positive H. pylori test 8/19/2013    Rectal polyp 10/9/2017    Anoscopy:  Revealed large cauliflower-like polyp at the anterior midline just above the dentate line, mobile soft, to have removed with Dr. Dariusz Chaidez ([facility]), benign. No more fecal leakage.  Stroke (cerebrum) ([facility] [address] 2/16/2020    Stroke ([facility] [address] 1/7/2009    no deficits expect patient reports going to one side if she gets up too fast    Thromboembolus [facility])     [address]       Past Surgical History:        Procedure Laterality Date    APPENDECTOMY      BLADDER SUSPENSION  2005    COLONOSCOPY  2012   Jaya Lizarraga.    @ [address]      hysterectomy    HYSTERECTOMY, TOTAL ABDOMINAL (CERVIX REMOVED)  1972    LITHOTRIPSY  2008 x 2    ORTHOPEDIC SURGERY      2 rotater cuff right shoulder    [place] UNLISTED PROCEDURE CARDIAC SURGERY      by pass    UROLOGICAL SURGERY      suspension       Social History:    Social History     Tobacco Use    Smoking status: Never    Smokeless tobacco: Never   Substance Use Topics    Alcohol use:  No Counseling given: Not Answered      Vital Signs (Current):   Vitals:    01/30/23 1127   BP: (!) 167/77   Pulse: 63   Resp: 16   Temp: 97.7 °F (36.5 °C)   TempSrc: Oral   SpO2: 99%   Weight: 137 lb (62.1 kg)   Height: 5' 6\" (1.676 m)                                              BP Readings from Last 3 Encounters:   01/30/23 (!) 167/77   01/09/23 130/68   01/07/23 (!) 147/77       NPO Status:                                                   Date of last liquid consumption: 01/29/23                        Date of last solid food consumption: 01/29/23    BMI:   Wt Readings from Last 3 Encounters:   01/30/23 137 lb (62.1 kg)   01/09/23 137 lb (62.1 kg)   01/07/23 135 lb (61.2 kg)     Body mass index is 22.11 kg/m². CBC:   Lab Results   Component Value Date/Time    WBC 11.0 01/25/2023 09:59 AM    RBC 5.28 01/25/2023 09:59 AM    HGB 15.6 01/25/2023 09:59 AM    HCT 46.8 01/25/2023 09:59 AM    MCV 88.6 01/25/2023 09:59 AM    RDW 11.9 01/25/2023 09:59 AM     01/25/2023 09:59 AM       CMP:   Lab Results   Component Value Date/Time     01/25/2023 09:59 AM    K 3.9 01/25/2023 09:59 AM     01/25/2023 09:59 AM    CO2 23 01/25/2023 09:59 AM    BUN 13 01/25/2023 09:59 AM    CREATININE 1.10 01/25/2023 09:59 AM    GFRAA >60 07/05/2022 01:51 PM    AGRATIO 1.8 12/30/2021 11:05 AM    LABGLOM 47 01/25/2023 09:59 AM    GLUCOSE 98 01/25/2023 09:59 AM    PROT 7.1 07/05/2022 01:51 PM    CALCIUM 10.3 01/25/2023 09:59 AM    BILITOT 0.5 07/05/2022 01:51 PM    ALKPHOS 51 07/05/2022 01:51 PM    ALKPHOS 57 12/30/2021 11:05 AM    AST 17 07/05/2022 01:51 PM    ALT 26 07/05/2022 01:51 PM       POC Tests: No results for input(s): POCGLU, POCNA, POCK, POCCL, POCBUN, POCHEMO, POCHCT in the last 72 hours.     Coags:   Lab Results   Component Value Date/Time    PROTIME 13.2 03/18/2021 07:18 AM    INR 1.0 03/18/2021 07:18 AM    APTT >200.0 03/18/2021 06:36 PM       HCG (If Applicable): No results found for: PREGTESTUR, PREGSERUM, HCG, HCGQUANT     ABGs: No results found for: PHART, PO2ART, OIN6PSO, VLK0DPI, BEART, H1VVYYLB     Type & Screen (If Applicable):  No results found for: LABABO, LABRH    Drug/Infectious Status (If Applicable):  No results found for: HIV, HEPCAB    COVID-19 Screening (If Applicable): No results found for: COVID19        Anesthesia Evaluation  Patient summary reviewed  Airway: Mallampati: II  TM distance: >3 FB   Neck ROM: limited  Mouth opening: < 3 FB   Dental: normal exam         Pulmonary:normal exam                               Cardiovascular:    (+) hypertension:, past MI (1988):, CAD:, CABG/stent (1988):, CHF (? ICM): systolic, hyperlipidemia               ROS comment: TTE 4/2021: Mod LV dysfunction (? EF 30-40%)     Neuro/Psych:   (+) CVA (last 2021): residual symptoms,             GI/Hepatic/Renal:   (+) renal disease: CRI,           Endo/Other:                     Abdominal:             Vascular:   + PVD, aortic or cerebral, .         ROS comment: PE. Other Findings:           Anesthesia Plan      TIVA     ASA 4                   Attending anesthesiologist reviewed and agrees with Maryuri Self MD   1/30/2023

## 2023-01-30 NOTE — TELEPHONE ENCOUNTER
Late entry-Patient was called and Dr Soto Daniel referred patient for  Kyphoplasty for compression fracture

## 2023-01-30 NOTE — OR NURSING
Recovery period without difficulty. Pt alert and oriented. Dressing is clean, dry, and intact. Reviewed discharge instructions with patient and her son, both verbalized understanding. Pt escorted to lobby discharge area via wheelchair. Vital signs and Balwinder score completed. Patient to home with her son.

## 2023-01-30 NOTE — OR NURSING
TRANSFER - OUT REPORT:           Verbal report given to [facility]. [facility] CORDERO RN on Georgia Shukla  being transferred to [facility]  for routine post-op              Report consisted of patients Situation, Background, Assessment and      Recommendations(SBAR). Information from the following report(s) Procedure Summary was reviewed with the receiving nurse. Opportunity for questions and clarification was provided. Pt tolerated procedure well.              VITALS:  /60   Pulse 66   Temp 98.6 °F (37 °C) (Oral)   Resp 18   Ht 5' 6\" (1.676 m)   Wt 137 lb (62.1 kg)   SpO2 95%   BMI 22.11 kg/m²

## 2023-01-30 NOTE — ANESTHESIA POSTPROCEDURE EVALUATION
Department of Anesthesiology  Postprocedure Note    Patient: Damon Nathan  MRN: [id number]  YOB: 1931  Date of evaluation: 1/30/2023      Procedure Summary     Date: 01/30/23 Room / Location: [facility]    Anesthesia Start: 3074 Anesthesia Stop: 1942    Procedure: IR KYPHOPLASTY THORACIC FIRST LEVEL Diagnosis: Compression fracture of T10 vertebra, sequela    Scheduled Providers: ALESSANDRA Amezquita - CRNA; Deondre Santos MD Responsible Provider: Deondre Santos MD    Anesthesia Type: TIVA ASA Status: 4          Anesthesia Type: No value filed.     Balwinder Phase I: Balwinder Score: 10    Balwinder Phase II:        Anesthesia Post Evaluation    Patient location during evaluation: PACU  Patient participation: complete - patient participated  Level of consciousness: awake  Airway patency: patent  Nausea & Vomiting: no nausea  Complications: no  Cardiovascular status: blood pressure returned to baseline and hemodynamically stable  Respiratory status: acceptable  Hydration status: stable  Multimodal analgesia pain management approach

## 2023-01-30 NOTE — OR NURSING
Patient did experience sharp back pain when positioning from the semi-flowers position to sitting on the side of the bed to get dressed. Assisted patient in dressing. Pain still present but greatly eased off.

## 2023-01-30 NOTE — OP NOTE
Department of Interventional Radiology  [phone]        Interventional Radiology Brief Procedure Note    Patient: Simon Canas MRN: [id number]  SSN: [id number]    YOB: 1931  Age: 80 y.o. Sex: female      Date of Procedure: 1/30/2023    Pre-Procedure Diagnosis: T10 fracture    Post-Procedure Diagnosis: SAME    Procedure(s): Kyphoplasty    Brief Description of Procedure: as above    Performed By: Suri Schaefer MD     Assistants: None    Anesthesia:TIVS/MAC    Estimated Blood Loss: Less than 10ml    Specimens:  None    Implants:  None    Findings: T 10 fracture.   Successful kyphoplasty    Complications: None    Recommendations: routine post care     Follow Up: 1 month in clinic    Signed By: Suri Schaefer MD     January 30, 2023

## 2023-01-30 NOTE — DISCHARGE INSTRUCTIONS
If you have any questions about your procedure, please call the Interventional Radiology department at [phone].   After business hours (5pm) and weekends, call the answering service at [phone] and ask for the Radiologist on call to be paged.     Si tiene Preguntas acerca del procedimiento, por favor llame al departamento de Radiología Intervencional al [phone].   Después de horas de oficina (5 pm) y los fines de semana, llamar al servicio de llamadas al [phone] y pregunte por el Radiologo de belkys.      Interventional Radiology General Nurse Discharge    After general anesthesia or intravenous sedation, for 24 hours or while taking prescription Narcotics:  Limit your activities  Do not drive and operate hazardous machinery  Do not make important personal or business decisions  Do  not drink alcoholic beverages  If you have not urinated within 8 hours after discharge, please contact your surgeon on call.    * Please give a list of your current medications to your Primary Care Provider.  * Please update this list whenever your medications are discontinued, doses are     changed, or new medications (including over-the-counter products) are added.  * Please carry medication information at all times in case of emergency situations.    These are general instructions for a healthy lifestyle:    No smoking/ No tobacco products/ Avoid exposure to second hand smoke  Surgeon General's Warning:  Quitting smoking now greatly reduces serious risk to your health.    Obesity, smoking, and sedentary lifestyle greatly increases your risk for illness  A healthy diet, regular physical exercise & weight monitoring are important for maintaining a healthy lifestyle    You may be retaining fluid if you have a history of heart failure or if you experience any of the following symptoms:  Weight gain of 3 pounds or more overnight or 5 pounds in a week, increased swelling in our hands or feet or shortness of breath  while lying flat in bed. Please call your doctor as soon as you notice any of these symptoms; do not wait until your next office visit. Recognize signs and symptoms of STROKE:  F-face looks uneven    A-arms unable to move or move unevenly    S-speech slurred or non-existent    T-time-call 911 as soon as signs and symptoms begin-DO NOT go       Back to bed or wait to see if you get better-TIME IS BRAIN.

## 2023-01-30 NOTE — OR NURSING
MAC recovery complete. Dr Eliane Roper states pt is ok for discharge from anes standpoint. Son at bedside.

## 2023-01-30 NOTE — H&P
Department of Interventional Radiology  [phone]    History and Physical    Patient:  Jordana Zarate MRN:  [id number]  SSN:  [id number]    YOB: 1931  Age:  89 y.o.  Sex:  female      Primary Care Provider:  VARGHESE ABRAMS MD  Referring Physician:  Geovanna Norris PA    Subjective:     Chief Complaint: back pain    History of the Present Illness:  The patient is a 89 y.o. female with an acute T10 VCF who presents for kyphoplasty.  Pt reports acute onset of mid to lower localized back pain beginning 1/6 while cleaning her home.  Bone scan reveals acute fx.  She is taking Tylenol ES for pain, she does not wish to billy opioids.  NPO x meds.  Hx MI, CAD.         Past Medical History:   Diagnosis Date    CAD (coronary artery disease)     2 MI    Calculus of kidney     Closed nondisplaced fracture of fifth left metatarsal bone 5/18/2021    Congestive heart failure (HCC)     Coronary artery disease involving coronary bypass graft of native heart without angina pectoris 12/14/2015    CVD (cerebrovascular disease) 8/19/2013    Dyslipidemia 8/19/2013    GERD (gastroesophageal reflux disease)     HTN (hypertension) 8/19/2013    Left breast mass 4/13/2017    Diagnostic Mammogram negative.    Long term current use of anticoagulant therapy     Myocardial infarction (HCC) 2005, 2006    [facility], Dr. Rutledge    Osteoporosis 8/19/2013    Positive H. pylori test 8/19/2013    Rectal polyp 10/9/2017    Anoscopy:  Revealed large cauliflower-like polyp at the anterior midline just above the dentate line, mobile soft, to have removed with Dr. Abel ([facility]), benign.  No more fecal leakage.       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      hysterectomy    HYSTERECTOMY, TOTAL ABDOMINAL (CERVIX REMOVED)  1972    LITHOTRIPSY  2008 x 2    ORTHOPEDIC SURGERY      2 rotater cuff right shoulder    MD UNLISTED PROCEDURE CARDIAC SURGERY      by pass    UROLOGICAL SURGERY      suspension        Review of Systems:    Pertinent items are noted in HPI. Prior to Admission medications    Medication Sig Start Date End Date Taking? Authorizing Provider   tiZANidine (ZANAFLEX) 2 MG tablet Take 1 tablet by mouth every 8 hours as needed (back pain) 1/13/23   Marco Juan MD   ondansetron (ZOFRAN-ODT) 4 MG disintegrating tablet Take 1 tablet by mouth 3 times daily as needed for Nausea or Vomiting 1/9/23   Judith Colon MD   lidocaine 4 % external patch Place 1 patch onto the skin daily  Patient not taking: Reported on 1/9/2023 1/7/23 2/6/23  GIOVANY White   irbesartan (AVAPRO) 75 MG tablet TAKE 1 TABLET DAILY 12/7/22   Malena Larkin MD   carvedilol (COREG) 3.125 MG tablet Take 1 tablet by mouth 2 times daily TAKE 1 TABLET TWICE DAILY WITH MEALS 7/12/22   Judith Colon MD   clopidogrel (PLAVIX) 75 MG tablet Take 1 tablet by mouth daily The details of the medication are not available because there are pending changes by a home health clinician.  7/12/22   Judith Colon MD   isosorbide mononitrate (IMDUR) 60 MG extended release tablet Take 1 tablet by mouth daily 7/12/22   Judith Colon MD   rosuvastatin (CRESTOR) 20 MG tablet Take 1 tablet by mouth daily TAKE 1 TABLET NIGHTLY 7/12/22   Judith Colon MD   spironolactone (ALDACTONE) 25 MG tablet Take 1 tablet by mouth daily 7/12/22   Judith Colon MD   Cranberry 250 MG TABS Take 4,200 mg by mouth 2 times daily    Historical Provider, MD   Multiple Vitamins-Minerals (HAIR SKIN AND NAILS FORMULA PO) Take by mouth    Historical Provider, MD   aspirin 81 MG EC tablet Take 81 mg by mouth daily    Georgiana Green Prince Grijalva MD   pantoprazole (PROTONIX) 20 MG tablet Take 1 tablet by mouth daily 6/23/22   Ashely Soto MD   APPLE CIDER VINEGAR PO Take 480 mg by mouth daily    Ar Automatic Reconciliation   TURMERIC PO Take 500 mg by mouth daily    Ar Automatic Reconciliation   Cholecalciferol 50 MCG (2000 UT) CAPS Take 2,000 Units by mouth daily    Ar Automatic Reconciliation   cyanocobalamin 100 MCG tablet Take 100 mcg by mouth daily    Ar Automatic Reconciliation   Lidocaine, Anorectal, 5 % CREA Actual prescription: Lidocaine 5%: Neurontin/gabapentin 5% combined topical cream/gelApply to affected area up to 4 times a day as needed for pain  Patient not taking: Reported on 1/9/2023 6/28/21   Ar Automatic Reconciliation   loratadine (CLARITIN) 10 MG tablet Take 10 mg by mouth daily    Ar Automatic Reconciliation   montelukast (SINGULAIR) 10 MG tablet Take 10 mg by mouth daily  Patient not taking: No sig reported 3/31/22   Ar Automatic Reconciliation        Allergies   Allergen Reactions    Amlodipine Other (See Comments)     Other reaction(s): Unknown-Unspecified    Quinapril Other (See Comments)     Other reaction(s): Cough-Intolerance    Sertraline Other (See Comments)     Other reaction(s): Drowsiness    Prednisone Itching, Palpitations and Other (See Comments)     Per pt also had heart burn       Family History   Problem Relation Age of Onset    Emergence Delirium Neg Hx     Heart Disease Brother     Cancer Brother         prostate    Heart Disease Brother     Heart Disease Father     Stroke Mother     Heart Disease Mother     Post-op Cognitive Dysfunction Neg Hx     Other Neg Hx     Breast Cancer Neg Hx     Heart Disease Brother     Heart Disease Brother     Malig Hypertherm Neg Hx     Pseudochol.  Deficiency Neg Hx     Delayed Awakening Neg Hx     Post-op Nausea/Vomiting Neg Hx      Social History     Tobacco Use    Smoking status: Never    Smokeless tobacco: Never   Substance Use Topics    Alcohol use: No        Not in a hospital admission. Objective:       Physical Examination:    Vitals:    01/30/23 1127   BP: (!) 167/77   Pulse: 63   Resp: 16   Temp: 97.7 °F (36.5 °C)   TempSrc: Oral   SpO2: 99%   Weight: 137 lb (62.1 kg)   Height: 5' 6\" (1.676 m)       Pain Assessment  Pain Level: 10  Pain Location: Back          Pain Level: 10       Pain Location: Back         Goal 0  Interv: kypho, per primary                    Gen: NAD, AAO  HEART: regular rate and rhythm  LUNG: clear to auscultation bilaterally  ABDOMEN: normal findings: soft, non-tender  EXTREMITIES: warm ,no edema  MS: focal tenderness T10. Laboratory:     Lab Results   Component Value Date/Time     01/25/2023 09:59 AM     07/05/2022 01:51 PM    K 3.9 01/25/2023 09:59 AM    K 4.5 07/05/2022 01:51 PM     01/25/2023 09:59 AM     07/05/2022 01:51 PM    CO2 23 01/25/2023 09:59 AM    CO2 28 07/05/2022 01:51 PM    BUN 13 01/25/2023 09:59 AM    BUN 19 07/05/2022 01:51 PM    GFRAA >60 07/05/2022 01:51 PM    GFRAA 59 12/30/2021 11:05 AM    MG 2.6 04/27/2021 10:25 AM    MG 2.3 04/24/2021 05:30 AM    GLOB 3.1 07/05/2022 01:51 PM    GLOB 3.3 04/21/2021 01:53 PM    ALT 26 07/05/2022 01:51 PM    ALT 17 12/30/2021 11:05 AM     Lab Results   Component Value Date/Time    WBC 11.0 01/25/2023 09:59 AM    WBC 6.6 07/05/2022 01:51 PM    HGB 15.6 01/25/2023 09:59 AM    HGB 15.0 07/05/2022 01:51 PM    HCT 46.8 01/25/2023 09:59 AM    HCT 47.9 07/05/2022 01:51 PM     01/25/2023 09:59 AM     07/05/2022 01:51 PM     Lab Results   Component Value Date/Time    APTT >200.0 03/18/2021 06:36 PM    INR 1.0 03/18/2021 07:18 AM    INR 1.0 02/03/2021 11:52 AM       Assessment:     Acute T10 VCF    [unfilled]    Plan:     Planned Procedure:  kyphoplasty    Risks, benefits, and alternatives reviewed with patient and she agrees to proceed with the procedure.       Signed By: Millie Garay PA-C     January 30, 2023

## 2023-02-06 ENCOUNTER — TELEMEDICINE (OUTPATIENT)
Dept: INTERNAL MEDICINE CLINIC | Facility: CLINIC | Age: 88
End: 2023-02-06

## 2023-02-06 ENCOUNTER — APPOINTMENT (OUTPATIENT)
Dept: GENERAL RADIOLOGY | Age: 88
End: 2023-02-06
Payer: COMMERCIAL

## 2023-02-06 ENCOUNTER — HOSPITAL ENCOUNTER (EMERGENCY)
Age: 88
Discharge: HOME | End: 2023-02-06
Attending: STUDENT IN AN ORGANIZED HEALTH CARE EDUCATION/TRAINING PROGRAM
Payer: COMMERCIAL

## 2023-02-06 ENCOUNTER — TELEPHONE (OUTPATIENT)
Dept: INTERNAL MEDICINE CLINIC | Facility: CLINIC | Age: 88
End: 2023-02-06

## 2023-02-06 VITALS
OXYGEN SATURATION: 97 % | SYSTOLIC BLOOD PRESSURE: 120 MMHG | HEART RATE: 72 BPM | BODY MASS INDEX: 21.79 KG/M2 | WEIGHT: 135 LBS | RESPIRATION RATE: 20 BRPM | TEMPERATURE: 97.9 F | DIASTOLIC BLOOD PRESSURE: 67 MMHG

## 2023-02-06 DIAGNOSIS — Z98.890 S/P KYPHOPLASTY: ICD-10-CM

## 2023-02-06 DIAGNOSIS — S22.000A COMPRESSION FRACTURE OF BODY OF THORACIC VERTEBRA (HCC): Primary | ICD-10-CM

## 2023-02-06 DIAGNOSIS — S22.070A CLOSED WEDGE COMPRESSION FRACTURE OF T9 VERTEBRA, INITIAL ENCOUNTER (HCC): Primary | ICD-10-CM

## 2023-02-06 PROCEDURE — 6370000000 HC RX 637 (ALT 250 FOR IP)

## 2023-02-06 PROCEDURE — 99283 EMERGENCY DEPT VISIT LOW MDM: CPT

## 2023-02-06 PROCEDURE — 72072 X-RAY EXAM THORAC SPINE 3VWS: CPT

## 2023-02-06 RX ORDER — LIDOCAINE 50 MG/G
1 PATCH TOPICAL DAILY
Qty: 10 PATCH | Refills: 0 | Status: SHIPPED | OUTPATIENT
Start: 2023-02-06 | End: 2023-02-16

## 2023-02-06 RX ORDER — LIDOCAINE 4 G/G
1 PATCH TOPICAL
Status: DISCONTINUED | OUTPATIENT
Start: 2023-02-06 | End: 2023-02-06 | Stop reason: HOSPADM

## 2023-02-06 ASSESSMENT — ENCOUNTER SYMPTOMS
VOMITING: 0
ABDOMINAL PAIN: 0
COLOR CHANGE: 0
DIARRHEA: 0
NAUSEA: 1
BACK PAIN: 1
SHORTNESS OF BREATH: 0

## 2023-02-06 NOTE — ED NOTES
I have reviewed discharge instructions with the patient and caregiver. The patient and caregiver verbalized understanding. Patient left ED via Discharge Method: ambulatory to Home with son    Opportunity for questions and clarification provided. Patient given 0 scripts. To continue your aftercare when you leave the hospital, you may receive an automated call from our care team to check in on how you are doing. This is a free service and part of our promise to provide the best care and service to meet your aftercare needs.  If you have questions, or wish to unsubscribe from this service please call [phone]. Thank you for Choosing our [facility] Emergency Department.         Jen Brooks RN  02/06/23 7731

## 2023-02-06 NOTE — DISCHARGE INSTRUCTIONS
evaluated in the emergency department today for continued back pain    X-ray shows that you have a new compression fracture at T9  You are unable to tolerate steroids for pain medicine and you are unable to tolerate narcotics. I have written you a prescription for lidocaine patches. Lidocaine patches can be worn for 12 hours and then they need to be taken off for 12-hour    I have put in a stat referral to interventional radiology. They are the ones that performed your kyphoplasty. Recommend that you contact them tomorrow. Follow-up with your primary care provider in 1 week.     Return to the emergency department if you have chest pain, shortness of breath, signs or symptoms of stroke as listed in your discharge paperwork

## 2023-02-06 NOTE — PROGRESS NOTES
Talked to her son.  She still in a lot of pain even after her kyphoplasty.  She had some initial improvement and then her pain changed and she is still been hurting pretty bad.  Spoke with her son and unfortunately I think probably she does need to be seen in the emergency room and reassessing updated imaging and hopefully her pain can be alleviated some.  She does have some pain medication at home her son says she does not like to take it.  Recent T10 kyphoplasty.  Compression fracture.    1. Compression fracture of body of thoracic vertebra (HCC)       2. S/P kyphoplasty    VARGHESE ABRAMS MD

## 2023-02-06 NOTE — TELEPHONE ENCOUNTER
Patient's son called stating patient had Kyphoplasty on 1/30/2023 and states she is still in lots of patient.  Please advise

## 2023-02-06 NOTE — ED PROVIDER NOTES
Emergency Department Provider Note                   PCP:                Jimmy Saunders MD               Age: 80 y.o. Sex: female       ICD-10-CM    1. Closed wedge compression fracture of T9 vertebra, initial encounter [facility])  S22.070A [address] [facility] Interventional Radiology, [place]     lidocaine (LIDODERM) 5 %          DISPOSITION Decision To Discharge 02/06/2023 05:44:07 PM       Medical Decision Making  Vital signs reviewed, patient stable, NAD, afebrile, nontoxic in appearance     59-year-old female presents emergency department today for continued upper back pain status post kyphoplasty of T10 on 1/31. Plasty performed by interventional radiology. Patient states that after the surgery her upper back pain never changed it never got worse and it never got better. Patient states she does not tolerate nor want to take any narcotic pain medication. States that she does not tolerate and she is allergic to steroids. Patient has been taking small amounts of extra strength Tylenol along with the muscle relaxer that was prescribed by her primary care provider. Denies any new injury. On physical exam she is got some tenderness to palpation along midline thoracic spine around surgical site. Surgical site appears well-healed. No signs of infection    Repeat x-ray of thoracic spine demonstrates a compression fracture of T9. This is new from imaging obtained on 1/31. Lidocaine patch was ordered and placed on thoracic back. Patient states she was agreeable to trying this treatment. Had  [facility] as patient was last seen by their services for kyphoplasty. Did not hear a response back as  left a message. I placed urgent referral electronically to interventional radiology regarding new fracture. Based on history, physical exam, work-up today in the emergency department, no emergent findings.   Patient is stable and can be discharged home with follow-up to interventional radiology and her primary care provider. Patient be written a prescription for lidocaine patches. I discussed proper way to use lidocaine patches with both patient and her son. Included these instruction and discharge paperwork as well    I answered any questions that patient had    Included signs and symptoms that would warrant prompt return to the emergency department and discharge paperwork as well. History obtained from patient and her son contributed some information about medications  Reviewed note from interventional radiology and primary care  Ordered and reviewed x-rays today. I am in agreement with radiologist interpretation  No indication for EKG no indication for lab work    Patient very involved in shared decision-making today    Amount and/or Complexity of Data Reviewed  Radiology: ordered and independent interpretation performed. Decision-making details documented in ED Course. Risk  Prescription drug management. ED Course as of 02/06/23 2048   Mon Feb 06, 2023   1637 X-ray thoracic spine demonstrates compression fracture of T9. This is likely new considering it was not on previous imaging before her kyphoplasty. [JG]   6386 I had  contact IR for consult as they performed her kyphoplasty. [JG]   1721 Waiting on IR to call back.  [JG]      ED Course User Index  [JG] Campbell, PA        Orders Placed This Encounter   Procedures    XR THORACIC SPINE (3 VIEWS)    [address] [facility] Interventional Radiology, [place]        Medications - No data to display    Discharge Medication List as of 2/6/2023  5:45 PM        START taking these medications    Details   lidocaine (LIDODERM) 5 % Place 1 patch onto the skin daily for 10 days 12 hours on, 12 hours off., Disp-10 patch, R-0Print              Chandu Pro is a 80 y.o. female who presents to the Emergency Department with chief complaint of    Chief Complaint   Patient presents with Back Pain      80-year-old female with history of chronic renal disease, stage III, chronic systolic heart failure, W80 compression fracture, hypertension, CVD. Presents to the emergency department today for unchanged back pain status post T10 kyphoplasty 1/30/23. Patient states after her kyphoplasty, her pain was unchanged and it has remained the same ever since. Patient is intolerant of opioids, cannot take NSAIDs due to chronic kidney disease. Has been taking extra strength Tylenol without relief. States that she has been nauseous since the fracture occurred. Denies any vomiting, diarrhea, saddle anesthesia, bladder or bowel incontinence no lower extremity paresthesias or weakness. Review of Systems   Constitutional:  Negative for chills, fatigue and fever. Respiratory:  Negative for shortness of breath. Cardiovascular:  Negative for chest pain and palpitations. Gastrointestinal:  Positive for nausea. Negative for abdominal pain, diarrhea and vomiting. Musculoskeletal:  Positive for back pain. Skin:  Negative for color change. Neurological:  Negative for weakness and headaches. All other systems reviewed and are negative. Past Medical History:   Diagnosis Date    CAD (coronary artery disease)     2 MI    Calculus of kidney     Closed nondisplaced fracture of fifth left metatarsal bone 5/18/2021    Congestive heart failure (HCC)     Coronary artery disease involving coronary bypass graft of native heart without angina pectoris 12/14/2015    CVD (cerebrovascular disease) 8/19/2013    Dyslipidemia 8/19/2013    GERD (gastroesophageal reflux disease)     HTN (hypertension) 8/19/2013    Left breast mass 4/13/2017    Diagnostic Mammogram negative.     Long term current use of anticoagulant therapy     Myocardial infarction [facility]) 2005, 2006    [facility] Cardiology, Dr. Shasta Almanza    Osteoporosis 8/19/2013    Positive H. pylori test 8/19/2013    Rectal polyp 10/9/2017    Anoscopy:  Revealed large cauliflower-like polyp at the anterior midline just above the dentate line, mobile soft, to have removed with Dr. Yassine Bonilla ([facility]), benign. No more fecal leakage. 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    Stroke ([address] 1/7/2009    no deficits expect patient reports going to one side if she gets up too fast    Thromboembolus [facility])     [address]        Past Surgical History:   Procedure Laterality Date    APPENDECTOMY      BLADDER SUSPENSION  2005    COLONOSCOPY  2012    [address]    @ [address]    GYN      hysterectomy    HYSTERECTOMY, TOTAL ABDOMINAL (CERVIX REMOVED)  1972    IR KYPHOPLASTY THORACIC FIRST LEVEL  1/30/2023    IR KYPHOPLASTY THORACIC FIRST LEVEL 1/30/2023 [facility]    LITHOTRIPSY  2008 x 2    ORTHOPEDIC SURGERY      2 rotater cuff right shoulder    MD UNLISTED PROCEDURE CARDIAC SURGERY      by pass    UROLOGICAL SURGERY      suspension        Family History   Problem Relation Age of Onset    Emergence Delirium Neg Hx     Heart Disease Brother     Cancer Brother         prostate    Heart Disease Brother     Heart Disease Father     Stroke Mother     Heart Disease Mother     Post-op Cognitive Dysfunction Neg Hx     Other Neg Hx     Breast Cancer Neg Hx     Heart Disease Brother     Heart Disease Brother     [address] Hypertherm Neg Hx     Pseudochol. Deficiency Neg Hx     Delayed Awakening Neg Hx     Post-op Nausea/Vomiting Neg Hx         Social History     Socioeconomic History    Marital status:     Tobacco Use    Smoking status: Never    Smokeless tobacco: Never   Vaping Use    Vaping Use: Never used   Substance and Sexual Activity    Alcohol use: No    Drug use: No        Allergies: Amlodipine, Quinapril, Sertraline, and Prednisone    Discharge Medication List as of 2/6/2023  5:45 PM        CONTINUE these medications which have NOT CHANGED    Details   tiZANidine (ZANAFLEX) 2 MG tablet Take 1 tablet by mouth every 8 hours as needed (back pain), Disp-30 tablet, R-0Normal      ondansetron (ZOFRAN-ODT) 4 MG disintegrating tablet Take 1 tablet by mouth 3 times daily as needed for Nausea or Vomiting, Disp-21 tablet, R-0Normal      irbesartan (AVAPRO) 75 MG tablet TAKE 1 TABLET DAILY, Disp-90 tablet, R-3Normal      carvedilol (COREG) 3.125 MG tablet Take 1 tablet by mouth 2 times daily TAKE 1 TABLET TWICE DAILY WITH MEALS, Disp-180 tablet, R-3Normal      clopidogrel (PLAVIX) 75 MG tablet Take 1 tablet by mouth daily The details of the medication are not available because there are pending changes by a home health clinician., Disp-90 tablet, R-3Normal      isosorbide mononitrate (IMDUR) 60 MG extended release tablet Take 1 tablet by mouth daily, Disp-90 tablet, R-3Normal      rosuvastatin (CRESTOR) 20 MG tablet Take 1 tablet by mouth daily TAKE 1 TABLET NIGHTLY, Disp-90 tablet, R-3Normal      spironolactone (ALDACTONE) 25 MG tablet Take 1 tablet by mouth daily, Disp-90 tablet, R-3Normal      Cranberry 250 MG TABS Take 4,200 mg by mouth 2 times dailyHistorical Med      Multiple Vitamins-Minerals (HAIR SKIN AND NAILS FORMULA PO) Take by mouthHistorical Med      aspirin 81 MG EC tablet Take 81 mg by mouth dailyHistorical Med      pantoprazole (PROTONIX) 20 MG tablet Take 1 tablet by mouth daily, Disp-90 tablet, R-3Normal      APPLE CIDER VINEGAR PO Take 480 mg by mouth dailyHistorical Med      TURMERIC PO Take 500 mg by mouth dailyHistorical Med      Cholecalciferol 50 MCG (2000 UT) CAPS Take 2,000 Units by mouth dailyHistorical Med      cyanocobalamin 100 MCG tablet Take 100 mcg by mouth dailyHistorical Med      Lidocaine, Anorectal, 5 % CREA Actual prescription: Lidocaine 5%: Neurontin/gabapentin 5% combined topical cream/gelApply to affected area up to 4 times a day as needed for painHistorical Med      loratadine (CLARITIN) 10 MG tablet Take 10 mg by mouth dailyHistorical Med      montelukast (SINGULAIR) 10 MG tablet Take 10 mg by mouth dailyHistorical Med              Vitals signs and nursing note reviewed. No data found. Physical Exam  Vitals and nursing note reviewed. Constitutional:       General: She is not in acute distress. Appearance: Normal appearance. She is not ill-appearing, toxic-appearing or diaphoretic. HENT:      Head: Atraumatic. Nose: Nose normal.      Mouth/Throat:      Mouth: Mucous membranes are moist.      Pharynx: Oropharynx is clear. Eyes:      General: No scleral icterus. Extraocular Movements: Extraocular movements intact. Conjunctiva/sclera: Conjunctivae normal.   Cardiovascular:      Rate and Rhythm: Normal rate. Pulses: Normal pulses. Heart sounds: Normal heart sounds. Comments: Bilateral PT pulses are 2+ and equal  Pulmonary:      Effort: Pulmonary effort is normal.      Breath sounds: Normal breath sounds. Abdominal:      General: Bowel sounds are normal.      Palpations: Abdomen is soft. Tenderness: There is no abdominal tenderness. There is no guarding or rebound. Musculoskeletal:         General: Normal range of motion. Cervical back: Normal and normal range of motion. Thoracic back: Tenderness (Midline thoracic spine around T8-T9 and T11) present. Lumbar back: Normal.        Back:       Comments: Surgical site appears well-healed. No purulent drainage, no surrounding erythema, induration or edema   Skin:     General: Skin is warm and dry. Capillary Refill: Capillary refill takes less than 2 seconds. Neurological:      General: No focal deficit present. Mental Status: She is alert and oriented to person, place, and time. Sensory: No sensory deficit (Bilateral lower extremity sensation intact and equal). Motor: No weakness (Bilateral lower extremity motor strength 5+ and equal).       Comments: No saddle anesthesia, no bladder or bowel incontinence   Psychiatric:         Mood and Affect: Mood normal.         Behavior: Behavior normal.         Thought Content: Thought content normal.         Judgment: Judgment normal.        Procedures      Results for orders placed or performed during the hospital encounter of 02/06/23   XR THORACIC SPINE (3 VIEWS)    Narrative    Thoracic Spine    INDICATION:  Back pain post kyphoplasty     AP and lateral views of the thoracic spine were obtained    FINDINGS: Post T10 kyphoplasty. There is a T9 compression fracture which is  probably new compared to the procedure films from 01/30/2023. There is no other  significant change. Impression    New T9 compression fracture        XR THORACIC SPINE (3 VIEWS)   Final Result   New T9 compression fracture                            Voice dictation software was used during the making of this note. This software is not perfect and grammatical and other typographical errors may be present. This note has not been completely proofread for errors.       Zackery Brady  02/06/23 2040

## 2023-02-06 NOTE — ED TRIAGE NOTES
Pt arrives in  stating she had back sx x 1 week ago. Pt states worsening back pain. Pt states she was d/c with prescribed pain meds with no relief. Pt states chills. Pt denies fever at home. Pt denies burning on urination.

## 2023-02-09 ENCOUNTER — TELEPHONE (OUTPATIENT)
Dept: INTERNAL MEDICINE CLINIC | Facility: CLINIC | Age: 88
End: 2023-02-09

## 2023-02-09 ENCOUNTER — HOSPITAL ENCOUNTER (OUTPATIENT)
Dept: INTERVENTIONAL RADIOLOGY/VASCULAR | Age: 88
End: 2023-02-09
Payer: COMMERCIAL

## 2023-02-09 ENCOUNTER — ANESTHESIA EVENT (OUTPATIENT)
Dept: INTERVENTIONAL RADIOLOGY/VASCULAR | Age: 88
End: 2023-02-09

## 2023-02-09 ENCOUNTER — ANESTHESIA (OUTPATIENT)
Dept: INTERVENTIONAL RADIOLOGY/VASCULAR | Age: 88
End: 2023-02-09

## 2023-02-09 VITALS
DIASTOLIC BLOOD PRESSURE: 70 MMHG | RESPIRATION RATE: 16 BRPM | TEMPERATURE: 97.9 F | OXYGEN SATURATION: 96 % | SYSTOLIC BLOOD PRESSURE: 141 MMHG | HEART RATE: 69 BPM

## 2023-02-09 DIAGNOSIS — S22.000A COMPRESSION FRACTURE OF THORACIC VERTEBRA, UNSPECIFIED THORACIC VERTEBRAL LEVEL, INITIAL ENCOUNTER (HCC): ICD-10-CM

## 2023-02-09 PROCEDURE — 2500000003 HC RX 250 WO HCPCS

## 2023-02-09 PROCEDURE — 2500000003 HC RX 250 WO HCPCS: Performed by: RADIOLOGY

## 2023-02-09 PROCEDURE — 3700000001 HC ADD 15 MINUTES (ANESTHESIA)

## 2023-02-09 PROCEDURE — 6360000002 HC RX W HCPCS

## 2023-02-09 PROCEDURE — 3700000000 HC ANESTHESIA ATTENDED CARE

## 2023-02-09 PROCEDURE — 6360000002 HC RX W HCPCS: Performed by: RADIOLOGY

## 2023-02-09 PROCEDURE — C1713 ANCHOR/SCREW BN/BN,TIS/BN: HCPCS

## 2023-02-09 PROCEDURE — 2580000003 HC RX 258

## 2023-02-09 PROCEDURE — 6360000002 HC RX W HCPCS: Performed by: ANESTHESIOLOGY

## 2023-02-09 PROCEDURE — 22513 PERQ VERTEBRAL AUGMENTATION: CPT

## 2023-02-09 PROCEDURE — 6370000000 HC RX 637 (ALT 250 FOR IP): Performed by: RADIOLOGY

## 2023-02-09 RX ORDER — PROPOFOL 10 MG/ML
INJECTION, EMULSION INTRAVENOUS PRN
Status: DISCONTINUED | OUTPATIENT
Start: 2023-02-09 | End: 2023-02-09 | Stop reason: SDUPTHER

## 2023-02-09 RX ORDER — IPRATROPIUM BROMIDE AND ALBUTEROL SULFATE 2.5; .5 MG/3ML; MG/3ML
1 SOLUTION RESPIRATORY (INHALATION)
Status: ACTIVE | OUTPATIENT
Start: 2023-02-09 | End: 2023-02-10

## 2023-02-09 RX ORDER — SODIUM CHLORIDE 0.9 % (FLUSH) 0.9 %
5-40 SYRINGE (ML) INJECTION PRN
Status: DISCONTINUED | OUTPATIENT
Start: 2023-02-09 | End: 2023-02-13 | Stop reason: HOSPADM

## 2023-02-09 RX ORDER — FENTANYL CITRATE 50 UG/ML
50 INJECTION, SOLUTION INTRAMUSCULAR; INTRAVENOUS EVERY 5 MIN PRN
Status: DISCONTINUED | OUTPATIENT
Start: 2023-02-09 | End: 2023-02-13 | Stop reason: HOSPADM

## 2023-02-09 RX ORDER — KETAMINE HYDROCHLORIDE 50 MG/ML
INJECTION, SOLUTION, CONCENTRATE INTRAMUSCULAR; INTRAVENOUS PRN
Status: DISCONTINUED | OUTPATIENT
Start: 2023-02-09 | End: 2023-02-09 | Stop reason: SDUPTHER

## 2023-02-09 RX ORDER — OXYCODONE HYDROCHLORIDE 5 MG/1
5 TABLET ORAL
Status: ACTIVE | OUTPATIENT
Start: 2023-02-09 | End: 2023-02-10

## 2023-02-09 RX ORDER — SODIUM CHLORIDE 0.9 % (FLUSH) 0.9 %
5-40 SYRINGE (ML) INJECTION EVERY 12 HOURS SCHEDULED
Status: DISCONTINUED | OUTPATIENT
Start: 2023-02-09 | End: 2023-02-13 | Stop reason: HOSPADM

## 2023-02-09 RX ORDER — OXYCODONE HYDROCHLORIDE 5 MG/1
10 TABLET ORAL
Status: COMPLETED | OUTPATIENT
Start: 2023-02-09 | End: 2023-02-09

## 2023-02-09 RX ORDER — LIDOCAINE HYDROCHLORIDE 20 MG/ML
INJECTION, SOLUTION EPIDURAL; INFILTRATION; INTRACAUDAL; PERINEURAL PRN
Status: DISCONTINUED | OUTPATIENT
Start: 2023-02-09 | End: 2023-02-09 | Stop reason: SDUPTHER

## 2023-02-09 RX ORDER — HYDROMORPHONE HCL 110MG/55ML
0.5 PATIENT CONTROLLED ANALGESIA SYRINGE INTRAVENOUS EVERY 10 MIN PRN
Status: DISCONTINUED | OUTPATIENT
Start: 2023-02-09 | End: 2023-02-13 | Stop reason: HOSPADM

## 2023-02-09 RX ORDER — SODIUM CHLORIDE, SODIUM LACTATE, POTASSIUM CHLORIDE, CALCIUM CHLORIDE 600; 310; 30; 20 MG/100ML; MG/100ML; MG/100ML; MG/100ML
INJECTION, SOLUTION INTRAVENOUS CONTINUOUS PRN
Status: DISCONTINUED | OUTPATIENT
Start: 2023-02-09 | End: 2023-02-09 | Stop reason: SDUPTHER

## 2023-02-09 RX ORDER — MORPHINE SULFATE 2 MG/ML
2 INJECTION, SOLUTION INTRAMUSCULAR; INTRAVENOUS ONCE
Status: COMPLETED | OUTPATIENT
Start: 2023-02-09 | End: 2023-02-09

## 2023-02-09 RX ORDER — BUPIVACAINE HYDROCHLORIDE 5 MG/ML
INJECTION, SOLUTION EPIDURAL; INTRACAUDAL
Status: COMPLETED | OUTPATIENT
Start: 2023-02-09 | End: 2023-02-09

## 2023-02-09 RX ORDER — HALOPERIDOL 5 MG/ML
1 INJECTION INTRAMUSCULAR
Status: ACTIVE | OUTPATIENT
Start: 2023-02-09 | End: 2023-02-10

## 2023-02-09 RX ORDER — LIDOCAINE HYDROCHLORIDE 20 MG/ML
INJECTION, SOLUTION INFILTRATION; PERINEURAL
Status: COMPLETED | OUTPATIENT
Start: 2023-02-09 | End: 2023-02-09

## 2023-02-09 RX ORDER — ONDANSETRON 2 MG/ML
4 INJECTION INTRAMUSCULAR; INTRAVENOUS
Status: ACTIVE | OUTPATIENT
Start: 2023-02-09 | End: 2023-02-10

## 2023-02-09 RX ORDER — PROPOFOL 10 MG/ML
INJECTION, EMULSION INTRAVENOUS CONTINUOUS PRN
Status: DISCONTINUED | OUTPATIENT
Start: 2023-02-09 | End: 2023-02-09 | Stop reason: SDUPTHER

## 2023-02-09 RX ADMIN — MORPHINE SULFATE 2 MG: 2 INJECTION, SOLUTION INTRAMUSCULAR; INTRAVENOUS at 16:32

## 2023-02-09 RX ADMIN — PROPOFOL 30 MG: 10 INJECTION, EMULSION INTRAVENOUS at 14:33

## 2023-02-09 RX ADMIN — LIDOCAINE HYDROCHLORIDE 10 ML: 20 INJECTION, SOLUTION INFILTRATION; PERINEURAL at 14:45

## 2023-02-09 RX ADMIN — OXYCODONE HYDROCHLORIDE 10 MG: 5 TABLET ORAL at 15:34

## 2023-02-09 RX ADMIN — KETAMINE HYDROCHLORIDE 20 MG: 50 INJECTION, SOLUTION INTRAMUSCULAR; INTRAVENOUS at 14:32

## 2023-02-09 RX ADMIN — LIDOCAINE HYDROCHLORIDE 40 MG: 20 INJECTION, SOLUTION EPIDURAL; INFILTRATION; INTRACAUDAL; PERINEURAL at 14:32

## 2023-02-09 RX ADMIN — PROPOFOL 100 MCG/KG/MIN: 10 INJECTION, EMULSION INTRAVENOUS at 14:35

## 2023-02-09 RX ADMIN — SODIUM CHLORIDE, SODIUM LACTATE, POTASSIUM CHLORIDE, AND CALCIUM CHLORIDE: 600; 310; 30; 20 INJECTION, SOLUTION INTRAVENOUS at 14:21

## 2023-02-09 RX ADMIN — BUPIVACAINE HYDROCHLORIDE 15 ML: 5 INJECTION, SOLUTION EPIDURAL; INTRACAUDAL; PERINEURAL at 14:55

## 2023-02-09 RX ADMIN — HYDROMORPHONE HYDROCHLORIDE 0.5 MG: 2 INJECTION, SOLUTION INTRAMUSCULAR; INTRAVENOUS; SUBCUTANEOUS at 15:47

## 2023-02-09 ASSESSMENT — PAIN SCALES - GENERAL
PAINLEVEL_OUTOF10: 8
PAINLEVEL_OUTOF10: 6
PAINLEVEL_OUTOF10: 6
PAINLEVEL_OUTOF10: 10
PAINLEVEL_OUTOF10: 9

## 2023-02-09 ASSESSMENT — PAIN DESCRIPTION - FREQUENCY: FREQUENCY: CONTINUOUS

## 2023-02-09 ASSESSMENT — PAIN DESCRIPTION - PAIN TYPE: TYPE: CHRONIC PAIN

## 2023-02-09 ASSESSMENT — PAIN - FUNCTIONAL ASSESSMENT: PAIN_FUNCTIONAL_ASSESSMENT: PREVENTS OR INTERFERES WITH ALL ACTIVE AND SOME PASSIVE ACTIVITIES

## 2023-02-09 ASSESSMENT — PAIN DESCRIPTION - LOCATION: LOCATION: GENERALIZED

## 2023-02-09 NOTE — PROGRESS NOTES
Patient transported back to IR recovery 3 with this RN and Tessa Villasenor CRNA. Patient transported without incident. Bedside verbal report to Carlos Manuel Hudson RN.

## 2023-02-09 NOTE — PROGRESS NOTES
Patient in IR Bi[facility] for procedure. Patient's name and  verified. Patient secured to procedure table. Anesthesia has assumed care of patient for the duration of procedure. Please see anesthesia record for documentation.

## 2023-02-09 NOTE — ANESTHESIA POSTPROCEDURE EVALUATION
Department of Anesthesiology  Postprocedure Note    Patient: Henrry Viramontes  MRN: [id number]  YOB: 1931  Date of evaluation: 2/9/2023      Procedure Summary     Date: 02/09/23 Room / Location: [facility]    Anesthesia Start: 7242 Anesthesia Stop: 4756    Procedure: IR KYPHOPLASTY THORACIC FIRST LEVEL Diagnosis: Compression fracture of thoracic vertebra, unspecified thoracic vertebral level, [facility]    Scheduled Providers: ALESSANDRA Daley - CRNA; Penelope Noriega MD Responsible Provider: Penelope Noriega MD    Anesthesia Type: TIVA ASA Status: 3          Anesthesia Type: No value filed.     Balwinder Phase I: Balwinder Score: 10    Balwinder Phase II:        Anesthesia Post Evaluation    Patient location during evaluation: PACU  Patient participation: complete - patient participated  Level of consciousness: awake and alert  Airway patency: patent  Nausea & Vomiting: no nausea and no vomiting  Complications: no  Cardiovascular status: hemodynamically stable  Respiratory status: acceptable, nonlabored ventilation and spontaneous ventilation  Hydration status: euvolemic  Comments: BP (!) 167/74   Pulse 74   Temp 97.9 °F (36.6 °C) (Temporal)   Resp 16   SpO2 94%     Multimodal analgesia pain management approach

## 2023-02-09 NOTE — DISCHARGE INSTRUCTIONS
If you have any questions about your procedure, please call the Interventional Radiology department at [phone]. After business hours (5pm) and weekends, call the answering service at [phone] and ask for the Radiologist on call to be paged. Si tiene Preguntas acerca del procedimiento, por favor llame al departamento de Radiología Intervencional al [phone]. Después de horas de oficina (5 pm) y los fines de [place], llamar al [facility] Karen Coco al [phone] y pregunte por el Radiologo de [facility].

## 2023-02-09 NOTE — OP NOTE
Department of Interventional Radiology  [phone]        Interventional Radiology Brief Procedure Note    Patient: Marina Solorzano MRN: [id number]  SSN: [id number]    YOB: 1931  Age: 80 y.o.   Sex: female      Date of Procedure: 2/9/2023    Pre-Procedure Diagnosis: t9 fracture    Post-Procedure Diagnosis: SAME    Procedure(s): Kyphoplasty    Brief Description of Procedure: as above    Performed By: Otilio Holter, MD     Assistants: None    Anesthesia:TIVS/MAC    Estimated Blood Loss: Less than 10ml    Specimens:  None    Implants:  None    Findings: T9 fracture successful kypho    Complications: None    Recommendations: routine post care     Follow Up: 1 month    Signed By: Otilio Holter, MD     February 9, 2023

## 2023-02-09 NOTE — TELEPHONE ENCOUNTER
Patient's son states that patient had Kyphoplasty today and was told that also had broken ribs.  Son is requesting a call #[phone]

## 2023-02-09 NOTE — ANESTHESIA PRE PROCEDURE
Department of Anesthesiology  Preprocedure Note       Name:  Pasha Oneil   Age:  80 y.o.  :  1931                                          MRN:  [id number]         Date:  2023      Surgeon: * No surgeons listed *    Procedure: * No procedures listed *    Medications prior to admission:   Prior to Admission medications    Medication Sig Start Date End Date Taking? Authorizing Provider   lidocaine (LIDODERM) 5 % Place 1 patch onto the skin daily for 10 days 12 hours on, 12 hours off. 23  GIOVANY Montoya   tiZANidine (ZANAFLEX) 2 MG tablet Take 1 tablet by mouth every 8 hours as needed (back pain) 23   Betty Cason MD   ondansetron (ZOFRAN-ODT) 4 MG disintegrating tablet Take 1 tablet by mouth 3 times daily as needed for Nausea or Vomiting 23   Robi Jurado MD   irbesartan (AVAPRO) 75 MG tablet TAKE 1 TABLET DAILY 22   Anita Kramer MD   carvedilol (COREG) 3.125 MG tablet Take 1 tablet by mouth 2 times daily TAKE 1 TABLET TWICE DAILY WITH MEALS 22   Robi Jurado MD   clopidogrel (PLAVIX) 75 MG tablet Take 1 tablet by mouth daily The details of the medication are not available because there are pending changes by a home health clinician.  22   Robi Jurado MD   isosorbide mononitrate (IMDUR) 60 MG extended release tablet Take 1 tablet by mouth daily 22   Robi Jurado MD   rosuvastatin (CRESTOR) 20 MG tablet Take 1 tablet by mouth daily TAKE 1 TABLET NIGHTLY 22   Robi Jurado MD   spironolactone (ALDACTONE) 25 MG tablet Take 1 tablet by mouth daily 22   Robi Jurado MD   Cranberry 250 MG TABS Take 4,200 mg by mouth 2 times daily    Historical Provider, MD   Multiple Vitamins-Minerals (HAIR SKIN AND NAILS FORMULA PO) Take by mouth    Historical Provider, MD   aspirin 81 MG EC tablet Take 81 mg by mouth daily    Anita Kramer MD   pantoprazole (PROTONIX) 20 MG tablet Take 1 tablet by mouth daily 6/23/22   Yves Serrano MD   APPLE CIDER VINEGAR PO Take 480 mg by mouth daily    Ar Automatic Reconciliation   TURMERIC PO Take 500 mg by mouth daily    Ar Automatic Reconciliation   Cholecalciferol 50 MCG (2000 UT) CAPS Take 2,000 Units by mouth daily    Ar Automatic Reconciliation   cyanocobalamin 100 MCG tablet Take 100 mcg by mouth daily    Ar Automatic Reconciliation   Lidocaine, Anorectal, 5 % CREA Actual prescription: Lidocaine 5%: Neurontin/gabapentin 5% combined topical cream/gelApply to affected area up to 4 times a day as needed for pain  Patient not taking: No sig reported 6/28/21   Ar Automatic Reconciliation   loratadine (CLARITIN) 10 MG tablet Take 10 mg by mouth daily    Ar Automatic Reconciliation   montelukast (SINGULAIR) 10 MG tablet Take 10 mg by mouth daily  Patient not taking: No sig reported 3/31/22   Ar Automatic Reconciliation       Current medications:    Current Outpatient Medications   Medication Sig Dispense Refill    lidocaine (LIDODERM) 5 % Place 1 patch onto the skin daily for 10 days 12 hours on, 12 hours off. 10 patch 0    tiZANidine (ZANAFLEX) 2 MG tablet Take 1 tablet by mouth every 8 hours as needed (back pain) 30 tablet 0    ondansetron (ZOFRAN-ODT) 4 MG disintegrating tablet Take 1 tablet by mouth 3 times daily as needed for Nausea or Vomiting 21 tablet 0    irbesartan (AVAPRO) 75 MG tablet TAKE 1 TABLET DAILY 90 tablet 3    carvedilol (COREG) 3.125 MG tablet Take 1 tablet by mouth 2 times daily TAKE 1 TABLET TWICE DAILY WITH MEALS 180 tablet 3    clopidogrel (PLAVIX) 75 MG tablet Take 1 tablet by mouth daily The details of the medication are not available because there are pending changes by a home health clinician.  90 tablet 3    isosorbide mononitrate (IMDUR) 60 MG extended release tablet Take 1 tablet by mouth daily 90 tablet 3    rosuvastatin (CRESTOR) 20 MG tablet Take 1 tablet by mouth daily TAKE 1 TABLET NIGHTLY 90 tablet 3    spironolactone (ALDACTONE) 25 MG tablet Take 1 tablet by mouth daily 90 tablet 3    Cranberry 250 MG TABS Take 4,200 mg by mouth 2 times daily      Multiple Vitamins-Minerals (HAIR SKIN AND NAILS FORMULA PO) Take by mouth      aspirin 81 MG EC tablet Take 81 mg by mouth daily      pantoprazole (PROTONIX) 20 MG tablet Take 1 tablet by mouth daily 90 tablet 3    APPLE CIDER VINEGAR PO Take 480 mg by mouth daily      TURMERIC PO Take 500 mg by mouth daily      Cholecalciferol 50 MCG (2000 UT) CAPS Take 2,000 Units by mouth daily      cyanocobalamin 100 MCG tablet Take 100 mcg by mouth daily      Lidocaine, Anorectal, 5 % CREA Actual prescription: Lidocaine 5%: Neurontin/gabapentin 5% combined topical cream/gelApply to affected area up to 4 times a day as needed for pain (Patient not taking: No sig reported)      loratadine (CLARITIN) 10 MG tablet Take 10 mg by mouth daily      montelukast (SINGULAIR) 10 MG tablet Take 10 mg by mouth daily (Patient not taking: No sig reported)       No current facility-administered medications for this visit. Allergies:     Allergies   Allergen Reactions    Amlodipine Other (See Comments)     Other reaction(s): Unknown-Unspecified    Quinapril Other (See Comments)     Other reaction(s): Cough-Intolerance    Sertraline Other (See Comments)     Other reaction(s): Drowsiness    Prednisone Itching, Palpitations and Other (See Comments)     Per pt also had heart burn       Problem List:    Patient Active Problem List   Diagnosis Code    Mixed dyslipidemia E78.2    Osteoporosis M81.0    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 [facility] I25.118    Primary osteoarthritis of both knees M17.0    Ischemic heart disease I25.9    Hypertensive urgency I16.0    Esophageal reflux K21.9    CAD (coronary artery disease) I25.10    Left foot pain M79.672    HTN (hypertension) I10    CVD (cerebrovascular disease) I67.9    History of cerebellar stroke Z86.73    Chronic renal disease, stage III [facility]) [[id number]] N18.30    Chronic systolic (congestive) heart failure I50.22    Strain of lumbar region S39.012A    Acute cystitis with hematuria N30.01    Compression fracture of body of thoracic vertebra (HCC) S22.000A    S/P kyphoplasty Z98.890       Past Medical History:        Diagnosis Date    CAD (coronary artery disease)     2 MI    Calculus of kidney     Closed nondisplaced fracture of fifth left metatarsal bone 5/18/2021    Congestive heart failure ([facility]     Coronary artery disease involving coronary bypass graft of native heart without angina pectoris 12/14/2015    CVD (cerebrovascular disease) 8/19/2013    Dyslipidemia 8/19/2013    GERD (gastroesophageal reflux disease)     HTN (hypertension) 8/19/2013    Left breast mass 4/13/2017    Diagnostic Mammogram negative.  Long term current use of anticoagulant therapy     Myocardial infarction [facility]) 2005, 2006    [facility], Dr. Marlo Robison    Osteoporosis 8/19/2013    Positive H. pylori test 8/19/2013    Rectal polyp 10/9/2017    Anoscopy:  Revealed large cauliflower-like polyp at the anterior midline just above the dentate line, mobile soft, to have removed with Dr. Ana Maria Lao ([facility]), benign. No more fecal leakage.        Stroke (cerebrum) ([facility] 2/16/2020    Stroke ([facility] 1/7/2009    no deficits expect patient reports going to one side if she gets up too fast    Thromboembolus [facility])     pulmanary       Past Surgical History:        Procedure Laterality Date    APPENDECTOMY      BLADDER SUSPENSION  2005    COLONOSCOPY  2012   [address]    @ [facility]    GYN      hysterectomy    HYSTERECTOMY, TOTAL ABDOMINAL (CERVIX REMOVED)  1972    IR KYPHOPLASTY THORACIC FIRST LEVEL  1/30/2023    IR KYPHOPLASTY THORACIC FIRST LEVEL 1/30/2023 [facility]    LITHOTRIPSY  2008 x 2    ORTHOPEDIC SURGERY      2 rotater cuff right shoulder    [place] UNLISTED PROCEDURE CARDIAC SURGERY      by pass    UROLOGICAL SURGERY      suspension       Social History:    Social History     Tobacco Use    Smoking status: Never    Smokeless tobacco: Never   Substance Use Topics    Alcohol use: No                                Counseling given: Not Answered      Vital Signs (Current): There were no vitals filed for this visit. BP Readings from Last 3 Encounters:   02/09/23 (!) 165/85   02/06/23 120/67   01/30/23 (!) 151/67       NPO Status:                                                                                 BMI:   Wt Readings from Last 3 Encounters:   02/06/23 135 lb (61.2 kg)   01/30/23 137 lb (62.1 kg)   01/09/23 137 lb (62.1 kg)     There is no height or weight on file to calculate BMI.    CBC:   Lab Results   Component Value Date/Time    WBC 11.0 01/25/2023 09:59 AM    RBC 5.28 01/25/2023 09:59 AM    HGB 15.6 01/25/2023 09:59 AM    HCT 46.8 01/25/2023 09:59 AM    MCV 88.6 01/25/2023 09:59 AM    RDW 11.9 01/25/2023 09:59 AM     01/25/2023 09:59 AM       CMP:   Lab Results   Component Value Date/Time     01/25/2023 09:59 AM    K 3.9 01/25/2023 09:59 AM     01/25/2023 09:59 AM    CO2 23 01/25/2023 09:59 AM    BUN 13 01/25/2023 09:59 AM    CREATININE 1.10 01/25/2023 09:59 AM    GFRAA >60 07/05/2022 01:51 PM    AGRATIO 1.8 12/30/2021 11:05 AM    LABGLOM 47 01/25/2023 09:59 AM    GLUCOSE 98 01/25/2023 09:59 AM    PROT 7.1 07/05/2022 01:51 PM    CALCIUM 10.3 01/25/2023 09:59 AM    BILITOT 0.5 07/05/2022 01:51 PM    ALKPHOS 51 07/05/2022 01:51 PM    ALKPHOS 57 12/30/2021 11:05 AM    AST 17 07/05/2022 01:51 PM    ALT 26 07/05/2022 01:51 PM       POC Tests: No results for input(s): POCGLU, POCNA, POCK, POCCL, POCBUN, POCHEMO, POCHCT in the last 72 hours.     Coags:   Lab Results   Component Value Date/Time    PROTIME 13.2 03/18/2021 07:18 AM    INR 1.0 03/18/2021 07:18 AM    APTT >200.0 03/18/2021 06:36 PM       HCG (If Applicable): No results found for: PREGTESTUR, PREGSERUM, HCG, HCGQUANT     ABGs: No results found for: PHART, PO2ART, HAX6YPO, GVH9TRZ, BEART, W7CLTFGB     Type & Screen (If Applicable):  No results found for: LABABO, LABRH    Drug/Infectious Status (If Applicable):  No results found for: HIV, HEPCAB    COVID-19 Screening (If Applicable): No results found for: COVID19        Anesthesia Evaluation  Patient summary reviewed  Airway: Mallampati: II  TM distance: >3 FB   Neck ROM: limited  Mouth opening: < 3 FB   Dental: normal exam         Pulmonary:normal exam  breath sounds clear to auscultation                             Cardiovascular:    (+) hypertension:, pacemaker (DDD @ 60): pacemaker, past MI (1988):, CAD:, CABG/stent (1988):, CHF (? ICM): systolic, hyperlipidemia    (-) peripheral edema      Rhythm: regular  Rate: normal                 ROS comment: TTE 4/2021: Mod LV dysfunction (? EF 30-40%)     Neuro/Psych:   (+) CVA (last 2021): residual symptoms,             GI/Hepatic/Renal:   (+) GERD:, renal disease: CRI,           Endo/Other:                     Abdominal:             Vascular:   + PVD, aortic or cerebral, . ROS comment: PE. Other Findings:             Anesthesia Plan      TIVA     ASA 3       Induction: intravenous. Anesthetic plan and risks discussed with patient.         Attending anesthesiologist reviewed and agrees with Kimmie Burk MD   2/9/2023

## 2023-02-09 NOTE — OR NURSING
Recovery period without difficulty. Pt alert and oriented and denies pain except when she coughs d/t left rib fractures. Site is clean, dry, and intact. Reviewed discharge instructions with patient and her son, both verbalized understanding. Pt escorted to lobby discharge area via wheelchair. Vital signs and Balwinder score completed.

## 2023-02-09 NOTE — H&P
Department of Interventional Radiology  [phone]    History and Physical    Patient:  Bishop Fuchs MRN:  [id number]  SSN:  [id number]    YOB: 1931  Age:  80 y.o. Sex:  female      Primary Care Provider:  Teresa Bansal MD  Referring Physician:  Severiano Rimes, MD    Subjective:     Chief Complaint: back pain    History of the Present Illness: The patient is a 80 y.o. female who presents with persistent pain after T10 Kyhpoplasty. Possible new T9 fracture. NPO. Past Medical History:   Diagnosis Date    CAD (coronary artery disease)     2 MI    Calculus of kidney     Closed nondisplaced fracture of fifth left metatarsal bone 5/18/2021    Congestive heart failure (HCC)     Coronary artery disease involving coronary bypass graft of native heart without angina pectoris 12/14/2015    CVD (cerebrovascular disease) 8/19/2013    Dyslipidemia 8/19/2013    GERD (gastroesophageal reflux disease)     HTN (hypertension) 8/19/2013    Left breast mass 4/13/2017    Diagnostic Mammogram negative. Long term current use of anticoagulant therapy     Myocardial infarction [facility]) 2005, 2006    [facility]iology, Dr. Kathrin Lopez    Osteoporosis 8/19/2013    Positive H. pylori test 8/19/2013    Rectal polyp 10/9/2017    Anoscopy:  Revealed large cauliflower-like polyp at the anterior midline just above the dentate line, mobile soft, to have removed with Dr. Margarette Buirtago ([facility]), benign. No more fecal leakage.        Stroke (cerebrum) ([address] 2/16/2020    Stroke ([address] 1/7/2009    no deficits expect patient reports going to one side if she gets up too fast    Thromboembolus [facility])     pulmanary     Past Surgical History:   Procedure Laterality Date    APPENDECTOMY      BLADDER SUSPENSION  2005    COLONOSCOPY  2012    [address]    @ [address]      hysterectomy    HYSTERECTOMY, TOTAL ABDOMINAL (CERVIX REMOVED)  1972    IR KYPHOPLASTY THORACIC FIRST LEVEL  1/30/2023    IR KYPHOPLASTY THORACIC FIRST LEVEL 1/30/2023 [facility]    LITHOTRIPSY  2008 x 2    ORTHOPEDIC SURGERY      2 rotater cuff right shoulder    [place] UNLISTED PROCEDURE CARDIAC SURGERY      by pass    UROLOGICAL SURGERY      suspension        Review of Systems:    Pertinent items are noted in the History of Present Illness. Prior to Admission medications    Medication Sig Start Date End Date Taking? Authorizing Provider   lidocaine (LIDODERM) 5 % Place 1 patch onto the skin daily for 10 days 12 hours on, 12 hours off. 2/6/23 2/16/23  GIOVANY Jones   tiZANidine (ZANAFLEX) 2 MG tablet Take 1 tablet by mouth every 8 hours as needed (back pain) 1/13/23   Avtar Kenney MD   ondansetron (ZOFRAN-ODT) 4 MG disintegrating tablet Take 1 tablet by mouth 3 times daily as needed for Nausea or Vomiting 1/9/23   Janay Hollis MD   irbesartan (AVAPRO) 75 MG tablet TAKE 1 TABLET DAILY 12/7/22   Eleazar Howard MD   carvedilol (COREG) 3.125 MG tablet Take 1 tablet by mouth 2 times daily TAKE 1 TABLET TWICE DAILY WITH MEALS 7/12/22   Janay Hollis MD   clopidogrel (PLAVIX) 75 MG tablet Take 1 tablet by mouth daily The details of the medication are not available because there are pending changes by a home health clinician.  7/12/22   Janay Hollis MD   isosorbide mononitrate (IMDUR) 60 MG extended release tablet Take 1 tablet by mouth daily 7/12/22   Janay Hollis MD   rosuvastatin (CRESTOR) 20 MG tablet Take 1 tablet by mouth daily TAKE 1 TABLET NIGHTLY 7/12/22   Janay Hollis MD   spironolactone (ALDACTONE) 25 MG tablet Take 1 tablet by mouth daily 7/12/22   Janay Hollis MD   Cranberry 250 MG TABS Take 4,200 mg by mouth 2 times daily    Historical Provider, MD   Multiple Vitamins-Minerals (HAIR SKIN AND NAILS FORMULA PO) Take by mouth    Historical Provider, MD   aspirin 81 MG EC tablet Take 81 mg by mouth daily    Eleazar Howard MD   pantoprazole (PROTONIX) 20 MG tablet Take 1 tablet by mouth daily 6/23/22   Prachi Meyers MD   APPLE CIDER VINEGAR PO Take 480 mg by mouth daily    Ar Automatic Reconciliation   TURMERIC PO Take 500 mg by mouth daily    Ar Automatic Reconciliation   Cholecalciferol 50 MCG (2000 UT) CAPS Take 2,000 Units by mouth daily    Ar Automatic Reconciliation   cyanocobalamin 100 MCG tablet Take 100 mcg by mouth daily    Ar Automatic Reconciliation   Lidocaine, Anorectal, 5 % CREA Actual prescription: Lidocaine 5%: Neurontin/gabapentin 5% combined topical cream/gelApply to affected area up to 4 times a day as needed for pain  Patient not taking: No sig reported 6/28/21   Ar Automatic Reconciliation   loratadine (CLARITIN) 10 MG tablet Take 10 mg by mouth daily    Ar Automatic Reconciliation   montelukast (SINGULAIR) 10 MG tablet Take 10 mg by mouth daily  Patient not taking: No sig reported 3/31/22   Ar Automatic Reconciliation        Allergies   Allergen Reactions    Amlodipine Other (See Comments)     Other reaction(s): Unknown-Unspecified    Quinapril Other (See Comments)     Other reaction(s): Cough-Intolerance    Sertraline Other (See Comments)     Other reaction(s): Drowsiness    Prednisone Itching, Palpitations and Other (See Comments)     Per pt also had heart burn       Family History   Problem Relation Age of Onset    Emergence Delirium Neg Hx     Heart Disease Brother     Cancer Brother         prostate    Heart Disease Brother     Heart Disease Father     Stroke Mother     Heart Disease Mother     Post-op Cognitive Dysfunction Neg Hx     Other Neg Hx     Breast Cancer Neg Hx     Heart Disease Brother     Heart Disease Brother     Malig Hypertherm Neg Hx     Pseudochol.  Deficiency Neg Hx     Delayed Awakening Neg Hx     Post-op Nausea/Vomiting Neg Hx      Social History     Tobacco Use    Smoking status: Never    Smokeless tobacco: Never   Substance Use Topics    Alcohol use: No        Objective:       Physical Examination:    Vitals:    02/09/23 1254   BP: (!) 165/85   Pulse: 70   Resp: 16   Temp: 97 °F (36.1 °C)   TempSrc: Temporal   SpO2: 98%       Pain Assessment  No flowsheet data found. HEART: regular rate and rhythm, S1, S2 normal, no murmur, click, rub or gallop  LUNG: clear to auscultation bilaterally    Laboratory:     Lab Results   Component Value Date/Time     01/25/2023 09:59 AM     07/05/2022 01:51 PM    K 3.9 01/25/2023 09:59 AM    K 4.5 07/05/2022 01:51 PM     01/25/2023 09:59 AM     07/05/2022 01:51 PM    CO2 23 01/25/2023 09:59 AM    CO2 28 07/05/2022 01:51 PM    BUN 13 01/25/2023 09:59 AM    BUN 19 07/05/2022 01:51 PM    GFRAA >60 07/05/2022 01:51 PM    GFRAA 59 12/30/2021 11:05 AM    MG 2.6 04/27/2021 10:25 AM    MG 2.3 04/24/2021 05:30 AM    GLOB 3.1 07/05/2022 01:51 PM    GLOB 3.3 04/21/2021 01:53 PM    ALT 26 07/05/2022 01:51 PM    ALT 17 12/30/2021 11:05 AM     Lab Results   Component Value Date/Time    WBC 11.0 01/25/2023 09:59 AM    WBC 6.6 07/05/2022 01:51 PM    HGB 15.6 01/25/2023 09:59 AM    HGB 15.0 07/05/2022 01:51 PM    HCT 46.8 01/25/2023 09:59 AM    HCT 47.9 07/05/2022 01:51 PM     01/25/2023 09:59 AM     07/05/2022 01:51 PM     Lab Results   Component Value Date/Time    APTT >200.0 03/18/2021 06:36 PM    INR 1.0 03/18/2021 07:18 AM    INR 1.0 02/03/2021 11:52 AM       Assessment:     T9 fracture         Plan:     Planned Procedure:  kyphoplasty with anesthesia consult    Risks, benefits, and alternatives reviewed with patient and she agrees to proceed with the procedure.       Signed By: Anna Centeno MD     February 9, 2023

## 2023-02-10 DIAGNOSIS — S22.000D COMPRESSION FRACTURE OF THORACIC VERTEBRA WITH ROUTINE HEALING, UNSPECIFIED THORACIC VERTEBRAL LEVEL, SUBSEQUENT ENCOUNTER: Primary | ICD-10-CM

## 2023-02-10 DIAGNOSIS — M48.44XA STRESS FRACTURE OF THORACIC VERTEBRA, INITIAL ENCOUNTER: Primary | ICD-10-CM

## 2023-02-10 DIAGNOSIS — S22.000A CLOSED WEDGE FRACTURE OF THORACIC VERTEBRA, UNSPECIFIED THORACIC VERTEBRAL LEVEL, INITIAL ENCOUNTER (HCC): Primary | ICD-10-CM

## 2023-02-10 RX ORDER — OXYCODONE HYDROCHLORIDE 5 MG/1
5 TABLET ORAL EVERY 8 HOURS PRN
Qty: 9 TABLET | Refills: 0 | Status: SHIPPED | OUTPATIENT
Start: 2023-02-10 | End: 2023-02-10

## 2023-02-10 RX ORDER — OXYCODONE HYDROCHLORIDE 5 MG/1
5 TABLET ORAL EVERY 8 HOURS PRN
Qty: 9 TABLET | Refills: 0 | Status: SHIPPED | OUTPATIENT
Start: 2023-02-10 | End: 2023-02-13

## 2023-02-10 NOTE — PROGRESS NOTES
Patient's son called and states that her pain is not controlled after her kyphoplasty yesterday. I had given them a prescription for Skelaxin and asked what she is taking. He indicated that she has taken both Zanaflex and Skelaxin. I told him she cannot take both and has to choose 1 or the other.   I will E scribe 3 days of Roxicodone for better pain control and she can follow-up with her primary care physician on Monday as she already has an appointment

## 2023-02-13 ENCOUNTER — OFFICE VISIT (OUTPATIENT)
Dept: INTERNAL MEDICINE CLINIC | Facility: CLINIC | Age: 88
End: 2023-02-13
Payer: COMMERCIAL

## 2023-02-13 VITALS
TEMPERATURE: 97 F | SYSTOLIC BLOOD PRESSURE: 160 MMHG | HEART RATE: 59 BPM | RESPIRATION RATE: 16 BRPM | BODY MASS INDEX: 21.79 KG/M2 | OXYGEN SATURATION: 94 % | DIASTOLIC BLOOD PRESSURE: 70 MMHG | HEIGHT: 66 IN

## 2023-02-13 DIAGNOSIS — S22.000A COMPRESSION FRACTURE OF BODY OF THORACIC VERTEBRA (HCC): Primary | ICD-10-CM

## 2023-02-13 DIAGNOSIS — K59.03 DRUG-INDUCED CONSTIPATION: ICD-10-CM

## 2023-02-13 DIAGNOSIS — R07.81 RIB PAIN ON LEFT SIDE: ICD-10-CM

## 2023-02-13 DIAGNOSIS — R06.02 SOB (SHORTNESS OF BREATH): ICD-10-CM

## 2023-02-13 PROCEDURE — 99215 OFFICE O/P EST HI 40 MIN: CPT | Performed by: NURSE PRACTITIONER

## 2023-02-13 PROCEDURE — 96372 THER/PROPH/DIAG INJ SC/IM: CPT | Performed by: NURSE PRACTITIONER

## 2023-02-13 PROCEDURE — 1123F ACP DISCUSS/DSCN MKR DOCD: CPT | Performed by: NURSE PRACTITIONER

## 2023-02-13 RX ORDER — HYDROCODONE BITARTRATE AND ACETAMINOPHEN 5; 325 MG/1; MG/1
1 TABLET ORAL EVERY 6 HOURS PRN
Status: ON HOLD | COMMUNITY

## 2023-02-13 RX ORDER — METAXALONE 800 MG/1
TABLET ORAL
Status: ON HOLD | COMMUNITY
Start: 2023-01-25

## 2023-02-13 RX ORDER — KETOROLAC TROMETHAMINE 30 MG/ML
30 INJECTION, SOLUTION INTRAMUSCULAR; INTRAVENOUS ONCE
Status: COMPLETED | OUTPATIENT
Start: 2023-02-13 | End: 2023-02-13

## 2023-02-13 RX ADMIN — KETOROLAC TROMETHAMINE 30 MG: 30 INJECTION, SOLUTION INTRAMUSCULAR; INTRAVENOUS at 10:58

## 2023-02-13 SDOH — ECONOMIC STABILITY: INCOME INSECURITY: HOW HARD IS IT FOR YOU TO PAY FOR THE VERY BASICS LIKE FOOD, HOUSING, MEDICAL CARE, AND HEATING?: VERY HARD

## 2023-02-13 SDOH — ECONOMIC STABILITY: FOOD INSECURITY: WITHIN THE PAST 12 MONTHS, YOU WORRIED THAT YOUR FOOD WOULD RUN OUT BEFORE YOU GOT MONEY TO BUY MORE.: NEVER TRUE

## 2023-02-13 SDOH — ECONOMIC STABILITY: FOOD INSECURITY: WITHIN THE PAST 12 MONTHS, THE FOOD YOU BOUGHT JUST DIDN'T LAST AND YOU DIDN'T HAVE MONEY TO GET MORE.: NEVER TRUE

## 2023-02-13 SDOH — ECONOMIC STABILITY: HOUSING INSECURITY
IN THE LAST 12 MONTHS, WAS THERE A TIME WHEN YOU DID NOT HAVE A STEADY PLACE TO SLEEP OR SLEPT IN A SHELTER (INCLUDING NOW)?: NO

## 2023-02-13 ASSESSMENT — ENCOUNTER SYMPTOMS
BACK PAIN: 1
COUGH: 0
CONSTIPATION: 1
SHORTNESS OF BREATH: 1
ABDOMINAL PAIN: 0
DIARRHEA: 0
NAUSEA: 0
WHEEZING: 0
VOMITING: 0

## 2023-02-13 ASSESSMENT — PATIENT HEALTH QUESTIONNAIRE - PHQ9
SUM OF ALL RESPONSES TO PHQ QUESTIONS 1-9: 2
2. FEELING DOWN, DEPRESSED OR HOPELESS: 1
1. LITTLE INTEREST OR PLEASURE IN DOING THINGS: 1
SUM OF ALL RESPONSES TO PHQ9 QUESTIONS 1 & 2: 2
SUM OF ALL RESPONSES TO PHQ QUESTIONS 1-9: 2

## 2023-02-13 NOTE — PROGRESS NOTES
[facility]      2023    Patient Name: Renae Colon  :  1931      Chief Complaint:  Chief Complaint   Patient presents with    Back Pain    Rib Injury         HPI  Patient presents today accompanied by her son with complaint of severe back pain. Symptoms started about 6 weeks ago. No known injury mechanism. No recent falls. She was initially seen in the ER on 23. X-ray of lumbar spine with no evidence of fracture or subluxation. She was then seen in our office on 23. CT and pelvis which showed no obvious obstructing stones or other acute abnormality. Pain remained severe. Bone scan ordered which showed compression fracture at T10. She was then referred to interventional radiology. T10 kyphoplasty on 23. Pain unchanged following procedure. Seen in the ER on 23. X-ray of thoracic spine demonstrated a new compression fracture of T9. T9 kyphoplasty on 23. Patient's son reports that they were told by radiologist on 23 that she also has broken ribs. She presents today reporting no improvement in pain. She reports that on a scale of 0-10, her pain is a \"95\". She is taking pain medications and muscle relaxers as prescribed but denies any improvement. She complains of mild SOB that she attributes to inability to take a deep breath because of back pain and possible rib fractures. Also with complaint of mild generalized abdominal discomfort that she attributes to medication-induced constipation. Last BM was 4-5 days ago. Has previously been on Citrucel and docusate sodium. Not currently taking anything for constipation.            Past Medical History:   Diagnosis Date    CAD (coronary artery disease)     2 MI    Calculus of kidney     Closed nondisplaced fracture of fifth left metatarsal bone 2021    Congestive heart failure (HCC)     Coronary artery disease involving coronary bypass graft of native heart without angina pectoris 2015    CVD (cerebrovascular disease) 8/19/2013    Dyslipidemia 8/19/2013    GERD (gastroesophageal reflux disease)     HTN (hypertension) 8/19/2013    Left breast mass 4/13/2017    Diagnostic Mammogram negative. Long term current use of anticoagulant therapy     Myocardial infarction [facility]) 2005, 2006    [facility], Dr. Lauren Carpenter    Osteoporosis 8/19/2013    Positive H. pylori test 8/19/2013    Rectal polyp 10/9/2017    Anoscopy:  Revealed large cauliflower-like polyp at the anterior midline just above the dentate line, mobile soft, to have removed with Dr. Amarilis Lim ([facility]), benign. No more fecal leakage. 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      hysterectomy    HYSTERECTOMY, TOTAL ABDOMINAL (CERVIX REMOVED)  1972    IR KYPHOPLASTY THORACIC FIRST LEVEL  1/30/2023    IR KYPHOPLASTY THORACIC FIRST LEVEL 1/30/2023 [facility]    IR KYPHOPLASTY THORACIC FIRST LEVEL  2/9/2023    IR KYPHOPLASTY THORACIC FIRST LEVEL 2/9/2023 [facility]    LITHOTRIPSY  2008 x 2    ORTHOPEDIC SURGERY      2 rotater cuff right shoulder    [facility] UNLISTED PROCEDURE CARDIAC SURGERY      by pass    UROLOGICAL SURGERY      suspension       Family History   Problem Relation Age of Onset    Emergence Delirium Neg Hx     Heart Disease Brother     Cancer Brother         prostate    Heart Disease Brother     Heart Disease Father     Stroke Mother     Heart Disease Mother     Post-op Cognitive Dysfunction Neg Hx     Other Neg Hx     Breast Cancer Neg Hx     Heart Disease Brother     Heart Disease Brother     [address] Hypertherm Neg Hx     Pseudochol.  Deficiency Neg Hx     Delayed Awakening Neg Hx     Post-op Nausea/Vomiting Neg Hx Social History     Tobacco Use    Smoking status: Never    Smokeless tobacco: Never   Vaping Use    Vaping Use: Never used   Substance Use Topics    Alcohol use: No    Drug use: No       Current Outpatient Medications:     HYDROcodone-acetaminophen (NORCO) 5-325 MG per tablet, Take 1 tablet by mouth every 6 hours as needed for Pain., Disp: , Rfl:     oxyCODONE (ROXICODONE) 5 MG immediate release tablet, Take 1 tablet by mouth every 8 hours as needed for Pain for up to 3 days. Intended supply: 3 days.  Take lowest dose possible to manage pain Max Daily Amount: 15 mg, Disp: 9 tablet, Rfl: 0    lidocaine (LIDODERM) 5 %, Place 1 patch onto the skin daily for 10 days 12 hours on, 12 hours off., Disp: 10 patch, Rfl: 0    tiZANidine (ZANAFLEX) 2 MG tablet, Take 1 tablet by mouth every 8 hours as needed (back pain), Disp: 30 tablet, Rfl: 0    ondansetron (ZOFRAN-ODT) 4 MG disintegrating tablet, Take 1 tablet by mouth 3 times daily as needed for Nausea or Vomiting, Disp: 21 tablet, Rfl: 0    irbesartan (AVAPRO) 75 MG tablet, TAKE 1 TABLET DAILY, Disp: 90 tablet, Rfl: 3    carvedilol (COREG) 3.125 MG tablet, Take 1 tablet by mouth 2 times daily TAKE 1 TABLET TWICE DAILY WITH MEALS, Disp: 180 tablet, Rfl: 3    clopidogrel (PLAVIX) 75 MG tablet, Take 1 tablet by mouth daily The details of the medication are not available because there are pending changes by a home health clinician., Disp: 90 tablet, Rfl: 3    isosorbide mononitrate (IMDUR) 60 MG extended release tablet, Take 1 tablet by mouth daily, Disp: 90 tablet, Rfl: 3    rosuvastatin (CRESTOR) 20 MG tablet, Take 1 tablet by mouth daily TAKE 1 TABLET NIGHTLY, Disp: 90 tablet, Rfl: 3    spironolactone (ALDACTONE) 25 MG tablet, Take 1 tablet by mouth daily, Disp: 90 tablet, Rfl: 3    aspirin 81 MG EC tablet, Take 81 mg by mouth daily, Disp: , Rfl:     pantoprazole (PROTONIX) 20 MG tablet, Take 1 tablet by mouth daily, Disp: 90 tablet, Rfl: 3    metaxalone (SKELAXIN) 800 MG tablet, , Disp: , Rfl:     Cranberry 250 MG TABS, Take 4,200 mg by mouth 2 times daily (Patient not taking: Reported on 2/13/2023), Disp: , Rfl:     Multiple Vitamins-Minerals (HAIR SKIN AND NAILS FORMULA PO), Take by mouth (Patient not taking: Reported on 2/13/2023), Disp: , Rfl:     APPLE CIDER VINEGAR PO, Take 480 mg by mouth daily (Patient not taking: Reported on 2/13/2023), Disp: , Rfl:     TURMERIC PO, Take 500 mg by mouth daily (Patient not taking: Reported on 2/13/2023), Disp: , Rfl:     Cholecalciferol 50 MCG (2000 UT) CAPS, Take 2,000 Units by mouth daily (Patient not taking: Reported on 2/13/2023), Disp: , Rfl:     cyanocobalamin 100 MCG tablet, Take 100 mcg by mouth daily (Patient not taking: Reported on 2/13/2023), Disp: , Rfl:     Lidocaine, Anorectal, 5 % CREA, Actual prescription: Lidocaine 5%: Neurontin/gabapentin 5% combined topical cream/gelApply to affected area up to 4 times a day as needed for pain (Patient not taking: No sig reported), Disp: , Rfl:     loratadine (CLARITIN) 10 MG tablet, Take 10 mg by mouth daily (Patient not taking: Reported on 2/13/2023), Disp: , Rfl:     montelukast (SINGULAIR) 10 MG tablet, Take 10 mg by mouth daily (Patient not taking: No sig reported), Disp: , Rfl:     Allergies   Allergen Reactions    Amlodipine Other (See Comments)     Other reaction(s): Unknown-Unspecified    Quinapril Other (See Comments)     Other reaction(s): Cough-Intolerance    Sertraline Other (See Comments)     Other reaction(s): Drowsiness    Prednisone Itching, Palpitations and Other (See Comments)     Per pt also had heart burn       Review of Systems   Constitutional:  Negative for chills and fever. Respiratory:  Positive for shortness of breath. Negative for cough and wheezing. Cardiovascular:  Negative for chest pain, palpitations and leg swelling. Gastrointestinal:  Positive for constipation. Negative for abdominal pain, diarrhea, nausea and vomiting.    Genitourinary:  Negative for dysuria and hematuria. Musculoskeletal:  Positive for back pain and gait problem. Neurological:  Negative for dizziness, light-headedness, numbness and headaches. Objective:  BP (!) 160/70 (Site: Right Upper Arm, Position: Sitting, Cuff Size: Child)   Pulse 59   Temp 97 °F (36.1 °C) (Temporal)   Resp 16   Ht 5' 6\" (1.676 m)   SpO2 94%   BMI 21.79 kg/m²     Examination:  Physical Exam  Vitals and nursing note reviewed. Constitutional:       General: She is not in acute distress. Appearance: Normal appearance. Cardiovascular:      Rate and Rhythm: Normal rate and regular rhythm. Pulmonary:      Effort: Pulmonary effort is normal. No respiratory distress. Breath sounds: Normal breath sounds. Abdominal:      General: Bowel sounds are normal.      Palpations: Abdomen is soft. Tenderness: There is no abdominal tenderness. There is no guarding or rebound. Musculoskeletal:      Thoracic back: Tenderness present. Decreased range of motion. Lumbar back: Tenderness present. Decreased range of motion. Right lower leg: No edema. Left lower leg: No edema. Skin:     General: Skin is warm and dry. Neurological:      General: No focal deficit present. Mental Status: She is alert. Psychiatric:         Mood and Affect: Affect is tearful (due to pain). Assessment/Plan:    Telly Lopez was seen today for back pain and rib injury. Diagnoses and all orders for this visit:    Compression fracture of body of thoracic vertebra (HCC)  -     ketorolac (TORADOL) injection 30 mg  Patient seen in the office with Dr. Ariane Alicea this morning. S/p kyphoplasty of T9 and T10 with no relief. Pain poorly controlled on current regimen. Plan is for admission to hospitalist service to help with pain control and further work-up as appropriate. Rib pain on left side  As above.      SOB (shortness of breath)    Drug-induced constipation          On this date, 2/13/23, I have spent 60 minutes reviewing previous notes, test results and face to face with the patient discussing the diagnosis and importance of compliance with the treatment plan as well as documenting on the day of the visit. An electronic signature was used to authenticate this note.   DANIELLE MurrayC

## 2023-02-13 NOTE — PATIENT INSTRUCTIONS
FINANCIAL RESOURCES    [facility]   o What they offer: The [employer] Program helps uninsured patients who do not qualify for government-sponsored health insurance and cannot afford to pay for their medical care. Insured patients may also qualify for assistance based on family income, family size, and medical needs. o Phone Number: [phone]      o How to apply for the [employer] Program:   Option 1: To apply for financial assistance, a patient (or their family or other provider) should fill out the Financial Assistance Application. Copies of the Financial Assistance Application and the FAP may be obtained for free by calling the [address] [place] [address] [employer] service department at [phone]. Option 2: The Financial Assistance Application and policy may be obtained for free by downloading a copy from the [employer]: o http://amador-petty.org/. com/patient-resources/financial-assistance       o Applications are available in several languages on the website     [employer] to transform cities  What they offer:  May be able to assist with medical bills if you are uninsured. Phone number: [phone]  www. [employer] and Resources for the [place]'s (uninsured and under insured) A Nurse and  are available. Chinese speaking staff available   What they offer: Provide information and assistance for the whole family  Discuss your health and help you find a doctor if you need one. Answer questions about your medications. Diabetes Self-Management education. Connect you with financial assistance agencies, social and medical services. Provide moral support during difficult times. Unfortunately they are unable to administer any medicine, provide you with money or transportation in our vehicles. However, the team will try to help you with your health needs. Phone: [phone] to make an appointment.  Leave voice mail with name and call back number. Medication Cost Assistance    Good Rx    o What they offer: Good Rx tracks prescription drug prices and provides drug  coupons for discounts on medications. o Website: [employer]/     [employer]   o What they offer: [employer] offers free information on medications and healthcare cost savings programs including prescription assistance programs, coupons, and discount programs. o Website: PaymentBack.[employer] org/   o Helpline: 503.873.7633     RX Assist   o What they offer: Information about free and low-cost medicine programs. o Website: https://[employer]/     [employer] $4 Prescription Program   o What they offer: Prescription Program includes up to a 30-day supply for $4 and a 90-day supply for $10 of some covered generic drugs at commonly prescribed dosages   o Website: Melinda Thompson  What they offer:  If you are uninsured and cannot afford the prescription medicine you need, you may be able to have your prescriptions filled at no cost through [employer]. You must live in [place]. To find out if you qualify. Website: PopPath.it    Cost Plus Drugs  What they offer:  Low-cost versions of high-cost generic drugs  Website: https://costPlanZaps. [employer]/    [facility]   https://[facility].gov/  Phone: 689.944.5919      Need additional resources? Call 211 or Find Help: https://www. findhelp.org/

## 2023-02-13 NOTE — PROGRESS NOTES
The patient was seen and examined with NP [facility]. The patient was clearly very uncomfortable and in a lot of pain. Due to our longstanding relationship over the last approximately 15 years and given her age and comorbidities and the severity of her pain due to her compression fractures as well as the inability to maintain pain control I have recommended admission. I spoke with the bed coordinator while the patient was in the office today. I also spoke with the accepting hospitalist on call. They kindly agreed to accept the patient for admission and pain control. She has a history of stroke in the past as well as coronary artery disease and heart failure chronic kidney disease. I have concerns about her being able to maintain pain control without affecting respiratory status and she is have a lot of pain with breathing with apparent rib fractures as well. In addition the medication that she is had to take at home for pain control has caused constipation and abdominal pain. She is with her son today whom I know as well. We have been in contact with the bed coordinators this afternoon and are awaiting bed assignment. We have been notified they will contact the patient directly about admission when bed becomes available. There are currently no beds available at the time of the office visit. I think it safe that she can go home but I do think this is with the anticipation of her admission this afternoon. In addition, I have also counseled the patient that if her pain does become even more severe while awaiting a bed she should proceed to the emergency room immediately.     Richelle Martinez MD

## 2023-02-14 ENCOUNTER — TELEPHONE (OUTPATIENT)
Dept: INTERNAL MEDICINE CLINIC | Facility: CLINIC | Age: 88
End: 2023-02-14

## 2023-02-14 ENCOUNTER — HOSPITAL ENCOUNTER (INPATIENT)
Age: 88
LOS: 17 days | Discharge: NURSING, REHAB OR LONG-TERM CARE | DRG: 478 | End: 2023-03-03
Attending: INTERNAL MEDICINE | Admitting: FAMILY MEDICINE
Payer: COMMERCIAL

## 2023-02-14 ENCOUNTER — APPOINTMENT (OUTPATIENT)
Dept: GENERAL RADIOLOGY | Age: 88
DRG: 478 | End: 2023-02-14
Attending: INTERNAL MEDICINE
Payer: COMMERCIAL

## 2023-02-14 DIAGNOSIS — S22.080A COMPRESSION FRACTURE OF T11 VERTEBRA, INITIAL ENCOUNTER (HCC): ICD-10-CM

## 2023-02-14 DIAGNOSIS — Z98.890 S/P KYPHOPLASTY: ICD-10-CM

## 2023-02-14 DIAGNOSIS — I50.22 CHRONIC SYSTOLIC (CONGESTIVE) HEART FAILURE (HCC): Primary | ICD-10-CM

## 2023-02-14 DIAGNOSIS — R11.0 NAUSEA: ICD-10-CM

## 2023-02-14 PROBLEM — R52 INTRACTABLE PAIN: Status: ACTIVE | Noted: 2023-02-14

## 2023-02-14 PROBLEM — I25.10 CAD (CORONARY ARTERY DISEASE): Status: ACTIVE | Noted: 2021-03-20

## 2023-02-14 PROBLEM — I25.118 CORONARY ARTERY DISEASE WITH STABLE ANGINA PECTORIS (HCC): Status: ACTIVE | Noted: 2020-09-24

## 2023-02-14 LAB
ALBUMIN SERPL-MCNC: 2.7 G/DL (ref 3.2–4.6)
ALBUMIN/GLOB SERPL: 0.6 (ref 0.4–1.6)
ALP SERPL-CCNC: 77 U/L (ref 50–136)
ALT SERPL-CCNC: 16 U/L (ref 12–65)
ANION GAP SERPL CALC-SCNC: 7 MMOL/L (ref 2–11)
AST SERPL-CCNC: 26 U/L (ref 15–37)
BASOPHILS # BLD: 0.1 K/UL (ref 0–0.2)
BASOPHILS NFR BLD: 1 % (ref 0–2)
BILIRUB SERPL-MCNC: 0.6 MG/DL (ref 0.2–1.1)
BUN SERPL-MCNC: 23 MG/DL (ref 8–23)
CALCIUM SERPL-MCNC: 9.7 MG/DL (ref 8.3–10.4)
CHLORIDE SERPL-SCNC: 108 MMOL/L (ref 101–110)
CO2 SERPL-SCNC: 25 MMOL/L (ref 21–32)
CREAT SERPL-MCNC: 1.2 MG/DL (ref 0.6–1)
DIFFERENTIAL METHOD BLD: ABNORMAL
EOSINOPHIL # BLD: 0.4 K/UL (ref 0–0.8)
EOSINOPHIL NFR BLD: 5 % (ref 0.5–7.8)
ERYTHROCYTE [DISTWIDTH] IN BLOOD BY AUTOMATED COUNT: 11.7 % (ref 11.9–14.6)
GLOBULIN SER CALC-MCNC: 4.3 G/DL (ref 2.8–4.5)
GLUCOSE SERPL-MCNC: 116 MG/DL (ref 65–100)
HCT VFR BLD AUTO: 43.6 % (ref 35.8–46.3)
HGB BLD-MCNC: 14.2 G/DL (ref 11.7–15.4)
IMM GRANULOCYTES # BLD AUTO: 0 K/UL (ref 0–0.5)
IMM GRANULOCYTES NFR BLD AUTO: 0 % (ref 0–5)
LYMPHOCYTES # BLD: 1.7 K/UL (ref 0.5–4.6)
LYMPHOCYTES NFR BLD: 22 % (ref 13–44)
MCH RBC QN AUTO: 29.6 PG (ref 26.1–32.9)
MCHC RBC AUTO-ENTMCNC: 32.6 G/DL (ref 31.4–35)
MCV RBC AUTO: 90.8 FL (ref 82–102)
MONOCYTES # BLD: 0.6 K/UL (ref 0.1–1.3)
MONOCYTES NFR BLD: 8 % (ref 4–12)
NEUTS SEG # BLD: 5 K/UL (ref 1.7–8.2)
NEUTS SEG NFR BLD: 64 % (ref 43–78)
NRBC # BLD: 0 K/UL (ref 0–0.2)
PLATELET # BLD AUTO: 231 K/UL (ref 150–450)
PMV BLD AUTO: 10.2 FL (ref 9.4–12.3)
POTASSIUM SERPL-SCNC: 3.4 MMOL/L (ref 3.5–5.1)
PROT SERPL-MCNC: 7 G/DL (ref 6.3–8.2)
RBC # BLD AUTO: 4.8 M/UL (ref 4.05–5.2)
SODIUM SERPL-SCNC: 140 MMOL/L (ref 133–143)
WBC # BLD AUTO: 7.8 K/UL (ref 4.3–11.1)

## 2023-02-14 PROCEDURE — 71045 X-RAY EXAM CHEST 1 VIEW: CPT

## 2023-02-14 PROCEDURE — 36415 COLL VENOUS BLD VENIPUNCTURE: CPT

## 2023-02-14 PROCEDURE — 2580000003 HC RX 258: Performed by: FAMILY MEDICINE

## 2023-02-14 PROCEDURE — 6360000002 HC RX W HCPCS: Performed by: FAMILY MEDICINE

## 2023-02-14 PROCEDURE — 1100000000 HC RM PRIVATE

## 2023-02-14 PROCEDURE — 85025 COMPLETE CBC W/AUTO DIFF WBC: CPT

## 2023-02-14 PROCEDURE — 80053 COMPREHEN METABOLIC PANEL: CPT

## 2023-02-14 RX ORDER — ACETAMINOPHEN 325 MG/1
650 TABLET ORAL EVERY 6 HOURS PRN
Status: DISCONTINUED | OUTPATIENT
Start: 2023-02-14 | End: 2023-02-25 | Stop reason: SDUPTHER

## 2023-02-14 RX ORDER — CARVEDILOL 3.12 MG/1
3.12 TABLET ORAL 2 TIMES DAILY WITH MEALS
Status: DISCONTINUED | OUTPATIENT
Start: 2023-02-15 | End: 2023-03-03 | Stop reason: HOSPADM

## 2023-02-14 RX ORDER — MORPHINE SULFATE 2 MG/ML
1 INJECTION, SOLUTION INTRAMUSCULAR; INTRAVENOUS ONCE
Status: COMPLETED | OUTPATIENT
Start: 2023-02-14 | End: 2023-02-14

## 2023-02-14 RX ORDER — SPIRONOLACTONE 25 MG/1
25 TABLET ORAL DAILY
Status: DISCONTINUED | OUTPATIENT
Start: 2023-02-15 | End: 2023-03-03 | Stop reason: HOSPADM

## 2023-02-14 RX ORDER — ISOSORBIDE MONONITRATE 60 MG/1
60 TABLET, EXTENDED RELEASE ORAL DAILY
Status: DISCONTINUED | OUTPATIENT
Start: 2023-02-15 | End: 2023-03-03 | Stop reason: HOSPADM

## 2023-02-14 RX ORDER — OXYCODONE AND ACETAMINOPHEN 10; 325 MG/1; MG/1
1 TABLET ORAL EVERY 4 HOURS PRN
Status: DISCONTINUED | OUTPATIENT
Start: 2023-02-14 | End: 2023-02-25

## 2023-02-14 RX ORDER — ROSUVASTATIN CALCIUM 20 MG/1
20 TABLET, COATED ORAL DAILY
Status: DISCONTINUED | OUTPATIENT
Start: 2023-02-15 | End: 2023-02-16

## 2023-02-14 RX ORDER — CLOPIDOGREL BISULFATE 75 MG/1
75 TABLET ORAL DAILY
Status: DISCONTINUED | OUTPATIENT
Start: 2023-02-15 | End: 2023-02-17

## 2023-02-14 RX ORDER — PANTOPRAZOLE SODIUM 40 MG/1
40 TABLET, DELAYED RELEASE ORAL DAILY
Status: DISCONTINUED | OUTPATIENT
Start: 2023-02-15 | End: 2023-03-03 | Stop reason: HOSPADM

## 2023-02-14 RX ORDER — KETOROLAC TROMETHAMINE 30 MG/ML
15 INJECTION, SOLUTION INTRAMUSCULAR; INTRAVENOUS EVERY 6 HOURS PRN
Status: ACTIVE | OUTPATIENT
Start: 2023-02-14 | End: 2023-02-19

## 2023-02-14 RX ORDER — POLYETHYLENE GLYCOL 3350 17 G/17G
17 POWDER, FOR SOLUTION ORAL DAILY PRN
Status: DISCONTINUED | OUTPATIENT
Start: 2023-02-14 | End: 2023-03-03 | Stop reason: HOSPADM

## 2023-02-14 RX ORDER — ASPIRIN 81 MG/1
81 TABLET ORAL DAILY
Status: DISCONTINUED | OUTPATIENT
Start: 2023-02-15 | End: 2023-03-03 | Stop reason: HOSPADM

## 2023-02-14 RX ORDER — ACETAMINOPHEN 650 MG/1
650 SUPPOSITORY RECTAL EVERY 6 HOURS PRN
Status: DISCONTINUED | OUTPATIENT
Start: 2023-02-14 | End: 2023-02-25 | Stop reason: SDUPTHER

## 2023-02-14 RX ORDER — ONDANSETRON 2 MG/ML
4 INJECTION INTRAMUSCULAR; INTRAVENOUS EVERY 6 HOURS PRN
Status: DISCONTINUED | OUTPATIENT
Start: 2023-02-14 | End: 2023-03-03 | Stop reason: HOSPADM

## 2023-02-14 RX ORDER — TIZANIDINE 2 MG/1
2 TABLET ORAL EVERY 8 HOURS PRN
Status: DISCONTINUED | OUTPATIENT
Start: 2023-02-14 | End: 2023-03-03 | Stop reason: HOSPADM

## 2023-02-14 RX ORDER — ONDANSETRON 4 MG/1
4 TABLET, ORALLY DISINTEGRATING ORAL EVERY 8 HOURS PRN
Status: DISCONTINUED | OUTPATIENT
Start: 2023-02-14 | End: 2023-03-03 | Stop reason: HOSPADM

## 2023-02-14 RX ORDER — SODIUM CHLORIDE 9 MG/ML
INJECTION, SOLUTION INTRAVENOUS PRN
Status: DISCONTINUED | OUTPATIENT
Start: 2023-02-14 | End: 2023-03-03 | Stop reason: HOSPADM

## 2023-02-14 RX ORDER — MORPHINE SULFATE 2 MG/ML
2 INJECTION, SOLUTION INTRAMUSCULAR; INTRAVENOUS EVERY 4 HOURS PRN
Status: DISCONTINUED | OUTPATIENT
Start: 2023-02-14 | End: 2023-03-03 | Stop reason: HOSPADM

## 2023-02-14 RX ORDER — SODIUM CHLORIDE 0.9 % (FLUSH) 0.9 %
5-40 SYRINGE (ML) INJECTION PRN
Status: DISCONTINUED | OUTPATIENT
Start: 2023-02-14 | End: 2023-03-03 | Stop reason: HOSPADM

## 2023-02-14 RX ORDER — LIDOCAINE 4 G/G
1 PATCH TOPICAL DAILY
Status: DISCONTINUED | OUTPATIENT
Start: 2023-02-15 | End: 2023-03-03 | Stop reason: HOSPADM

## 2023-02-14 RX ORDER — SODIUM CHLORIDE 0.9 % (FLUSH) 0.9 %
5-40 SYRINGE (ML) INJECTION EVERY 12 HOURS SCHEDULED
Status: DISCONTINUED | OUTPATIENT
Start: 2023-02-14 | End: 2023-03-03 | Stop reason: HOSPADM

## 2023-02-14 RX ORDER — LOSARTAN POTASSIUM 25 MG/1
25 TABLET ORAL DAILY
Status: DISCONTINUED | OUTPATIENT
Start: 2023-02-15 | End: 2023-03-03 | Stop reason: HOSPADM

## 2023-02-14 RX ORDER — LANOLIN ALCOHOL/MO/W.PET/CERES
3 CREAM (GRAM) TOPICAL ONCE
Status: DISCONTINUED | OUTPATIENT
Start: 2023-02-14 | End: 2023-02-17 | Stop reason: ALTCHOICE

## 2023-02-14 RX ORDER — SODIUM CHLORIDE 9 MG/ML
INJECTION, SOLUTION INTRAVENOUS CONTINUOUS
Status: DISCONTINUED | OUTPATIENT
Start: 2023-02-14 | End: 2023-02-15

## 2023-02-14 RX ADMIN — MORPHINE SULFATE 1 MG: 2 INJECTION, SOLUTION INTRAMUSCULAR; INTRAVENOUS at 21:20

## 2023-02-14 RX ADMIN — SODIUM CHLORIDE, PRESERVATIVE FREE 10 ML: 5 INJECTION INTRAVENOUS at 23:45

## 2023-02-14 RX ADMIN — SODIUM CHLORIDE: 900 INJECTION, SOLUTION INTRAVENOUS at 23:31

## 2023-02-14 ASSESSMENT — PAIN DESCRIPTION - LOCATION: LOCATION: HIP

## 2023-02-14 ASSESSMENT — PAIN DESCRIPTION - DESCRIPTORS: DESCRIPTORS: STABBING;SHARP;ACHING

## 2023-02-14 ASSESSMENT — PAIN - FUNCTIONAL ASSESSMENT: PAIN_FUNCTIONAL_ASSESSMENT: ACTIVITIES ARE NOT PREVENTED

## 2023-02-14 ASSESSMENT — PAIN DESCRIPTION - ORIENTATION: ORIENTATION: LEFT

## 2023-02-14 ASSESSMENT — PAIN SCALES - GENERAL
PAINLEVEL_OUTOF10: 2
PAINLEVEL_OUTOF10: 5

## 2023-02-14 NOTE — TELEPHONE ENCOUNTER
----- Message from Wendy Patel sent at 2/13/2023  4:33 PM EST -----  Subject: Message to Provider    QUESTIONS  Information for Provider? patient jaren was calling back to see if they was   able to find her a bed at the hospital? No one has called him back  ---------------------------------------------------------------------------  --------------  [id number]  [phone]; OK to leave message on voicemail  ---------------------------------------------------------------------------  --------------  SCRIPT ANSWERS  Relationship to Patient? Other  Representative Name? son  Is the Representative on the appropriate HIPAA document in Epic?  Yes

## 2023-02-14 NOTE — TELEPHONE ENCOUNTER
Spoke with patient's son this afternoon to update him. Still awaiting an inpatient bed but have received word that she will be admitted today. He verbalized understanding and is aware that he should be looking to hear from the hospital with further instruction on when and where to go.

## 2023-02-14 NOTE — PROGRESS NOTES
Attending Addendum:  I personally evaluated the patient with, Nicolle Busby NP, and agree with the assessment, findings and plan as documented. Please see my chart notes on this encounter.       Lolita Hughes MD

## 2023-02-14 NOTE — TELEPHONE ENCOUNTER
Pt son wanted to know about [facility]. He has her in a comfortable position for now. Provided pt son with recommendations of going to the ER if pain gets worse.  Please advise

## 2023-02-15 ENCOUNTER — APPOINTMENT (OUTPATIENT)
Dept: CT IMAGING | Age: 88
DRG: 478 | End: 2023-02-15
Attending: INTERNAL MEDICINE
Payer: COMMERCIAL

## 2023-02-15 LAB
ANION GAP SERPL CALC-SCNC: 6 MMOL/L (ref 2–11)
BASOPHILS # BLD: 0.1 K/UL (ref 0–0.2)
BASOPHILS NFR BLD: 1 % (ref 0–2)
BUN SERPL-MCNC: 21 MG/DL (ref 8–23)
CALCIUM SERPL-MCNC: 8.9 MG/DL (ref 8.3–10.4)
CHLORIDE SERPL-SCNC: 112 MMOL/L (ref 101–110)
CO2 SERPL-SCNC: 23 MMOL/L (ref 21–32)
CREAT SERPL-MCNC: 0.9 MG/DL (ref 0.6–1)
DIFFERENTIAL METHOD BLD: NORMAL
EOSINOPHIL # BLD: 0.3 K/UL (ref 0–0.8)
EOSINOPHIL NFR BLD: 5 % (ref 0.5–7.8)
ERYTHROCYTE [DISTWIDTH] IN BLOOD BY AUTOMATED COUNT: 11.9 % (ref 11.9–14.6)
GLUCOSE SERPL-MCNC: 96 MG/DL (ref 65–100)
HCT VFR BLD AUTO: 39 % (ref 35.8–46.3)
HGB BLD-MCNC: 12.8 G/DL (ref 11.7–15.4)
IMM GRANULOCYTES # BLD AUTO: 0 K/UL (ref 0–0.5)
IMM GRANULOCYTES NFR BLD AUTO: 0 % (ref 0–5)
LYMPHOCYTES # BLD: 1.6 K/UL (ref 0.5–4.6)
LYMPHOCYTES NFR BLD: 22 % (ref 13–44)
MAGNESIUM SERPL-MCNC: 2.2 MG/DL (ref 1.8–2.4)
MCH RBC QN AUTO: 29.5 PG (ref 26.1–32.9)
MCHC RBC AUTO-ENTMCNC: 32.8 G/DL (ref 31.4–35)
MCV RBC AUTO: 89.9 FL (ref 82–102)
MONOCYTES # BLD: 0.7 K/UL (ref 0.1–1.3)
MONOCYTES NFR BLD: 10 % (ref 4–12)
NEUTS SEG # BLD: 4.4 K/UL (ref 1.7–8.2)
NEUTS SEG NFR BLD: 62 % (ref 43–78)
NRBC # BLD: 0 K/UL (ref 0–0.2)
PLATELET # BLD AUTO: 232 K/UL (ref 150–450)
PMV BLD AUTO: 10.3 FL (ref 9.4–12.3)
POTASSIUM SERPL-SCNC: 3.5 MMOL/L (ref 3.5–5.1)
RBC # BLD AUTO: 4.34 M/UL (ref 4.05–5.2)
SODIUM SERPL-SCNC: 141 MMOL/L (ref 133–143)
WBC # BLD AUTO: 7.1 K/UL (ref 4.3–11.1)

## 2023-02-15 PROCEDURE — 51798 US URINE CAPACITY MEASURE: CPT

## 2023-02-15 PROCEDURE — 97530 THERAPEUTIC ACTIVITIES: CPT

## 2023-02-15 PROCEDURE — 97163 PT EVAL HIGH COMPLEX 45 MIN: CPT

## 2023-02-15 PROCEDURE — 51702 INSERT TEMP BLADDER CATH: CPT

## 2023-02-15 PROCEDURE — 97167 OT EVAL HIGH COMPLEX 60 MIN: CPT

## 2023-02-15 PROCEDURE — 81001 URINALYSIS AUTO W/SCOPE: CPT

## 2023-02-15 PROCEDURE — 1100000000 HC RM PRIVATE

## 2023-02-15 PROCEDURE — 97535 SELF CARE MNGMENT TRAINING: CPT

## 2023-02-15 PROCEDURE — 36415 COLL VENOUS BLD VENIPUNCTURE: CPT

## 2023-02-15 PROCEDURE — 2580000003 HC RX 258: Performed by: FAMILY MEDICINE

## 2023-02-15 PROCEDURE — 2500000003 HC RX 250 WO HCPCS: Performed by: FAMILY MEDICINE

## 2023-02-15 PROCEDURE — 80048 BASIC METABOLIC PNL TOTAL CA: CPT

## 2023-02-15 PROCEDURE — 74176 CT ABD & PELVIS W/O CONTRAST: CPT

## 2023-02-15 PROCEDURE — 6360000002 HC RX W HCPCS: Performed by: FAMILY MEDICINE

## 2023-02-15 PROCEDURE — 6370000000 HC RX 637 (ALT 250 FOR IP): Performed by: FAMILY MEDICINE

## 2023-02-15 PROCEDURE — 85025 COMPLETE CBC W/AUTO DIFF WBC: CPT

## 2023-02-15 PROCEDURE — 83735 ASSAY OF MAGNESIUM: CPT

## 2023-02-15 RX ADMIN — SODIUM CHLORIDE, PRESERVATIVE FREE 10 ML: 5 INJECTION INTRAVENOUS at 22:04

## 2023-02-15 RX ADMIN — PANTOPRAZOLE SODIUM 40 MG: 40 TABLET, DELAYED RELEASE ORAL at 05:27

## 2023-02-15 RX ADMIN — CARVEDILOL 3.12 MG: 3.12 TABLET, FILM COATED ORAL at 08:47

## 2023-02-15 RX ADMIN — ISOSORBIDE MONONITRATE 60 MG: 60 TABLET, EXTENDED RELEASE ORAL at 08:46

## 2023-02-15 RX ADMIN — SODIUM CHLORIDE, PRESERVATIVE FREE 10 ML: 5 INJECTION INTRAVENOUS at 08:49

## 2023-02-15 RX ADMIN — SODIUM CHLORIDE: 900 INJECTION, SOLUTION INTRAVENOUS at 14:15

## 2023-02-15 RX ADMIN — TUBERCULIN PURIFIED PROTEIN DERIVATIVE 5 UNITS: 5 INJECTION, SOLUTION INTRADERMAL at 14:15

## 2023-02-15 RX ADMIN — MORPHINE SULFATE 2 MG: 2 INJECTION, SOLUTION INTRAMUSCULAR; INTRAVENOUS at 05:16

## 2023-02-15 RX ADMIN — ASPIRIN 81 MG: 81 TABLET ORAL at 08:46

## 2023-02-15 RX ADMIN — CARVEDILOL 3.12 MG: 3.12 TABLET, FILM COATED ORAL at 18:04

## 2023-02-15 RX ADMIN — OXYCODONE AND ACETAMINOPHEN 1 TABLET: 10; 325 TABLET ORAL at 11:35

## 2023-02-15 ASSESSMENT — PAIN DESCRIPTION - LOCATION
LOCATION: FLANK
LOCATION: ABDOMEN;FLANK;BACK
LOCATION: BACK;HIP

## 2023-02-15 ASSESSMENT — PAIN DESCRIPTION - ORIENTATION
ORIENTATION: RIGHT;LEFT;UPPER
ORIENTATION: LEFT

## 2023-02-15 ASSESSMENT — PAIN SCALES - GENERAL
PAINLEVEL_OUTOF10: 4
PAINLEVEL_OUTOF10: 10
PAINLEVEL_OUTOF10: 6
PAINLEVEL_OUTOF10: 10
PAINLEVEL_OUTOF10: 2

## 2023-02-15 ASSESSMENT — PAIN DESCRIPTION - DESCRIPTORS: DESCRIPTORS: ACHING;STABBING;SHARP;SORE

## 2023-02-15 ASSESSMENT — PAIN DESCRIPTION - PAIN TYPE: TYPE: CHRONIC PAIN;ACUTE PAIN

## 2023-02-15 ASSESSMENT — PAIN - FUNCTIONAL ASSESSMENT: PAIN_FUNCTIONAL_ASSESSMENT: ACTIVITIES ARE NOT PREVENTED

## 2023-02-15 ASSESSMENT — PAIN DESCRIPTION - ONSET: ONSET: ON-GOING

## 2023-02-15 ASSESSMENT — PAIN DESCRIPTION - FREQUENCY: FREQUENCY: CONTINUOUS

## 2023-02-15 NOTE — PROGRESS NOTES
END OF SHIFT NOTE:    INTAKE/OUTPUT  No intake/output data recorded. Voiding: No  Catheter: No  Drain:              Flatus: Patient does have flatus present. Stool:  occurrences. Characteristics:                Emesis:  occurrences. Characteristics:        VITAL SIGNS  Patient Vitals for the past 12 hrs:   Temp Pulse Resp BP SpO2   02/15/23 0716 98.4 °F (36.9 °C) 63 18 (!) 150/61 94 %   02/15/23 0324 98.4 °F (36.9 °C) 65 17 131/60 92 %   02/14/23 2324 98.2 °F (36.8 °C) 75 18 125/64 93 %   02/14/23 2017 97.7 °F (36.5 °C) 66 17 127/62 95 %       Pain Assessment  Pain Level: 10 (02/15/23 0516)  Pain Location: Back, Hip  Patient's Stated Pain Goal: 3    Ambulating  No    Shift report given to oncoming nurse at the bedside.     Jayro Story RN

## 2023-02-15 NOTE — PROGRESS NOTES
END OF SHIFT NOTE:    INTAKE/OUTPUT  No intake/output data recorded. Voiding: No  Catheter: Yes  Drain:   Urinary Catheter 02/15/23 Moore (Active)   $ Urethral catheter insertion $ Not inserted for procedure 02/15/23 1250   Catheter Indications Urinary retention (acute or chronic), continuous bladder irrigation or bladder outlet obstruction 02/15/23 1250   Site Assessment No urethral drainage 02/15/23 1250   Urine Color Yellow 02/15/23 1250   Urine Appearance Hazy 02/15/23 1250   Collection Container Standard 02/15/23 1250   Securement Method Securing device (Describe) 02/15/23 1250   Catheter Care  Perineal wipes 02/15/23 1250   Catheter Best Practices  Drainage tube clipped to bed; Tamper seal intact; Catheter secured to thigh; Bag below bladder;Bag not on floor; Lack of dependent loop in tubing;Drainage bag less than half full 02/15/23 1250   Status Draining;Patent 02/15/23 1250               Flatus: Patient does not have flatus present. Stool: 0 occurrences. Characteristics:                Emesis: 0 occurrences. Characteristics:        VITAL SIGNS  Patient Vitals for the past 12 hrs:   Temp Pulse Resp BP SpO2   02/15/23 1804 -- 65 -- (!) 155/66 --   02/15/23 1525 97.7 °F (36.5 °C) 73 18 (!) 147/75 --   02/15/23 1207 98.4 °F (36.9 °C) 66 19 126/61 94 %   02/15/23 1133 97.7 °F (36.5 °C) 62 16 134/61 95 %   02/15/23 0847 -- 63 -- (!) 150/61 --   02/15/23 0716 98.4 °F (36.9 °C) 63 18 (!) 150/61 94 %       Pain Assessment  Pain Level: 10 (02/15/23 1548)  Pain Location: Abdomen, Flank, Back  Patient's Stated Pain Goal: 0 - No pain    Ambulating  No    Shift report given to oncoming nurse at the bedside.     Ashlee Frank RN

## 2023-02-15 NOTE — PROGRESS NOTES
ACUTE OCCUPATIONAL THERAPY GOALS:   (Developed with and agreed upon by patient and/or caregiver.)  1. Patient will complete lower body bathing and dressing with CGA and adaptive equipment as   needed. 2. Patient will completed upper body bathing and dressing with Mod I seated EOB and adaptive equipment as needed. 3. Patient will complete grooming seated at EOB with Mod I and adaptive equipment as needed. 4. Patient will complete toileting with CGA and adaptive equipment as needed. 5. Patient will tolerate 30 minutes of OT treatment with 1-2 rest breaks to increase activity tolerance for ADLs. 6. Patient will complete functional transfers with Min A and adaptive equipment as needed. Timeframe: 7 visits     OCCUPATIONAL THERAPY Initial Assessment, Daily Note, and PM       OT Visit Days: 1  Acknowledge Orders  Time  OT Charge Capture  Rehab Caseload Tracker      Brit Aguillon is a 80 y.o. female   PRIMARY DIAGNOSIS: Intractable pain  Intractable pain [R52]       Reason for Referral: Generalized Muscle Weakness (M62.81)  Other abnormalities of gait and mobility (R26.89)  Inpatient: Payor: [address] [place] [address] / Plan: [address] [place] [address] / Product Type: *No Product type* /     ASSESSMENT:     REHAB RECOMMENDATIONS:   Recommendation to date pending progress:  Setting:  Short-term Rehab    Equipment:    To Be Determined     ASSESSMENT:  Ms. Hardeep Gaviria is a 79 y/o F presenting with intractable pain. Pt presents s/p recent kyphoplasty(s) on 1/30 and 2/9. Pt supine on entry on RA with son present. Pt A/O x 4. Pt significantly limited secondary to significant back pain with any movement. Per PT and NSG pt to go for MRI soon. Per NSG pt appropriate to see bed level. Applicable PMHx: HTN, CAD, CHF, CKD. Pt denied light headedness, dizziness or SOB. Pt reports no fall(s) in past 6 months. PLOF Mod I 7 weeks prior (before onset of pain) since intermittent assist with ADLs and use of 4WRW. DME present in home; [place], [id number]. Pt currently Min A - CGA with UB ADLs supine, Max A with LB ADLs, Max A for toileting and Max A x 2 for bed mobility with pad utilized for adjusting pt up in bed. Rest breaks utilized as needed throughout session. Pt presents with deficits in ADLs, functional t/fs, household mobility for ADLs and activity tolerance compared to reported PLOF. Pt tolerated session well given limitation secondary to pain. Pt presents pleasant and motivated to improve with good rehab potential. Pt would benefit from continued skilled OT services for duration of hospital stay and after t/f to next level of care or until all stated goals met.       [address] [facility] [address] [address] AM-PAC 6 Clicks Daily Activity Inpatient Short Form:    AM-PAC Daily Activity - Inpatient   How much help is needed for putting on and taking off regular lower body clothing?: A Lot  How much help is needed for bathing (which includes washing, rinsing, drying)?: A Lot  How much help is needed for toileting (which includes using toilet, bedpan, or urinal)?: A Lot  How much help is needed for putting on and taking off regular upper body clothing?: A Little  How much help is needed for taking care of personal grooming?: A Little  How much help for eating meals?: None  AM-[facility] Inpatient Daily Activity Raw Score: 16  AM-PAC Inpatient ADL T-Scale Score : 35.96  ADL Inpatient CMS 0-100% Score: 53.32  ADL Inpatient CMS G-Code Modifier : CK           SUBJECTIVE:     Ms. Filippo Larsen states, \"I'd like to brush my teeth\"     Social/Functional Lives With: Alone  Type of Home: House  Home Layout: Two level  Home Access: Stairs to enter with rails  Entrance Stairs - Number of Steps: 6    OBJECTIVE:     Abilio Caro / Layo Boles / Sobeida El: Moore Catheter and IV    RESTRICTIONS/PRECAUTIONS:  Restrictions/Precautions: Fall Risk    PAIN: VITALS / O2:   Pre Treatment:   Pain Assessment: None - Denies Pain      Post Treatment: None       Vitals          Oxygen            GROSS EVALUATION: INTACT IMPAIRED   (See Comments)   UE AROM [x] []   UE PROM [] []N/T   Strength [] LUE Strength  Gross LUE Strength: WFL  LUE Strength Comment: 3/5  RUE Strength  Gross RUE Strength: WFL  RUE Strength Comment: 3/5     Posture / Balance []  Unable to assess due to pain limitation at present   Sensation []  Pt and son report hx of/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 feet with intermttent numbness/tingling   Coordination []  Generally decreased     Tone []  N/A     Edema [] N/A   Activity Tolerance [] Patient limited by fatigue, Patient limited by pain     Hand Dominance R [x] L []      COGNITION/  PERCEPTION: INTACT IMPAIRED   (See Comments)   Orientation [x]     Vision [x]     Hearing [x]     Cognition  [x]     Perception []       MOBILITY: I Mod I S SBA CGA Min Mod Max Total  NT x2 Comments:   Bed Mobility    Rolling [] [] [] [] [] [] [] [] [] [x] []    Supine to Sit [] [] [] [] [] [] [] [] [] [x] []    Scooting [] [] [] [] [] [] [] [x] [] [] [x] Up in bed using chuckpad   Sit to Supine [] [] [] [] [] [] [] [] [] [x] []    Transfers    Sit to Stand [] [] [] [] [] [] [] [] [] [x] []    Bed to Chair [] [] [] [] [] [] [] [] [] [x] []    Stand to Sit [] [] [] [] [] [] [] [] [] [x] []    Tub/Shower [] [] [] [] [] [] [] [] [] [x] []     Toilet [] [] [] [] [] [] [] [] [] [x] []      [] [] [] [] [] [] [] [] [] [] []    I=Independent, Mod I=Modified Independent, S=Supervision/Setup, SBA=Standby Assistance, CGA=Contact Guard Assistance, Min=Minimal Assistance, Mod=Moderate Assistance, Max=Maximal Assistance, Total=Total Assistance, NT=Not Tested    ACTIVITIES OF DAILY LIVING: I Mod I S SBA CGA Min Mod Max Total NT Comments   BASIC ADLs:              Upper Body Bathing  [] [] [] [] [] [x] [] [] [] [] UB bathing supine in bed   Lower Body Bathing [] [] [] [] [] [] [] [x] [] []    Toileting [] [] [] [] [] [] [] [x] [] []    Upper Body Dressing [] [] [] [] [] [x] [] [] [] [] Doff/don gown supine   Lower Body Dressing [] [] [] [] [] [] [] [x] [] [] Feeding [] [] [] [] [] [] [] [] [] []    Grooming [] [] [] [] [x] [] [] [] [] [] Oral hygiene and face washing seated upright in bed   Personal Device Care [] [] [] [] [] [] [] [] [] [x]    Functional Mobility [] [] [] [] [] [] [] [x] [] [] X2 repositioning supine   I=Independent, Mod I=Modified Independent, S=Supervision/Setup, SBA=Standby Assistance, CGA=Contact Guard Assistance, Min=Minimal Assistance, Mod=Moderate Assistance, Max=Maximal Assistance, Total=Total Assistance, NT=Not Tested    PLAN:   FREQUENCY/DURATION   OT Plan of Care: 3 times/week for duration of hospital stay or until stated goals are met, whichever comes first.    PROBLEM LIST:   (Skilled intervention is medically necessary to address:)  Decreased ADL/Functional Activities  Decreased Activity Tolerance  Decreased AROM/PROM  Decreased Balance  Decreased Coordination  Decreased Gait Ability  Decreased Strength  Decreased Transfer Abilities  Increased Pain   INTERVENTIONS PLANNED:  (Benefits and precautions of occupational therapy have been discussed with the patient.)  Self Care Training  Therapeutic Activity  Therapeutic Exercise/HEP  Neuromuscular Re-education  Manual Therapy  Education         TREATMENT:     EVALUATION: HIGH COMPLEXITY: (Untimed Charge)    TREATMENT:   Self Care (14 minutes): Patient participated in upper body bathing, upper body dressing, and grooming ADLs in supine with minimal verbal cueing to increase independence, decrease assistance required, and increase activity tolerance. Patient also participated in functional mobility, functional transfer, energy conservation, and adaptive equipment training to increase independence, decrease assistance required, and increase activity tolerance.      TREATMENT GRID:  N/A    AFTER TREATMENT PRECAUTIONS: Bed, Bed/Chair Locked, Call light within reach, Needs within reach, RN notified, and Visitors at bedside    INTERDISCIPLINARY COLLABORATION:  RN/ PCT and OT/ DOMINGUEZ    EDUCATION:  Education Given To: Patient; Family  Education Provided: Role of Therapy;Plan of Care;Energy Conservation; Family Education  Education Method: Verbal  Barriers to Learning: None  Education Outcome: Verbalized understanding;Continued education needed    TOTAL TREATMENT DURATION AND TIME:  Time In: 1528  Time Out: Yadiel  Minutes: [address], OT

## 2023-02-15 NOTE — PROGRESS NOTES
Patient has not voided since admission. Bladder scan: 400cc. Hospitalist notified.  New orders received for straight cath

## 2023-02-15 NOTE — PROGRESS NOTES
Informed Dr. Sara Diaz that pt was in and out cathed at 0500 this AM. Pt still has not voided. Bladder scan showed 380ml. Orders for fry placed. No acute signs of distress at this time.

## 2023-02-15 NOTE — ASSESSMENT & PLAN NOTE
- Unknown specific etiology  - She has been found to have T10 and T9 fractures, with subsequent kyphoplasties over the past month, without improvement in pain  - Possible kidney stones, consider repeat CT  - Patient is very tearful due to pain and unknown diagnosis for pain  - PO and IV pain meds ordered

## 2023-02-15 NOTE — H&P
Hospitalist History and Physical   Admit Date:  2023  7:07 PM   Name:  Bishop Fuchs   Age:  80 y.o. Sex:  female  :  1931   MRN:  [id number]     Presenting Complaint: Intractable pain  Reason(s) for Admission: Intractable pain [R52]     History of Present Illness:   Bishop Fuchs is a 80 y.o. female with medical history of HTN, CAD, CHF, CKD who presents as a DIRECT ADMIT from home with intractable back/flank pain. Patient has been dealing with similar pain for about 2 months. Over that time period, she has been diagnosed with T10, then T9 fractures, and subsequently underwent kyphoplasties on  and , respectively. Despite kyphoplasties, patient continues to have severe pain. She has undergone CT which does show possible bilateral kidney stones, but did not show obstruction. Patient saw her PCP yesterday, and was in tears with the amount of pain she has been having, despite taking several prescribed pain medications. Her PCP arranged for direct admission, however [facility] was over capacity without bed availability, so she came from home today after she was assigned a bed. Hospitalist to admit. Patient remains very tearful regarding her pain a situation. She feels somewhat concerned that despite several doctors doing their best to diagnose and remedy problems, that her pain is not improving at all. Review of Systems:  10 systems reviewed and negative except as noted in HPI.   Assessment & Plan:   * Intractable pain  Assessment & Plan  - Unknown specific etiology  - She has been found to have T10 and T9 fractures, with subsequent kyphoplasties over the past month, without improvement in pain  - Possible kidney stones, consider repeat CT  - Patient is very tearful due to pain and unknown diagnosis for pain  - PO and IV pain meds ordered    S/P kyphoplasty  Assessment & Plan  - As noted above, underwent kyphoplasty on  and   - Pain still    Chronic systolic (congestive) heart failure  Assessment & Plan  - Not in exacerbation  - Home meds    Chronic renal disease, stage III ([address] [551962]  Assessment & Plan  - STAT labs ordered  - Baseline Cr appears to be ~1    HTN (hypertension)  Assessment & Plan  - Home meds    CAD (coronary artery disease)  Assessment & Plan  - No chest pain  - Home meds      Disposition: inpatient      Past medical history reviewed. Past Medical History:   Diagnosis Date    CAD (coronary artery disease)     2 MI    Calculus of kidney     Closed nondisplaced fracture of fifth left metatarsal bone 5/18/2021    Congestive heart failure (HCC)     Coronary artery disease involving coronary bypass graft of native heart without angina pectoris 12/14/2015    CVD (cerebrovascular disease) 8/19/2013    Dyslipidemia 8/19/2013    GERD (gastroesophageal reflux disease)     HTN (hypertension) 8/19/2013    Left breast mass 4/13/2017    Diagnostic Mammogram negative. Long term current use of anticoagulant therapy     Myocardial infarction [facility]) 2005, 2006    [facility], Dr. Paty Dailey    Osteoporosis 8/19/2013    Positive H. pylori test 8/19/2013    Rectal polyp 10/9/2017    Anoscopy:  Revealed large cauliflower-like polyp at the anterior midline just above the dentate line, mobile soft, to have removed with Dr. Madyson Cabral ([facility]), benign. No more fecal leakage. 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     Past Surgical History:   Procedure Laterality Date    APPENDECTOMY      BLADDER SUSPENSION  2005    COLONOSCOPY  2012    CORONARY ARTERY BYPASS GRAFT  1988    @ [facility] [place] [facility]    GYN      hysterectomy    HYSTERECTOMY, TOTAL ABDOMINAL (CERVIX REMOVED)  1972    IR KYPHOPLASTY THORACIC FIRST LEVEL  1/30/2023    IR KYPHOPLASTY THORACIC FIRST LEVEL 1/30/2023 [facility]    IR KYPHOPLASTY THORACIC FIRST LEVEL  2/9/2023    IR KYPHOPLASTY THORACIC FIRST LEVEL 2/9/2023 [facility]    LITHOTRIPSY  2008 x 2    ORTHOPEDIC SURGERY      2 rotater cuff right shoulder    [place] UNLISTED PROCEDURE CARDIAC SURGERY      by pass    UROLOGICAL SURGERY      suspension      Allergies   Allergen Reactions    Amlodipine Other (See Comments)     Other reaction(s): Unknown-Unspecified    Quinapril Other (See Comments)     Other reaction(s): Cough-Intolerance    Sertraline Other (See Comments)     Other reaction(s): Drowsiness    Prednisone Itching, Palpitations and Other (See Comments)     Per pt also had heart burn      Social History     Tobacco Use    Smoking status: Never    Smokeless tobacco: Never   Substance Use Topics    Alcohol use: No      Family History   Problem Relation Age of Onset    Emergence Delirium Neg Hx     Heart Disease Brother     Cancer Brother         prostate    Heart Disease Brother     Heart Disease Father     Stroke Mother     Heart Disease Mother     Post-op Cognitive Dysfunction Neg Hx     Other Neg Hx     Breast Cancer Neg Hx     Heart Disease Brother     Heart Disease Brother     [address] Hypertherm Neg Hx     Pseudochol. Deficiency Neg Hx     Delayed Awakening Neg Hx     Post-op Nausea/Vomiting Neg Hx       Family history reviewed and noncontributory to patient's acute condition; no relevant family history unless otherwise noted above.   Immunization History   Administered Date(s) Administered    COVID-19, PFIZER PURPLE top, DILUTE for use, (age 15 y+), 30mcg/0.3mL 01/20/2021    Influenza Virus Vaccine 09/24/2012, 10/10/2013, 10/20/2014, 10/01/2021    Influenza, FLUAD, (age 72 y+), Adjuvanted, 0.5mL 09/24/2020, 09/15/2022    Influenza, FLUZONE (age 72 y+), High Dose, 0.7mL 10/10/2020    Influenza, High Dose (Fluzone 65 yrs and older) 11/16/2015, 09/26/2016, 10/09/2017, 09/18/2018    Influenza, Triv, inactivated, subunit, adjuvanted, IM (Fluad 65 yrs and older) 09/23/2019    Pneumococcal Conjugate 13-valent ([id number]) 06/14/2016    Pneumococcal Vaccine 10/01/2010    Pneumococcal conjugate PCV20, PF (Prevnar 20) 07/12/2022    Tdap (Boostrix, Adacel) 09/02/2013     PTA Medications:  Current Outpatient Medications   Medication Instructions    APPLE CIDER VINEGAR  mg, DAILY    aspirin 81 mg, Oral, DAILY    carvedilol (COREG) 3.125 mg, Oral, 2 TIMES DAILY, TAKE 1 TABLET TWICE DAILY WITH MEALS    Cholecalciferol 2,000 Units, DAILY    clopidogrel (PLAVIX) 75 mg, Oral, DAILY, The details of the medication are not available because there are pending changes by a home health clinician. Cranberry 4,200 mg, 2 TIMES DAILY    cyanocobalamin 100 mcg, DAILY    HYDROcodone-acetaminophen (NORCO) 5-325 MG per tablet 1 tablet, Oral, EVERY 6 HOURS PRN    irbesartan (AVAPRO) 75 MG tablet TAKE 1 TABLET DAILY    isosorbide mononitrate (IMDUR) 60 mg, Oral, DAILY    lidocaine (LIDODERM) 5 % 1 patch, TransDERmal, DAILY, 12 hours on, 12 hours off. Lidocaine, Anorectal, 5 % CREA Actual prescription: Lidocaine 5%: Neurontin/gabapentin 5% combined topical cream/gelApply to affected area up to 4 times a day as needed for pain    loratadine (CLARITIN) 10 mg, DAILY    metaxalone (SKELAXIN) 800 MG tablet No dose, route, or frequency recorded.     montelukast (SINGULAIR) 10 mg, DAILY    Multiple Vitamins-Minerals (HAIR SKIN AND NAILS FORMULA PO) Oral    ondansetron (ZOFRAN-ODT) 4 mg, Oral, 3 TIMES DAILY PRN    pantoprazole (PROTONIX) 20 mg, Oral, DAILY    rosuvastatin (CRESTOR) 20 mg, Oral, DAILY, TAKE 1 TABLET NIGHTLY    spironolactone (ALDACTONE) 25 mg, Oral, DAILY    tiZANidine (ZANAFLEX) 2 mg, Oral, EVERY 8 HOURS PRN    TURMERIC  mg, DAILY       Objective:   Patient Vitals for the past 24 hrs:   Temp Pulse Resp BP SpO2   02/15/23 0324 98.4 °F (36.9 °C) 65 17 131/60 92 %   02/14/23 2324 98.2 °F (36.8 °C) 75 18 125/64 93 %   02/14/23 2017 97.7 °F (36.5 °C) 66 17 127/62 95 %          Estimated body mass index is 21.79 kg/m² as calculated from the following:    Height as of 2/13/23: 5' 6\" (1.676 m). Weight as of 2/6/23: 135 lb (61.2 kg). No intake or output data in the 24 hours ending 02/15/23 0609      Physical Exam:  General:    Well nourished. Uncomfortable appearing. Head:  Normocephalic, atraumatic  Eyes:  Sclerae appear normal.  Pupils equally round. HENT:  Nares appear normal, no drainage. Moist mucous membranes  Neck:  No restricted ROM. Trachea midline  CV:   RRR. S1/S2 auscultated  Lungs:   CTAB. No wheezing, rhonchi, or rales. Appears even, unlabored  Abdomen: Bowel sounds present. Soft, nontender, nondistended. Extremities: Warm and dry. No cyanosis or clubbing. No edema. Skin:     No rashes. Normal turgor. Normal coloration  Neuro:  Cranial nerves II-XII grossly intact. Sensation intact  Psych:  Normal mood and affect. Alert and oriented x3. Tearful due to pain.     Data Ordered and Personally Reviewed:    Last 24hr Labs:  Recent Results (from the past 24 hour(s))   CBC with Auto Differential    Collection Time: 02/14/23  8:13 PM   Result Value Ref Range    WBC 7.8 4.3 - 11.1 K/uL    RBC 4.80 4.05 - 5.2 M/uL    Hemoglobin 14.2 11.7 - 15.4 g/dL    Hematocrit 43.6 35.8 - 46.3 %    MCV 90.8 82 - 102 FL    MCH 29.6 26.1 - 32.9 PG    MCHC 32.6 31.4 - 35.0 g/dL    RDW 11.7 (L) 11.9 - 14.6 %    Platelets 175 248 - 962 K/uL    MPV 10.2 9.4 - 12.3 FL    nRBC 0.00 0.0 - 0.2 K/uL    Differential Type AUTOMATED      Seg Neutrophils 64 43 - 78 %    Lymphocytes 22 13 - 44 %    Monocytes 8 4.0 - 12.0 %    Eosinophils % 5 0.5 - 7.8 %    Basophils 1 0.0 - 2.0 %    Immature Granulocytes 0 0.0 - 5.0 %    Segs Absolute 5.0 1.7 - 8.2 K/UL    Absolute Lymph # 1.7 0.5 - 4.6 K/UL    Absolute Mono # 0.6 0.1 - 1.3 K/UL    Absolute Eos # 0.4 0.0 - 0.8 K/UL    Basophils Absolute 0.1 0.0 - 0.2 K/UL    Absolute Immature Granulocyte 0.0 0.0 - 0.5 K/UL   Comprehensive Metabolic Panel    Collection Time: 02/14/23  8:13 PM Result Value Ref Range    Sodium 140 133 - 143 mmol/L    Potassium 3.4 (L) 3.5 - 5.1 mmol/L    Chloride 108 101 - 110 mmol/L    CO2 25 21 - 32 mmol/L    Anion Gap 7 2 - 11 mmol/L    Glucose 116 (H) 65 - 100 mg/dL    BUN 23 8 - 23 MG/DL    Creatinine 1.20 (H) 0.6 - 1.0 MG/DL    Est, Glom Filt Rate 43 (L) >60 ml/min/1.73m2    Calcium 9.7 8.3 - 10.4 MG/DL    Total Bilirubin 0.6 0.2 - 1.1 MG/DL    ALT 16 12 - 65 U/L    AST 26 15 - 37 U/L    Alk Phosphatase 77 50 - 136 U/L    Total Protein 7.0 6.3 - 8.2 g/dL    Albumin 2.7 (L) 3.2 - 4.6 g/dL    Globulin 4.3 2.8 - 4.5 g/dL    Albumin/Globulin Ratio 0.6 0.4 - 1.6         Signed:  Adria Pena MD

## 2023-02-15 NOTE — PROGRESS NOTES
Patient arrived to the floor. Admissions notified. Hospitalist notified of patient arrival.    Ana Uriel: Patient c/o pain. Hospitalist notified. New orders received for STAT labs, CXR and pain medication.

## 2023-02-15 NOTE — PROGRESS NOTES
ACUTE PHYSICAL THERAPY GOALS:   (Developed with and agreed upon by patient and/or caregiver.)  (1.) David Zambrano will perform bed mobility with MINIMAL ASSIST within 7 treatment day(s). (2.) David Zambrano will transfer from bed to chair and chair to bed with MINIMAL ASSIST using the least restrictive device within 7 treatment day(s). (3.) David Zambrano will ambulate with MINIMAL ASSIST for 50 feet with the least restrictive device within 7 treatment day(s). (4.) David Zambrano will perform bilateral lower extremity exercises x 15 min for HEP with SUPERVISION to improve strength, endurance, and functional mobility within 7 treatment day(s). PHYSICAL THERAPY Initial Assessment, Daily Note, and PM  (Link to Caseload Tracking: PT Visit Days : 1  Acknowledge Orders  Time In/Out  PT Charge Capture  Rehab Caseload Tracker    David Zambrano is a 80 y.o. female   PRIMARY DIAGNOSIS: Intractable pain  Intractable pain [R52]     Reason for Referral: Generalized Muscle Weakness (M62.81)  Difficulty in walking, Not elsewhere classified (R26.2)  Other abnormalities of gait and mobility (R26.89)  Inpatient: Payor: [address] [place] [address] / Plan: [address] [place] [address] / Product Type: *No Product type* /     ASSESSMENT:     REHAB RECOMMENDATIONS:   Recommendation to date pending progress:  Setting:  Short-term Rehab pending clinical progress    Equipment:    To Be Determined     ASSESSMENT:  Ms. Tera Bueno is a 80 y.o. female who presents with intractable pain along her abdomen and flanks bilaterally x 2 months. She has a recent history of kyphoplasties at T10 & T9 on 1/30 and 2/9 respectively without relief of pain. Patient's son is present at bedside at time of PT eval and is vocal about his frustration with lack of insight to the patient's pain. He says that broken ribs and kidney stones have also been mentioned as possible factors in patient's pain without certainty.  Patient's physician entered room upon PT eval and stated that she was going to order an MRI. Patient reports she has 30/10 pain and is nauseous when she tries to eat. She was unable to tolerated rolling or performing sit <> supine transfer without wailing and becoming tearful. She performed supine to sit transfer with max A from PT. She was able to tolerate sitting EOB for about 15 seconds before requesting to return to supine position. She was apologetic that she could not do more during therapy today. She could probably benefit from co-treatment with OT due to limited activity tolerance. If her pain was managed, I think she would be able to return home with assistance as needed; however, based on her current presentation, I'm not sure that she would be able to tolerate returning to current living arrangement. She could benefit from skilled PT services to address her deficits and maximize her independence to reduce her risk of hospital-acquired impairments. [facility] [address] [id number] AM-PAC 6 Clicks Basic Mobility Inpatient Short Form  AM-PAC Basic Mobility - Inpatient   How much help is needed turning from your back to your side while in a flat bed without using bedrails?: A Lot  How much help is needed moving from lying on your back to sitting on the side of a flat bed without using bedrails?: A Lot  How much help is needed moving to and from a bed to a chair?: Total  How much help is needed standing up from a chair using your arms?: Total  How much help is needed walking in hospital room?: Total  How much help is needed climbing 3-5 steps with a railing?: Total  AM-PAC Inpatient Mobility Raw Score : 8  AM-PAC Inpatient T-Scale Score : 28.52  Mobility Inpatient CMS 0-100% Score: 86.62  Mobility Inpatient CMS G-Code Modifier : CM    SUBJECTIVE:   Ms. Young Harp states, \"My pain is 30/10. \"     Social/Functional   Lives With: Alone  Type of Home: House  Home Layout: Two level  Home Access: Stairs to enter with rails  Entrance Stairs - Number of Steps: 6    OBJECTIVE:     PAIN: VITALS / O2: PRECAUTION / Teodoro Feil / DRAINS:   Pre Treatment:   Pain Assessment: 0-10  Pain Level: 10  Pain Location: Abdomen;Flank;Back    Post Treatment: 10 Vitals        Oxygen  O2 Device: None (Room air)   Moore Catheter and IV    RESTRICTIONS/PRECAUTIONS:  Restrictions/Precautions: Fall Risk                 GROSS EVALUATION: Intact Impaired (Comments):   AROM []  Decreased secondary to pain   PROM [x]    Strength []  Generally decreased due to immobility   Balance []  Unable to tolerate sitting or standing due to pain   Posture []  Kyphotic   Sensation [x]     Coordination [x]      Tone [x]     Edema [x]    Activity Tolerance []  Decreased secondary to pain and fatigue    []      COGNITION/  PERCEPTION: Intact Impaired (Comments):   Orientation [x]     Vision [x]     Hearing [x]     Cognition  [x]       MOBILITY: I Mod I S SBA CGA Min Mod Max Total  NT x2 Comments:   Bed Mobility    Rolling [] [] [] [] [] [] [] [x] [] [] []    Supine to Sit [] [] [] [] [] [] [] [x] [] [] [] Pt wailing & screaming; tearful   Scooting [] [] [] [] [] [] [] [] [] [x] []    Sit to Supine [] [] [] [] [] [] [] [x] [] [] []    Transfers    Sit to Stand [] [] [] [] [] [] [] [] [] [x] []    Bed to Chair [] [] [] [] [] [] [] [] [] [x] []    Stand to Sit [] [] [] [] [] [] [] [] [] [x] []     [] [] [] [] [] [] [] [] [] [] []    I=Independent, Mod I=Modified Independent, S=Supervision, SBA=Standby Assistance, CGA=Contact Guard Assistance,   Min=Minimal Assistance, Mod=Moderate Assistance, Max=Maximal Assistance, Total=Total Assistance, NT=Not Tested    GAIT: I Mod I S SBA CGA Min Mod Max Total  NT x2 Comments:   Level of Assistance [] [] [] [] [] [] [] [] [] [x] []    Distance   feet    DME N/A    Gait Quality N/A    Weightbearing Status Restrictions/Precautions  Restrictions/Precautions: Fall Risk    Stairs      I=Independent, Mod I=Modified Independent, S=Supervision, SBA=Standby Assistance, CGA=Contact Guard Assistance,   Min=Minimal Assistance, Mod=Moderate Assistance, Max=Maximal Assistance, Total=Total Assistance, NT=Not Tested    PLAN:   FREQUENCY AND DURATION: 3 times/week for duration of hospital stay or until stated goals are met, whichever comes first.    THERAPY PROGNOSIS: Fair    PROBLEM LIST:   (Skilled intervention is medically necessary to address:)  Decreased ADL/Functional Activities  Decreased Activity Tolerance  Decreased AROM/PROM  Decreased Balance  Decreased Gait Ability  Decreased Strength  Decreased Transfer Abilities  Increased Pain INTERVENTIONS PLANNED:   (Benefits and precautions of physical therapy have been discussed with the patient.)  Self Care Training  Therapeutic Activity  Therapeutic Exercise/HEP  Neuromuscular Re-education  Gait Training  Manual Therapy  Modalities  Education       TREATMENT:   EVALUATION: HIGH COMPLEXITY: (Untimed Charge)    TREATMENT:   Therapeutic Activity (9 Minutes): Therapeutic activity included Rolling, Supine to Sit, Sit to Supine, and Transfer Training to improve functional Activity tolerance, Balance, and Mobility. Patient/caregiver education regarding importance of changing positions to reduce risks of immobility.     TREATMENT GRID:  N/A    AFTER TREATMENT PRECAUTIONS: Bed, Bed/Chair Locked, Call light within reach, Needs within reach, and Visitors at bedside    INTERDISCIPLINARY COLLABORATION:  RN/ PCT, PT/ PTA, and OT/ DOMINGUEZ    EDUCATION:   Education Given To: Patient;Caregiver  Education Provided: Role of Therapy  Education Method: Verbal;Demonstration  Barriers to Learning: None  Education Outcome: Continued education needed    TIME IN/OUT:  Time In: 1453  Time Out: 52 [facility]  Minutes: [address] PT

## 2023-02-15 NOTE — PROGRESS NOTES
Hospitalist Progress Note   Admit Date:  2023  7:07 PM   Name:  Kate Solorzano   Age:  80 y.o. Sex:  female  :  1931   MRN:  [id number]   Room:  221    Presenting Complaint: No chief complaint on file. Reason(s) for Admission: Intractable pain [R52]     Hospital Course:   Kate Solorzano is a 80 y.o. female with medical history of Kate Solorzano is a 80 y.o. female with medical history of HTN, CAD, CHF, CKD who presents as a direct admit from home with intractable back/flank pain. Over 2 months period, she has been diagnosed with T10, then T9 fractures, and subsequently underwent kyphoplasties on  and , respectively. Subjective & 24hr Events (02/15/23): Patient is seen at the bedside. Tearful and complaining of persistent severe back and flank pain. Endorses some nausea but denies chest pain, palpitation or shortness of breath. Patient reports kyphoplasty did not help. Assessment & Plan:     Intractable back/flank pain:  Over 2 months period, she has been diagnosed with T10, then T9 fractures, and subsequently underwent kyphoplasties on  and , respectively  Check UA  MRI thoracolumbar ordered  CT AP to rule out kidney stones  Continue oxycodone 10 mg every 4 hours and morphine 2 mg IV every 4 hours as needed  IV fluid  PT/OT    History of T9/T10 compression fracture status post kyphoplasty:  Noted  Pain control    Hypertension/hyperlipidemia:  CAD:  Chronic systolic heart failure:  Not in exacerbation  Continue home meds: Aspirin, Plavix, carvedilol, Imdur and rosuvastatin    CKD stage III:  Kidney function stable  Avoid nephrotoxic agent    PT/OT evals and PPD needed/ordered? Yes    Diet:  ADULT DIET;  Regular  VTE prophylaxis: SCD's   Code status: Full Code    Hospital Problems:  Principal Problem:    Intractable pain  Active Problems:    Chronic renal disease, stage III (HCC) [677887]    Chronic systolic (congestive) heart failure    S/P kyphoplasty    CAD (coronary artery disease)    HTN (hypertension)  Resolved Problems:    * No resolved hospital problems. *      Objective:   Patient Vitals for the past 24 hrs:   Temp Pulse Resp BP SpO2   02/15/23 1207 98.4 °F (36.9 °C) 66 19 126/61 94 %   02/15/23 1133 97.7 °F (36.5 °C) 62 16 134/61 95 %   02/15/23 0847 -- 63 -- (!) 150/61 --   02/15/23 0716 98.4 °F (36.9 °C) 63 18 (!) 150/61 94 %   02/15/23 0324 98.4 °F (36.9 °C) 65 17 131/60 92 %   02/14/23 2324 98.2 °F (36.8 °C) 75 18 125/64 93 %   02/14/23 2017 97.7 °F (36.5 °C) 66 17 127/62 95 %       Oxygen Therapy  SpO2: 94 %  O2 Device: None (Room air)    Estimated body mass index is 18.72 kg/m² as calculated from the following:    Height as of this encounter: 5' 6\" (1.676 m). Weight as of this encounter: 116 lb (52.6 kg). No intake or output data in the 24 hours ending 02/15/23 1342      Physical Exam:     Blood pressure 126/61, pulse 66, temperature 98.4 °F (36.9 °C), temperature source Oral, resp. rate 19, height 5' 6\" (1.676 m), weight 116 lb (52.6 kg), SpO2 94 %. General:    Well nourished. Careful due to pain  Head:  Normocephalic, atraumatic  Eyes:  Sclerae appear normal.  Pupils equally round. ENT:  Nares appear normal.  Moist oral mucosa  Neck:  No restricted ROM. Trachea midline   CV:   RRR. No m/r/g. No jugular venous distension. Lungs:   CTAB. No wheezing, rhonchi, or rales. Symmetric expansion. Abdomen:   Soft, nontender, nondistended. Bilateral flank tenderness  Extremities: No cyanosis or clubbing. No edema  Skin:     No rashes and normal coloration. Warm and dry. Neuro:  CN II-XII grossly intact. Psych:  Normal mood and affect.       I have personally reviewed labs and tests:  Recent Labs:  Recent Results (from the past 48 hour(s))   CBC with Auto Differential    Collection Time: 02/14/23  8:13 PM   Result Value Ref Range    WBC 7.8 4.3 - 11.1 K/uL    RBC 4.80 4.05 - 5.2 M/uL    Hemoglobin 14.2 11.7 - 15.4 g/dL    Hematocrit 43.6 35.8 - 46.3 %    MCV 90.8 82 - 102 FL    MCH 29.6 26.1 - 32.9 PG    MCHC 32.6 31.4 - 35.0 g/dL    RDW 11.7 (L) 11.9 - 14.6 %    Platelets 484 937 - 769 K/uL    MPV 10.2 9.4 - 12.3 FL    nRBC 0.00 0.0 - 0.2 K/uL    Differential Type AUTOMATED      Seg Neutrophils 64 43 - 78 %    Lymphocytes 22 13 - 44 %    Monocytes 8 4.0 - 12.0 %    Eosinophils % 5 0.5 - 7.8 %    Basophils 1 0.0 - 2.0 %    Immature Granulocytes 0 0.0 - 5.0 %    Segs Absolute 5.0 1.7 - 8.2 K/UL    Absolute Lymph # 1.7 0.5 - 4.6 K/UL    Absolute Mono # 0.6 0.1 - 1.3 K/UL    Absolute Eos # 0.4 0.0 - 0.8 K/UL    Basophils Absolute 0.1 0.0 - 0.2 K/UL    Absolute Immature Granulocyte 0.0 0.0 - 0.5 K/UL   Comprehensive Metabolic Panel    Collection Time: 02/14/23  8:13 PM   Result Value Ref Range    Sodium 140 133 - 143 mmol/L    Potassium 3.4 (L) 3.5 - 5.1 mmol/L    Chloride 108 101 - 110 mmol/L    CO2 25 21 - 32 mmol/L    Anion Gap 7 2 - 11 mmol/L    Glucose 116 (H) 65 - 100 mg/dL    BUN 23 8 - 23 MG/DL    Creatinine 1.20 (H) 0.6 - 1.0 MG/DL    Est, Glom Filt Rate 43 (L) >60 ml/min/1.73m2    Calcium 9.7 8.3 - 10.4 MG/DL    Total Bilirubin 0.6 0.2 - 1.1 MG/DL    ALT 16 12 - 65 U/L    AST 26 15 - 37 U/L    Alk Phosphatase 77 50 - 136 U/L    Total Protein 7.0 6.3 - 8.2 g/dL    Albumin 2.7 (L) 3.2 - 4.6 g/dL    Globulin 4.3 2.8 - 4.5 g/dL    Albumin/Globulin Ratio 0.6 0.4 - 1.6     Basic Metabolic Panel w/ Reflex to MG    Collection Time: 02/15/23  7:30 AM   Result Value Ref Range    Sodium 141 133 - 143 mmol/L    Potassium 3.5 3.5 - 5.1 mmol/L    Chloride 112 (H) 101 - 110 mmol/L    CO2 23 21 - 32 mmol/L    Anion Gap 6 2 - 11 mmol/L    Glucose 96 65 - 100 mg/dL    BUN 21 8 - 23 MG/DL    Creatinine 0.90 0.6 - 1.0 MG/DL    Est, Glom Filt Rate >60 >60 ml/min/1.73m2    Calcium 8.9 8.3 - 10.4 MG/DL   CBC with Auto Differential    Collection Time: 02/15/23  7:30 AM   Result Value Ref Range    WBC 7.1 4.3 - 11.1 K/uL    RBC 4.34 4.05 - 5.2 M/uL    Hemoglobin 12.8 11.7 - 15.4 g/dL    Hematocrit 39.0 35.8 - 46.3 %    MCV 89.9 82 - 102 FL    MCH 29.5 26.1 - 32.9 PG    MCHC 32.8 31.4 - 35.0 g/dL    RDW 11.9 11.9 - 14.6 %    Platelets 745 119 - 474 K/uL    MPV 10.3 9.4 - 12.3 FL    nRBC 0.00 0.0 - 0.2 K/uL    Differential Type AUTOMATED      Seg Neutrophils 62 43 - 78 %    Lymphocytes 22 13 - 44 %    Monocytes 10 4.0 - 12.0 %    Eosinophils % 5 0.5 - 7.8 %    Basophils 1 0.0 - 2.0 %    Immature Granulocytes 0 0.0 - 5.0 %    Segs Absolute 4.4 1.7 - 8.2 K/UL    Absolute Lymph # 1.6 0.5 - 4.6 K/UL    Absolute Mono # 0.7 0.1 - 1.3 K/UL    Absolute Eos # 0.3 0.0 - 0.8 K/UL    Basophils Absolute 0.1 0.0 - 0.2 K/UL    Absolute Immature Granulocyte 0.0 0.0 - 0.5 K/UL   Magnesium    Collection Time: 02/15/23  7:30 AM   Result Value Ref Range    Magnesium 2.2 1.8 - 2.4 mg/dL       I have personally reviewed imaging studies:  Other Studies:  XR CHEST PORTABLE   Final Result      1. No acute cardiopulmonary process identified. This exam was interpreted by a board-certified, body-imaging fellowship trained    radiologist from [address] [place] [address]. If there are any questions regarding    this examination please feel free to contact a radiologist at [phone].       [address]    2/14/2023 9:00:00 PM      MRI LUMBAR SPINE WO CONTRAST    (Results Pending)   MRI THORACIC SPINE WO CONTRAST    (Results Pending)       Current Meds:  Current Facility-Administered Medications   Medication Dose Route Frequency    aspirin EC tablet 81 mg  81 mg Oral Daily    pantoprazole (PROTONIX) tablet 40 mg  40 mg Oral Daily    carvedilol (COREG) tablet 3.125 mg  3.125 mg Oral BID with meals    clopidogrel (PLAVIX) tablet 75 mg  75 mg Oral Daily    isosorbide mononitrate (IMDUR) extended release tablet 60 mg  60 mg Oral Daily    rosuvastatin (CRESTOR) tablet 20 mg  20 mg Oral Daily    tiZANidine (ZANAFLEX) tablet 2 mg  2 mg Oral Q8H PRN    lidocaine 4 % external patch 1 patch  1 patch TransDERmal Daily    [Held by provider] losartan (COZAAR) tablet 25 mg  25 mg Oral Daily    [Held by provider] spironolactone (ALDACTONE) tablet 25 mg  25 mg Oral Daily    sodium chloride flush 0.9 % injection 5-40 mL  5-40 mL IntraVENous 2 times per day    sodium chloride flush 0.9 % injection 5-40 mL  5-40 mL IntraVENous PRN    0.9 % sodium chloride infusion   IntraVENous PRN    ondansetron (ZOFRAN-ODT) disintegrating tablet 4 mg  4 mg Oral Q8H PRN    Or    ondansetron (ZOFRAN) injection 4 mg  4 mg IntraVENous Q6H PRN    polyethylene glycol (GLYCOLAX) packet 17 g  17 g Oral Daily PRN    acetaminophen (TYLENOL) tablet 650 mg  650 mg Oral Q6H PRN    Or    acetaminophen (TYLENOL) suppository 650 mg  650 mg Rectal Q6H PRN    0.9 % sodium chloride infusion   IntraVENous Continuous    oxyCODONE-acetaminophen (PERCOCET)  MG per tablet 1 tablet  1 tablet Oral Q4H PRN    morphine injection 2 mg  2 mg IntraVENous Q4H PRN    ketorolac (TORADOL) injection 15 mg  15 mg IntraVENous Q6H PRN    melatonin tablet 3 mg  3 mg Oral Once       Signed:  Quinten Bruce MD    Part of this note may have been written by using a voice dictation software. The note has been proof read but may still contain some grammatical/other typographical errors.

## 2023-02-16 ENCOUNTER — APPOINTMENT (OUTPATIENT)
Dept: NON INVASIVE DIAGNOSTICS | Age: 88
DRG: 478 | End: 2023-02-16
Attending: INTERNAL MEDICINE
Payer: COMMERCIAL

## 2023-02-16 ENCOUNTER — APPOINTMENT (OUTPATIENT)
Dept: MRI IMAGING | Age: 88
DRG: 478 | End: 2023-02-16
Attending: INTERNAL MEDICINE
Payer: COMMERCIAL

## 2023-02-16 LAB
ANION GAP SERPL CALC-SCNC: 6 MMOL/L (ref 2–11)
APPEARANCE UR: ABNORMAL
BACTERIA URNS QL MICRO: ABNORMAL /HPF
BASOPHILS # BLD: 0.1 K/UL (ref 0–0.2)
BASOPHILS NFR BLD: 1 % (ref 0–2)
BILIRUB UR QL: NEGATIVE
BUN SERPL-MCNC: 15 MG/DL (ref 8–23)
CALCIUM SERPL-MCNC: 9.8 MG/DL (ref 8.3–10.4)
CASTS URNS QL MICRO: 0 /LPF
CHLORIDE SERPL-SCNC: 110 MMOL/L (ref 101–110)
CO2 SERPL-SCNC: 26 MMOL/L (ref 21–32)
COLOR UR: ABNORMAL
CREAT SERPL-MCNC: 0.8 MG/DL (ref 0.6–1)
CRYSTALS URNS QL MICRO: ABNORMAL /LPF
DIFFERENTIAL METHOD BLD: ABNORMAL
ECHO AO ASC DIAM: 3.5 CM
ECHO AO ASCENDING AORTA INDEX: 2.2 CM/M2
ECHO AO ROOT DIAM: 3.1 CM
ECHO AO ROOT INDEX: 1.95 CM/M2
ECHO AV AREA PEAK VELOCITY: 2.9 CM2
ECHO AV AREA VTI: 3.1 CM2
ECHO AV AREA/BSA PEAK VELOCITY: 1.8 CM2/M2
ECHO AV AREA/BSA VTI: 1.9 CM2/M2
ECHO AV MEAN GRADIENT: 2 MMHG
ECHO AV MEAN VELOCITY: 0.7 M/S
ECHO AV PEAK GRADIENT: 4 MMHG
ECHO AV PEAK VELOCITY: 1 M/S
ECHO AV VELOCITY RATIO: 0.9
ECHO AV VTI: 16.5 CM
ECHO BSA: 1.57 M2
ECHO EST RA PRESSURE: 3 MMHG
ECHO IVC PROX: 1.1 CM
ECHO LA AREA 2C: 14.6 CM2
ECHO LA AREA 4C: 16.1 CM2
ECHO LA MAJOR AXIS: 4.7 CM
ECHO LA MINOR AXIS: 4.7 CM
ECHO LA VOL 2C: 38 ML (ref 22–52)
ECHO LA VOL 4C: 43 ML (ref 22–52)
ECHO LA VOL BP: 40 ML (ref 22–52)
ECHO LA VOL/BSA BIPLANE: 25 ML/M2 (ref 16–34)
ECHO LA VOLUME INDEX A2C: 24 ML/M2 (ref 16–34)
ECHO LA VOLUME INDEX A4C: 27 ML/M2 (ref 16–34)
ECHO LV E' LATERAL VELOCITY: 8 CM/S
ECHO LV E' SEPTAL VELOCITY: 7 CM/S
ECHO LV EDV A2C: 112 ML
ECHO LV EDV A4C: 88 ML
ECHO LV EDV INDEX A4C: 55 ML/M2
ECHO LV EDV NDEX A2C: 70 ML/M2
ECHO LV EJECTION FRACTION A2C: 44 %
ECHO LV EJECTION FRACTION A4C: 47 %
ECHO LV EJECTION FRACTION BIPLANE: 45 % (ref 55–100)
ECHO LV ESV A2C: 64 ML
ECHO LV ESV A4C: 47 ML
ECHO LV ESV INDEX A2C: 40 ML/M2
ECHO LV ESV INDEX A4C: 30 ML/M2
ECHO LV FRACTIONAL SHORTENING: 20 % (ref 28–44)
ECHO LV INTERNAL DIMENSION DIASTOLE INDEX: 2.52 CM/M2
ECHO LV INTERNAL DIMENSION DIASTOLIC: 4 CM (ref 3.9–5.3)
ECHO LV INTERNAL DIMENSION SYSTOLIC INDEX: 2.01 CM/M2
ECHO LV INTERNAL DIMENSION SYSTOLIC: 3.2 CM
ECHO LV IVSD: 1.1 CM (ref 0.6–0.9)
ECHO LV MASS 2D: 136.2 G (ref 67–162)
ECHO LV MASS INDEX 2D: 85.7 G/M2 (ref 43–95)
ECHO LV POSTERIOR WALL DIASTOLIC: 1 CM (ref 0.6–0.9)
ECHO LV RELATIVE WALL THICKNESS RATIO: 0.5
ECHO LVOT AREA: 3.5 CM2
ECHO LVOT AV VTI INDEX: 0.9
ECHO LVOT DIAM: 2.1 CM
ECHO LVOT MEAN GRADIENT: 1 MMHG
ECHO LVOT PEAK GRADIENT: 3 MMHG
ECHO LVOT PEAK VELOCITY: 0.9 M/S
ECHO LVOT STROKE VOLUME INDEX: 32.2 ML/M2
ECHO LVOT SV: 51.2 ML
ECHO LVOT VTI: 14.8 CM
ECHO MV A VELOCITY: 0.9 M/S
ECHO MV AREA VTI: 2.9 CM2
ECHO MV E DECELERATION TIME (DT): 132 MS
ECHO MV E VELOCITY: 0.35 M/S
ECHO MV E/A RATIO: 0.39
ECHO MV E/E' LATERAL: 4.38
ECHO MV E/E' RATIO (AVERAGED): 4.69
ECHO MV E/E' SEPTAL: 5
ECHO MV LVOT VTI INDEX: 1.18
ECHO MV MAX VELOCITY: 1 M/S
ECHO MV MEAN GRADIENT: 1 MMHG
ECHO MV MEAN VELOCITY: 0.5 M/S
ECHO MV PEAK GRADIENT: 4 MMHG
ECHO MV VTI: 17.5 CM
ECHO PULMONARY ARTERY END DIASTOLIC PRESSURE: 4 MMHG
ECHO PV ACCELERATION TIME (AT): 88 MS
ECHO PV MAX VELOCITY: 0.8 M/S
ECHO PV PEAK GRADIENT: 2 MMHG
ECHO PV REGURGITANT MAX VELOCITY: 1.1 M/S
ECHO RIGHT VENTRICULAR SYSTOLIC PRESSURE (RVSP): 33 MMHG
ECHO RV BASAL DIMENSION: 3.7 CM
ECHO RV FREE WALL PEAK S': 9 CM/S
ECHO RV TAPSE: 1.6 CM (ref 1.7–?)
ECHO TV REGURGITANT MAX VELOCITY: 2.72 M/S
ECHO TV REGURGITANT PEAK GRADIENT: 30 MMHG
EOSINOPHIL # BLD: 0.3 K/UL (ref 0–0.8)
EOSINOPHIL NFR BLD: 5 % (ref 0.5–7.8)
EPI CELLS #/AREA URNS HPF: ABNORMAL /HPF
ERYTHROCYTE [DISTWIDTH] IN BLOOD BY AUTOMATED COUNT: 11.7 % (ref 11.9–14.6)
GLUCOSE SERPL-MCNC: 92 MG/DL (ref 65–100)
GLUCOSE UR STRIP.AUTO-MCNC: NEGATIVE MG/DL
HCT VFR BLD AUTO: 42.1 % (ref 35.8–46.3)
HGB BLD-MCNC: 13.1 G/DL (ref 11.7–15.4)
HGB UR QL STRIP: ABNORMAL
IMM GRANULOCYTES # BLD AUTO: 0 K/UL (ref 0–0.5)
IMM GRANULOCYTES NFR BLD AUTO: 0 % (ref 0–5)
KETONES UR QL STRIP.AUTO: NEGATIVE MG/DL
LEUKOCYTE ESTERASE UR QL STRIP.AUTO: ABNORMAL
LV EF: 43 %
LVEF MODALITY: ABNORMAL
LYMPHOCYTES # BLD: 1.9 K/UL (ref 0.5–4.6)
LYMPHOCYTES NFR BLD: 31 % (ref 13–44)
MCH RBC QN AUTO: 29.2 PG (ref 26.1–32.9)
MCHC RBC AUTO-ENTMCNC: 31.1 G/DL (ref 31.4–35)
MCV RBC AUTO: 94 FL (ref 82–102)
MM INDURATION, POC: 0 MM (ref 0–0)
MONOCYTES # BLD: 0.7 K/UL (ref 0.1–1.3)
MONOCYTES NFR BLD: 11 % (ref 4–12)
MUCOUS THREADS URNS QL MICRO: 0 /LPF
NEUTS SEG # BLD: 3.1 K/UL (ref 1.7–8.2)
NEUTS SEG NFR BLD: 51 % (ref 43–78)
NITRITE UR QL STRIP.AUTO: NEGATIVE
NRBC # BLD: 0 K/UL (ref 0–0.2)
OTHER OBSERVATIONS: ABNORMAL
PH UR STRIP: 6.5 (ref 5–9)
PLATELET # BLD AUTO: 209 K/UL (ref 150–450)
PMV BLD AUTO: 9.8 FL (ref 9.4–12.3)
POTASSIUM SERPL-SCNC: 3.7 MMOL/L (ref 3.5–5.1)
PPD, POC: NEGATIVE
PROT UR STRIP-MCNC: 30 MG/DL
RBC # BLD AUTO: 4.48 M/UL (ref 4.05–5.2)
RBC #/AREA URNS HPF: 0 /HPF
SODIUM SERPL-SCNC: 142 MMOL/L (ref 133–143)
SP GR UR REFRACTOMETRY: 1.02 (ref 1–1.02)
URINE CULTURE IF INDICATED: ABNORMAL
UROBILINOGEN UR QL STRIP.AUTO: 1 EU/DL (ref 0.2–1)
WBC # BLD AUTO: 6.1 K/UL (ref 4.3–11.1)
WBC URNS QL MICRO: ABNORMAL /HPF

## 2023-02-16 PROCEDURE — 6370000000 HC RX 637 (ALT 250 FOR IP): Performed by: FAMILY MEDICINE

## 2023-02-16 PROCEDURE — C8929 TTE W OR WO FOL WCON,DOPPLER: HCPCS

## 2023-02-16 PROCEDURE — 93306 TTE W/DOPPLER COMPLETE: CPT | Performed by: INTERNAL MEDICINE

## 2023-02-16 PROCEDURE — 72148 MRI LUMBAR SPINE W/O DYE: CPT

## 2023-02-16 PROCEDURE — 1100000000 HC RM PRIVATE

## 2023-02-16 PROCEDURE — 6360000004 HC RX CONTRAST MEDICATION: Performed by: FAMILY MEDICINE

## 2023-02-16 PROCEDURE — 2580000003 HC RX 258: Performed by: FAMILY MEDICINE

## 2023-02-16 PROCEDURE — 36415 COLL VENOUS BLD VENIPUNCTURE: CPT

## 2023-02-16 PROCEDURE — 85025 COMPLETE CBC W/AUTO DIFF WBC: CPT

## 2023-02-16 PROCEDURE — 72146 MRI CHEST SPINE W/O DYE: CPT

## 2023-02-16 PROCEDURE — 80048 BASIC METABOLIC PNL TOTAL CA: CPT

## 2023-02-16 RX ORDER — ROSUVASTATIN CALCIUM 20 MG/1
20 TABLET, COATED ORAL NIGHTLY
Status: DISCONTINUED | OUTPATIENT
Start: 2023-02-16 | End: 2023-03-03 | Stop reason: HOSPADM

## 2023-02-16 RX ORDER — GUAIFENESIN 600 MG/1
600 TABLET, EXTENDED RELEASE ORAL 2 TIMES DAILY
Status: DISCONTINUED | OUTPATIENT
Start: 2023-02-16 | End: 2023-02-28

## 2023-02-16 RX ADMIN — PANTOPRAZOLE SODIUM 40 MG: 40 TABLET, DELAYED RELEASE ORAL at 07:00

## 2023-02-16 RX ADMIN — OXYCODONE AND ACETAMINOPHEN 1 TABLET: 10; 325 TABLET ORAL at 13:30

## 2023-02-16 RX ADMIN — POLYETHYLENE GLYCOL 3350 17 G: 17 POWDER, FOR SOLUTION ORAL at 08:31

## 2023-02-16 RX ADMIN — ISOSORBIDE MONONITRATE 60 MG: 60 TABLET, EXTENDED RELEASE ORAL at 08:32

## 2023-02-16 RX ADMIN — OXYCODONE AND ACETAMINOPHEN 1 TABLET: 10; 325 TABLET ORAL at 00:35

## 2023-02-16 RX ADMIN — GUAIFENESIN 600 MG: 600 TABLET ORAL at 21:20

## 2023-02-16 RX ADMIN — OXYCODONE AND ACETAMINOPHEN 1 TABLET: 10; 325 TABLET ORAL at 21:20

## 2023-02-16 RX ADMIN — CARVEDILOL 3.12 MG: 3.12 TABLET, FILM COATED ORAL at 08:32

## 2023-02-16 RX ADMIN — SODIUM CHLORIDE, PRESERVATIVE FREE 10 ML: 5 INJECTION INTRAVENOUS at 21:22

## 2023-02-16 RX ADMIN — ONDANSETRON 4 MG: 4 TABLET, ORALLY DISINTEGRATING ORAL at 13:34

## 2023-02-16 RX ADMIN — SODIUM CHLORIDE, PRESERVATIVE FREE 10 ML: 5 INJECTION INTRAVENOUS at 08:33

## 2023-02-16 RX ADMIN — ROSUVASTATIN CALCIUM 20 MG: 20 TABLET, COATED ORAL at 21:20

## 2023-02-16 RX ADMIN — SODIUM CHLORIDE, PRESERVATIVE FREE 0.45 ML: 5 INJECTION INTRAVENOUS at 15:44

## 2023-02-16 RX ADMIN — ASPIRIN 81 MG: 81 TABLET ORAL at 08:32

## 2023-02-16 RX ADMIN — CLOPIDOGREL BISULFATE 75 MG: 75 TABLET ORAL at 08:32

## 2023-02-16 ASSESSMENT — PAIN DESCRIPTION - LOCATION
LOCATION: BACK
LOCATION: BACK
LOCATION: BACK;FLANK

## 2023-02-16 ASSESSMENT — PAIN DESCRIPTION - ORIENTATION
ORIENTATION: POSTERIOR

## 2023-02-16 ASSESSMENT — PAIN - FUNCTIONAL ASSESSMENT
PAIN_FUNCTIONAL_ASSESSMENT: PREVENTS OR INTERFERES SOME ACTIVE ACTIVITIES AND ADLS
PAIN_FUNCTIONAL_ASSESSMENT: PREVENTS OR INTERFERES SOME ACTIVE ACTIVITIES AND ADLS

## 2023-02-16 ASSESSMENT — PAIN SCALES - GENERAL
PAINLEVEL_OUTOF10: 7
PAINLEVEL_OUTOF10: 6
PAINLEVEL_OUTOF10: 3
PAINLEVEL_OUTOF10: 6

## 2023-02-16 ASSESSMENT — PAIN DESCRIPTION - FREQUENCY
FREQUENCY: CONTINUOUS
FREQUENCY: INTERMITTENT

## 2023-02-16 ASSESSMENT — PAIN DESCRIPTION - ONSET: ONSET: ON-GOING

## 2023-02-16 ASSESSMENT — PAIN DESCRIPTION - PAIN TYPE
TYPE: CHRONIC PAIN;ACUTE PAIN
TYPE: CHRONIC PAIN

## 2023-02-16 ASSESSMENT — PAIN DESCRIPTION - DESCRIPTORS
DESCRIPTORS: ACHING;DISCOMFORT;SHARP;SORE
DESCRIPTORS: ACHING
DESCRIPTORS: ACHING

## 2023-02-16 NOTE — PROGRESS NOTES
END OF SHIFT NOTE:    INTAKE/OUTPUT  02/15 0701 - 02/16 0700  In: 480 [P.O.:480]  Out: 300 [Urine:300]  Voiding: No  Catheter: Yes  Drain:   Urinary Catheter 02/15/23 Moore (Active)   $ Urethral catheter insertion $ Not inserted for procedure 02/15/23 1250   Catheter Indications Urinary retention (acute or chronic), continuous bladder irrigation or bladder outlet obstruction 02/16/23 1530   Site Assessment No urethral drainage 02/16/23 1530   Urine Color Yellow 02/16/23 1530   Urine Appearance Clear 02/16/23 1530   Collection Container Standard 02/16/23 1530   Securement Method Securing device (Describe) 02/16/23 1530   Catheter Care  Perineal wipes 02/16/23 1530   Catheter Best Practices  Drainage tube clipped to bed;Catheter secured to thigh; Tamper seal intact; Bag not on floor;Drainage bag less than half full;Lack of dependent loop in tubing;Bag below bladder 02/16/23 1530   Status Draining;Patent 02/16/23 1530   Output (mL) 400 mL 02/16/23 0817               Flatus: Patient does have flatus present. Stool:  0 occurrences. Characteristics:           Stool Assessment  Last BM (including prior to admit): 02/08/23 (per the pt)    Emesis: 0 occurrences. Characteristics:        VITAL SIGNS  Patient Vitals for the past 12 hrs:   Temp Pulse Resp BP SpO2   02/16/23 1611 97.9 °F (36.6 °C) 69 18 (!) 129/59 91 %   02/16/23 1330 -- -- 20 -- --   02/16/23 1112 98.2 °F (36.8 °C) 78 18 (!) 154/76 96 %   02/16/23 0832 -- 62 -- (!) 155/67 --   02/16/23 0749 98.6 °F (37 °C) 62 18 (!) 155/67 92 %       Pain Assessment  Pain Level: 6 (02/16/23 1330)  Pain Location: Back, Flank  Patient's Stated Pain Goal: 0 - No pain    Ambulating  No    Shift report given to oncoming nurse at the bedside.     Yvette Swanson RN

## 2023-02-16 NOTE — PROGRESS NOTES
Hospitalist Progress Note   Admit Date:  2023  7:07 PM   Name:  Ortiz Gilmore   Age:  80 y.o. Sex:  female  :  1931   MRN:  [id number]   Room:  221    Presenting Complaint: No chief complaint on file. Reason(s) for Admission: Intractable pain [R52]     Hospital Course:   Ortiz Gilmore is a 80 y.o. female with medical history of Ortiz Gilmore is a 80 y.o. female with medical history of HTN, CAD, CHF, CKD who presents as a direct admit from home with intractable back/flank pain. Over 2 months period, she has been diagnosed with T10, then T9 fractures, and subsequently underwent kyphoplasties on  and , respectively. Subjective & 24hr Events (23): Patient is seen at the bedside. Reports feeling little better but still with significant back pain. Denies chest pain, palpitation, nausea, vomiting or abdominal pain. MRI pending. 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:  Disc protrusion at L3-L4 and bilateral foraminal narrowing at L4-L5:  Over 2 months period, she has been diagnosed with T10, then T9 fractures, and subsequently underwent kyphoplasties on  and , respectively  MRI thoracolumbar showed large intraosseous hemangioma at T11 with probable compression fracture and lumbar multilevel degenerative disc and disc protrusion at L3-L4  Continue oxycodone 10 mg every 4 hours and morphine 2 mg IV every 4 hours as needed  Orthospine consulted, appreciate recommendation    History of T9/T10 compression fracture status post kyphoplasty:  Noted  Pain control    Hypertension/hyperlipidemia:  CAD:  Chronic systolic heart failure:  Not in exacerbation  Continue home meds: Aspirin, Plavix, carvedilol, Imdur and rosuvastatin    CKD stage III:  Kidney function stable  Avoid nephrotoxic agent    PT/OT evals and PPD needed/ordered? Yes    Diet:  ADULT DIET;  Regular  VTE prophylaxis: SCD's   Code status: Full Code    Hospital Problems:  Principal Problem:    Intractable pain  Active Problems:    Chronic renal disease, stage III ([facility]) [868642]    Chronic systolic (congestive) heart failure    S/P kyphoplasty    CAD (coronary artery disease)    HTN (hypertension)  Resolved Problems:    * No resolved hospital problems. *      Objective:   Patient Vitals for the past 24 hrs:   Temp Pulse Resp BP SpO2   02/16/23 1330 -- -- 20 -- --   02/16/23 1112 98.2 °F (36.8 °C) 78 18 (!) 154/76 96 %   02/16/23 0832 -- 62 -- (!) 155/67 --   02/16/23 0749 98.6 °F (37 °C) 62 18 (!) 155/67 92 %   02/16/23 0344 97.9 °F (36.6 °C) 71 17 (!) 146/72 93 %   02/15/23 2324 98.4 °F (36.9 °C) 64 17 (!) 146/62 91 %   02/15/23 1956 98.1 °F (36.7 °C) 66 17 (!) 129/58 --   02/15/23 1804 -- 65 -- (!) 155/66 --   02/15/23 1525 97.7 °F (36.5 °C) 73 18 (!) 147/75 --         Oxygen Therapy  SpO2: 96 %  O2 Device: None (Room air)    Estimated body mass index is 18.72 kg/m² as calculated from the following:    Height as of this encounter: 5' 6\" (1.676 m). Weight as of this encounter: 116 lb (52.6 kg). Intake/Output Summary (Last 24 hours) at 2/16/2023 1513  Last data filed at 2/16/2023 0953  Gross per 24 hour   Intake 360 ml   Output 700 ml   Net -340 ml           Physical Exam:     Blood pressure (!) 154/76, pulse 78, temperature 98.2 °F (36.8 °C), temperature source Oral, resp. rate 20, height 5' 6\" (1.676 m), weight 116 lb (52.6 kg), SpO2 96 %. General:    Well nourished. Careful due to pain  Head:  Normocephalic, atraumatic  Eyes:  Sclerae appear normal.  Pupils equally round. ENT:  Nares appear normal.  Moist oral mucosa  Neck:  No restricted ROM. Trachea midline   CV:   RRR. No m/r/g. No jugular venous distension. Lungs:   CTAB. No wheezing, rhonchi, or rales. Symmetric expansion. Abdomen:   Soft, nontender, nondistended. Bilateral flank tenderness  Extremities: No cyanosis or clubbing. No edema  Skin:     No rashes and normal coloration. Warm and dry. Neuro:  CN II-XII grossly intact. Psych:  Normal mood and affect.       I have personally reviewed labs and tests:  Recent Labs:  Recent Results (from the past 48 hour(s))   CBC with Auto Differential    Collection Time: 02/14/23  8:13 PM   Result Value Ref Range    WBC 7.8 4.3 - 11.1 K/uL    RBC 4.80 4.05 - 5.2 M/uL    Hemoglobin 14.2 11.7 - 15.4 g/dL    Hematocrit 43.6 35.8 - 46.3 %    MCV 90.8 82 - 102 FL    MCH 29.6 26.1 - 32.9 PG    MCHC 32.6 31.4 - 35.0 g/dL    RDW 11.7 (L) 11.9 - 14.6 %    Platelets 482 234 - 928 K/uL    MPV 10.2 9.4 - 12.3 FL    nRBC 0.00 0.0 - 0.2 K/uL    Differential Type AUTOMATED      Seg Neutrophils 64 43 - 78 %    Lymphocytes 22 13 - 44 %    Monocytes 8 4.0 - 12.0 %    Eosinophils % 5 0.5 - 7.8 %    Basophils 1 0.0 - 2.0 %    Immature Granulocytes 0 0.0 - 5.0 %    Segs Absolute 5.0 1.7 - 8.2 K/UL    Absolute Lymph # 1.7 0.5 - 4.6 K/UL    Absolute Mono # 0.6 0.1 - 1.3 K/UL    Absolute Eos # 0.4 0.0 - 0.8 K/UL    Basophils Absolute 0.1 0.0 - 0.2 K/UL    Absolute Immature Granulocyte 0.0 0.0 - 0.5 K/UL   Comprehensive Metabolic Panel    Collection Time: 02/14/23  8:13 PM   Result Value Ref Range    Sodium 140 133 - 143 mmol/L    Potassium 3.4 (L) 3.5 - 5.1 mmol/L    Chloride 108 101 - 110 mmol/L    CO2 25 21 - 32 mmol/L    Anion Gap 7 2 - 11 mmol/L    Glucose 116 (H) 65 - 100 mg/dL    BUN 23 8 - 23 MG/DL    Creatinine 1.20 (H) 0.6 - 1.0 MG/DL    Est, Glom Filt Rate 43 (L) >60 ml/min/1.73m2    Calcium 9.7 8.3 - 10.4 MG/DL    Total Bilirubin 0.6 0.2 - 1.1 MG/DL    ALT 16 12 - 65 U/L    AST 26 15 - 37 U/L    Alk Phosphatase 77 50 - 136 U/L    Total Protein 7.0 6.3 - 8.2 g/dL    Albumin 2.7 (L) 3.2 - 4.6 g/dL    Globulin 4.3 2.8 - 4.5 g/dL    Albumin/Globulin Ratio 0.6 0.4 - 1.6     Basic Metabolic Panel w/ Reflex to MG    Collection Time: 02/15/23  7:30 AM   Result Value Ref Range    Sodium 141 133 - 143 mmol/L    Potassium 3.5 3.5 - 5.1 mmol/L    Chloride 112 (H) 101 - 110 mmol/L    CO2 23 21 - 32 mmol/L    Anion Gap 6 2 - 11 mmol/L    Glucose 96 65 - 100 mg/dL    BUN 21 8 - 23 MG/DL    Creatinine 0.90 0.6 - 1.0 MG/DL    Est, Glom Filt Rate >60 >60 ml/min/1.73m2    Calcium 8.9 8.3 - 10.4 MG/DL   CBC with Auto Differential    Collection Time: 02/15/23  7:30 AM   Result Value Ref Range    WBC 7.1 4.3 - 11.1 K/uL    RBC 4.34 4.05 - 5.2 M/uL    Hemoglobin 12.8 11.7 - 15.4 g/dL    Hematocrit 39.0 35.8 - 46.3 %    MCV 89.9 82 - 102 FL    MCH 29.5 26.1 - 32.9 PG    MCHC 32.8 31.4 - 35.0 g/dL    RDW 11.9 11.9 - 14.6 %    Platelets 288 533 - 390 K/uL    MPV 10.3 9.4 - 12.3 FL    nRBC 0.00 0.0 - 0.2 K/uL    Differential Type AUTOMATED      Seg Neutrophils 62 43 - 78 %    Lymphocytes 22 13 - 44 %    Monocytes 10 4.0 - 12.0 %    Eosinophils % 5 0.5 - 7.8 %    Basophils 1 0.0 - 2.0 %    Immature Granulocytes 0 0.0 - 5.0 %    Segs Absolute 4.4 1.7 - 8.2 K/UL    Absolute Lymph # 1.6 0.5 - 4.6 K/UL    Absolute Mono # 0.7 0.1 - 1.3 K/UL    Absolute Eos # 0.3 0.0 - 0.8 K/UL    Basophils Absolute 0.1 0.0 - 0.2 K/UL    Absolute Immature Granulocyte 0.0 0.0 - 0.5 K/UL   Magnesium    Collection Time: 02/15/23  7:30 AM   Result Value Ref Range    Magnesium 2.2 1.8 - 2.4 mg/dL   Urinalysis with Reflex to Culture    Collection Time: 02/15/23  6:07 PM    Specimen: Urine   Result Value Ref Range    Color, UA YELLOW/STRAW      Appearance CLOUDY      Specific [place], UA 1.019 1.001 - 1.023      pH, Urine 6.5 5.0 - 9.0      Protein, UA 30 (A) NEG mg/dL    Glucose, UA Negative mg/dL    Ketones, Urine Negative NEG mg/dL    Bilirubin Urine Negative NEG      Blood, Urine LARGE (A) NEG      Urobilinogen, Urine 1.0 0.2 - 1.0 EU/dL    Nitrite, Urine Negative NEG      Leukocyte Esterase, Urine TRACE (A) NEG      WBC, UA 5-10 0 /hpf    RBC, UA 0 0 /hpf    BACTERIA, URINE 2+ (H) 0 /hpf    Urine Culture if Indicated CULTURE NOT INDICATED BY UA RESULT      Epithelial Cells UA 0-3 0 /hpf    Casts 0 0 /lpf    Crystals OCCASIONAL 0 /LPF Mucus, UA 0 0 /lpf    Other observations RESULTS VERIFIED MANUALLY     Basic Metabolic Panel w/ Reflex to MG    Collection Time: 02/16/23  6:48 AM   Result Value Ref Range    Sodium 142 133 - 143 mmol/L    Potassium 3.7 3.5 - 5.1 mmol/L    Chloride 110 101 - 110 mmol/L    CO2 26 21 - 32 mmol/L    Anion Gap 6 2 - 11 mmol/L    Glucose 92 65 - 100 mg/dL    BUN 15 8 - 23 MG/DL    Creatinine 0.80 0.6 - 1.0 MG/DL    Est, Glom Filt Rate >60 >60 ml/min/1.73m2    Calcium 9.8 8.3 - 10.4 MG/DL   CBC with Auto Differential    Collection Time: 02/16/23  6:48 AM   Result Value Ref Range    WBC 6.1 4.3 - 11.1 K/uL    RBC 4.48 4.05 - 5.2 M/uL    Hemoglobin 13.1 11.7 - 15.4 g/dL    Hematocrit 42.1 35.8 - 46.3 %    MCV 94.0 82 - 102 FL    MCH 29.2 26.1 - 32.9 PG    MCHC 31.1 (L) 31.4 - 35.0 g/dL    RDW 11.7 (L) 11.9 - 14.6 %    Platelets 219 238 - 888 K/uL    MPV 9.8 9.4 - 12.3 FL    nRBC 0.00 0.0 - 0.2 K/uL    Differential Type AUTOMATED      Seg Neutrophils 51 43 - 78 %    Lymphocytes 31 13 - 44 %    Monocytes 11 4.0 - 12.0 %    Eosinophils % 5 0.5 - 7.8 %    Basophils 1 0.0 - 2.0 %    Immature Granulocytes 0 0.0 - 5.0 %    Segs Absolute 3.1 1.7 - 8.2 K/UL    Absolute Lymph # 1.9 0.5 - 4.6 K/UL    Absolute Mono # 0.7 0.1 - 1.3 K/UL    Absolute Eos # 0.3 0.0 - 0.8 K/UL    Basophils Absolute 0.1 0.0 - 0.2 K/UL    Absolute Immature Granulocyte 0.0 0.0 - 0.5 K/UL   Transthoracic echocardiogram (TTE) complete with contrast, bubble, strain, and 3D PRN    Collection Time: 02/16/23  8:34 AM   Result Value Ref Range    LV EDV A2C 112 mL    LV EDV A4C 88 mL    LV ESV A2C 64 mL    LV ESV A4C 47 mL    IVSd 1.1 (A) 0.6 - 0.9 cm    LVIDd 4.0 3.9 - 5.3 cm    LVIDs 3.2 cm    LVOT Diameter 2.1 cm    LVOT Mean Gradient 1 mmHg    LVOT VTI 14.8 cm    LVOT Peak Velocity 0.9 m/s    LVOT Peak Gradient 3 mmHg    LVPWd 1.0 (A) 0.6 - 0.9 cm    LV E' Lateral Velocity 8 cm/s    LV E' Septal Velocity 7 cm/s    LV Ejection Fraction A2C 44 %    LV Ejection Fraction A4C 47 %    EF BP 45 (A) 55 - 100 %    LVOT Area 3.5 cm2    LVOT SV 51.0 ml    LA Minor Axis 4.7 cm    LA Major [place] 4.7 cm    LA Area 2C 14.6 cm2    LA Area 4C 16.1 cm2    LA Volume 2C 38 22 - 52 mL    LA Volume 4C 43 22 - 52 mL    LA Volume BP 40 22 - 52 mL    AV Mean Velocity 0.7 m/s    AV Mean Gradient 2 mmHg    AV VTI 16.5 cm    AV Peak Velocity 1.0 m/s    AV Peak Gradient 4 mmHg    AV Area by VTI 3.1 cm2    AV Area by Peak Velocity 2.9 cm2    Aortic Root 3.1 cm    Ascending Aorta 3.5 cm    IVC Proxmal 1.1 cm    MV E Wave Deceleration Time 132.0 ms    MV A Velocity 0.90 m/s    MV E Velocity 0.35 m/s    MV Mean Gradient 1 mmHg    MV VTI 17.5 cm    MV Mean Velocity 0.5 m/s    MV Max Velocity 1.0 m/s    MV Peak Gradient 4 mmHg    MV Area by PHT 5.6 cm2    MV Area by VTI 2.9 cm2    PA Max Velocity 1.1 m/s    Pulmonary Artery EDP 4 mmHg    PV AT 88.0 ms    PV Max Velocity 0.8 m/s    PV Peak Gradient 2 mmHg    RV Basal Dimension 3.7 cm    RV Free Wall Peak S' 9 cm/s    TAPSE 1.6 (A) 1.7 cm    TR Max Velocity 2.72 m/s    TR Peak Gradient 30 mmHg   PLEASE READ & DOCUMENT PPD TEST IN 24 HRS    Collection Time: 02/16/23  2:15 PM   Result Value Ref Range    PPD, (POC) Negative Negative    mm Induration 0 0 - 0 mm       I have personally reviewed imaging studies:  Other Studies:  MRI LUMBAR SPINE WO CONTRAST   Final Result   1. No acute musculoskeletal abnormality or compression fracture. 2. Multilevel degenerative disc and facet disease with a prominent left   paracentral/posterior lateral disc protrusion at L3-L4. This does not result in   central stenosis. There is mild left foramina narrowing. 3. Moderate bilateral foramina narrowing at L4-L5 with mild bilateral foramina   narrowing at L5-S1. MRI THORACIC SPINE WO CONTRAST   Final Result   1. Status post kyphoplasty at T9 and T10 without obvious complication.    2. 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 3. No central spinal stenosis or cord compression. 4. Bilateral pleural effusions. CT ABDOMEN PELVIS WO CONTRAST Additional Contrast? Radiologist Recommendation   Final Result         1. 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 No evidence of   obstructive uropathy. Bilateral renal calcifications noted. 2. Bilateral pleural effusions with bibasilar atelectasis present. New when   compared with the prior exam.   3. Diverticulosis. No CT evidence of diverticulitis. XR CHEST PORTABLE   Final Result      1. No acute cardiopulmonary process identified. This exam was interpreted by a board-certified, body-imaging fellowship trained    radiologist from [address] [place] [address]. If there are any questions regarding    this examination please feel free to contact a radiologist at [phone].       [address]    2/14/2023 9:00:00 PM          Current Meds:  Current Facility-Administered Medications   Medication Dose Route Frequency    rosuvastatin (CRESTOR) tablet 20 mg  20 mg Oral Nightly    guaiFENesin (MUCINEX) extended release tablet 600 mg  600 mg Oral BID    aspirin EC tablet 81 mg  81 mg Oral Daily    pantoprazole (PROTONIX) tablet 40 mg  40 mg Oral Daily    carvedilol (COREG) tablet 3.125 mg  3.125 mg Oral BID with meals    clopidogrel (PLAVIX) tablet 75 mg  75 mg Oral Daily    isosorbide mononitrate (IMDUR) extended release tablet 60 mg  60 mg Oral Daily    tiZANidine (ZANAFLEX) tablet 2 mg  2 mg Oral Q8H PRN    lidocaine 4 % external patch 1 patch  1 patch TransDERmal Daily    [Held by provider] losartan (COZAAR) tablet 25 mg  25 mg Oral Daily    [Held by provider] spironolactone (ALDACTONE) tablet 25 mg  25 mg Oral Daily    sodium chloride flush 0.9 % injection 5-40 mL  5-40 mL IntraVENous 2 times per day    sodium chloride flush 0.9 % injection 5-40 mL  5-40 mL IntraVENous PRN    0.9 % sodium chloride infusion   IntraVENous PRN    ondansetron (ZOFRAN-ODT) disintegrating tablet 4 mg  4 mg Oral Q8H PRN    Or    ondansetron (ZOFRAN) injection 4 mg  4 mg IntraVENous Q6H PRN    polyethylene glycol (GLYCOLAX) packet 17 g  17 g Oral Daily PRN    acetaminophen (TYLENOL) tablet 650 mg  650 mg Oral Q6H PRN    Or    acetaminophen (TYLENOL) suppository 650 mg  650 mg Rectal Q6H PRN    oxyCODONE-acetaminophen (PERCOCET)  MG per tablet 1 tablet  1 tablet Oral Q4H PRN    morphine injection 2 mg  2 mg IntraVENous Q4H PRN    ketorolac (TORADOL) injection 15 mg  15 mg IntraVENous Q6H PRN    melatonin tablet 3 mg  3 mg Oral Once       Signed:  Endy Morocho MD    Part of this note may have been written by using a voice dictation software. The note has been proof read but may still contain some grammatical/other typographical errors.

## 2023-02-16 NOTE — PROGRESS NOTES
Physician Progress Note      Tj Gaviria  CSN #:                  [id number]  :                       1931  ADMIT DATE:       2023 7:07 PM  [address] [place] DATE:  RESPONDING  PROVIDER #:        Halina Hua MD          QUERY TEXT:    Pt admitted with intractable pain. Pt noted to have osteoporosis and T10 - T9   fracture,   If possible, please document in progress notes and discharge   summary if you are evaluating and/or treating any of the following: The medical record reflects the following:  Risk Factors:89 YOF, Osteoporosis, s/p kyphoplasty on  and   Clinical Indicators: - She has been found to have T10 and T9 fractures, with   subsequent kyphoplasties over the past month, without improvement in pain   HP: - She has been found to have T10 and T9 fractures, with subsequent   kyphoplasties over the past month, without improvement in pain She has been   found to have T10 and T9 fractures, with subsequent kyphoplasties over the   past month, without improvement in pain  Treatment: Monitoring, pain control,    Thank you,  Morales White RN,C BSN  Clinical   [email or web address]  Options provided:  -- Pathological T10 - T9 fracture  -- Osteoporotic T10 - T9 Fracture  -- Other - I will add my own diagnosis  -- Disagree - Not applicable / Not valid  -- Disagree - Clinically unable to determine / Unknown  -- Refer to Clinical Documentation Reviewer    PROVIDER RESPONSE TEXT:    This patient has an osteoporotic T10 - T9 fracture. Query created by:  Kaitlyn Hamilton on 2/15/2023 10:41 AM      Electronically signed by:  Halina Hua MD 2023 8:32 AM

## 2023-02-16 NOTE — CARE COORDINATION
This CM went to pt room to complete assessment. Pt off floor for procedure and pt son, Estelita Fagan, present and able to complete assessment. He verified pt's PCP, insurance, emergency contact, and home address. He reports no difficulty obtaining her medications in the community. Pt lives at home alone with 5-6 steps to enter and a flight of stairs to go to the basement. Pt's home DME includes a rollator. Prior to admission at baseline pt has been completely independent with her ADLs until recently. Pt son assisting as needed with ADLs. We discussed discharge planning this day. Recommendation from therapy is that pt would benefit from post acute STR - he understands. SNF list provided this day and requested they chose 2-3 facilities to pick from. SNF list reviewed and they selected [facility], [facility], and [place] [address]. This CM to make referrals this day. No additional CM needs at this time. Will continue to monitor and update as needed. 02/14/23 2000   Service Assessment   Patient Orientation Other (see comment)  (assessment complete with pt son)   Cognition Other (see comment)  (assessment complete with pt son)   History Provided By Child/Family   Primary [place] [address] is: Legal Next of Rene 69   PCP Verified by CM Yes   Last Visit to PCP Within last 3 months   Prior Functional Level Independent in ADLs/IADLs   Current Functional Level Other (see comment)  (TBD by clinical team)   Can patient return to prior living arrangement Unknown at present   Ability to make needs known: Good   Family able to assist with home care needs: Yes   Would you like for me to discuss the discharge plan with any other family members/significant others, and if so, who?  Yes   Discharge Planning   Type of [address] [place] [address] Prior To Admission None   Potential Assistance Needed N/A Type of Home Care Services None   Patient expects to be discharged to: House   Condition of Participation: Discharge Planning   The Plan for Transition of Care is related to the following treatment goals: pending clinical progress

## 2023-02-16 NOTE — PLAN OF CARE
Problem: Discharge Planning  Goal: Discharge to home or other facility with appropriate resources  Outcome: Progressing  Flowsheets  Taken 2/16/2023 1530  Discharge to home or other facility with appropriate resources:   Identify barriers to discharge with patient and caregiver   Refer to discharge planning if patient needs post-hospital services based on physician order or complex needs related to functional status, cognitive ability or social support system  Taken 2/16/2023 0800  Discharge to home or other facility with appropriate resources:   Identify barriers to discharge with patient and caregiver   Refer to discharge planning if patient needs post-hospital services based on physician order or complex needs related to functional status, cognitive ability or social support system     Problem: Chronic Conditions and Co-morbidities  Goal: Patient's chronic conditions and co-morbidity symptoms are monitored and maintained or improved  Outcome: Progressing  Flowsheets  Taken 2/16/2023 [address] [facility] - Patient's Chronic Conditions and Co-Morbidity Symptoms are Monitored and Maintained or Improved: Monitor and assess patient's chronic conditions and comorbid symptoms for stability, deterioration, or improvement  Taken 2/16/2023 0800  Care Plan - Patient's Chronic Conditions and Co-Morbidity Symptoms are Monitored and Maintained or Improved: Monitor and assess patient's chronic conditions and comorbid symptoms for stability, deterioration, or improvement     Problem: Safety - Adult  Goal: Free from fall injury  Outcome: Progressing  Flowsheets (Taken 2/16/2023 0800)  Free From Fall Injury: Instruct family/caregiver on patient safety     Problem: ABCDS Injury Assessment  Goal: Absence of physical injury  Outcome: Progressing  Flowsheets (Taken 2/16/2023 0800)  Absence of Physical Injury: Implement safety measures based on patient assessment     Problem: Pain  Goal: Verbalizes/displays adequate comfort level or baseline comfort level  Outcome: Not Progressing

## 2023-02-16 NOTE — PROGRESS NOTES
Physical Therapy Note:    Attempted to see patient this PM for physical therapy treatment  session. Ortho consulted regarding results of MRI.  Will hold PT pending ortho consult.. Will follow and re-attempt as schedule permits/patient available. Thank you,    KATE Arias, PT     Rehab Caseload Tracker

## 2023-02-17 ENCOUNTER — PREP FOR PROCEDURE (OUTPATIENT)
Dept: NEUROSURGERY | Age: 88
End: 2023-02-17

## 2023-02-17 LAB
ANION GAP SERPL CALC-SCNC: 5 MMOL/L (ref 2–11)
BASOPHILS # BLD: 0.1 K/UL (ref 0–0.2)
BASOPHILS NFR BLD: 1 % (ref 0–2)
BUN SERPL-MCNC: 14 MG/DL (ref 8–23)
CALCIUM SERPL-MCNC: 10 MG/DL (ref 8.3–10.4)
CHLORIDE SERPL-SCNC: 109 MMOL/L (ref 101–110)
CO2 SERPL-SCNC: 27 MMOL/L (ref 21–32)
CREAT SERPL-MCNC: 0.9 MG/DL (ref 0.6–1)
DIFFERENTIAL METHOD BLD: ABNORMAL
EOSINOPHIL # BLD: 0.3 K/UL (ref 0–0.8)
EOSINOPHIL NFR BLD: 5 % (ref 0.5–7.8)
ERYTHROCYTE [DISTWIDTH] IN BLOOD BY AUTOMATED COUNT: 11.8 % (ref 11.9–14.6)
GLUCOSE BLD STRIP.AUTO-MCNC: 134 MG/DL (ref 65–100)
GLUCOSE SERPL-MCNC: 95 MG/DL (ref 65–100)
HCT VFR BLD AUTO: 40.1 % (ref 35.8–46.3)
HGB BLD-MCNC: 13.2 G/DL (ref 11.7–15.4)
IMM GRANULOCYTES # BLD AUTO: 0 K/UL (ref 0–0.5)
IMM GRANULOCYTES NFR BLD AUTO: 0 % (ref 0–5)
LYMPHOCYTES # BLD: 1.6 K/UL (ref 0.5–4.6)
LYMPHOCYTES NFR BLD: 24 % (ref 13–44)
MCH RBC QN AUTO: 29 PG (ref 26.1–32.9)
MCHC RBC AUTO-ENTMCNC: 32.9 G/DL (ref 31.4–35)
MCV RBC AUTO: 88.1 FL (ref 82–102)
MM INDURATION, POC: 0 MM (ref 0–5)
MONOCYTES # BLD: 0.6 K/UL (ref 0.1–1.3)
MONOCYTES NFR BLD: 9 % (ref 4–12)
NEUTS SEG # BLD: 4 K/UL (ref 1.7–8.2)
NEUTS SEG NFR BLD: 61 % (ref 43–78)
NRBC # BLD: 0 K/UL (ref 0–0.2)
PLATELET # BLD AUTO: 249 K/UL (ref 150–450)
PMV BLD AUTO: 10.4 FL (ref 9.4–12.3)
POTASSIUM SERPL-SCNC: 3.7 MMOL/L (ref 3.5–5.1)
PPD, POC: NEGATIVE
RBC # BLD AUTO: 4.55 M/UL (ref 4.05–5.2)
SERVICE CMNT-IMP: ABNORMAL
SODIUM SERPL-SCNC: 141 MMOL/L (ref 133–143)
WBC # BLD AUTO: 6.5 K/UL (ref 4.3–11.1)

## 2023-02-17 PROCEDURE — 1100000000 HC RM PRIVATE

## 2023-02-17 PROCEDURE — 36415 COLL VENOUS BLD VENIPUNCTURE: CPT

## 2023-02-17 PROCEDURE — 80048 BASIC METABOLIC PNL TOTAL CA: CPT

## 2023-02-17 PROCEDURE — 2580000003 HC RX 258: Performed by: FAMILY MEDICINE

## 2023-02-17 PROCEDURE — 85025 COMPLETE CBC W/AUTO DIFF WBC: CPT

## 2023-02-17 PROCEDURE — 6370000000 HC RX 637 (ALT 250 FOR IP): Performed by: FAMILY MEDICINE

## 2023-02-17 PROCEDURE — 82962 GLUCOSE BLOOD TEST: CPT

## 2023-02-17 RX ORDER — LACTULOSE 10 G/15ML
30 SOLUTION ORAL ONCE
Status: COMPLETED | OUTPATIENT
Start: 2023-02-17 | End: 2023-02-17

## 2023-02-17 RX ORDER — DOCUSATE SODIUM 100 MG/1
100 CAPSULE, LIQUID FILLED ORAL DAILY
Status: DISCONTINUED | OUTPATIENT
Start: 2023-02-17 | End: 2023-03-03 | Stop reason: HOSPADM

## 2023-02-17 RX ADMIN — ISOSORBIDE MONONITRATE 60 MG: 60 TABLET, EXTENDED RELEASE ORAL at 08:56

## 2023-02-17 RX ADMIN — LACTULOSE 30 G: 20 SOLUTION ORAL at 16:45

## 2023-02-17 RX ADMIN — POLYETHYLENE GLYCOL 3350 17 G: 17 POWDER, FOR SOLUTION ORAL at 08:56

## 2023-02-17 RX ADMIN — OXYCODONE AND ACETAMINOPHEN 1 TABLET: 10; 325 TABLET ORAL at 15:25

## 2023-02-17 RX ADMIN — GUAIFENESIN 600 MG: 600 TABLET ORAL at 21:46

## 2023-02-17 RX ADMIN — ROSUVASTATIN CALCIUM 20 MG: 20 TABLET, COATED ORAL at 21:46

## 2023-02-17 RX ADMIN — PANTOPRAZOLE SODIUM 40 MG: 40 TABLET, DELAYED RELEASE ORAL at 06:21

## 2023-02-17 RX ADMIN — CARVEDILOL 3.12 MG: 3.12 TABLET, FILM COATED ORAL at 08:56

## 2023-02-17 RX ADMIN — DOCUSATE SODIUM 100 MG: 100 CAPSULE, LIQUID FILLED ORAL at 16:45

## 2023-02-17 RX ADMIN — SODIUM CHLORIDE, PRESERVATIVE FREE 10 ML: 5 INJECTION INTRAVENOUS at 21:48

## 2023-02-17 RX ADMIN — CARVEDILOL 3.12 MG: 3.12 TABLET, FILM COATED ORAL at 16:45

## 2023-02-17 RX ADMIN — GUAIFENESIN 600 MG: 600 TABLET ORAL at 08:56

## 2023-02-17 RX ADMIN — SODIUM CHLORIDE, PRESERVATIVE FREE 10 ML: 5 INJECTION INTRAVENOUS at 08:59

## 2023-02-17 ASSESSMENT — PAIN DESCRIPTION - DESCRIPTORS: DESCRIPTORS: ACHING

## 2023-02-17 ASSESSMENT — PAIN DESCRIPTION - ORIENTATION: ORIENTATION: POSTERIOR

## 2023-02-17 ASSESSMENT — PAIN SCALES - GENERAL: PAINLEVEL_OUTOF10: 6

## 2023-02-17 ASSESSMENT — PAIN DESCRIPTION - LOCATION: LOCATION: BACK

## 2023-02-17 NOTE — PROGRESS NOTES
Physical Therapy Note:    Attempted to see patient this PM for physical therapy treatment  session. Patient refused stating movement is just too painful. Will follow and re-attempt as schedule permits/patient available. Thank you,  Addendum:  per ortho \"T10 kyphoplasty on Tuesday.  Pt is also to wear a TLSO when UOOB\"    KATE Browne, PT     Rehab Caseload Tracker

## 2023-02-17 NOTE — PROGRESS NOTES
END OF SHIFT NOTE:    INTAKE/OUTPUT  02/16 0701 - 02/17 0700  In: 5 [P.O.:420]  Out: 600 [Urine:600]  Voiding: Yes  Catheter: Yes  Drain:   Urinary Catheter 02/15/23 [place] (Active)   $ Urethral catheter insertion $ Not inserted for procedure 02/15/23 1250   Catheter Indications Urinary retention (acute or chronic), continuous bladder irrigation or bladder outlet obstruction 02/16/23 1930   Site Assessment No urethral drainage 02/17/23 0231   Urine Color Yellow 02/17/23 0231   Urine Appearance Clear 02/17/23 0231   Collection Container Standard 02/17/23 0231   Securement Method Securing device (Describe) 02/17/23 0231   Catheter Care  Perineal wipes 02/16/23 1530   Catheter Best Practices  Drainage tube clipped to bed;Catheter secured to thigh; Tamper seal intact; Bag not on floor;Drainage bag less than half full;Lack of dependent loop in tubing;Bag below bladder 02/17/23 0231   Status Draining;Patent 02/17/23 0231   Output (mL) 200 mL 02/17/23 0231               Flatus: Patient does have flatus present. Stool: 0 occurrences. Characteristics:           Stool Assessment  Last BM (including prior to admit): 02/08/23    Emesis: 0 occurrences. Characteristics:        VITAL SIGNS  Patient Vitals for the past 12 hrs:   Temp Pulse Resp BP SpO2   02/17/23 0344 97.9 °F (36.6 °C) 63 16 133/69 92 %   02/16/23 2335 98.1 °F (36.7 °C) 56 16 124/64 94 %   02/16/23 1942 98.1 °F (36.7 °C) 70 18 102/87 92 %       Pain Assessment  Pain Level: 6 (02/16/23 2120)  Pain Location: Back  Patient's Stated Pain Goal: 0 - No pain    Ambulating  No    Shift report given to oncoming nurse at the bedside.     Ranjit Lindsay RN

## 2023-02-17 NOTE — CONSULTS
NEUROSURGERY CONSULT NOTE:     CC: T10 compression fracture    Assessment and Plan:   -hold all antiplatelets and anticoagulants including plavix  -T10 kyphoplasty and bone biopsy scheduled for Tuesday  -discussed T10 kyphoplasty with son and agreed to procedure  -TLSO when UOOB    HPI:   Mara Erickson y.o. female with a PMH of T8 and T9 kyphoplasty, CAD, HTN, and osteoporosis who presents with a T10 compression fracture. Pt states that in early January she has intense pain in back. Pt then required a T8 kyphoplasty on January 30 and a T9 kyphoplasty February 6. Pt states she has had no symptom improvement with either of these procedures. Pt states the pain in her back is still horrible. Pt states she was told to go to pain management to help with her back pain but she wants the cause of her pain to be fixed. A thoracic MRI reveals a mild T10 compression fracture. Discussed treatment options with the patient and and her son and they have agreed to the T10 kyphoplasty on Tuesday. Pt is also to wear a TLSO when UOOB. Pt will need to stop taking her plavix in order to have procedure. Past Medical History:   Diagnosis Date    CAD (coronary artery disease)     2 MI    Calculus of kidney     Closed nondisplaced fracture of fifth left metatarsal bone 5/18/2021    Congestive heart failure (HCC)     Coronary artery disease involving coronary bypass graft of native heart without angina pectoris 12/14/2015    CVD (cerebrovascular disease) 8/19/2013    Dyslipidemia 8/19/2013    GERD (gastroesophageal reflux disease)     HTN (hypertension) 8/19/2013    Left breast mass 4/13/2017    Diagnostic Mammogram negative.     Long term current use of anticoagulant therapy     Myocardial infarction [facility]) 2005, 2006    [facility], Dr. Haney Livingston    Osteoporosis 8/19/2013    Positive H. pylori test 8/19/2013    Rectal polyp 10/9/2017    Anoscopy:  Revealed large cauliflower-like polyp at the anterior midline just above the dentate line, mobile soft, to have removed with Dr. Tammy Shahid ([facility]), benign. No more fecal leakage. 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      hysterectomy    HYSTERECTOMY, TOTAL ABDOMINAL (CERVIX REMOVED)  1972    IR KYPHOPLASTY THORACIC FIRST LEVEL  1/30/2023    IR KYPHOPLASTY THORACIC FIRST LEVEL 1/30/2023 [facility] RADIOLOGY SPECIALS    IR KYPHOPLASTY THORACIC FIRST LEVEL  2/9/2023    IR KYPHOPLASTY THORACIC FIRST LEVEL 2/9/2023 [facility] RADIOLOGY SPECIALS    LITHOTRIPSY  2008 x 2    ORTHOPEDIC SURGERY      2 rotater cuff right shoulder    MS UNLISTED PROCEDURE CARDIAC SURGERY      by pass    UROLOGICAL SURGERY      suspension     Allergies   Allergen Reactions    Amlodipine Other (See Comments)     Other reaction(s): Unknown-Unspecified    Quinapril Other (See Comments)     Other reaction(s): Cough-Intolerance    Sertraline Other (See Comments)     Other reaction(s): Drowsiness    Prednisone Itching, Palpitations and Other (See Comments)     Per pt also had heart burn     Social History     Socioeconomic History    Marital status:       Spouse name: Not on file    Number of children: Not on file    Years of education: Not on file    Highest education level: Not on file   Occupational History    Not on file   Tobacco Use    Smoking status: Never    Smokeless tobacco: Never   Vaping Use    Vaping Use: Never used   Substance and Sexual Activity    Alcohol use: No    Drug use: No    Sexual activity: Not on file   Other Topics Concern    Not on file   Social History Narrative    Not on file     Social Determinants of Health     Financial Resource Strain: High Risk    Difficulty of Paying Living Expenses: Very hard   Food Insecurity: No Food Insecurity    Worried About Running Out of Food in the Last Year: Never true    Ran Out of Food in the Last Year: Never true   Transportation Needs: Unknown    Lack of Transportation (Medical): Not on file    Lack of Transportation (Non-Medical): No   Physical Activity: Not on file   Stress: Not on file   Social Connections: Not on file   Intimate Partner Violence: Not on file   Housing Stability: Unknown    Unable to Pay for Housing in the Last Year: Not on file    Number of Places Lived in the Last Year: Not on file    Unstable Housing in the Last Year: No         Physical Exam:   /69   Pulse 63   Temp 97.9 °F (36.6 °C)   Resp 16   Ht 5' 6\" (1.676 m)   Wt 116 lb (52.6 kg)   SpO2 92%   BMI 18.72 kg/m²           ROS Review of Systems    Constitutional:                    No recent weight changes, fever, fatigue, sleep difficulties, loss of appetite   ENT/Mouth:  POShearing loss, POSringing in the ears, chronic sinus problem, nose bleeds,sore throat, voice change, hoarseness, swollen glands in neck, or difficulties with chewing and swallowing. Cardiovascular:  No chest pain/angina pectoris, palpitations, swelling of feet/ankles/hands, or calf pain while walking. Respiratory: No chronic or frequent coughs, spitting up blood, shortness of breath, POSasthma, or wheezing. Gastrointestinal: No a bdominal pain, heartburn, nausea, vomiting, constipation, or frequent diarrhea     Genitourinary:  frequent urination, burning or painful urination, or blood in urine     Musculoskeletal:   Lower thoracic back pain     Integument:   No rash/itching     Neurological:   dizziness/vertigo,POS numbness/tingling sensation, tremors, POSweakness in limbs, frequent or recurring headaches, memory loss or confusion.        Neuro Assessment:    ARVIND  Alert and Oriented x4   GCS15    Speech clear  Reflexes 2+ equal and bilateral  Cranial nerves I-VII grossly intact  Sensation equal bilaterally  Bowel and bladder control intact  Upper extremities: shoulders 5/5 bilateral, biceps 5/5 bilateral, triceps 5/5, hand intrinsics 5/5 bilateral  Lower extremities: HF 5/5 bilateral, knees 5/5 bilateral, hamsting 5/5 bilateral, DF 5/5 bilateral, PF 5/5 bilateral    55 min  Toma Pilling, APRN - CNP

## 2023-02-17 NOTE — PROGRESS NOTES
Hospitalist Progress Note   Admit Date:  2023  7:07 PM   Name:  Dilan Gaffney   Age:  80 y.o. Sex:  female  :  1931   MRN:  [id number]   Room:      Presenting Complaint: No chief complaint on file. Reason(s) for Admission: Intractable pain [R52]     Hospital Course:   Dilan Gaffney is a 80 y.o. female with medical history of Dilan Gaffney is a 80 y.o. female with medical history of HTN, CAD, CHF, CKD who presents as a direct admit from home with intractable back/flank pain. Over 2 months period, she has been diagnosed with T10, then T9 fractures, and subsequently underwent kyphoplasties on  and , respectively. 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 Neurosurgery consulted and plan for kyphoplasty and bone biopsy on . Recommend TLSO when UOOB. Subjective & 24hr Events (23): Patient is seen at the bedside. Initially does not want TLSO and tearful due to pain. After talking to the son agreed to get it fitted. 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 Recommend TLSO when UOOB.   Hold antiplatelet and anticoagulation for possible procedure on     History of T9/T10 compression fracture status post kyphoplasty:  Noted  Pain control    Hypertension/hyperlipidemia:  CAD:  Chronic systolic heart failure:  Not in exacerbation  Continue home meds: carvedilol, Imdur and rosuvastatin  Holding aspirin and Plavix for procedure on 2/21    CKD stage III:  Kidney function stable  Avoid nephrotoxic agent    PT/OT evals and PPD needed/ordered? Yes    Diet:  ADULT DIET; Regular  VTE prophylaxis: SCD's   Code status: Full Code    Hospital Problems:  Principal Problem:    Intractable pain  Active Problems:    Chronic renal disease, stage III ([facility]) [438535]    Chronic systolic (congestive) heart failure    Compression fracture of body of thoracic vertebra ([facility])    S/P kyphoplasty    CAD (coronary artery disease)    HTN (hypertension)  Resolved Problems:    * No resolved hospital problems. *      Objective:   Patient Vitals for the past 24 hrs:   Temp Pulse Resp BP SpO2   02/17/23 1108 97.7 °F (36.5 °C) 65 18 (!) 126/58 92 %   02/17/23 0740 98.7 °F (37.1 °C) 71 18 (!) 154/84 93 %   02/17/23 0344 97.9 °F (36.6 °C) 63 16 133/69 92 %   02/16/23 2335 98.1 °F (36.7 °C) 56 16 124/64 94 %   02/16/23 1942 98.1 °F (36.7 °C) 70 18 102/87 92 %   02/16/23 1611 97.9 °F (36.6 °C) 69 18 (!) 129/59 91 %         Oxygen Therapy  SpO2: 92 %  O2 Device: None (Room air)    Estimated body mass index is 18.72 kg/m² as calculated from the following:    Height as of this encounter: 5' 6\" (1.676 m). Weight as of this encounter: 116 lb (52.6 kg). Intake/Output Summary (Last 24 hours) at 2/17/2023 1449  Last data filed at 2/17/2023 1108  Gross per 24 hour   Intake 300 ml   Output 600 ml   Net -300 ml           Physical Exam:     Blood pressure (!) 126/58, pulse 65, temperature 97.7 °F (36.5 °C), temperature source Oral, resp. rate 18, height 5' 6\" (1.676 m), weight 116 lb (52.6 kg), SpO2 92 %. General:    Well nourished. Careful due to pain  Head:  Normocephalic, atraumatic  Eyes:  Sclerae appear normal.  Pupils equally round.   ENT:  Nares appear normal.  Moist oral mucosa  Neck:  No restricted ROM. Trachea midline   CV:   RRR. No m/r/g. No jugular venous distension. Lungs:   CTAB. No wheezing, rhonchi, or rales. Symmetric expansion. Abdomen:   Soft, nontender, nondistended. Bilateral flank tenderness  Extremities: No cyanosis or clubbing. No edema  Skin:     No rashes and normal coloration. Warm and dry. Neuro:  CN II-XII grossly intact. Psych:  Normal mood and affect.       I have personally reviewed labs and tests:  Recent Labs:  Recent Results (from the past 48 hour(s))   Urinalysis with Reflex to Culture    Collection Time: 02/15/23  6:07 PM    Specimen: Urine   Result Value Ref Range    Color, UA YELLOW/STRAW      Appearance CLOUDY      Specific [place], UA 1.019 1.001 - 1.023      pH, Urine 6.5 5.0 - 9.0      Protein, UA 30 (A) NEG mg/dL    Glucose, UA Negative mg/dL    Ketones, Urine Negative NEG mg/dL    Bilirubin Urine Negative NEG      Blood, Urine LARGE (A) NEG      Urobilinogen, Urine 1.0 0.2 - 1.0 EU/dL    Nitrite, Urine Negative NEG      Leukocyte Esterase, Urine TRACE (A) NEG      WBC, UA 5-10 0 /hpf    RBC, UA 0 0 /hpf    BACTERIA, URINE 2+ (H) 0 /hpf    Urine Culture if Indicated CULTURE NOT INDICATED BY UA RESULT      Epithelial Cells UA 0-3 0 /hpf    Casts 0 0 /lpf    Crystals OCCASIONAL 0 /LPF    Mucus, UA 0 0 /lpf    Other observations RESULTS VERIFIED MANUALLY     Basic Metabolic Panel w/ Reflex to MG    Collection Time: 02/16/23  6:48 AM   Result Value Ref Range    Sodium 142 133 - 143 mmol/L    Potassium 3.7 3.5 - 5.1 mmol/L    Chloride 110 101 - 110 mmol/L    CO2 26 21 - 32 mmol/L    Anion Gap 6 2 - 11 mmol/L    Glucose 92 65 - 100 mg/dL    BUN 15 8 - 23 MG/DL    Creatinine 0.80 0.6 - 1.0 MG/DL    Est, Glom Filt Rate >60 >60 ml/min/1.73m2    Calcium 9.8 8.3 - 10.4 MG/DL   CBC with Auto Differential    Collection Time: 02/16/23  6:48 AM   Result Value Ref Range    WBC 6.1 4.3 - 11.1 K/uL    RBC 4.48 4.05 - 5.2 M/uL    Hemoglobin 13.1 11.7 - 15.4 g/dL    Hematocrit 42.1 35.8 - 46.3 %    MCV 94.0 82 - 102 FL    MCH 29.2 26.1 - 32.9 PG    MCHC 31.1 (L) 31.4 - 35.0 g/dL    RDW 11.7 (L) 11.9 - 14.6 %    Platelets 252 701 - 425 K/uL    MPV 9.8 9.4 - 12.3 FL    nRBC 0.00 0.0 - 0.2 K/uL    Differential Type AUTOMATED      Seg Neutrophils 51 43 - 78 %    Lymphocytes 31 13 - 44 %    Monocytes 11 4.0 - 12.0 %    Eosinophils % 5 0.5 - 7.8 %    Basophils 1 0.0 - 2.0 %    Immature Granulocytes 0 0.0 - 5.0 %    Segs Absolute 3.1 1.7 - 8.2 K/UL    Absolute Lymph # 1.9 0.5 - 4.6 K/UL    Absolute Mono # 0.7 0.1 - 1.3 K/UL    Absolute Eos # 0.3 0.0 - 0.8 K/UL    Basophils Absolute 0.1 0.0 - 0.2 K/UL    Absolute Immature Granulocyte 0.0 0.0 - 0.5 K/UL   PLEASE READ & DOCUMENT PPD TEST IN 24 HRS    Collection Time: 02/16/23  2:15 PM   Result Value Ref Range    PPD, (POC) Negative Negative    mm Induration 0 0 - 0 mm   Transthoracic echocardiogram (TTE) complete with contrast, bubble, strain, and 3D PRN    Collection Time: 02/16/23  3:20 PM   Result Value Ref Range    LV EDV A2C 112 mL    LV EDV A4C 88 mL    LV ESV A2C 64 mL    LV ESV A4C 47 mL    IVSd 1.1 (A) 0.6 - 0.9 cm    LVIDd 4.0 3.9 - 5.3 cm    LVIDs 3.2 cm    LVOT Diameter 2.1 cm    LVOT Mean Gradient 1 mmHg    LVOT VTI 14.8 cm    LVOT Peak Velocity 0.9 m/s    LVOT Peak Gradient 3 mmHg    LVPWd 1.0 (A) 0.6 - 0.9 cm    LV E' Lateral Velocity 8 cm/s    LV E' Septal Velocity 7 cm/s    LV Ejection Fraction A2C 44 %    LV Ejection Fraction A4C 47 %    EF BP 45 (A) 55 - 100 %    LVOT Area 3.5 cm2    LVOT SV 51.2 ml    LA Minor Axis 4.7 cm    LA Major [place] 4.7 cm    LA Area 2C 14.6 cm2    LA Area 4C 16.1 cm2    LA Volume 2C 38 22 - 52 mL    LA Volume 4C 43 22 - 52 mL    LA Volume BP 40 22 - 52 mL    AV Mean Velocity 0.7 m/s    AV Mean Gradient 2 mmHg    AV VTI 16.5 cm    AV Peak Velocity 1.0 m/s    AV Peak Gradient 4 mmHg    AV Area by VTI 3.1 cm2    AV Area by Peak Velocity 2.9 cm2    Aortic Root 3.1 cm Ascending Aorta 3.5 cm    IVC Proxmal 1.1 cm    MV E Wave Deceleration Time 132.0 ms    MV A Velocity 0.90 m/s    MV E Velocity 0.35 m/s    MV Mean Gradient 1 mmHg    MV VTI 17.5 cm    MV Mean Velocity 0.5 m/s    MV Max Velocity 1.0 m/s    MV Peak Gradient 4 mmHg    MV Area by VTI 2.9 cm2    GA Max Velocity 1.1 m/s    Pulmonary Artery EDP 4 mmHg    PV AT 88.0 ms    PV Max Velocity 0.8 m/s    PV Peak Gradient 2 mmHg    RV Basal Dimension 3.7 cm    RV Free Wall Peak S' 9 cm/s    TAPSE 1.6 (A) 1.7 cm    TR Max Velocity 2.72 m/s    TR Peak Gradient 30 mmHg    Body Surface Area 1.57 m2    Fractional Shortening 2D 20 28 - 44 %    LV ESV Index A4C 30 mL/m2    LV EDV Index A4C 55 mL/m2    LV ESV Index A2C 40 mL/m2    LV EDV Index A2C 70 mL/m2    LVIDd Index 2.52 cm/m2    LVIDs Index 2.01 cm/m2    LV RWT Ratio 0.50     LV Mass 2D 136.2 67 - 162 g    LV Mass 2D Index 85.7 43 - 95 g/m2    MV E/A 0.39     E/E' Ratio (Averaged) 4.69     E/E' Lateral 4.38     E/E' Septal 5.00     LA Volume Index BP 25 16 - 34 ml/m2    LVOT Stroke Volume Index 32.2 mL/m2    LA Volume Index 2C 24 16 - 34 mL/m2    LA Volume Index 4C 27 16 - 34 mL/m2    Ao Root Index 1.95 cm/m2    Ascending Aorta Index 2.20 cm/m2    AV Velocity Ratio 0.90     LVOT:AV VTI Index 0.90     RONA/BSA VTI 1.9 cm2/m2    RONA/BSA Peak Velocity 1.8 cm2/m2    MV:LVOT VTI Index 1.18     Est. RA Pressure 3 mmHg    RVSP 33 mmHg    Left Ventricular Ejection Fraction 43     LVEF MODALITY ECHO    Basic Metabolic Panel w/ Reflex to MG    Collection Time: 02/17/23  8:20 AM   Result Value Ref Range    Sodium 141 133 - 143 mmol/L    Potassium 3.7 3.5 - 5.1 mmol/L    Chloride 109 101 - 110 mmol/L    CO2 27 21 - 32 mmol/L    Anion Gap 5 2 - 11 mmol/L    Glucose 95 65 - 100 mg/dL    BUN 14 8 - 23 MG/DL    Creatinine 0.90 0.6 - 1.0 MG/DL    Est, Glom Filt Rate >60 >60 ml/min/1.73m2    Calcium 10.0 8.3 - 10.4 MG/DL   CBC with Auto Differential    Collection Time: 02/17/23  8:20 AM   Result Value Ref Range    WBC 6.5 4.3 - 11.1 K/uL    RBC 4.55 4.05 - 5.2 M/uL    Hemoglobin 13.2 11.7 - 15.4 g/dL    Hematocrit 40.1 35.8 - 46.3 %    MCV 88.1 82 - 102 FL    MCH 29.0 26.1 - 32.9 PG    MCHC 32.9 31.4 - 35.0 g/dL    RDW 11.8 (L) 11.9 - 14.6 %    Platelets 301 654 - 790 K/uL    MPV 10.4 9.4 - 12.3 FL    nRBC 0.00 0.0 - 0.2 K/uL    Differential Type AUTOMATED      Seg Neutrophils 61 43 - 78 %    Lymphocytes 24 13 - 44 %    Monocytes 9 4.0 - 12.0 %    Eosinophils % 5 0.5 - 7.8 %    Basophils 1 0.0 - 2.0 %    Immature Granulocytes 0 0.0 - 5.0 %    Segs Absolute 4.0 1.7 - 8.2 K/UL    Absolute Lymph # 1.6 0.5 - 4.6 K/UL    Absolute Mono # 0.6 0.1 - 1.3 K/UL    Absolute Eos # 0.3 0.0 - 0.8 K/UL    Basophils Absolute 0.1 0.0 - 0.2 K/UL    Absolute Immature Granulocyte 0.0 0.0 - 0.5 K/UL       I have personally reviewed imaging studies:  Other Studies:  MRI LUMBAR SPINE WO CONTRAST   Final Result   1. No acute musculoskeletal abnormality or compression fracture. 2. Multilevel degenerative disc and facet disease with a prominent left   paracentral/posterior lateral disc protrusion at L3-L4. This does not result in   central stenosis. There is mild left foramina narrowing. 3. Moderate bilateral foramina narrowing at L4-L5 with mild bilateral foramina   narrowing at L5-S1. MRI THORACIC SPINE WO CONTRAST   Final Result   1. Status post kyphoplasty at T9 and T10 without obvious complication. 2. 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 3. No central spinal stenosis or cord compression. 4. Bilateral pleural effusions. CT ABDOMEN PELVIS WO CONTRAST Additional Contrast? Radiologist Recommendation   Final Result         1. 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 No evidence of   obstructive uropathy. Bilateral renal calcifications noted.    2. Bilateral pleural effusions with bibasilar atelectasis present. New when   compared with the prior exam.   3. Diverticulosis. No CT evidence of diverticulitis. XR CHEST PORTABLE   Final Result      1. No acute cardiopulmonary process identified. This exam was interpreted by a board-certified, body-imaging fellowship trained    radiologist from [address] [place] [address]. If there are any questions regarding    this examination please feel free to contact a radiologist at [phone].       [address]    2/14/2023 9:00:00 PM          Current Meds:  Current Facility-Administered Medications   Medication Dose Route Frequency    rosuvastatin (CRESTOR) tablet 20 mg  20 mg Oral Nightly    guaiFENesin (MUCINEX) extended release tablet 600 mg  600 mg Oral BID    [Held by provider] aspirin EC tablet 81 mg  81 mg Oral Daily    pantoprazole (PROTONIX) tablet 40 mg  40 mg Oral Daily    carvedilol (COREG) tablet 3.125 mg  3.125 mg Oral BID with meals    isosorbide mononitrate (IMDUR) extended release tablet 60 mg  60 mg Oral Daily    tiZANidine (ZANAFLEX) tablet 2 mg  2 mg Oral Q8H PRN    lidocaine 4 % external patch 1 patch  1 patch TransDERmal Daily    [Held by provider] losartan (COZAAR) tablet 25 mg  25 mg Oral Daily    [Held by provider] spironolactone (ALDACTONE) tablet 25 mg  25 mg Oral Daily    sodium chloride flush 0.9 % injection 5-40 mL  5-40 mL IntraVENous 2 times per day    sodium chloride flush 0.9 % injection 5-40 mL  5-40 mL IntraVENous PRN    0.9 % sodium chloride infusion   IntraVENous PRN    ondansetron (ZOFRAN-ODT) disintegrating tablet 4 mg  4 mg Oral Q8H PRN    Or    ondansetron (ZOFRAN) injection 4 mg  4 mg IntraVENous Q6H PRN    polyethylene glycol (GLYCOLAX) packet 17 g  17 g Oral Daily PRN    acetaminophen (TYLENOL) tablet 650 mg  650 mg Oral Q6H PRN    Or    acetaminophen (TYLENOL) suppository 650 mg  650 mg Rectal Q6H PRN    oxyCODONE-acetaminophen (PERCOCET)  MG per tablet 1 tablet  1 tablet Oral Q4H PRN    morphine injection 2 mg  2 mg IntraVENous Q4H PRN    ketorolac (TORADOL) injection 15 mg  15 mg IntraVENous Q6H PRN       Signed:  Omid Gilman MD    Part of this note may have been written by using a voice dictation software. The note has been proof read but may still contain some grammatical/other typographical errors.

## 2023-02-17 NOTE — PROGRESS NOTES
Noted urine output 200ml in 7hrs. Nurse updated MD of findings. No IVF currently infusing and patient admits to poor fluid intake. Per Dr. Jesus Damon  Per chart this patient Hx of CHF I left a message for day shift hospitalist to follow up. Patient made aware of MD response and nurse encouraged increase in PO fluid intake.

## 2023-02-17 NOTE — CARE COORDINATION
This CM met with pt and son this day to discuss discharge planning. Pt with scheduled procedure tentatively Tuesday 2/21/23. We discussed the recommendation that pt would benefit from STR at discharge - pt remains agreeable with no voiced concerns. Updated pt and son that [address] [place] [address] is able to offer STR as the other 2 facilities are not in network with her insurance - they are agreeable with bed offer and accept. Tentative discharge plan at this time is that pt will transition to [address] [place] [address] for STR pending procedure, therapy progress, and insurance prior authorization. No additional CM needs at this time. Will continue to monitor and update as needed.

## 2023-02-18 PROCEDURE — 2580000003 HC RX 258: Performed by: FAMILY MEDICINE

## 2023-02-18 PROCEDURE — 6370000000 HC RX 637 (ALT 250 FOR IP): Performed by: FAMILY MEDICINE

## 2023-02-18 PROCEDURE — 1100000000 HC RM PRIVATE

## 2023-02-18 PROCEDURE — 6360000002 HC RX W HCPCS: Performed by: FAMILY MEDICINE

## 2023-02-18 RX ADMIN — ACETAMINOPHEN 650 MG: 325 TABLET ORAL at 23:38

## 2023-02-18 RX ADMIN — GUAIFENESIN 600 MG: 600 TABLET ORAL at 08:36

## 2023-02-18 RX ADMIN — SODIUM CHLORIDE, PRESERVATIVE FREE 10 ML: 5 INJECTION INTRAVENOUS at 21:23

## 2023-02-18 RX ADMIN — CARVEDILOL 3.12 MG: 3.12 TABLET, FILM COATED ORAL at 16:46

## 2023-02-18 RX ADMIN — ROSUVASTATIN CALCIUM 20 MG: 20 TABLET, COATED ORAL at 21:22

## 2023-02-18 RX ADMIN — CARVEDILOL 3.12 MG: 3.12 TABLET, FILM COATED ORAL at 08:36

## 2023-02-18 RX ADMIN — DOCUSATE SODIUM 100 MG: 100 CAPSULE, LIQUID FILLED ORAL at 08:36

## 2023-02-18 RX ADMIN — PANTOPRAZOLE SODIUM 40 MG: 40 TABLET, DELAYED RELEASE ORAL at 06:20

## 2023-02-18 RX ADMIN — ONDANSETRON 4 MG: 2 INJECTION INTRAMUSCULAR; INTRAVENOUS at 12:04

## 2023-02-18 RX ADMIN — SODIUM CHLORIDE, PRESERVATIVE FREE 10 ML: 5 INJECTION INTRAVENOUS at 08:40

## 2023-02-18 RX ADMIN — GUAIFENESIN 600 MG: 600 TABLET ORAL at 21:22

## 2023-02-18 RX ADMIN — ISOSORBIDE MONONITRATE 60 MG: 60 TABLET, EXTENDED RELEASE ORAL at 08:36

## 2023-02-18 ASSESSMENT — PAIN SCALES - GENERAL
PAINLEVEL_OUTOF10: 0
PAINLEVEL_OUTOF10: 3

## 2023-02-18 ASSESSMENT — PAIN DESCRIPTION - DESCRIPTORS: DESCRIPTORS: SORE

## 2023-02-18 ASSESSMENT — PAIN - FUNCTIONAL ASSESSMENT: PAIN_FUNCTIONAL_ASSESSMENT: PREVENTS OR INTERFERES SOME ACTIVE ACTIVITIES AND ADLS

## 2023-02-18 ASSESSMENT — PAIN DESCRIPTION - ORIENTATION: ORIENTATION: POSTERIOR

## 2023-02-18 ASSESSMENT — PAIN DESCRIPTION - LOCATION: LOCATION: BACK

## 2023-02-18 NOTE — PROGRESS NOTES
Hospitalist Progress Note   Admit Date:  2023  7:07 PM   Name:  Noy Clifford   Age:  80 y.o. Sex:  female  :  1931   MRN:  [id number]   Room:      Presenting Complaint: No chief complaint on file. Reason(s) for Admission: Intractable pain [R52]     Hospital Course:   Noy Clifford is a 80 y.o. female with medical history of Noy Clifford is a 80 y.o. female with medical history of HTN, CAD, CHF, CKD who presents as a direct admit from home with intractable back/flank pain. Over 2 months period, she has been diagnosed with T10, then T9 fractures, and subsequently underwent kyphoplasties on  and , respectively. 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 Neurosurgery consulted and plan for kyphoplasty and bone biopsy on , after plavix wash out. Recommend TLSO when UOOB. Subjective & 24hr Events (23): Patient is seen at the bedside. Still with pain. TLSO at bedside. No other complaint. 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 Recommend TLSO when UOOB.   Hold antiplatelet and anticoagulation for possible procedure on     History of T9/T10 compression fracture status post kyphoplasty:  Noted  Pain control    Hypertension/hyperlipidemia:  CAD:  Chronic systolic heart failure:  Not in exacerbation  Continue home meds: carvedilol, Imdur and rosuvastatin  Holding aspirin and Plavix for procedure on 2/21    CKD stage III:  Kidney function stable  Avoid nephrotoxic agent    PT/OT evals and PPD needed/ordered? Yes    Diet:  ADULT DIET; Regular  VTE prophylaxis: SCD's   Code status: Full Code    Hospital Problems:  Principal Problem:    Intractable pain  Active Problems:    Chronic renal disease, stage III ([facility]) [096175]    Chronic systolic (congestive) heart failure    Compression fracture of body of thoracic vertebra ([facility])    S/P kyphoplasty    CAD (coronary artery disease)    HTN (hypertension)  Resolved Problems:    * No resolved hospital problems. *      Objective:   Patient Vitals for the past 24 hrs:   Temp Pulse Resp BP SpO2   02/18/23 1110 98.1 °F (36.7 °C) 64 18 (!) 120/56 93 %   02/18/23 0731 -- 70 -- (!) 162/73 92 %   02/18/23 0730 97.7 °F (36.5 °C) 66 18 (!) 176/73 90 %   02/18/23 0256 97.5 °F (36.4 °C) 70 18 (!) 152/72 92 %   02/17/23 2332 97.7 °F (36.5 °C) 66 16 (!) 146/74 95 %   02/17/23 1940 97.5 °F (36.4 °C) 69 18 135/73 93 %   02/17/23 1645 -- 78 -- 104/65 --   02/17/23 1456 98.4 °F (36.9 °C) 59 18 123/64 91 %         Oxygen Therapy  SpO2: 93 %  O2 Device: None (Room air)    Estimated body mass index is 18.72 kg/m² as calculated from the following:    Height as of this encounter: 5' 6\" (1.676 m). Weight as of this encounter: 116 lb (52.6 kg). Intake/Output Summary (Last 24 hours) at 2/18/2023 1420  Last data filed at 2/18/2023 1412  Gross per 24 hour   Intake 360 ml   Output 1900 ml   Net -1540 ml           Physical Exam:     Blood pressure (!) 120/56, pulse 64, temperature 98.1 °F (36.7 °C), temperature source Oral, resp. rate 18, height 5' 6\" (1.676 m), weight 116 lb (52.6 kg), SpO2 93 %. General:    Well nourished.   Careful due to pain  Head:  Normocephalic, atraumatic  Eyes:  Sclerae appear normal.  Pupils equally round. ENT:  Nares appear normal.  Moist oral mucosa  Neck:  No restricted ROM. Trachea midline   CV:   RRR. No m/r/g. No jugular venous distension. Lungs:   CTAB. No wheezing, rhonchi, or rales. Symmetric expansion. Abdomen:   Soft, nontender, nondistended. Bilateral flank tenderness  Extremities: No cyanosis or clubbing. No edema  Skin:     No rashes and normal coloration. Warm and dry. Neuro:  CN II-XII grossly intact. Psych:  Normal mood and affect.       I have personally reviewed labs and tests:  Recent Labs:  Recent Results (from the past 48 hour(s))   Transthoracic echocardiogram (TTE) complete with contrast, bubble, strain, and 3D PRN    Collection Time: 02/16/23  3:20 PM   Result Value Ref Range    LV EDV A2C 112 mL    LV EDV A4C 88 mL    LV ESV A2C 64 mL    LV ESV A4C 47 mL    IVSd 1.1 (A) 0.6 - 0.9 cm    LVIDd 4.0 3.9 - 5.3 cm    LVIDs 3.2 cm    LVOT Diameter 2.1 cm    LVOT Mean Gradient 1 mmHg    LVOT VTI 14.8 cm    LVOT Peak Velocity 0.9 m/s    LVOT Peak Gradient 3 mmHg    LVPWd 1.0 (A) 0.6 - 0.9 cm    LV E' Lateral Velocity 8 cm/s    LV E' Septal Velocity 7 cm/s    LV Ejection Fraction A2C 44 %    LV Ejection Fraction A4C 47 %    EF BP 45 (A) 55 - 100 %    LVOT Area 3.5 cm2    LVOT SV 51.2 ml    LA Minor Axis 4.7 cm    LA Major [place] 4.7 cm    LA Area 2C 14.6 cm2    LA Area 4C 16.1 cm2    LA Volume 2C 38 22 - 52 mL    LA Volume 4C 43 22 - 52 mL    LA Volume BP 40 22 - 52 mL    AV Mean Velocity 0.7 m/s    AV Mean Gradient 2 mmHg    AV VTI 16.5 cm    AV Peak Velocity 1.0 m/s    AV Peak Gradient 4 mmHg    AV Area by VTI 3.1 cm2    AV Area by Peak Velocity 2.9 cm2    Aortic Root 3.1 cm    Ascending Aorta 3.5 cm    IVC Proxmal 1.1 cm    MV E Wave Deceleration Time 132.0 ms    MV A Velocity 0.90 m/s    MV E Velocity 0.35 m/s    MV Mean Gradient 1 mmHg    MV VTI 17.5 cm    MV Mean Velocity 0.5 m/s    MV Max Velocity 1.0 m/s    MV Peak Gradient 4 mmHg    MV Area by VTI 2.9 cm2    ND Max Velocity 1.1 m/s    Pulmonary Artery EDP 4 mmHg    PV AT 88.0 ms    PV Max Velocity 0.8 m/s    PV Peak Gradient 2 mmHg    RV Basal Dimension 3.7 cm    RV Free Wall Peak S' 9 cm/s    TAPSE 1.6 (A) 1.7 cm    TR Max Velocity 2.72 m/s    TR Peak Gradient 30 mmHg    Body Surface Area 1.57 m2    Fractional Shortening 2D 20 28 - 44 %    LV ESV Index A4C 30 mL/m2    LV EDV Index A4C 55 mL/m2    LV ESV Index A2C 40 mL/m2    LV EDV Index A2C 70 mL/m2    LVIDd Index 2.52 cm/m2    LVIDs Index 2.01 cm/m2    LV RWT Ratio 0.50     LV Mass 2D 136.2 67 - 162 g    LV Mass 2D Index 85.7 43 - 95 g/m2    MV E/A 0.39     E/E' Ratio (Averaged) 4.69     E/E' Lateral 4.38     E/E' Septal 5.00     LA Volume Index BP 25 16 - 34 ml/m2    LVOT Stroke Volume Index 32.2 mL/m2    LA Volume Index 2C 24 16 - 34 mL/m2    LA Volume Index 4C 27 16 - 34 mL/m2    Ao Root Index 1.95 cm/m2    Ascending Aorta Index 2.20 cm/m2    AV Velocity Ratio 0.90     LVOT:AV VTI Index 0.90     RONA/BSA VTI 1.9 cm2/m2    RONA/BSA Peak Velocity 1.8 cm2/m2    MV:LVOT VTI Index 1.18     Est. RA Pressure 3 mmHg    RVSP 33 mmHg    Left Ventricular Ejection Fraction 43     LVEF MODALITY ECHO    Basic Metabolic Panel w/ Reflex to MG    Collection Time: 02/17/23  8:20 AM   Result Value Ref Range    Sodium 141 133 - 143 mmol/L    Potassium 3.7 3.5 - 5.1 mmol/L    Chloride 109 101 - 110 mmol/L    CO2 27 21 - 32 mmol/L    Anion Gap 5 2 - 11 mmol/L    Glucose 95 65 - 100 mg/dL    BUN 14 8 - 23 MG/DL    Creatinine 0.90 0.6 - 1.0 MG/DL    Est, Glom Filt Rate >60 >60 ml/min/1.73m2    Calcium 10.0 8.3 - 10.4 MG/DL   CBC with Auto Differential    Collection Time: 02/17/23  8:20 AM   Result Value Ref Range    WBC 6.5 4.3 - 11.1 K/uL    RBC 4.55 4.05 - 5.2 M/uL    Hemoglobin 13.2 11.7 - 15.4 g/dL    Hematocrit 40.1 35.8 - 46.3 %    MCV 88.1 82 - 102 FL    MCH 29.0 26.1 - 32.9 PG    MCHC 32.9 31.4 - 35.0 g/dL    RDW 11.8 (L) 11.9 - 14.6 %    Platelets 751 753 - 995 K/uL    MPV 10.4 9.4 - 12.3 FL    nRBC 0.00 0.0 - 0.2 K/uL    Differential Type AUTOMATED      Seg Neutrophils 61 43 - 78 %    Lymphocytes 24 13 - 44 %    Monocytes 9 4.0 - 12.0 %    Eosinophils % 5 0.5 - 7.8 %    Basophils 1 0.0 - 2.0 %    Immature Granulocytes 0 0.0 - 5.0 %    Segs Absolute 4.0 1.7 - 8.2 K/UL    Absolute Lymph # 1.6 0.5 - 4.6 K/UL    Absolute Mono # 0.6 0.1 - 1.3 K/UL    Absolute Eos # 0.3 0.0 - 0.8 K/UL    Basophils Absolute 0.1 0.0 - 0.2 K/UL    Absolute Immature Granulocyte 0.0 0.0 - 0.5 K/UL   PLEASE READ & DOCUMENT PPD TEST IN 48 HRS    Collection Time: 02/17/23  2:15 PM   Result Value Ref Range    PPD, (POC) Negative Negative    mm Induration 0 0 - 5 mm   POCT Glucose    Collection Time: 02/17/23  9:00 PM   Result Value Ref Range    POC Glucose 134 (H) 65 - 100 mg/dL    Performed by: Diamond Nunes        I have personally reviewed imaging studies:  Other Studies:  MRI LUMBAR SPINE WO CONTRAST   Final Result   1. No acute musculoskeletal abnormality or compression fracture. 2. Multilevel degenerative disc and facet disease with a prominent left   paracentral/posterior lateral disc protrusion at L3-L4. This does not result in   central stenosis. There is mild left foramina narrowing. 3. Moderate bilateral foramina narrowing at L4-L5 with mild bilateral foramina   narrowing at L5-S1. MRI THORACIC SPINE WO CONTRAST   Final Result   1. Status post kyphoplasty at T9 and T10 without obvious complication. 2. 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 3. No central spinal stenosis or cord compression. 4. Bilateral pleural effusions. CT ABDOMEN PELVIS WO CONTRAST Additional Contrast? Radiologist Recommendation   Final Result         1. 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 No evidence of   obstructive uropathy. Bilateral renal calcifications noted. 2. Bilateral pleural effusions with bibasilar atelectasis present. New when   compared with the prior exam.   3. Diverticulosis. No CT evidence of diverticulitis. XR CHEST PORTABLE   Final Result      1. No acute cardiopulmonary process identified. This exam was interpreted by a board-certified, body-imaging fellowship trained    radiologist from [address] [place] [address]. If there are any questions regarding    this examination please feel free to contact a radiologist at [phone].       [address]    2/14/2023 9:00:00 PM          Current Meds:  Current Facility-Administered Medications   Medication Dose Route Frequency    docusate sodium (COLACE) capsule 100 mg  100 mg Oral Daily    rosuvastatin (CRESTOR) tablet 20 mg  20 mg Oral Nightly    guaiFENesin (MUCINEX) extended release tablet 600 mg  600 mg Oral BID    [Held by provider] aspirin EC tablet 81 mg  81 mg Oral Daily    pantoprazole (PROTONIX) tablet 40 mg  40 mg Oral Daily    carvedilol (COREG) tablet 3.125 mg  3.125 mg Oral BID with meals    isosorbide mononitrate (IMDUR) extended release tablet 60 mg  60 mg Oral Daily    tiZANidine (ZANAFLEX) tablet 2 mg  2 mg Oral Q8H PRN    lidocaine 4 % external patch 1 patch  1 patch TransDERmal Daily    [Held by provider] losartan (COZAAR) tablet 25 mg  25 mg Oral Daily    [Held by provider] spironolactone (ALDACTONE) tablet 25 mg  25 mg Oral Daily    sodium chloride flush 0.9 % injection 5-40 mL  5-40 mL IntraVENous 2 times per day    sodium chloride flush 0.9 % injection 5-40 mL  5-40 mL IntraVENous PRN    0.9 % sodium chloride infusion   IntraVENous PRN    ondansetron (ZOFRAN-ODT) disintegrating tablet 4 mg  4 mg Oral Q8H PRN    Or    ondansetron (ZOFRAN) injection 4 mg  4 mg IntraVENous Q6H PRN    polyethylene glycol (GLYCOLAX) packet 17 g  17 g Oral Daily PRN    acetaminophen (TYLENOL) tablet 650 mg  650 mg Oral Q6H PRN    Or    acetaminophen (TYLENOL) suppository 650 mg  650 mg Rectal Q6H PRN    oxyCODONE-acetaminophen (PERCOCET)  MG per tablet 1 tablet  1 tablet Oral Q4H PRN    morphine injection 2 mg  2 mg IntraVENous Q4H PRN    ketorolac (TORADOL) injection 15 mg  15 mg IntraVENous Q6H PRN       Signed:  Omid Gilman MD    Part of this note may have been written by using a voice dictation software. The note has been proof read but may still contain some grammatical/other typographical errors.

## 2023-02-18 NOTE — PROGRESS NOTES
END OF SHIFT NOTE:    INTAKE/OUTPUT  02/17 0701 - 02/18 0700  In: 240 [P.O.:240]  Out: 1400 [Urine:1400]  Voiding: no  Catheter: yes  Drain:   Urinary Catheter 02/15/23 Moore (Active)   $ Urethral catheter insertion $ Not inserted for procedure 02/15/23 1250   Catheter Indications Prolonged immobilization (e.g. unstable thoracic or lumbar spine, multiple traumatic injuries such as pelvic fractures) 02/17/23 2332   Site Assessment No urethral drainage 02/17/23 2332   Urine Color [place] 02/17/23 2332   Urine Appearance Clear 02/17/23 2332   Collection Container Standard 02/17/23 2332   Securement Method Securing device (Describe) 02/17/23 2332   Catheter Care  Perineal wipes 02/17/23 2332   Catheter Best Practices  Drainage tube clipped to bed;Catheter secured to thigh; Tamper seal intact; Bag below bladder;Bag not on floor; Lack of dependent loop in tubing;Drainage bag less than half full 02/17/23 2332   Status Draining;Patent 02/17/23 1525   Output (mL) 500 mL 02/17/23 2332               Flatus: Patient does have flatus present. Stool:  0 occurrences. Given prune juice this morning to help her have a BM  Characteristics:       Emesis: 0 occurrences. Characteristics:        VITAL SIGNS  Patient Vitals for the past 12 hrs:   Temp Pulse Resp BP SpO2   02/18/23 0256 97.5 °F (36.4 °C) 70 18 (!) 152/72 92 %   02/17/23 2332 97.7 °F (36.5 °C) 66 16 (!) 146/74 95 %   02/17/23 1940 97.5 °F (36.4 °C) 69 18 135/73 93 %       Pain Assessment                Ambulating  no    Shift report given to oncoming nurse at the bedside.     Gokul Urias RN

## 2023-02-19 PROCEDURE — 6360000002 HC RX W HCPCS: Performed by: FAMILY MEDICINE

## 2023-02-19 PROCEDURE — 6370000000 HC RX 637 (ALT 250 FOR IP): Performed by: FAMILY MEDICINE

## 2023-02-19 PROCEDURE — 2580000003 HC RX 258: Performed by: FAMILY MEDICINE

## 2023-02-19 PROCEDURE — 1100000000 HC RM PRIVATE

## 2023-02-19 RX ORDER — HYDROCODONE BITARTRATE AND HOMATROPINE METHYLBROMIDE ORAL SOLUTION 5; 1.5 MG/5ML; MG/5ML
5 LIQUID ORAL EVERY 4 HOURS PRN
Status: DISCONTINUED | OUTPATIENT
Start: 2023-02-19 | End: 2023-03-03 | Stop reason: HOSPADM

## 2023-02-19 RX ADMIN — CARVEDILOL 3.12 MG: 3.12 TABLET, FILM COATED ORAL at 17:39

## 2023-02-19 RX ADMIN — DOCUSATE SODIUM 100 MG: 100 CAPSULE, LIQUID FILLED ORAL at 08:07

## 2023-02-19 RX ADMIN — GUAIFENESIN 600 MG: 600 TABLET ORAL at 21:30

## 2023-02-19 RX ADMIN — SODIUM CHLORIDE, PRESERVATIVE FREE 10 ML: 5 INJECTION INTRAVENOUS at 08:07

## 2023-02-19 RX ADMIN — GUAIFENESIN 600 MG: 600 TABLET ORAL at 08:07

## 2023-02-19 RX ADMIN — ROSUVASTATIN CALCIUM 20 MG: 20 TABLET, COATED ORAL at 21:30

## 2023-02-19 RX ADMIN — MORPHINE SULFATE 2 MG: 2 INJECTION, SOLUTION INTRAMUSCULAR; INTRAVENOUS at 21:38

## 2023-02-19 RX ADMIN — SODIUM CHLORIDE, PRESERVATIVE FREE 10 ML: 5 INJECTION INTRAVENOUS at 21:30

## 2023-02-19 RX ADMIN — HYDROCODONE BITARTRATE AND HOMATROPINE METHYLBROMIDE 5 ML: 5; 1.5 SOLUTION ORAL at 13:56

## 2023-02-19 RX ADMIN — ISOSORBIDE MONONITRATE 60 MG: 60 TABLET, EXTENDED RELEASE ORAL at 08:07

## 2023-02-19 RX ADMIN — CARVEDILOL 3.12 MG: 3.12 TABLET, FILM COATED ORAL at 08:07

## 2023-02-19 RX ADMIN — ONDANSETRON 4 MG: 4 TABLET, ORALLY DISINTEGRATING ORAL at 21:45

## 2023-02-19 RX ADMIN — PANTOPRAZOLE SODIUM 40 MG: 40 TABLET, DELAYED RELEASE ORAL at 06:17

## 2023-02-19 ASSESSMENT — PAIN SCALES - GENERAL
PAINLEVEL_OUTOF10: 7
PAINLEVEL_OUTOF10: 0
PAINLEVEL_OUTOF10: 5

## 2023-02-19 ASSESSMENT — PAIN DESCRIPTION - ORIENTATION: ORIENTATION: MID;POSTERIOR

## 2023-02-19 ASSESSMENT — PAIN DESCRIPTION - DESCRIPTORS: DESCRIPTORS: ACHING

## 2023-02-19 ASSESSMENT — PAIN SCALES - WONG BAKER: WONGBAKER_NUMERICALRESPONSE: 2

## 2023-02-19 ASSESSMENT — PAIN - FUNCTIONAL ASSESSMENT: PAIN_FUNCTIONAL_ASSESSMENT: PREVENTS OR INTERFERES SOME ACTIVE ACTIVITIES AND ADLS

## 2023-02-19 ASSESSMENT — PAIN DESCRIPTION - LOCATION: LOCATION: BACK

## 2023-02-19 NOTE — PLAN OF CARE
Problem: Discharge Planning  Goal: Discharge to home or other facility with appropriate resources  2/19/2023 0903 by Ezequiel Huang RN  Outcome: Progressing  2/18/2023 1944 by Mp Hurtado RN  Outcome: Progressing  Flowsheets (Taken 2/18/2023 1937)  Discharge to home or other facility with appropriate resources: Identify barriers to discharge with patient and caregiver     Problem: Pain  Goal: Verbalizes/displays adequate comfort level or baseline comfort level  2/19/2023 0903 by Ezequiel Huang RN  Outcome: Progressing  2/18/2023 1944 by Mp Hurtado RN  Outcome: Progressing     Problem: Chronic Conditions and Co-morbidities  Goal: Patient's chronic conditions and co-morbidity symptoms are monitored and maintained or improved  2/19/2023 0903 by Ezequiel Huang RN  Outcome: Progressing  2/18/2023 1944 by Mp Hurtado RN  Outcome: Progressing  Flowsheets (Taken 2/18/2023 1937)  Care Plan - Patient's Chronic Conditions and Co-Morbidity Symptoms are Monitored and Maintained or Improved: Monitor and assess patient's chronic conditions and comorbid symptoms for stability, deterioration, or improvement     Problem: Safety - Adult  Goal: Free from fall injury  2/19/2023 0903 by Ezequiel Huang RN  Outcome: Progressing  Flowsheets (Taken 2/19/2023 0725)  Free From Fall Injury: Instruct family/caregiver on patient safety  2/18/2023 1944 by Mp Hurtado RN  Outcome: Progressing  [address] (Taken 2/18/2023 1942)  Free From Fall Injury: Instruct family/caregiver on patient safety     Problem: ABCDS Injury Assessment  Goal: Absence of physical injury  2/19/2023 0903 by Ezequiel Huang RN  Outcome: Progressing  2/18/2023 1944 by Mp Hurtado RN  Outcome: Progressing     Problem: Skin/Tissue Integrity  Goal: Absence of new skin breakdown  Description: 1. Monitor for areas of redness and/or skin breakdown  2. Assess vascular access sites hourly  3. Every 4-6 hours minimum:  Change oxygen saturation probe site  4. Every 4-6 hours:  If on nasal continuous positive airway pressure, respiratory therapy assess nares and determine need for appliance change or resting period.   Outcome: Progressing

## 2023-02-19 NOTE — PROGRESS NOTES
END OF SHIFT NOTE:    INTAKE/OUTPUT  02/18 0701 - 02/19 0700  In: 360 [P.O.:360]  Out: 2100 [Urine:2100]  Voiding: No  Catheter: Yes  Drain:   Urinary Catheter 02/15/23 Moore (Active)   $ Urethral catheter insertion $ Not inserted for procedure 02/15/23 1250   Catheter Indications Urinary retention (acute or chronic), continuous bladder irrigation or bladder outlet obstruction 02/19/23 1410   Site Assessment No urethral drainage 02/19/23 1410   Urine Color [place]ricilla 02/19/23 1410   Urine Appearance Sediment 02/19/23 1410   Collection Container Standard 02/19/23 1410   Securement Method Securing device (Describe) 02/19/23 1410   Catheter Care  Perineal wipes 02/19/23 1410   Catheter Best Practices  Drainage tube clipped to bed;Catheter secured to thigh; Tamper seal intact; Bag below bladder;Bag not on floor; Lack of dependent loop in tubing;Drainage bag less than half full 02/19/23 1410   Status Draining;Patent 02/19/23 1410   Output (mL) 200 mL 02/19/23 1746               Flatus: Patient does have flatus present. Stool: 0 occurrences. Characteristics:  Stool Appearance: Soft  Stool Color: Brown  Stool Amount: Smear  Stool Assessment  Incontinence: Yes  Stool Appearance: Soft  Stool Color: Brown  Stool Amount: Smear  Stool Source: Rectum  Last BM (including prior to admit): 02/18/23    Emesis: 0 occurrences. Characteristics:        VITAL SIGNS  Patient Vitals for the past 12 hrs:   Temp Pulse Resp BP SpO2   02/19/23 1737 -- 67 -- 131/72 --   02/19/23 1455 98.2 °F (36.8 °C) 59 18 107/62 95 %   02/19/23 1115 98.6 °F (37 °C) 71 20 (!) 119/58 94 %   02/19/23 0807 -- 70 -- 137/70 --   02/19/23 0656 98 °F (36.7 °C) 70 18 137/70 96 %   02/19/23 0654 -- 68 18 (!) 163/81 92 %       Pain Assessment  Pain Level: 0 (02/19/23 0030)  Pain Location: Back  Patient's Stated Pain Goal: 0 - No pain    Ambulating  No    Shift report given to oncoming nurse at the bedside.     Victor M Balderas, RN

## 2023-02-19 NOTE — PROGRESS NOTES
END OF SHIFT NOTE:    INTAKE/OUTPUT  02/18 0701 - 02/19 0700  In: 360 [P.O.:360]  Out: 1500 [Urine:1500]  Voiding: no  Catheter: yes  Drain:   Urinary Catheter 02/15/23 Moore (Active)   $ Urethral catheter insertion $ Not inserted for procedure 02/15/23 1250   Catheter Indications Prolonged immobilization (e.g. unstable thoracic or lumbar spine, multiple traumatic injuries such as pelvic fractures) 02/18/23 1937   Site Assessment No urethral drainage 02/18/23 1937   Urine Color [place] 02/18/23 1937   Urine Appearance Sediment 02/18/23 1937   Collection Container Standard 02/18/23 1937   Securement Method Securing device (Describe) 02/18/23 1937   Catheter Care  Perineal wipes 02/17/23 2332   Catheter Best Practices  Drainage tube clipped to bed;Catheter secured to thigh; Tamper seal intact; Bag below bladder;Bag not on floor; Lack of dependent loop in tubing;Drainage bag less than half full 02/18/23 1937   Status Draining;Patent 02/18/23 0841   Output (mL) 300 mL 02/18/23 2014               Flatus: Patient does have flatus present. Stool:  1 occurrences. Smear brown stool  Characteristics:       Emesis: 0 occurrences. Characteristics:        VITAL SIGNS  Patient Vitals for the past 12 hrs:   Temp Pulse Resp BP SpO2   02/19/23 0331 97.9 °F (36.6 °C) 69 18 (!) 143/78 95 %   02/18/23 2329 97.5 °F (36.4 °C) 68 16 122/65 93 %   02/18/23 2014 98.2 °F (36.8 °C) 76 18 120/62 90 %       Pain Assessment                Ambulating  no    Shift report given to oncoming nurse at the bedside.     Farzad Salinas RN

## 2023-02-19 NOTE — PROGRESS NOTES
Hospitalist Progress Note   Admit Date:  2023  7:07 PM   Name:  Dilan Gaffney   Age:  80 y.o. Sex:  female  :  1931   MRN:  [id number]   Room:      Presenting Complaint: No chief complaint on file. Reason(s) for Admission: Intractable pain [R52]     Hospital Course:   Dilan Gaffney is a 80 y.o. female with medical history of Dilan Gaffney is a 80 y.o. female with medical history of HTN, CAD, CHF, CKD who presents as a direct admit from home with intractable back/flank pain. Over 2 months period, she has been diagnosed with T10, then T9 fractures, and subsequently underwent kyphoplasties on  and , respectively. 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 Neurosurgery consulted and plan for kyphoplasty and bone biopsy on , after plavix wash out. Recommend TLSO when UOOB. Subjective & 24hr Events (23): Patient is seen at the bedside. Reports some improvement in pain. Reports she has miced feeling about upcoming procedure. Wants to talk to neurosurgery prior to procedure to know her outcome. Tearful as she had kyphoplasty x2 with in a month without any improvement. 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 Recommend TLSO when UOOB. Hold antiplatelet and anticoagulation for possible procedure on 2/21    History of T9/T10 compression fracture status post kyphoplasty:  Noted  Pain control    Hypertension/hyperlipidemia:  CAD:  Chronic systolic heart failure:  Not in exacerbation  Continue home meds: carvedilol, Imdur and rosuvastatin  Holding aspirin and Plavix for procedure on 2/21    CKD stage III:  Kidney function stable  Avoid nephrotoxic agent    PT/OT evals and PPD needed/ordered? Yes    Diet:  ADULT DIET; Regular  VTE prophylaxis: SCD's   Code status: Full Code    Hospital Problems:  Principal Problem:    Intractable pain  Active Problems:    Chronic renal disease, stage III ([facility]) [588551]    Chronic systolic (congestive) heart failure    Compression fracture of body of thoracic vertebra (HCC)    S/P kyphoplasty    CAD (coronary artery disease)    HTN (hypertension)  Resolved Problems:    * No resolved hospital problems. *      Objective:   Patient Vitals for the past 24 hrs:   Temp Pulse Resp BP SpO2   02/19/23 1115 98.6 °F (37 °C) 71 20 (!) 119/58 94 %   02/19/23 0807 -- 70 -- 137/70 --   02/19/23 0656 98 °F (36.7 °C) 70 18 137/70 96 %   02/19/23 0654 -- 68 18 (!) 163/81 92 %   02/19/23 0331 97.9 °F (36.6 °C) 69 18 (!) 143/78 95 %   02/18/23 2329 97.5 °F (36.4 °C) 68 16 122/65 93 %   02/18/23 2014 98.2 °F (36.8 °C) 76 18 120/62 90 %   02/18/23 1544 97.5 °F (36.4 °C) 70 20 122/71 94 %         Oxygen Therapy  SpO2: 94 %  O2 Device: None (Room air)    Estimated body mass index is 18.72 kg/m² as calculated from the following:    Height as of this encounter: 5' 6\" (1.676 m). Weight as of this encounter: 116 lb (52.6 kg). Intake/Output Summary (Last 24 hours) at 2/19/2023 1307  Last data filed at 2/19/2023 1000  Gross per 24 hour   Intake 360 ml   Output 1450 ml   Net -1090 ml           Physical Exam:     Blood pressure (!) 119/58, pulse 71, temperature 98.6 °F (37 °C), temperature source Oral, resp.  rate 20, height 5' 6\" (1.676 m), weight 116 lb (52.6 kg), SpO2 94 %. General:    Well nourished. Careful due to pain  Head:  Normocephalic, atraumatic  Eyes:  Sclerae appear normal.  Pupils equally round. ENT:  Nares appear normal.  Moist oral mucosa  Neck:  No restricted ROM. Trachea midline   CV:   RRR. No m/r/g. No jugular venous distension. Lungs:   CTAB. No wheezing, rhonchi, or rales. Symmetric expansion. Abdomen:   Soft, nontender, nondistended. Bilateral flank tenderness  Extremities: No cyanosis or clubbing. No edema  Skin:     No rashes and normal coloration. Warm and dry. Neuro:  CN II-XII grossly intact. Psych:  Normal mood and affect. I have personally reviewed labs and tests:  Recent Labs:  Recent Results (from the past 48 hour(s))   PLEASE READ & DOCUMENT PPD TEST IN 48 HRS    Collection Time: 02/17/23  2:15 PM   Result Value Ref Range    PPD, (POC) Negative Negative    mm Induration 0 0 - 5 mm   POCT Glucose    Collection Time: 02/17/23  9:00 PM   Result Value Ref Range    POC Glucose 134 (H) 65 - 100 mg/dL    Performed by: Stephanie Harrison        I have personally reviewed imaging studies:  Other Studies:  MRI LUMBAR SPINE WO CONTRAST   Final Result   1. No acute musculoskeletal abnormality or compression fracture. 2. Multilevel degenerative disc and facet disease with a prominent left   paracentral/posterior lateral disc protrusion at L3-L4. This does not result in   central stenosis. There is mild left foramina narrowing. 3. Moderate bilateral foramina narrowing at L4-L5 with mild bilateral foramina   narrowing at L5-S1. MRI THORACIC SPINE WO CONTRAST   Final Result   1. Status post kyphoplasty at T9 and T10 without obvious complication. 2. 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 3. No central spinal stenosis or cord compression.    4. Bilateral pleural effusions. CT ABDOMEN PELVIS WO CONTRAST Additional Contrast? Radiologist Recommendation   Final Result         1. 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 No evidence of   obstructive uropathy. Bilateral renal calcifications noted. 2. Bilateral pleural effusions with bibasilar atelectasis present. New when   compared with the prior exam.   3. Diverticulosis. No CT evidence of diverticulitis. XR CHEST PORTABLE   Final Result      1. No acute cardiopulmonary process identified. This exam was interpreted by a board-certified, body-imaging fellowship trained    radiologist from [address] [place] [address]. If there are any questions regarding    this examination please feel free to contact a radiologist at [phone].       Slot [address]    2/14/2023 9:00:00 PM          Current Meds:  Current Facility-Administered Medications   Medication Dose Route Frequency    docusate sodium (COLACE) capsule 100 mg  100 mg Oral Daily    rosuvastatin (CRESTOR) tablet 20 mg  20 mg Oral Nightly    guaiFENesin (MUCINEX) extended release tablet 600 mg  600 mg Oral BID    [Held by provider] aspirin EC tablet 81 mg  81 mg Oral Daily    pantoprazole (PROTONIX) tablet 40 mg  40 mg Oral Daily    carvedilol (COREG) tablet 3.125 mg  3.125 mg Oral BID with meals    isosorbide mononitrate (IMDUR) extended release tablet 60 mg  60 mg Oral Daily    tiZANidine (ZANAFLEX) tablet 2 mg  2 mg Oral Q8H PRN    lidocaine 4 % external patch 1 patch  1 patch TransDERmal Daily    [Held by provider] losartan (COZAAR) tablet 25 mg  25 mg Oral Daily    [Held by provider] spironolactone (ALDACTONE) tablet 25 mg  25 mg Oral Daily    sodium chloride flush 0.9 % injection 5-40 mL  5-40 mL IntraVENous 2 times per day    sodium chloride flush 0.9 % injection 5-40 mL  5-40 mL IntraVENous PRN    0.9 % sodium chloride infusion   IntraVENous PRN    ondansetron (ZOFRAN-ODT) disintegrating tablet 4 mg  4 mg Oral Q8H PRN    Or ondansetron (ZOFRAN) injection 4 mg  4 mg IntraVENous Q6H PRN    polyethylene glycol (GLYCOLAX) packet 17 g  17 g Oral Daily PRN    acetaminophen (TYLENOL) tablet 650 mg  650 mg Oral Q6H PRN    Or    acetaminophen (TYLENOL) suppository 650 mg  650 mg Rectal Q6H PRN    oxyCODONE-acetaminophen (PERCOCET)  MG per tablet 1 tablet  1 tablet Oral Q4H PRN    morphine injection 2 mg  2 mg IntraVENous Q4H PRN    ketorolac (TORADOL) injection 15 mg  15 mg IntraVENous Q6H PRN       Signed:  Zane Cortes MD    Part of this note may have been written by using a voice dictation software. The note has been proof read but may still contain some grammatical/other typographical errors.

## 2023-02-20 PROCEDURE — 6370000000 HC RX 637 (ALT 250 FOR IP): Performed by: FAMILY MEDICINE

## 2023-02-20 PROCEDURE — 2580000003 HC RX 258: Performed by: FAMILY MEDICINE

## 2023-02-20 PROCEDURE — 1100000000 HC RM PRIVATE

## 2023-02-20 RX ADMIN — SODIUM CHLORIDE, PRESERVATIVE FREE 10 ML: 5 INJECTION INTRAVENOUS at 08:36

## 2023-02-20 RX ADMIN — ACETAMINOPHEN 650 MG: 325 TABLET ORAL at 22:33

## 2023-02-20 RX ADMIN — CARVEDILOL 3.12 MG: 3.12 TABLET, FILM COATED ORAL at 08:34

## 2023-02-20 RX ADMIN — PANTOPRAZOLE SODIUM 40 MG: 40 TABLET, DELAYED RELEASE ORAL at 05:15

## 2023-02-20 RX ADMIN — OXYCODONE AND ACETAMINOPHEN 1 TABLET: 10; 325 TABLET ORAL at 14:21

## 2023-02-20 RX ADMIN — GUAIFENESIN 600 MG: 600 TABLET ORAL at 22:33

## 2023-02-20 RX ADMIN — DOCUSATE SODIUM 100 MG: 100 CAPSULE, LIQUID FILLED ORAL at 08:34

## 2023-02-20 RX ADMIN — GUAIFENESIN 600 MG: 600 TABLET ORAL at 08:34

## 2023-02-20 RX ADMIN — OXYCODONE AND ACETAMINOPHEN 1 TABLET: 10; 325 TABLET ORAL at 08:34

## 2023-02-20 RX ADMIN — ROSUVASTATIN CALCIUM 20 MG: 20 TABLET, COATED ORAL at 22:33

## 2023-02-20 RX ADMIN — SODIUM CHLORIDE, PRESERVATIVE FREE 10 ML: 5 INJECTION INTRAVENOUS at 22:35

## 2023-02-20 RX ADMIN — ISOSORBIDE MONONITRATE 60 MG: 60 TABLET, EXTENDED RELEASE ORAL at 08:34

## 2023-02-20 RX ADMIN — CARVEDILOL 3.12 MG: 3.12 TABLET, FILM COATED ORAL at 16:56

## 2023-02-20 ASSESSMENT — PAIN DESCRIPTION - DESCRIPTORS
DESCRIPTORS: ACHING

## 2023-02-20 ASSESSMENT — PAIN SCALES - GENERAL
PAINLEVEL_OUTOF10: 6

## 2023-02-20 ASSESSMENT — PAIN DESCRIPTION - LOCATION
LOCATION: BACK

## 2023-02-20 ASSESSMENT — PAIN DESCRIPTION - PAIN TYPE
TYPE: CHRONIC PAIN
TYPE: CHRONIC PAIN

## 2023-02-20 ASSESSMENT — PAIN DESCRIPTION - ORIENTATION
ORIENTATION: POSTERIOR
ORIENTATION: LOWER;POSTERIOR
ORIENTATION: POSTERIOR
ORIENTATION: POSTERIOR
ORIENTATION: LOWER;POSTERIOR

## 2023-02-20 NOTE — PROGRESS NOTES
Hospitalist Progress Note   Admit Date:  2023  7:07 PM   Name:  Dilan Gaffney   Age:  80 y.o. Sex:  female  :  1931   MRN:  [id number]   Room:      Presenting Complaint: No chief complaint on file. Reason(s) for Admission: Intractable pain [R52]     Hospital Course:   Dilan Gaffney is a 80 y.o. female with medical history of Dilan Gaffney is a 80 y.o. female with medical history of HTN, CAD, CHF, CKD who presents as a direct admit from home with intractable back/flank pain. Over 2 months period, she has been diagnosed with T10, then T9 fractures, and subsequently underwent kyphoplasties on  and , respectively. 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 Neurosurgery consulted and plan for kyphoplasty and bone biopsy on , after plavix wash out. Recommend TLSO when UOOB. Subjective & 24hr Events (23): Patient is seen at the bedside. Reports she is feeling okay but still in pain. Neurosurgery delayed kyphoplasty until  in order to be off plavix for 7 days. 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 Recommend TLSO when UOOB.   Hold antiplatelet and anticoagulation     History of T9/T10 compression fracture status post kyphoplasty:  Noted  Pain control    Hypertension/hyperlipidemia:  CAD:  Chronic systolic heart failure:  Not in exacerbation  Continue home meds: carvedilol, Imdur and rosuvastatin  Holding aspirin and Plavix for procedure on 2/23    CKD stage III:  Kidney function stable  Avoid nephrotoxic agent    PT/OT evals and PPD needed/ordered? Yes    Diet:  ADULT DIET; Regular  VTE prophylaxis: SCD's   Code status: Full Code    Hospital Problems:  Principal Problem:    Intractable pain  Active Problems:    Chronic renal disease, stage III ([facility]) [648510]    Chronic systolic (congestive) heart failure    Compression fracture of body of thoracic vertebra (HCC)    S/P kyphoplasty    CAD (coronary artery disease)    HTN (hypertension)  Resolved Problems:    * No resolved hospital problems. *      Objective:   Patient Vitals for the past 24 hrs:   Temp Pulse Resp BP SpO2   02/20/23 1200 -- 68 -- 121/66 --   02/20/23 1107 -- 66 18 (!) 93/58 96 %   02/20/23 0736 97.9 °F (36.6 °C) 62 18 (!) 148/64 93 %   02/20/23 0342 98.2 °F (36.8 °C) 70 18 135/71 97 %   02/19/23 2355 97.9 °F (36.6 °C) 72 18 125/70 95 %   02/19/23 2208 -- -- 17 -- --   02/19/23 2138 -- 72 17 -- 94 %   02/19/23 1913 97.7 °F (36.5 °C) 67 18 (!) 147/68 94 %   02/19/23 1737 -- 67 -- 131/72 --   02/19/23 1455 98.2 °F (36.8 °C) 59 18 107/62 95 %         Oxygen Therapy  SpO2: 96 %  O2 Device: None (Room air)    Estimated body mass index is 18.72 kg/m² as calculated from the following:    Height as of this encounter: 5' 6\" (1.676 m). Weight as of this encounter: 116 lb (52.6 kg). Intake/Output Summary (Last 24 hours) at 2/20/2023 1431  Last data filed at 2/20/2023 1107  Gross per 24 hour   Intake 240 ml   Output 1800 ml   Net -1560 ml           Physical Exam:     Blood pressure 121/66, pulse 68, temperature 97.9 °F (36.6 °C), resp. rate 18, height 5' 6\" (1.676 m), weight 116 lb (52.6 kg), SpO2 96 %. General:    Well nourished.   Careful due to pain  Head:  Normocephalic, atraumatic  Eyes:  Sclerae appear normal.  Pupils equally round. ENT:  Nares appear normal.  Moist oral mucosa  Neck:  No restricted ROM. Trachea midline   CV:   RRR. No m/r/g. No jugular venous distension. Lungs:   CTAB. No wheezing, rhonchi, or rales. Symmetric expansion. Abdomen:   Soft, nontender, nondistended. Bilateral flank tenderness  Extremities: No cyanosis or clubbing. No edema  Skin:     No rashes and normal coloration. Warm and dry. Neuro:  CN II-XII grossly intact. Psych:  Normal mood and affect. I have personally reviewed labs and tests:  Recent Labs:  No results found for this or any previous visit (from the past 48 hour(s)). I have personally reviewed imaging studies:  Other Studies:  MRI LUMBAR SPINE WO CONTRAST   Final Result   1. No acute musculoskeletal abnormality or compression fracture. 2. Multilevel degenerative disc and facet disease with a prominent left   paracentral/posterior lateral disc protrusion at L3-L4. This does not result in   central stenosis. There is mild left foramina narrowing. 3. Moderate bilateral foramina narrowing at L4-L5 with mild bilateral foramina   narrowing at L5-S1. MRI THORACIC SPINE WO CONTRAST   Final Result   1. Status post kyphoplasty at T9 and T10 without obvious complication. 2. 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 3. No central spinal stenosis or cord compression. 4. Bilateral pleural effusions. CT ABDOMEN PELVIS WO CONTRAST Additional Contrast? Radiologist Recommendation   Final Result         1. 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 No evidence of   obstructive uropathy. Bilateral renal calcifications noted. 2. Bilateral pleural effusions with bibasilar atelectasis present. New when   compared with the prior exam.   3. Diverticulosis.  No CT evidence of diverticulitis. XR CHEST PORTABLE   Final Result      1. No acute cardiopulmonary process identified. This exam was interpreted by a board-certified, body-imaging fellowship trained    radiologist from [address] [place] [address]. If there are any questions regarding    this examination please feel free to contact a radiologist at [phone].       [address]    2/14/2023 9:00:00 PM          Current Meds:  Current Facility-Administered Medications   Medication Dose Route Frequency    HYDROcodone homatropine (HYCODAN) 5-1.5 MG/5ML solution 5 mL  5 mL Oral Q4H PRN    docusate sodium (COLACE) capsule 100 mg  100 mg Oral Daily    rosuvastatin (CRESTOR) tablet 20 mg  20 mg Oral Nightly    guaiFENesin (MUCINEX) extended release tablet 600 mg  600 mg Oral BID    [Held by provider] aspirin EC tablet 81 mg  81 mg Oral Daily    pantoprazole (PROTONIX) tablet 40 mg  40 mg Oral Daily    carvedilol (COREG) tablet 3.125 mg  3.125 mg Oral BID with meals    isosorbide mononitrate (IMDUR) extended release tablet 60 mg  60 mg Oral Daily    tiZANidine (ZANAFLEX) tablet 2 mg  2 mg Oral Q8H PRN    lidocaine 4 % external patch 1 patch  1 patch TransDERmal Daily    [Held by provider] losartan (COZAAR) tablet 25 mg  25 mg Oral Daily    [Held by provider] spironolactone (ALDACTONE) tablet 25 mg  25 mg Oral Daily    sodium chloride flush 0.9 % injection 5-40 mL  5-40 mL IntraVENous 2 times per day    sodium chloride flush 0.9 % injection 5-40 mL  5-40 mL IntraVENous PRN    0.9 % sodium chloride infusion   IntraVENous PRN    ondansetron (ZOFRAN-ODT) disintegrating tablet 4 mg  4 mg Oral Q8H PRN    Or    ondansetron (ZOFRAN) injection 4 mg  4 mg IntraVENous Q6H PRN    polyethylene glycol (GLYCOLAX) packet 17 g  17 g Oral Daily PRN    acetaminophen (TYLENOL) tablet 650 mg  650 mg Oral Q6H PRN    Or    acetaminophen (TYLENOL) suppository 650 mg  650 mg Rectal Q6H PRN    oxyCODONE-acetaminophen (PERCOCET)  MG per tablet 1 tablet  1 tablet Oral Q4H PRN    morphine injection 2 mg  2 mg IntraVENous Q4H PRN       Signed:  Chao Liu MD    Part of this note may have been written by using a voice dictation software. The note has been proof read but may still contain some grammatical/other typographical errors.

## 2023-02-20 NOTE — PROGRESS NOTES
[facility]    Assessment: T10 compression fracture    Plan:  -T10 kyphoplasty to be pushed until Thursday in order to be off Plavix for at least 7 days  -TLSO when UOOB    Subjective: Pt does not want to work with PT until after she has had the kypho    Objective:  Afebrile  VSS  GCS15 moving all extremities    Patient Vitals for the past 12 hrs:   Temp Pulse Resp BP SpO2   02/20/23 1200 -- 68 -- 121/66 --   02/20/23 1107 -- 66 18 (!) 93/58 96 %   02/20/23 0736 97.9 °F (36.6 °C) 62 18 (!) 148/64 93 %   02/20/23 0342 98.2 °F (36.8 °C) 70 18 135/71 97 %        No results found for this or any previous visit (from the past 24 hour(s)).      Cassandra Ruiz, APRN - CNP

## 2023-02-20 NOTE — ADT AUTH CERT
Neuro and Attending by Mackenzie Cisneros RN       Review Status Review Entered   In Primary 2/20/2023 1502       Created By   Mackenzie Cisneros RN      Criteria Review   Neurosurgery  Assessment: T10 compression fracture  Plan:  -T10 kyphoplasty to be pushed until Thursday in order to be off Plavix for at least 7 days  -TLSO when UOOB  Subjective: Pt does not want to work with PT until after she has had the kypho          Attending  Subjective & 24hr Events (02/20/23): Patient is seen at the bedside. Reports she is feeling okay but still in pain. Neurosurgery delayed kyphoplasty until 2/23 in order to be off plavix for 7 days. 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 Still with pain. TLSO at bedside. No other complaint. 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 Recommend TLSO when UOOB.   Hold antiplatelet and anticoagulation for possible procedure on 2/21 2/19/23  Vitals  Temp 97.9  RR 18  Pulse 67  /68  95% RA     Attending  Subjective & 24hr Events (02/19/23): Patient is seen at the bedside. Reports some improvement in pain. Reports she has miced feeling about upcoming procedure. Wants to talk to neurosurgery prior to procedure to know her outcome. Tearful as she had kyphoplasty x2 with in a month without any improvement. 2/20/23  Vitals  Temp 97.9  RR 18  Pulse 62  /64  96% RA  Occupational Therapy/Physical Therapy  Attempted to see patient this AM for occupational therapy treatment  session. Patient refused despite encouragement. Pt states she does not want to \"stir up pain\". Pt states he is having surgery either tomorrow or Wednesday and will be more than happy to work with therapy AFTER surgery.

## 2023-02-20 NOTE — CARE COORDINATION
CM continues to follow for discharge planning and/or CM needs. Pt has procedure scheduled for tentatively Thursday 2/23. Discharge plan at this time is that pt will transition to [facility] for [address]. Pending clinical progress, pt has been accepted to facility but will require prior authorization with insurance. No additional CM needs at this time. Will continue to monitor and update as needed.

## 2023-02-20 NOTE — PROGRESS NOTES
Occupational Therapy Note:    Attempted to see patient this AM for occupational therapy treatment  session. Patient refused despite encouragement. Pt states she does not want to \"stir up pain\". Pt states he is having surgery either tomorrow or Wednesday and will be more than happy to work with therapy AFTER surgery. Pt is skeptical of medical care team. Will follow and re-attempt as schedule permits/patient available.      Thank you,    Erna Copeland, OT    Rehab Caseload Tracker

## 2023-02-20 NOTE — PROGRESS NOTES
END OF SHIFT NOTE:    INTAKE/OUTPUT  02/19 0701 - 02/20 0700  In: 720 [P.O.:720]  Out: 1350 [Urine:1350]  Voiding: Yes   Catheter: Yes - Moore  Drain:   Urinary Catheter 02/15/23 Moore (Active)   $ Urethral catheter insertion $ Not inserted for procedure 02/15/23 1250   Catheter Indications Urinary retention (acute or chronic), continuous bladder irrigation or bladder outlet obstruction 02/19/23 2138   Site Assessment No urethral drainage 02/19/23 2138   Urine Color Yellow 02/19/23 2138   Urine Appearance Sediment 02/19/23 2138   Collection Container Standard 02/19/23 2138   Securement Method Securing device (Describe) 02/19/23 2138   Catheter Care  Perineal wipes 02/19/23 1410   Catheter Best Practices  Drainage tube clipped to bed;Catheter secured to thigh; Tamper seal intact; Bag below bladder;Bag not on floor; Lack of dependent loop in tubing;Drainage bag less than half full 02/19/23 2138   Status Draining;Patent 02/19/23 2138   Output (mL) 300 mL 02/20/23 0342               Flatus: Patient does have flatus present. Stool:  0 occurrences. Last BM (including prior to admit): 02/19/2023       Emesis: 0 occurrences. VITAL SIGNS  Patient Vitals for the past 12 hrs:   Temp Pulse Resp BP SpO2   02/20/23 0342 98.2 °F (36.8 °C) 70 18 135/71 97 %   02/19/23 2355 97.9 °F (36.6 °C) 72 18 125/70 95 %   02/19/23 2208 -- -- 17 -- --   02/19/23 2138 -- 72 17 -- 94 %   02/19/23 1913 97.7 °F (36.5 °C) 67 18 (!) 147/68 94 %       Ambulating  No    Shift report given to oncoming nurse at the bedside.     Tena June RN

## 2023-02-20 NOTE — PLAN OF CARE

## 2023-02-20 NOTE — PROGRESS NOTES
Physical Therapy Note:    Attempted to see patient this AM for occupational therapy treatment  session. Patient refused despite encouragement. Pt states she does not want to \"stir up pain\". Pt states he is having surgery either tomorrow or Wednesday and will be more than happy to work with therapy AFTER surgery. Pt is skeptical of medical care team. Educated patient regarding role of hospitalist.  Will follow and re-attempt as schedule permits/patient available.      Thank you,    KATE Monique, PT    Rehab Caseload Tracker

## 2023-02-21 ENCOUNTER — TELEPHONE (OUTPATIENT)
Dept: INTERNAL MEDICINE CLINIC | Facility: CLINIC | Age: 88
End: 2023-02-21

## 2023-02-21 PROCEDURE — 6360000002 HC RX W HCPCS: Performed by: FAMILY MEDICINE

## 2023-02-21 PROCEDURE — 1100000000 HC RM PRIVATE

## 2023-02-21 PROCEDURE — 97112 NEUROMUSCULAR REEDUCATION: CPT

## 2023-02-21 PROCEDURE — 6370000000 HC RX 637 (ALT 250 FOR IP): Performed by: FAMILY MEDICINE

## 2023-02-21 PROCEDURE — 97535 SELF CARE MNGMENT TRAINING: CPT

## 2023-02-21 PROCEDURE — 97530 THERAPEUTIC ACTIVITIES: CPT

## 2023-02-21 PROCEDURE — 2580000003 HC RX 258: Performed by: FAMILY MEDICINE

## 2023-02-21 RX ADMIN — MORPHINE SULFATE 2 MG: 2 INJECTION, SOLUTION INTRAMUSCULAR; INTRAVENOUS at 13:15

## 2023-02-21 RX ADMIN — DOCUSATE SODIUM 100 MG: 100 CAPSULE, LIQUID FILLED ORAL at 09:25

## 2023-02-21 RX ADMIN — SODIUM CHLORIDE, PRESERVATIVE FREE 10 ML: 5 INJECTION INTRAVENOUS at 09:27

## 2023-02-21 RX ADMIN — CARVEDILOL 3.12 MG: 3.12 TABLET, FILM COATED ORAL at 09:26

## 2023-02-21 RX ADMIN — ISOSORBIDE MONONITRATE 60 MG: 60 TABLET, EXTENDED RELEASE ORAL at 09:26

## 2023-02-21 RX ADMIN — PANTOPRAZOLE SODIUM 40 MG: 40 TABLET, DELAYED RELEASE ORAL at 06:16

## 2023-02-21 RX ADMIN — ROSUVASTATIN CALCIUM 20 MG: 20 TABLET, COATED ORAL at 21:09

## 2023-02-21 RX ADMIN — SODIUM CHLORIDE, PRESERVATIVE FREE 10 ML: 5 INJECTION INTRAVENOUS at 21:09

## 2023-02-21 RX ADMIN — GUAIFENESIN 600 MG: 600 TABLET ORAL at 21:09

## 2023-02-21 RX ADMIN — CARVEDILOL 3.12 MG: 3.12 TABLET, FILM COATED ORAL at 18:03

## 2023-02-21 RX ADMIN — ONDANSETRON 4 MG: 4 TABLET, ORALLY DISINTEGRATING ORAL at 09:33

## 2023-02-21 RX ADMIN — ACETAMINOPHEN 650 MG: 325 TABLET ORAL at 21:09

## 2023-02-21 RX ADMIN — GUAIFENESIN 600 MG: 600 TABLET ORAL at 09:26

## 2023-02-21 ASSESSMENT — PAIN DESCRIPTION - ONSET: ONSET: ON-GOING

## 2023-02-21 ASSESSMENT — PAIN SCALES - GENERAL
PAINLEVEL_OUTOF10: 5
PAINLEVEL_OUTOF10: 4
PAINLEVEL_OUTOF10: 10
PAINLEVEL_OUTOF10: 5

## 2023-02-21 ASSESSMENT — PAIN DESCRIPTION - ORIENTATION: ORIENTATION: MID

## 2023-02-21 ASSESSMENT — PAIN DESCRIPTION - PAIN TYPE: TYPE: CHRONIC PAIN

## 2023-02-21 ASSESSMENT — PAIN - FUNCTIONAL ASSESSMENT: PAIN_FUNCTIONAL_ASSESSMENT: PREVENTS OR INTERFERES SOME ACTIVE ACTIVITIES AND ADLS

## 2023-02-21 ASSESSMENT — PAIN DESCRIPTION - LOCATION: LOCATION: BACK

## 2023-02-21 ASSESSMENT — PAIN DESCRIPTION - FREQUENCY: FREQUENCY: CONTINUOUS

## 2023-02-21 ASSESSMENT — PAIN DESCRIPTION - DESCRIPTORS: DESCRIPTORS: ACHING;SHARP;SHOOTING;STABBING

## 2023-02-21 NOTE — PROGRESS NOTES
END OF SHIFT NOTE:    INTAKE/OUTPUT  02/20 0701 - 02/21 0700  In: -   Out: 1300 [Urine:1300]  Voiding: Yes  Catheter: Yes  Drain:   Urinary Catheter 02/15/23 Moore (Active)   $ Urethral catheter insertion $ Not inserted for procedure 02/15/23 1250   Catheter Indications Urinary retention (acute or chronic), continuous bladder irrigation or bladder outlet obstruction 02/21/23 0234   Site Assessment No urethral drainage 02/21/23 0234   Urine Color Yellow 02/21/23 0234   Urine Appearance Clear 02/21/23 0234   Collection Container Standard 02/21/23 0234   Securement Method Securing device (Describe) 02/21/23 0234   Catheter Care  Perineal wipes 02/20/23 1420   Catheter Best Practices  Drainage tube clipped to bed;Catheter secured to thigh; Tamper seal intact; Bag below bladder;Bag not on floor; Lack of dependent loop in tubing;Drainage bag less than half full 02/21/23 0234   Status Draining;Patent 02/21/23 0234   Output (mL) 300 mL 02/21/23 0341               Flatus: Patient does have flatus present. Stool:  occurrences. Characteristics:  Stool Appearance: Unable to assess  Stool Color: Unable to assess  Stool Amount: Unable to assess  Stool Assessment  Incontinence: Yes  Stool Appearance: Unable to assess  Stool Color: Unable to assess  Stool Amount: Unable to assess  Stool Source: Rectum  Last BM (including prior to admit): 02/19/23    Emesis:  occurrences. Characteristics:        VITAL SIGNS  Patient Vitals for the past 12 hrs:   Temp Pulse Resp BP SpO2   02/21/23 0341 98.2 °F (36.8 °C) 68 16 (!) 146/74 94 %   02/20/23 2337 98.4 °F (36.9 °C) 70 16 (!) 128/59 92 %   02/20/23 1925 97.5 °F (36.4 °C) 65 18 130/68 96 %       Pain Assessment  Pain Level: 6 (02/20/23 2233)  Pain Location: Back  Patient's Stated Pain Goal: 0 - No pain    Ambulating  No    Shift report given to oncoming nurse at the bedside.     Fabiola Travis RN

## 2023-02-21 NOTE — PROGRESS NOTES
ACUTE PHYSICAL THERAPY GOALS:   (Developed with and agreed upon by patient and/or caregiver.)  (1.) Holger Pruitt will perform bed mobility with MINIMAL ASSIST within 7 treatment day(s). (2.) Holger Pruitt will transfer from bed to chair and chair to bed with MINIMAL ASSIST using the least restrictive device within 7 treatment day(s). (3.) Holger Pruitt will ambulate with MINIMAL ASSIST for 50 feet with the least restrictive device within 7 treatment day(s). (4.) Holger Pruitt will perform bilateral lower extremity exercises x 15 min for HEP with SUPERVISION to improve strength, endurance, and functional mobility within 7 treatment day(s). PHYSICAL THERAPY: Daily Note AM   (Link to Caseload Tracking: PT Visit Days : 2  Time In/Out PT Charge Capture  Rehab Caseload Tracker  Orders  TLSO on when up and out of bed  Holger Pruitt is a 80 y.o. female   PRIMARY DIAGNOSIS: Intractable pain  Intractable pain [R52]  Procedure(s) (LRB):  T11 Kyphoplasty and Bone Biopsy (N/A)     Inpatient: Payor: [address] [place] [address] / Plan: [address] [place] [address] / Product Type: *No Product type* /     ASSESSMENT:     REHAB RECOMMENDATIONS:   Recommendation to date pending progress:  Setting:  Short-term Rehab    Equipment:    To Be Determined     ASSESSMENT:  Ms. Filippo Larsen was supine in bed and agreeable for PT with encouragement. Her surgery is not until Thursday for Plavix washout, so educated on benefits of mobility. . Patient performed log roll with max Ax2 then to sit with max Ax2. OT assisted patient with donning TLSO in bed. Patient stood with min A to RW. Stood several minutes then ambulated 20' with RW to recliner with min A and cueing for negotiating turns. Rested in recliner then ambulated to and from [facility]. Patient progressing well with sit to/from stand, only required CGA by end of session. Huge progress from initial evaluation when she barely tolerated bed mobility. Will continue to work towards goals. SUBJECTIVE:   Ms. Sasha Beltran states, \"I know the bed is just making me weak. \"     Social/Functional Lives With: Alone  Type of Home: House  Home Layout: Two level  Home Access: Stairs to enter with rails  Entrance Stairs - Number of Steps: 6  OBJECTIVE:     PAIN: VITALS / O2: PRECAUTION / Nita Lazier / DRAINS:   Pre Treatment:   Pain Assessment: 0-10  Pain Level: 5  Non-Pharmaceutical Pain Intervention(s): Repositioned      Post Treatment: 5 Vitals        Oxygen    Moore Catheter    RESTRICTIONS/PRECAUTIONS:  Restrictions/Precautions  Restrictions/Precautions: Fall Risk  Restrictions/Precautions: Fall Risk     MOBILITY: I Mod I S SBA CGA Min Mod Max Total  NT x2 Comments:   Bed Mobility    Rolling [] [] [] [] [] [] [] [x] [] [] [x]    Supine to Sit [] [] [] [] [] [] [] [x] [] [] [x]    Scooting [] [] [] [] [] [x] [] [] [] [] []    Sit to Supine [] [] [] [] [] [] [] [] [] [x] []    Transfers    Sit to Stand [] [] [] [] [] [x] [] [] [] [] [x]    Bed to Chair [] [] [] [] [] [x] [] [] [] [] [x]    Stand to Sit [] [] [] [] [] [x] [] [] [] [] [x]     [] [] [] [] [] [] [] [] [] [] []    I=Independent, Mod I=Modified Independent, S=Supervision, SBA=Standby Assistance, CGA=Contact Guard Assistance,   Min=Minimal Assistance, Mod=Moderate Assistance, Max=Maximal Assistance, Total=Total Assistance, NT=Not Tested    BALANCE: Good Fair+ Fair Fair- Poor NT Comments   Sitting Static [x] [] [] [] [] []    Sitting Dynamic [] [x] [] [] [] []              Standing Static [] [] [] [x] [] []    Standing Dynamic [] [] [] [x] [] []      GAIT: I Mod I S SBA CGA Min Mod Max Total  NT x2 Comments:   Level of Assistance [] [] [] [] [] [x] [] [] [] [] [x]    Distance 20, 8x2 feet    DME Rolling Walker    Gait Quality Decreased sebastián , Decreased step clearance, Decreased step length, and Trunk flexion    Weightbearing Status      Stairs      I=Independent, Mod I=Modified Independent, S=Supervision, SBA=Standby Assistance, CGA=Contact Guard Assistance,   Min=Minimal Assistance, Mod=Moderate Assistance, Max=Maximal Assistance, Total=Total Assistance, NT=Not Tested    PLAN:   FREQUENCY AND DURATION: 3 times/week for duration of hospital stay or until stated goals are met, whichever comes first.    TREATMENT:   TREATMENT:   Co-Treatment PT/OT necessary due to patient's decreased overall endurance/tolerance levels, as well as need for high level skilled assistance to complete functional transfers/mobility and functional tasks  Therapeutic Activity (38 Minutes): Therapeutic activity included Rolling, Supine to Sit, Scooting, Transfer Training, Ambulation on level ground, Standing balance, and education regarding spine precautions and benefits of increasing mobility to improve functional Activity tolerance, Balance, Mobility, and Strength.     TREATMENT GRID:  N/A    AFTER TREATMENT PRECAUTIONS: Bed/Chair Locked, Call light within reach, Chair, Needs within reach, and RN notified    INTERDISCIPLINARY COLLABORATION:  RN/ PCT and PT/ PTA    EDUCATION:      TIME IN/OUT:  Time In: 1102  Time Out: [facility]  Minutes: [facility],

## 2023-02-21 NOTE — TELEPHONE ENCOUNTER
Called the patient's son at his request last night at 8:27 PM and discussed case about his mother for 12 minutes. Additional surgery is planned for his mother and her spine fractures-  Possibly on Thursday this week. He says his mother is still in pain and may have new fractures in the spine and he has reached out to discuss. Which level of spine to have procedure? Prior kypho T9 and T10.  T11? Will discuss with 916 Megan Page MD    MRI Thoracic Spine 2/16/23, report and images reviewed. MR OF THE THORACIC SPINE WITHOUT CONTRAST. Clinical Indication: Intractable back pain, prior compression fracture treated   by kyphoplasty       Procedure: Multiplanar and multisequence MR images were performed of the   thoracic spine without gadolinium contrast.       Comparison: MRI of the thoracic spine       Findings: Post kyphoplasty changes are noted at both T9 and T10. There is an   intraosseous hemangioma at T11 with bright T2 signal also noted on the STIR   sequence. This is concerning for a compression fracture through the T11   vertebral body that is nearly entirely occupied by the hemangioma. No aggressive   bone lesions noted to suspect metastatic disease. The kyphoplasty cement at T9   and T10 is confined to the vertebral body. The thoracic spinal cord is normal in   signal throughout. There is no central stenosis or cord compression. The   posterior elements are unremarkable. Axial images show no central spinal stenosis or cord compression. Limited   evaluation of the paraspinal soft tissues is remarkable for bilateral pleural   effusions. Impression   1. Status post kyphoplasty at T9 and T10 without obvious complication. 2. 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 3. No central spinal stenosis or cord compression. 4. Bilateral pleural effusions. MRI Result (most recent): MRI LUMBAR SPINE WO CONTRAST 02/16/2023    Narrative  MR OF THE LUMBAR SPINE WITHOUT CONTRAST. Clinical Indication: Intractable back pain, prior compression fracture    Procedure: Multiplanar and multisequence MR images are performed of the lumbar  spine without gadolinium contrast.    Comparison: MRI of the thoracic spine from the same day    Findings: The vertebral bodies are normal in height. There is no fracture. Degenerative disc bulge at L3-L4. Degenerative disc signal is noted throughout  the lumbar spine. No compression fracture noted. No aggressive bone lesions  identified. The marrow signal in the imaged sacrum is normal. The conus  medullaris is normal morphology and signal terminating in expected position at  L1-L2. Axials:    L1-L2: No central stenosis or foramina narrowing. L2-L3: There is no central stenosis. Left-sided degenerative facet arthropathy  is present. The foramina are patent. L3-L4: There is a circumferential disc bulge with a broad-based left  paracentral/posterior lateral disc protrusion. This does not result in central  stenosis or nerve root impingement. There is mild left foramina narrowing. L4-L5: A circumferential disc bulge with mild degenerative facet arthropathy. No  central stenosis evident. Moderate bilateral foramina narrowing is present. L5-S1: There is left greater than right degenerative facet arthropathy. There is  no central stenosis. Mild bilateral foramina narrowing is present. Limited evaluation the paraspinal soft tissues is unremarkable. Impression  1. No acute musculoskeletal abnormality or compression fracture. 2. Multilevel degenerative disc and facet disease with a prominent left  paracentral/posterior lateral disc protrusion at L3-L4. This does not result in  central stenosis. There is mild left foramina narrowing.   3. Moderate bilateral foramina narrowing at L4-L5 with mild bilateral foramina  narrowing at L5-S1.

## 2023-02-21 NOTE — PROGRESS NOTES
Hospitalist Progress Note   Admit Date:  2023  7:07 PM   Name:  Daisy Landaverde   Age:  80 y.o. Sex:  female  :  1931   MRN:  [id number]   Room:      Presenting Complaint: No chief complaint on file. Reason(s) for Admission: Intractable pain [R52]     Hospital Course:   Daisy Landaverde is a 80 y.o. female with medical history of Daisy Landaverde is a 80 y.o. female with medical history of HTN, CAD, CHF, CKD who presents as a direct admit from home with intractable back/flank pain. Over 2 months period, she has been diagnosed with T10, then T9 fractures, and subsequently underwent kyphoplasties on  and , respectively. 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 Neurosurgery consulted and plan for kyphoplasty and bone biopsy on , after plavix wash out. Recommend TLSO when UOOB. Subjective & 24hr Events (23): Patient is seen at the bedside. Pain better controlled. No active complaint. Would like to talk to neurosurgery. 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 Recommend TLSO when UOOB.   Hold antiplatelet and anticoagulation     History of T9/T10 compression fracture status post kyphoplasty:  Noted  Pain control    Hypertension/hyperlipidemia:  CAD:  Chronic systolic heart failure:  Not in exacerbation  Continue home meds: carvedilol, Imdur and rosuvastatin  Holding aspirin and Plavix for procedure on 2/23    CKD stage III:  Kidney function stable  Avoid nephrotoxic agent    PT/OT evals and PPD needed/ordered? Yes    Diet:  ADULT DIET; Regular  VTE prophylaxis: SCD's   Code status: Full Code    Hospital Problems:  Principal Problem:    Intractable pain  Active Problems:    Chronic renal disease, stage III ([facility]) [963501]    Chronic systolic (congestive) heart failure    Compression fracture of body of thoracic vertebra ([facility])    S/P kyphoplasty    CAD (coronary artery disease)    HTN (hypertension)  Resolved Problems:    * No resolved hospital problems. *      Objective:   Patient Vitals for the past 24 hrs:   Temp Pulse Resp BP SpO2   02/21/23 1206 98.5 °F (36.9 °C) 63 18 (!) 101/58 98 %   02/21/23 0740 98.2 °F (36.8 °C) 69 18 (!) 153/74 92 %   02/21/23 0341 98.2 °F (36.8 °C) 68 16 (!) 146/74 94 %   02/20/23 2337 98.4 °F (36.9 °C) 70 16 (!) 128/59 92 %   02/20/23 1925 97.5 °F (36.4 °C) 65 18 130/68 96 %   02/20/23 1531 98.1 °F (36.7 °C) 70 18 110/74 95 %         Oxygen Therapy  SpO2: 98 %  O2 Device: None (Room air)    Estimated body mass index is 18.72 kg/m² as calculated from the following:    Height as of this encounter: 5' 6\" (1.676 m). Weight as of this encounter: 116 lb (52.6 kg). Intake/Output Summary (Last 24 hours) at 2/21/2023 1349  Last data filed at 2/21/2023 1320  Gross per 24 hour   Intake --   Output 1200 ml   Net -1200 ml           Physical Exam:     Blood pressure (!) 101/58, pulse 63, temperature 98.5 °F (36.9 °C), temperature source Oral, resp. rate 18, height 5' 6\" (1.676 m), weight 116 lb (52.6 kg), SpO2 98 %. General:    Well nourished. Careful due to pain  Head:  Normocephalic, atraumatic  Eyes:  Sclerae appear normal.  Pupils equally round.   ENT:  Nares appear normal.  Moist oral mucosa  Neck:  No restricted ROM. Trachea midline   CV:   RRR. No m/r/g. No jugular venous distension. Lungs:   CTAB. No wheezing, rhonchi, or rales. Symmetric expansion. Abdomen:   Soft, nontender, nondistended. Bilateral flank tenderness  Extremities: No cyanosis or clubbing. No edema  Skin:     No rashes and normal coloration. Warm and dry. Neuro:  CN II-XII grossly intact. Psych:  Normal mood and affect. I have personally reviewed labs and tests:  Recent Labs:  No results found for this or any previous visit (from the past 48 hour(s)). I have personally reviewed imaging studies:  Other Studies:  MRI LUMBAR SPINE WO CONTRAST   Final Result   1. No acute musculoskeletal abnormality or compression fracture. 2. Multilevel degenerative disc and facet disease with a prominent left   paracentral/posterior lateral disc protrusion at L3-L4. This does not result in   central stenosis. There is mild left foramina narrowing. 3. Moderate bilateral foramina narrowing at L4-L5 with mild bilateral foramina   narrowing at L5-S1. MRI THORACIC SPINE WO CONTRAST   Final Result   1. Status post kyphoplasty at T9 and T10 without obvious complication. 2. 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 3. No central spinal stenosis or cord compression. 4. Bilateral pleural effusions. CT ABDOMEN PELVIS WO CONTRAST Additional Contrast? Radiologist Recommendation   Final Result         1. 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 No evidence of   obstructive uropathy. Bilateral renal calcifications noted. 2. Bilateral pleural effusions with bibasilar atelectasis present. New when   compared with the prior exam.   3. Diverticulosis. No CT evidence of diverticulitis. XR CHEST PORTABLE   Final Result      1. No acute cardiopulmonary process identified.       This exam was interpreted by a board-certified, body-imaging fellowship trained    radiologist from [address] [place] [address]. If there are any questions regarding    this examination please feel free to contact a radiologist at [phone].       [address]    2/14/2023 9:00:00 PM          Current Meds:  Current Facility-Administered Medications   Medication Dose Route Frequency    HYDROcodone homatropine (HYCODAN) 5-1.5 MG/5ML solution 5 mL  5 mL Oral Q4H PRN    docusate sodium (COLACE) capsule 100 mg  100 mg Oral Daily    rosuvastatin (CRESTOR) tablet 20 mg  20 mg Oral Nightly    guaiFENesin (MUCINEX) extended release tablet 600 mg  600 mg Oral BID    [Held by provider] aspirin EC tablet 81 mg  81 mg Oral Daily    pantoprazole (PROTONIX) tablet 40 mg  40 mg Oral Daily    carvedilol (COREG) tablet 3.125 mg  3.125 mg Oral BID with meals    isosorbide mononitrate (IMDUR) extended release tablet 60 mg  60 mg Oral Daily    tiZANidine (ZANAFLEX) tablet 2 mg  2 mg Oral Q8H PRN    lidocaine 4 % external patch 1 patch  1 patch TransDERmal Daily    [Held by provider] losartan (COZAAR) tablet 25 mg  25 mg Oral Daily    [Held by provider] spironolactone (ALDACTONE) tablet 25 mg  25 mg Oral Daily    sodium chloride flush 0.9 % injection 5-40 mL  5-40 mL IntraVENous 2 times per day    sodium chloride flush 0.9 % injection 5-40 mL  5-40 mL IntraVENous PRN    0.9 % sodium chloride infusion   IntraVENous PRN    ondansetron (ZOFRAN-ODT) disintegrating tablet 4 mg  4 mg Oral Q8H PRN    Or    ondansetron (ZOFRAN) injection 4 mg  4 mg IntraVENous Q6H PRN    polyethylene glycol (GLYCOLAX) packet 17 g  17 g Oral Daily PRN    acetaminophen (TYLENOL) tablet 650 mg  650 mg Oral Q6H PRN    Or    acetaminophen (TYLENOL) suppository 650 mg  650 mg Rectal Q6H PRN    oxyCODONE-acetaminophen (PERCOCET)  MG per tablet 1 tablet  1 tablet Oral Q4H PRN    morphine injection 2 mg  2 mg IntraVENous Q4H PRN       Signed:  Nawaf Becerra MD    Part of this note may have been written by using a voice dictation software. The note has been proof read but may still contain some grammatical/other typographical errors.

## 2023-02-21 NOTE — PROGRESS NOTES
ACUTE OCCUPATIONAL THERAPY GOALS:   (Developed with and agreed upon by patient and/or caregiver.)  1. Patient will complete lower body bathing and dressing with CGA and adaptive equipment as   needed. 2. Patient will completed upper body bathing and dressing with Mod I seated EOB and adaptive equipment as needed. 3. Patient will complete grooming seated at EOB with Mod I and adaptive equipment as needed. 4. Patient will complete toileting with CGA and adaptive equipment as needed. 5. Patient will tolerate 30 minutes of OT treatment with 1-2 rest breaks to increase activity tolerance for ADLs. 6. Patient will complete functional transfers with Min A and adaptive equipment as needed. Timeframe: 7 visits     OCCUPATIONAL THERAPY: Daily Note AM   OT Visit Days: 2   Time In/Out  OT Charge Capture  Rehab Caseload Tracker  OT Orders    Rolf Alonso is a 80 y.o. female   PRIMARY DIAGNOSIS: Intractable pain  Intractable pain [R52]  Procedure(s) (LRB):  T11 Kyphoplasty and Bone Biopsy (N/A)     Inpatient: Payor: [address] [place] [address] / Plan: [address] [place] [address] / Product Type: *No Product type* /     ASSESSMENT:     REHAB RECOMMENDATIONS: CURRENT LEVEL OF FUNCTION:  (Most Recently Demonstrated)   Recommendation to date pending progress:  Setting:  Short-term Rehab    Equipment:    To Be Determined Bathing:  Not Tested  Dressing:  Not Tested  Feeding/Grooming:  Supervision/Setup  Toileting: Moderate Assist  Functional Mobility:  Contact Guard Assist- Min A RW     ASSESSMENT:  Ms. Omid Covington was received supine in bed. Pt required assistance for functional mobility and ADLs today due to decreased volition, fear of movement, generalized weakness, back pain, decreased balance, decreased activity tolerance. Pt had not participated in therapy for 6 days due to multiple refusals. Pt finally worked with therapy today. Pt is progressing with functional mobility and ADLs. Pt to have surgery Thursday. Continue POC. SUBJECTIVE:     Ms. Candia Shone states, \"You're not the one hurting. \"     Social/Functional Lives With: Alone  Type of Home: House  Home Layout: Two level  Home Access: Stairs to enter with rails  Entrance Stairs - Number of Steps: 6    OBJECTIVE:     Ev Ramp / Swethain Lard / AIRWAY: Moore Catheter and IV    RESTRICTIONS/PRECAUTIONS:  Restrictions/Precautions  Restrictions/Precautions: Fall Risk        PAIN: VITALS / O2:   Pre Treatment:   Pain Assessment: 0-10  Pain Level: 5        Post Treatment: 5/10 Vitals          Oxygen        MOBILITY: I Mod I S SBA CGA Min Mod Max Total  NT x2 Comments:   Bed Mobility    Rolling [] [] [] [] [] [] [] [x] [] [] [x] To put TLSO on in bed   Supine to Sit [] [] [] [] [] [] [] [x] [] [] [x]    Scooting [] [] [] [] [] [] [] [] [] [] []    Sit to Supine [] [] [] [] [] [] [] [] [] [] []    Transfers    Sit to Stand [] [] [] [] [] [x] [x] [] [] [] [x]    Bed to Chair [] [] [] [] [x] [x] [] [] [] [] []    Stand to Sit [] [] [] [] [] [x] [x] [] [] [] []    Tub/Shower [] [] [] [] [] [] [] [] [] [] []     Toilet [] [] [] [] [] [x] [x] [] [] [] []  BSC    [] [] [] [] [] [] [] [] [] [] []    I=Independent, Mod I=Modified Independent, S=Supervision/Setup, SBA=Standby Assistance, CGA=Contact Guard Assistance, Min=Minimal Assistance, Mod=Moderate Assistance, Max=Maximal Assistance, Total=Total Assistance, NT=Not Tested    ACTIVITIES OF DAILY LIVING: I Mod I S SBA CGA Min Mod Max Total NT Comments   BASIC ADLs:              Upper Body Bathing [] [] [] [] [] [] [] [] [] []    Lower Body Bathing [] [] [] [] [] [] [] [] [] []    Toileting [] [] [] [] [] [] [x] [] [] [] BSC; A for brief mngt; performed pericare using rocking method on BSC   Upper Body Dressing [] [] [] [] [] [] [] [x] [] [] Sitting EOB adjusting TLSO; rolling in bed to eyad TLSO   Lower Body Dressing [] [] [] [] [] [] [] [] [] []    Feeding [] [] [] [] [] [] [] [] [] []    Grooming [] [] [x] [] [] [] [] [] [] [] Seated on BSC washing hands  wipes   Personal Device Care [] [] [] [] [] [] [] [] [] []    Functional Mobility [] [] [] [] [x] [x] [] [] [] [] Bed > chair > BSC > chair RW   I=Independent, Mod I=Modified Independent, S=Supervision/Setup, SBA=Standby Assistance, CGA=Contact Guard Assistance, Min=Minimal Assistance, Mod=Moderate Assistance, Max=Maximal Assistance, Total=Total Assistance, NT=Not Tested    BALANCE: Good Fair+ Fair Fair- Poor NT Comments   Sitting Static [x] [x] [] [] [] []    Sitting Dynamic [] [] [] [] [] []              Standing Static [] [] [x] [x] [] []    Standing Dynamic [] [] [] [x] [] []        PLAN:     FREQUENCY/DURATION   OT Plan of Care: 3 times/week for duration of hospital stay or until stated goals are met, whichever comes first.    TREATMENT:     TREATMENT:   Co-Treatment PT/OT necessary due to patient's decreased overall endurance/tolerance levels, as well as need for high level skilled assistance to complete functional transfers/mobility and functional tasks  Neuromuscular Re-education (21 Minutes): Patient participated in neuromuscular re-education including postural training, standing tolerance activity , and sitting balance activity   with moderate assistance, verbal cues, tactile cues, education, and adaptive equipment to improve sitting balance, standing balance, static balance, and dynamic balance in order to prepare for functional task, prepare for seated ADLs, prepare for standing ADLs, prepare for functional transfer, increase safety awareness, and prepare for self care. .   Self Care (25 minutes): Patient participated in toileting, upper body dressing, and grooming ADLs in unsupported sitting and standing with moderate manual cueing to increase independence, decrease assistance required, increase activity tolerance, increase safety awareness, and maintain precautions.  Patient also participated in functional mobility, functional transfer, and adaptive equipment training to increase independence, decrease assistance required, increase activity tolerance, increase safety awareness, and maintain precautions. TREATMENT GRID:  N/A    AFTER TREATMENT PRECAUTIONS: Bed/Chair Locked, Call light within reach, Chair, Needs within reach, and RN notified    INTERDISCIPLINARY COLLABORATION:  RN/ PCT, PT/ PTA, and OT/ DOMINGUEZ    EDUCATION:  Education Given To: Patient  Education Provided: Role of Therapy;Plan of Care;Precautions; ADL Adaptive Strategies;Transfer Training;Equipment; Fall Prevention Strategies  Education Outcome: Verbalized understanding    TOTAL TREATMENT DURATION AND TIME:  Time In: 1102  Time Out: 169 [facility]  Minutes: 7154 [facility], OT

## 2023-02-21 NOTE — PROGRESS NOTES
END OF SHIFT NOTE:    INTAKE/OUTPUT  02/19 0701 - 02/20 0700  In: 720 [P.O.:720]  Out: 1350 [Urine:1350]  Voiding: Yes  Catheter: Yes  Drain:   Urinary Catheter 02/15/23 Moore (Active)   $ Urethral catheter insertion $ Not inserted for procedure 02/15/23 1250   Catheter Indications Urinary retention (acute or chronic), continuous bladder irrigation or bladder outlet obstruction 02/20/23 1420   Site Assessment No urethral drainage 02/20/23 1420   Urine Color Yellow 02/20/23 1420   Urine Appearance Clear 02/20/23 1420   Collection Container Standard 02/20/23 1420   Securement Method Securing device (Describe) 02/20/23 1420   Catheter Care  Perineal wipes 02/20/23 1420   Catheter Best Practices  Drainage tube clipped to bed;Catheter secured to thigh; Tamper seal intact; Bag below bladder;Bag not on floor; Lack of dependent loop in tubing;Drainage bag less than half full 02/20/23 1420   Status Draining;Patent 02/20/23 1420   Output (mL) 700 mL 02/20/23 1107               Flatus: Patient does have flatus present. Stool: 0 occurrences. Characteristics:  Stool Appearance: Unable to assess  Stool Color: Unable to assess  Stool Amount: Unable to assess  Stool Assessment  Incontinence: Yes  Stool Appearance: Unable to assess  Stool Color: Unable to assess  Stool Amount: Unable to assess  Stool Source: Rectum  Last BM (including prior to admit): 02/19/23 (Per pt)    Emesis: 0 occurrences. Characteristics:        VITAL SIGNS  Patient Vitals for the past 12 hrs:   Temp Pulse Resp BP SpO2   02/20/23 1925 97.5 °F (36.4 °C) 65 18 130/68 96 %   02/20/23 1531 98.1 °F (36.7 °C) 70 18 110/74 95 %   02/20/23 1200 -- 68 -- 121/66 --   02/20/23 1107 -- 66 18 (!) 93/58 96 %   02/20/23 0736 97.9 °F (36.6 °C) 62 18 (!) 148/64 93 %       Pain Assessment  Pain Level: 6 (02/20/23 1421)  Pain Location: Back  Patient's Stated Pain Goal: 0 - No pain    Ambulating  No    Shift report given to oncoming nurse at the bedside.     Quique NAVA [facility]

## 2023-02-22 ENCOUNTER — ANESTHESIA EVENT (OUTPATIENT)
Dept: SURGERY | Age: 88
End: 2023-02-22
Payer: COMMERCIAL

## 2023-02-22 PROCEDURE — 2580000003 HC RX 258: Performed by: FAMILY MEDICINE

## 2023-02-22 PROCEDURE — 6370000000 HC RX 637 (ALT 250 FOR IP): Performed by: FAMILY MEDICINE

## 2023-02-22 PROCEDURE — 1100000000 HC RM PRIVATE

## 2023-02-22 RX ADMIN — CARVEDILOL 3.12 MG: 3.12 TABLET, FILM COATED ORAL at 08:45

## 2023-02-22 RX ADMIN — CARVEDILOL 3.12 MG: 3.12 TABLET, FILM COATED ORAL at 18:03

## 2023-02-22 RX ADMIN — POLYETHYLENE GLYCOL 3350 17 G: 17 POWDER, FOR SOLUTION ORAL at 09:49

## 2023-02-22 RX ADMIN — GUAIFENESIN 600 MG: 600 TABLET ORAL at 08:45

## 2023-02-22 RX ADMIN — ACETAMINOPHEN 650 MG: 325 TABLET ORAL at 20:53

## 2023-02-22 RX ADMIN — PANTOPRAZOLE SODIUM 40 MG: 40 TABLET, DELAYED RELEASE ORAL at 05:33

## 2023-02-22 RX ADMIN — SODIUM CHLORIDE, PRESERVATIVE FREE 10 ML: 5 INJECTION INTRAVENOUS at 20:53

## 2023-02-22 RX ADMIN — ISOSORBIDE MONONITRATE 60 MG: 60 TABLET, EXTENDED RELEASE ORAL at 08:45

## 2023-02-22 RX ADMIN — GUAIFENESIN 600 MG: 600 TABLET ORAL at 20:53

## 2023-02-22 RX ADMIN — DOCUSATE SODIUM 100 MG: 100 CAPSULE, LIQUID FILLED ORAL at 08:45

## 2023-02-22 RX ADMIN — SODIUM CHLORIDE, PRESERVATIVE FREE 10 ML: 5 INJECTION INTRAVENOUS at 08:47

## 2023-02-22 RX ADMIN — ROSUVASTATIN CALCIUM 20 MG: 20 TABLET, COATED ORAL at 20:53

## 2023-02-22 ASSESSMENT — PAIN SCALES - GENERAL: PAINLEVEL_OUTOF10: 3

## 2023-02-22 ASSESSMENT — PAIN DESCRIPTION - ORIENTATION: ORIENTATION: MID

## 2023-02-22 ASSESSMENT — PAIN DESCRIPTION - LOCATION: LOCATION: BACK

## 2023-02-22 ASSESSMENT — PAIN DESCRIPTION - DESCRIPTORS: DESCRIPTORS: ACHING

## 2023-02-22 NOTE — PROGRESS NOTES
END OF SHIFT NOTE:    INTAKE/OUTPUT  02/20 0701 - 02/21 0700  In: -   Out: 1300 [Urine:1300]  Voiding: Yes  Catheter: Yes  Drain: no  Urinary Catheter 02/15/23 Moore (Active)   $ Urethral catheter insertion $ Not inserted for procedure 02/15/23 1250   Catheter Indications Urinary retention (acute or chronic), continuous bladder irrigation or bladder outlet obstruction 02/21/23 0234   Site Assessment No urethral drainage 02/21/23 1320   Urine Color Yellow 02/21/23 1320   Urine Appearance Clear 02/21/23 1320   Collection Container Standard 02/21/23 1320   Securement Method Securing device (Describe) 02/21/23 1320   Catheter Care  Perineal wipes 02/20/23 1420   Catheter Best Practices  Drainage tube clipped to bed;Catheter secured to thigh; Tamper seal intact; Bag below bladder;Bag not on floor; Lack of dependent loop in tubing;Drainage bag less than half full 02/21/23 1320   Status Draining;Patent 02/21/23 1320   Output (mL) 600 mL 02/21/23 1320               Flatus: Patient does have flatus present. Stool: 1 occurrences. Characteristics:  Stool Appearance: Unable to assess  Stool Color: Unable to assess  Stool Amount: Unable to assess  Stool Assessment  Incontinence: Yes  Stool Appearance: Unable to assess  Stool Color: Unable to assess  Stool Amount: Unable to assess  Stool Source: Rectum  Last BM (including prior to admit): 02/21/23    Emesis: 0 occurrences. Characteristics:        VITAL SIGNS  Patient Vitals for the past 12 hrs:   Temp Pulse Resp BP SpO2   02/21/23 1800 -- 70 -- (!) 148/71 --   02/21/23 1504 97.3 °F (36.3 °C) 69 16 (!) 110/59 92 %   02/21/23 1206 98.5 °F (36.9 °C) 63 18 (!) 101/58 98 %   02/21/23 0740 98.2 °F (36.8 °C) 69 18 (!) 153/74 92 %       Pain Assessment  Pain Level: 4 (02/21/23 1415)  Pain Location: Back  Patient's Stated Pain Goal: 4    Ambulating  No    Shift report given to oncoming nurse at the bedside.     Toan Conklin, RN

## 2023-02-22 NOTE — PROGRESS NOTES
Occupational Therapy Note:    Attempted to see patient this PM for occupational therapy treatment  session. Patient refused OT because she is having surgery tomorrow. Will follow and re-attempt as schedule permits/patient available.      Thank you,    Reubin Cowden, OT    Rehab Caseload Tracker

## 2023-02-22 NOTE — PROGRESS NOTES
Hospitalist Progress Note   Admit Date:  2023  7:07 PM   Name:  Roya Sanchez   Age:  80 y.o. Sex:  female  :  1931   MRN:  [id number]   Room:      Presenting Complaint: No chief complaint on file. Reason(s) for Admission: Intractable pain [R52]     Hospital Course:   Roya Sanchez is a 80 y.o. female with medical history of Roya Sanchez is a 80 y.o. female with medical history of HTN, CAD, CHF, CKD who presents as a direct admit from home with intractable back/flank pain. Over 2 months period, she has been diagnosed with T10, then T9 fractures, and subsequently underwent kyphoplasties on  and , respectively. 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 Neurosurgery consulted and plan for kyphoplasty and bone biopsy on , after plavix wash out. Recommend TLSO when UOOB. Subjective & 24hr Events (23): Patient is seen at the bedside. Reports she is feeling the same. Upset and would like to speak with neurosurgery prior to procedure. I asked nurse to notify neurosurgery. 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 Recommend TLSO when UOOB.   Hold antiplatelet and anticoagulation     History of T9/T10 compression fracture status post kyphoplasty:  Noted  Pain control    Hypertension/hyperlipidemia:  CAD:  Chronic systolic heart failure:  Not in exacerbation  Continue home meds: carvedilol, Imdur and rosuvastatin  Holding aspirin and Plavix for procedure on 2/23    CKD stage III:  Kidney function stable  Avoid nephrotoxic agent    Urinary retention:  Moore placed due to urinary retention  Patient has possible procedure tomorrow, give voiding trial postprocedure      PT/OT evals and PPD needed/ordered?  Yes    Diet:  ADULT DIET; Regular  Diet NPO  VTE prophylaxis: SCD's   Code status: Full Code    Hospital Problems:  Principal Problem:    Intractable pain  Active Problems:    Chronic renal disease, stage III ([facility]) [994833]    Chronic systolic (congestive) heart failure    Compression fracture of body of thoracic vertebra (HCC)    S/P kyphoplasty    CAD (coronary artery disease)    HTN (hypertension)  Resolved Problems:    * No resolved hospital problems. *      Objective:   Patient Vitals for the past 24 hrs:   Temp Pulse Resp BP SpO2   02/22/23 1152 98.2 °F (36.8 °C) 68 17 139/63 93 %   02/22/23 0802 98.4 °F (36.9 °C) 62 21 (!) 157/76 92 %   02/22/23 0252 98.2 °F (36.8 °C) 71 16 (!) 146/77 96 %   02/21/23 2313 98.2 °F (36.8 °C) 67 18 139/66 94 %   02/21/23 1915 97.5 °F (36.4 °C) 65 18 (!) 143/66 95 %   02/21/23 1800 -- 70 -- (!) 148/71 --   02/21/23 1504 97.3 °F (36.3 °C) 69 16 (!) 110/59 92 %         Oxygen Therapy  SpO2: 93 %  O2 Device: None (Room air)    Estimated body mass index is 20.34 kg/m² as calculated from the following:    Height as of this encounter: 5' 6\" (1.676 m).    Weight as of this encounter: 126 lb (57.2 kg).    Intake/Output Summary (Last 24 hours) at 2/22/2023 1457  Last data filed at 2/22/2023 1231  Gross per 24 hour   Intake --   Output 1900 ml   Net -1900 ml           Physical Exam:     Blood pressure 139/63, pulse 68, temperature 98.2 °F (36.8 °C), temperature source Oral, resp. rate 17, height 5' 6\"  (1.676 m), weight 126 lb (57.2 kg), SpO2 93 %. General:    Well nourished. Careful due to pain  Head:  Normocephalic, atraumatic  Eyes:  Sclerae appear normal.  Pupils equally round. ENT:  Nares appear normal.  Moist oral mucosa  Neck:  No restricted ROM. Trachea midline   CV:   RRR. No m/r/g. No jugular venous distension. Lungs:   CTAB. No wheezing, rhonchi, or rales. Symmetric expansion. Abdomen:   Soft, nontender, nondistended. Bilateral flank tenderness  Extremities: No cyanosis or clubbing. No edema  Skin:     No rashes and normal coloration. Warm and dry. Neuro:  CN II-XII grossly intact. Psych:  Normal mood and affect. I have personally reviewed labs and tests:  Recent Labs:  No results found for this or any previous visit (from the past 48 hour(s)). I have personally reviewed imaging studies:  Other Studies:  MRI LUMBAR SPINE WO CONTRAST   Final Result   1. No acute musculoskeletal abnormality or compression fracture. 2. Multilevel degenerative disc and facet disease with a prominent left   paracentral/posterior lateral disc protrusion at L3-L4. This does not result in   central stenosis. There is mild left foramina narrowing. 3. Moderate bilateral foramina narrowing at L4-L5 with mild bilateral foramina   narrowing at L5-S1. MRI THORACIC SPINE WO CONTRAST   Final Result   1. Status post kyphoplasty at T9 and T10 without obvious complication. 2. 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 3. No central spinal stenosis or cord compression. 4. Bilateral pleural effusions. CT ABDOMEN PELVIS WO CONTRAST Additional Contrast? Radiologist Recommendation   Final Result         1. 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 No evidence of   obstructive uropathy. Bilateral renal calcifications noted.    2. Bilateral pleural effusions with bibasilar atelectasis present. New when   compared with the prior exam.   3. Diverticulosis. No CT evidence of diverticulitis. XR CHEST PORTABLE   Final Result      1. No acute cardiopulmonary process identified. This exam was interpreted by a board-certified, body-imaging fellowship trained    radiologist from [address] [place] [address]. If there are any questions regarding    this examination please feel free to contact a radiologist at [phone].       [address]    2/14/2023 9:00:00 PM          Current Meds:  Current Facility-Administered Medications   Medication Dose Route Frequency    HYDROcodone homatropine (HYCODAN) 5-1.5 MG/5ML solution 5 mL  5 mL Oral Q4H PRN    docusate sodium (COLACE) capsule 100 mg  100 mg Oral Daily    rosuvastatin (CRESTOR) tablet 20 mg  20 mg Oral Nightly    guaiFENesin (MUCINEX) extended release tablet 600 mg  600 mg Oral BID    [Held by provider] aspirin EC tablet 81 mg  81 mg Oral Daily    pantoprazole (PROTONIX) tablet 40 mg  40 mg Oral Daily    carvedilol (COREG) tablet 3.125 mg  3.125 mg Oral BID with meals    isosorbide mononitrate (IMDUR) extended release tablet 60 mg  60 mg Oral Daily    tiZANidine (ZANAFLEX) tablet 2 mg  2 mg Oral Q8H PRN    lidocaine 4 % external patch 1 patch  1 patch TransDERmal Daily    [Held by provider] losartan (COZAAR) tablet 25 mg  25 mg Oral Daily    [Held by provider] spironolactone (ALDACTONE) tablet 25 mg  25 mg Oral Daily    sodium chloride flush 0.9 % injection 5-40 mL  5-40 mL IntraVENous 2 times per day    sodium chloride flush 0.9 % injection 5-40 mL  5-40 mL IntraVENous PRN    0.9 % sodium chloride infusion   IntraVENous PRN    ondansetron (ZOFRAN-ODT) disintegrating tablet 4 mg  4 mg Oral Q8H PRN    Or    ondansetron (ZOFRAN) injection 4 mg  4 mg IntraVENous Q6H PRN    polyethylene glycol (GLYCOLAX) packet 17 g  17 g Oral Daily PRN    acetaminophen (TYLENOL) tablet 650 mg  650 mg Oral Q6H PRN    Or acetaminophen (TYLENOL) suppository 650 mg  650 mg Rectal Q6H PRN    oxyCODONE-acetaminophen (PERCOCET)  MG per tablet 1 tablet  1 tablet Oral Q4H PRN    morphine injection 2 mg  2 mg IntraVENous Q4H PRN       Signed:  Talya Martines MD    Part of this note may have been written by using a voice dictation software. The note has been proof read but may still contain some grammatical/other typographical errors.

## 2023-02-22 NOTE — PROGRESS NOTES
Physical Therapy Note:    Attempted to see patient this PM for physical therapy treatment  session. Patient refused PT. Will follow and re-attempt as schedule permits/patient available.  Thank you,    KATE Aden, PT     Rehab Caseload Tracker

## 2023-02-22 NOTE — ANESTHESIA PRE PROCEDURE
Department of Anesthesiology  Preprocedure Note       Name:  Jordana Zarate   Age:  89 y.o.  :  1931                                          MRN:  [id number]         Date:  2023      Surgeon: Surgeon(s):  Mando Chilel MD    Procedure: Procedure(s):  T11 Kyphoplasty and Bone Biopsy    Medications prior to admission:   Prior to Admission medications    Medication Sig Start Date End Date Taking? Authorizing Provider   HYDROcodone-acetaminophen (NORCO) 5-325 MG per tablet Take 1 tablet by mouth every 6 hours as needed for Pain.    Historical Provider, MD   metaxalone (SKELAXIN) 800 MG tablet  23   Historical Provider, MD   tiZANidine (ZANAFLEX) 2 MG tablet Take 1 tablet by mouth every 8 hours as needed (back pain) 23   Elicia Coello MD   ondansetron (ZOFRAN-ODT) 4 MG disintegrating tablet Take 1 tablet by mouth 3 times daily as needed for Nausea or Vomiting 23   Moise Bain MD   irbesartan (AVAPRO) 75 MG tablet TAKE 1 TABLET DAILY 22   Jorge Munson MD   carvedilol (COREG) 3.125 MG tablet Take 1 tablet by mouth 2 times daily TAKE 1 TABLET TWICE DAILY WITH MEALS 22   Moise Bain MD   clopidogrel (PLAVIX) 75 MG tablet Take 1 tablet by mouth daily The details of the medication are not available because there are pending changes by a home health clinician. 22   Moise Bain MD   isosorbide mononitrate (IMDUR) 60 MG extended release tablet Take 1 tablet by mouth daily 22   Moise Bain MD   rosuvastatin (CRESTOR) 20 MG tablet Take 1 tablet by mouth daily TAKE 1 TABLET NIGHTLY 22   Moise Bain MD   spironolactone (ALDACTONE) 25 MG tablet Take 1 tablet by mouth daily 22   Moise Bain MD   Cranberry 250 MG TABS Take 4,200 mg by mouth 2 times daily  Patient not taking: No sig reported    Historical Provider, MD   Multiple Vitamins-Minerals (HAIR SKIN AND NAILS FORMULA PO) Take by mouth    Historical  Provider, MD   aspirin 81 MG EC tablet Take 81 mg by mouth daily    Manju Dorsey MD   pantoprazole (PROTONIX) 20 MG tablet Take 1 tablet by mouth daily 6/23/22   Manju Dorsey MD   APPLE CIDER VINEGAR PO Take 480 mg by mouth daily  Patient not taking: No sig reported    Ar Automatic Reconciliation   TURMERIC PO Take 500 mg by mouth daily  Patient not taking: No sig reported    Ar Automatic Reconciliation   Cholecalciferol 50 MCG (2000 UT) CAPS Take 2,000 Units by mouth daily  Patient not taking: Reported on 2/14/2023    Ar Automatic Reconciliation   cyanocobalamin 100 MCG tablet Take 100 mcg by mouth daily  Patient not taking: No sig reported    Ar Automatic Reconciliation   Lidocaine, Anorectal, 5 % CREA Actual prescription: Lidocaine 5%: Neurontin/gabapentin 5% combined topical cream/gelApply to affected area up to 4 times a day as needed for pain  Patient not taking: No sig reported 6/28/21   Ar Automatic Reconciliation   loratadine (CLARITIN) 10 MG tablet Take 10 mg by mouth daily  Patient not taking: No sig reported    Ar Automatic Reconciliation   montelukast (SINGULAIR) 10 MG tablet Take 10 mg by mouth daily  Patient not taking: No sig reported 3/31/22   Ar Automatic Reconciliation       Current medications:    No current facility-administered medications for this visit. No current outpatient medications on file.      Facility-Administered Medications Ordered in Other Visits   Medication Dose Route Frequency Provider Last Rate Last Admin    HYDROcodone homatropine (HYCODAN) 5-1.5 MG/5ML solution 5 mL  5 mL Oral Q4H PRN Rocio Gomez MD   5 mL at 02/19/23 1356    docusate sodium (COLACE) capsule 100 mg  100 mg Oral Daily Rocio Gomez MD   100 mg at 02/22/23 0845    rosuvastatin (CRESTOR) tablet 20 mg  20 mg Oral Nightly Rocio Gomez MD   20 mg at 02/21/23 2109    guaiFENesin [facility]) extended release tablet 600 mg  600 mg Oral BID Rocio Gomez MD   600 mg at 02/22/23 0845    [Held by provider] aspirin EC tablet 81 mg  81 mg Oral Daily Cody You MD   81 mg at 02/16/23 6040    pantoprazole (PROTONIX) tablet 40 mg  40 mg Oral Daily Cody You MD   40 mg at 02/22/23 0533    carvedilol (COREG) tablet 3.125 mg  3.125 mg Oral BID with meals Cody You MD   3.125 mg at 02/22/23 0845    isosorbide mononitrate (IMDUR) extended release tablet 60 mg  60 mg Oral Daily Cody You MD   60 mg at 02/22/23 0845    tiZANidine (ZANAFLEX) tablet 2 mg  2 mg Oral Q8H PRN Cody You MD        lidocaine 4 % external patch 1 patch  1 patch TransDERmal Daily Cody You MD   1 patch at 02/21/23 3310    [Held by provider] losartan (COZAAR) tablet 25 mg  25 mg Oral Daily Cody You MD        [Held by provider] spironolactone (ALDACTONE) tablet 25 mg  25 mg Oral Daily Cody You MD        sodium chloride flush 0.9 % injection 5-40 mL  5-40 mL IntraVENous 2 times per day Cody You MD   10 mL at 02/22/23 0847    sodium chloride flush 0.9 % injection 5-40 mL  5-40 mL IntraVENous PRN Cody You MD        0.9 % sodium chloride infusion   IntraVENous PRN Cody You MD   Stopped at 02/15/23 1804    ondansetron (ZOFRAN-ODT) disintegrating tablet 4 mg  4 mg Oral Q8H PRN Cody You MD   4 mg at 02/21/23 0933    Or    ondansetron (ZOFRAN) injection 4 mg  4 mg IntraVENous Q6H PRN Cody You MD   4 mg at 02/18/23 1204    polyethylene glycol (GLYCOLAX) packet 17 g  17 g Oral Daily PRN Cody You MD   17 g at 02/22/23 0949    acetaminophen (TYLENOL) tablet 650 mg  650 mg Oral Q6H PRN Cody You MD   650 mg at 02/21/23 2109    Or    acetaminophen (TYLENOL) suppository 650 mg  650 mg Rectal Q6H PRN Cody You MD        oxyCODONE-acetaminophen (PERCOCET)  MG per tablet 1 tablet  1 tablet Oral Q4H PRN Cody You MD   1 tablet at 02/20/23 1421    morphine injection 2 mg  2 mg IntraVENous Q4H PRN Cody You MD   2 mg at 02/21/23 1315       Allergies: Allergies   Allergen Reactions    Amlodipine Other (See Comments)     Other reaction(s): Unknown-Unspecified    Quinapril Other (See Comments)     Other reaction(s): Cough-Intolerance    Sertraline Other (See Comments)     Other reaction(s): Drowsiness    Prednisone Itching, Palpitations and Other (See Comments)     Per pt also had heart burn       Problem List:    Patient Active Problem List   Diagnosis Code    Mixed dyslipidemia E78.2    Osteoporosis M81.0    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 ([facility] [address] I25.118    Primary osteoarthritis of both knees M17.0    Ischemic heart disease I25.9    Hypertensive urgency I16.0    Esophageal reflux K21.9    CAD (coronary artery disease) I25.10    Left foot pain M79.672    HTN (hypertension) I10    CVD (cerebrovascular disease) I67.9    History of cerebellar stroke Z86.73    Chronic renal disease, stage III ([facility] [address] [261200] N18.30    Chronic systolic (congestive) heart failure I50.22    Strain of lumbar region S39.012A    Acute cystitis with hematuria N30.01    Compression fracture of body of thoracic vertebra (HCC) S22.000A    S/P kyphoplasty Z98.890    Intractable pain R52       Past Medical History:        Diagnosis Date    CAD (coronary artery disease)     2 MI    Calculus of kidney     Closed nondisplaced fracture of fifth left metatarsal bone 5/18/2021    Congestive heart failure ([facility] [address]     Coronary artery disease involving coronary bypass graft of native heart without angina pectoris 12/14/2015    CVD (cerebrovascular disease) 8/19/2013    Dyslipidemia 8/19/2013    GERD (gastroesophageal reflux disease)     HTN (hypertension) 8/19/2013    Left breast mass 4/13/2017    Diagnostic Mammogram negative.     Long term current use of anticoagulant therapy     Myocardial infarction [facility]) 2005, 2006    [address] Card[address], Dr. Audrey Hendrickson    Osteoporosis 8/19/2013    Positive H. pylori test 8/19/2013  Rectal polyp 10/9/2017    Anoscopy:  Revealed large cauliflower-like polyp at the anterior midline just above the dentate line, mobile soft, to have removed with Dr. Liam Torre ([facility]), benign. No more fecal leakage.  Stroke (cerebrum) ([facility] [address] 2/16/2020    Stroke ([facility] [address] 1/7/2009    no deficits expect patient reports going to one side if she gets up too fast    Thromboembolus [facility])     pulmanary       Past Surgical History:        Procedure Laterality Date    APPENDECTOMY      BLADDER SUSPENSION  2005    COLONOSCOPY  2012   Jaya Lizarraga.    @ [facility]    GYN      hysterectomy    HYSTERECTOMY, TOTAL ABDOMINAL (CERVIX REMOVED)  1972    IR KYPHOPLASTY THORACIC FIRST LEVEL  1/30/2023    IR KYPHOPLASTY THORACIC FIRST LEVEL 1/30/2023 [facility] SPE[facility]    IR KYPHOPLASTY THORACIC FIRST LEVEL  2/9/2023    IR KYPHOPLASTY THORACIC FIRST LEVEL 2/9/2023 [facility]OLOGY SPECIALS    LITHOTRIPSY  2008 x 2    ORTHOPEDIC SURGERY      2 rotater cuff right shoulder    NH UNLISTED PROCEDURE CARDIAC SURGERY      by pass    UROLOGICAL SURGERY      suspension       Social History:    Social History     Tobacco Use    Smoking status: Never    Smokeless tobacco: Never   Substance Use Topics    Alcohol use: No                                Counseling given: Not Answered      Vital Signs (Current): There were no vitals filed for this visit.                                            BP Readings from Last 3 Encounters:   02/22/23 (!) 157/76   02/13/23 (!) 160/70   02/09/23 (!) 141/70       NPO Status:                                                                                 BMI:   Wt Readings from Last 3 Encounters:   02/22/23 126 lb (57.2 kg)   02/06/23 135 lb (61.2 kg)   01/30/23 137 lb (62.1 kg)     There is no height or weight on file to calculate BMI.    CBC:   Lab Results   Component Value Date/Time    WBC 6.5 02/17/2023 08:20 AM    RBC 4.55 02/17/2023 08:20 AM    HGB 13.2 02/17/2023 08:20 AM HCT 40.1 02/17/2023 08:20 AM    MCV 88.1 02/17/2023 08:20 AM    RDW 11.8 02/17/2023 08:20 AM     02/17/2023 08:20 AM       CMP:   Lab Results   Component Value Date/Time     02/17/2023 08:20 AM    K 3.7 02/17/2023 08:20 AM     02/17/2023 08:20 AM    CO2 27 02/17/2023 08:20 AM    BUN 14 02/17/2023 08:20 AM    CREATININE 0.90 02/17/2023 08:20 AM    GFRAA >60 07/05/2022 01:51 PM    AGRATIO 1.8 12/30/2021 11:05 AM    LABGLOM >60 02/17/2023 08:20 AM    GLUCOSE 95 02/17/2023 08:20 AM    PROT 7.0 02/14/2023 08:13 PM    CALCIUM 10.0 02/17/2023 08:20 AM    BILITOT 0.6 02/14/2023 08:13 PM    ALKPHOS 77 02/14/2023 08:13 PM    ALKPHOS 57 12/30/2021 11:05 AM    AST 26 02/14/2023 08:13 PM    ALT 16 02/14/2023 08:13 PM       POC Tests: No results for input(s): POCGLU, POCNA, POCK, POCCL, POCBUN, POCHEMO, POCHCT in the last 72 hours. Coags:   Lab Results   Component Value Date/Time    PROTIME 13.2 03/18/2021 07:18 AM    INR 1.0 03/18/2021 07:18 AM    APTT >200.0 03/18/2021 06:36 PM       HCG (If Applicable): No results found for: PREGTESTUR, PREGSERUM, HCG, HCGQUANT     ABGs: No results found for: PHART, PO2ART, GRM3MCA, PZO0TMO, BEART, I4PCDASV     Type & Screen (If Applicable):  No results found for: LABABO, LABRH    Drug/Infectious Status (If Applicable):  No results found for: HIV, HEPCAB    COVID-19 Screening (If Applicable): No results found for: COVID19        Anesthesia Evaluation  Patient summary reviewed and Nursing notes reviewed no history of anesthetic complications:   Airway: Mallampati: II  TM distance: >3 FB   Neck ROM: limited  Mouth opening: < 3 FB   Dental:          Pulmonary:                              Cardiovascular:  Exercise tolerance: no interval change,   (+) hypertension:, pacemaker (DDD @ 60 - not pacer dependent per last interrogation ): pacemaker, past MI (1988):, CAD:, CABG/stent (1988):, CHF (? ICM): systolic, hyperlipidemia               ROS comment: TTE 4/2021:   Mod LV dysfunction (? EF 30-40%)     Neuro/Psych:   (+) CVA (last 2021): residual symptoms,             GI/Hepatic/Renal:   (+) GERD:, renal disease: CRI,           Endo/Other:    (+) : arthritis:., .                 Abdominal:             Vascular:   + PVD, aortic or cerebral, . ROS comment: PE. Other Findings:             Anesthesia Plan      TIVA     ASA 4     (Chart review only. Has had multiple kyphoplasty under TIVA and appeared to do well)  Induction: intravenous.             Attending anesthesiologist reviewed and agrees with Carley Pitts MD   2/22/2023

## 2023-02-22 NOTE — CARE COORDINATION
CM continues to follow for discharge planning and/or CM needs. Discharge plan pending at this time is that pt will transition to [facility] for [address] [place] when stable for discharge. Pt with procedure scheduled for tentatively tomorrow. Will need new PT/OT evals completed and will require prior authorization from insurance for admission to [facility]. No additional CM needs at this time. Will continue to monitor and update as needed.

## 2023-02-22 NOTE — PROGRESS NOTES
END OF SHIFT NOTE:    INTAKE/OUTPUT  02/21 0701 - 02/22 0700  In: -   Out: 2100 [Urine:2100]  Voiding: No  Catheter: Yes  Drain:   Urinary Catheter 02/15/23 Moore (Active)   $ Urethral catheter insertion $ Not inserted for procedure 02/15/23 1250   Catheter Indications Urinary retention (acute or chronic), continuous bladder irrigation or bladder outlet obstruction 02/22/23 0045   Site Assessment No urethral drainage 02/22/23 0045   Urine Color Yellow 02/22/23 0045   Urine Appearance Clear 02/22/23 0045   Collection Container Standard 02/22/23 0045   Securement Method Securing device (Describe) 02/22/23 0045   Catheter Care  Perineal wipes 02/21/23 1942   Catheter Best Practices  Drainage tube clipped to bed;Catheter secured to thigh; Tamper seal intact; Bag below bladder;Bag not on floor; Lack of dependent loop in tubing;Drainage bag less than half full 02/21/23 1942   Status Draining;Patent 02/21/23 1942   Output (mL) 1350 mL 02/22/23 0533               Flatus: Patient does have flatus present. Stool:  occurrences. Characteristics:  Stool Appearance: Unable to assess  Stool Color: Unable to assess  Stool Amount: Unable to assess  Stool Assessment  Incontinence: Yes  Stool Appearance: Unable to assess  Stool Color: Unable to assess  Stool Amount: Unable to assess  Stool Source: Rectum  Last BM (including prior to admit): 02/21/23    Emesis:  occurrences. Characteristics:        VITAL SIGNS  Patient Vitals for the past 12 hrs:   Temp Pulse Resp BP SpO2   02/22/23 0252 98.2 °F (36.8 °C) 71 16 (!) 146/77 96 %   02/21/23 2313 98.2 °F (36.8 °C) 67 18 139/66 94 %   02/21/23 1915 97.5 °F (36.4 °C) 65 18 (!) 143/66 95 %   02/21/23 1800 -- 70 -- (!) 148/71 --       Pain Assessment  Pain Level: 4 (02/21/23 1415)  Pain Location: Back  Patient's Stated Pain Goal: 4    Ambulating  No    Shift report given to oncoming nurse at the bedside.     Melyssa Hoover RN

## 2023-02-23 ENCOUNTER — APPOINTMENT (OUTPATIENT)
Dept: GENERAL RADIOLOGY | Age: 88
DRG: 478 | End: 2023-02-23
Attending: INTERNAL MEDICINE
Payer: COMMERCIAL

## 2023-02-23 ENCOUNTER — ANESTHESIA (OUTPATIENT)
Dept: SURGERY | Age: 88
End: 2023-02-23
Payer: COMMERCIAL

## 2023-02-23 PROBLEM — D18.09 VERTEBRAL BODY HEMANGIOMA: Status: ACTIVE | Noted: 2023-02-23

## 2023-02-23 PROBLEM — S22.080A COMPRESSION FRACTURE OF T11 VERTEBRA (HCC): Status: ACTIVE | Noted: 2023-02-06

## 2023-02-23 PROCEDURE — 2580000003 HC RX 258: Performed by: FAMILY MEDICINE

## 2023-02-23 PROCEDURE — 3700000000 HC ANESTHESIA ATTENDED CARE: Performed by: NEUROLOGICAL SURGERY

## 2023-02-23 PROCEDURE — 6360000004 HC RX CONTRAST MEDICATION: Performed by: NEUROLOGICAL SURGERY

## 2023-02-23 PROCEDURE — 0PS43ZZ REPOSITION THORACIC VERTEBRA, PERCUTANEOUS APPROACH: ICD-10-PCS | Performed by: NEUROLOGICAL SURGERY

## 2023-02-23 PROCEDURE — 0PU43JZ SUPPLEMENT THORACIC VERTEBRA WITH SYNTHETIC SUBSTITUTE, PERCUTANEOUS APPROACH: ICD-10-PCS | Performed by: NEUROLOGICAL SURGERY

## 2023-02-23 PROCEDURE — 2500000003 HC RX 250 WO HCPCS: Performed by: NEUROLOGICAL SURGERY

## 2023-02-23 PROCEDURE — 3600000014 HC SURGERY LEVEL 4 ADDTL 15MIN: Performed by: NEUROLOGICAL SURGERY

## 2023-02-23 PROCEDURE — C1713 ANCHOR/SCREW BN/BN,TIS/BN: HCPCS | Performed by: NEUROLOGICAL SURGERY

## 2023-02-23 PROCEDURE — 3700000001 HC ADD 15 MINUTES (ANESTHESIA): Performed by: NEUROLOGICAL SURGERY

## 2023-02-23 PROCEDURE — 1100000000 HC RM PRIVATE

## 2023-02-23 PROCEDURE — 7100000000 HC PACU RECOVERY - FIRST 15 MIN: Performed by: NEUROLOGICAL SURGERY

## 2023-02-23 PROCEDURE — 72070 X-RAY EXAM THORAC SPINE 2VWS: CPT

## 2023-02-23 PROCEDURE — 6370000000 HC RX 637 (ALT 250 FOR IP): Performed by: FAMILY MEDICINE

## 2023-02-23 PROCEDURE — 2720000010 HC SURG SUPPLY STERILE: Performed by: NEUROLOGICAL SURGERY

## 2023-02-23 PROCEDURE — 2709999900 HC NON-CHARGEABLE SUPPLY: Performed by: NEUROLOGICAL SURGERY

## 2023-02-23 PROCEDURE — 7100000001 HC PACU RECOVERY - ADDTL 15 MIN: Performed by: NEUROLOGICAL SURGERY

## 2023-02-23 PROCEDURE — 6360000002 HC RX W HCPCS: Performed by: ANESTHESIOLOGY

## 2023-02-23 PROCEDURE — 0RJ63ZZ INSPECTION OF THORACIC VERTEBRAL JOINT, PERCUTANEOUS APPROACH: ICD-10-PCS | Performed by: NEUROLOGICAL SURGERY

## 2023-02-23 PROCEDURE — 2500000003 HC RX 250 WO HCPCS

## 2023-02-23 PROCEDURE — 6360000002 HC RX W HCPCS

## 2023-02-23 PROCEDURE — 2580000003 HC RX 258: Performed by: ANESTHESIOLOGY

## 2023-02-23 PROCEDURE — 3600000004 HC SURGERY LEVEL 4 BASE: Performed by: NEUROLOGICAL SURGERY

## 2023-02-23 PROCEDURE — 0P943ZX DRAINAGE OF THORACIC VERTEBRA, PERCUTANEOUS APPROACH, DIAGNOSTIC: ICD-10-PCS | Performed by: NEUROLOGICAL SURGERY

## 2023-02-23 DEVICE — BONE CEMENT CX01A XPEDE US
Type: IMPLANTABLE DEVICE | Status: FUNCTIONAL
Brand: KYPHON® XPEDE™ BONE CEMENT

## 2023-02-23 RX ORDER — PROPOFOL 10 MG/ML
INJECTION, EMULSION INTRAVENOUS CONTINUOUS PRN
Status: DISCONTINUED | OUTPATIENT
Start: 2023-02-23 | End: 2023-02-23 | Stop reason: SDUPTHER

## 2023-02-23 RX ORDER — IPRATROPIUM BROMIDE AND ALBUTEROL SULFATE 2.5; .5 MG/3ML; MG/3ML
1 SOLUTION RESPIRATORY (INHALATION)
Status: DISCONTINUED | OUTPATIENT
Start: 2023-02-23 | End: 2023-02-23 | Stop reason: HOSPADM

## 2023-02-23 RX ORDER — SODIUM CHLORIDE 0.9 % (FLUSH) 0.9 %
5-40 SYRINGE (ML) INJECTION EVERY 12 HOURS SCHEDULED
Status: DISCONTINUED | OUTPATIENT
Start: 2023-02-23 | End: 2023-02-23 | Stop reason: HOSPADM

## 2023-02-23 RX ORDER — FENTANYL CITRATE 50 UG/ML
INJECTION, SOLUTION INTRAMUSCULAR; INTRAVENOUS PRN
Status: DISCONTINUED | OUTPATIENT
Start: 2023-02-23 | End: 2023-02-23 | Stop reason: SDUPTHER

## 2023-02-23 RX ORDER — LIDOCAINE HYDROCHLORIDE 10 MG/ML
1 INJECTION, SOLUTION INFILTRATION; PERINEURAL
Status: DISCONTINUED | OUTPATIENT
Start: 2023-02-23 | End: 2023-02-23 | Stop reason: HOSPADM

## 2023-02-23 RX ORDER — HYDROMORPHONE HCL 110MG/55ML
0.25 PATIENT CONTROLLED ANALGESIA SYRINGE INTRAVENOUS EVERY 5 MIN PRN
Status: COMPLETED | OUTPATIENT
Start: 2023-02-23 | End: 2023-02-23

## 2023-02-23 RX ORDER — SODIUM CHLORIDE 0.9 % (FLUSH) 0.9 %
5-40 SYRINGE (ML) INJECTION PRN
Status: DISCONTINUED | OUTPATIENT
Start: 2023-02-23 | End: 2023-02-23 | Stop reason: HOSPADM

## 2023-02-23 RX ORDER — ONDANSETRON 2 MG/ML
INJECTION INTRAMUSCULAR; INTRAVENOUS PRN
Status: DISCONTINUED | OUTPATIENT
Start: 2023-02-23 | End: 2023-02-23 | Stop reason: SDUPTHER

## 2023-02-23 RX ORDER — BUPIVACAINE HYDROCHLORIDE AND EPINEPHRINE 5; 5 MG/ML; UG/ML
INJECTION, SOLUTION EPIDURAL; INTRACAUDAL; PERINEURAL PRN
Status: DISCONTINUED | OUTPATIENT
Start: 2023-02-23 | End: 2023-02-23 | Stop reason: HOSPADM

## 2023-02-23 RX ORDER — ONDANSETRON 2 MG/ML
4 INJECTION INTRAMUSCULAR; INTRAVENOUS
Status: DISCONTINUED | OUTPATIENT
Start: 2023-02-23 | End: 2023-02-23 | Stop reason: HOSPADM

## 2023-02-23 RX ORDER — ACETAMINOPHEN 325 MG/1
650 TABLET ORAL ONCE
Status: DISCONTINUED | OUTPATIENT
Start: 2023-02-23 | End: 2023-02-23 | Stop reason: HOSPADM

## 2023-02-23 RX ORDER — OXYCODONE HYDROCHLORIDE 5 MG/1
10 TABLET ORAL PRN
Status: DISCONTINUED | OUTPATIENT
Start: 2023-02-23 | End: 2023-02-23 | Stop reason: HOSPADM

## 2023-02-23 RX ORDER — MIDAZOLAM HYDROCHLORIDE 2 MG/2ML
2 INJECTION, SOLUTION INTRAMUSCULAR; INTRAVENOUS
Status: DISCONTINUED | OUTPATIENT
Start: 2023-02-23 | End: 2023-02-23 | Stop reason: HOSPADM

## 2023-02-23 RX ORDER — PHENYLEPHRINE HYDROCHLORIDE 10 MG/ML
INJECTION INTRAVENOUS PRN
Status: DISCONTINUED | OUTPATIENT
Start: 2023-02-23 | End: 2023-02-23 | Stop reason: SDUPTHER

## 2023-02-23 RX ORDER — ROCURONIUM BROMIDE 10 MG/ML
INJECTION, SOLUTION INTRAVENOUS PRN
Status: DISCONTINUED | OUTPATIENT
Start: 2023-02-23 | End: 2023-02-23 | Stop reason: SDUPTHER

## 2023-02-23 RX ORDER — LIDOCAINE HYDROCHLORIDE 20 MG/ML
INJECTION, SOLUTION EPIDURAL; INFILTRATION; INTRACAUDAL; PERINEURAL PRN
Status: DISCONTINUED | OUTPATIENT
Start: 2023-02-23 | End: 2023-02-23 | Stop reason: SDUPTHER

## 2023-02-23 RX ORDER — OXYCODONE HYDROCHLORIDE 5 MG/1
5 TABLET ORAL PRN
Status: DISCONTINUED | OUTPATIENT
Start: 2023-02-23 | End: 2023-02-23 | Stop reason: HOSPADM

## 2023-02-23 RX ORDER — SODIUM CHLORIDE, SODIUM LACTATE, POTASSIUM CHLORIDE, CALCIUM CHLORIDE 600; 310; 30; 20 MG/100ML; MG/100ML; MG/100ML; MG/100ML
INJECTION, SOLUTION INTRAVENOUS CONTINUOUS
Status: DISCONTINUED | OUTPATIENT
Start: 2023-02-23 | End: 2023-02-23 | Stop reason: HOSPADM

## 2023-02-23 RX ADMIN — HYDROMORPHONE HYDROCHLORIDE 0.25 MG: 2 INJECTION, SOLUTION INTRAMUSCULAR; INTRAVENOUS; SUBCUTANEOUS at 15:24

## 2023-02-23 RX ADMIN — SODIUM CHLORIDE, PRESERVATIVE FREE 10 ML: 5 INJECTION INTRAVENOUS at 22:03

## 2023-02-23 RX ADMIN — ROCURONIUM BROMIDE 30 MG: 50 INJECTION, SOLUTION INTRAVENOUS at 13:49

## 2023-02-23 RX ADMIN — HYDROMORPHONE HYDROCHLORIDE 0.25 MG: 2 INJECTION, SOLUTION INTRAMUSCULAR; INTRAVENOUS; SUBCUTANEOUS at 15:00

## 2023-02-23 RX ADMIN — FENTANYL CITRATE 25 MCG: 50 INJECTION, SOLUTION INTRAMUSCULAR; INTRAVENOUS at 13:49

## 2023-02-23 RX ADMIN — OXYCODONE AND ACETAMINOPHEN 1 TABLET: 10; 325 TABLET ORAL at 17:48

## 2023-02-23 RX ADMIN — ACETAMINOPHEN 650 MG: 325 TABLET ORAL at 08:45

## 2023-02-23 RX ADMIN — PHENYLEPHRINE HYDROCHLORIDE 100 MCG: 10 INJECTION INTRAVENOUS at 13:50

## 2023-02-23 RX ADMIN — ONDANSETRON 4 MG: 2 INJECTION INTRAMUSCULAR; INTRAVENOUS at 14:37

## 2023-02-23 RX ADMIN — PHENYLEPHRINE HYDROCHLORIDE 100 MCG: 10 INJECTION INTRAVENOUS at 14:02

## 2023-02-23 RX ADMIN — SUGAMMADEX 100 MG: 100 INJECTION, SOLUTION INTRAVENOUS at 14:41

## 2023-02-23 RX ADMIN — PROPOFOL 20 MG: 10 INJECTION, EMULSION INTRAVENOUS at 14:25

## 2023-02-23 RX ADMIN — OXYCODONE AND ACETAMINOPHEN 1 TABLET: 10; 325 TABLET ORAL at 22:03

## 2023-02-23 RX ADMIN — CARVEDILOL 3.12 MG: 3.12 TABLET, FILM COATED ORAL at 17:48

## 2023-02-23 RX ADMIN — GUAIFENESIN 600 MG: 600 TABLET ORAL at 22:03

## 2023-02-23 RX ADMIN — SODIUM CHLORIDE, SODIUM LACTATE, POTASSIUM CHLORIDE, AND CALCIUM CHLORIDE: 600; 310; 30; 20 INJECTION, SOLUTION INTRAVENOUS at 11:12

## 2023-02-23 RX ADMIN — CARVEDILOL 3.12 MG: 3.12 TABLET, FILM COATED ORAL at 08:45

## 2023-02-23 RX ADMIN — PROPOFOL 20 MG: 10 INJECTION, EMULSION INTRAVENOUS at 13:51

## 2023-02-23 RX ADMIN — ISOSORBIDE MONONITRATE 60 MG: 60 TABLET, EXTENDED RELEASE ORAL at 08:45

## 2023-02-23 RX ADMIN — HYDROMORPHONE HYDROCHLORIDE 0.25 MG: 2 INJECTION, SOLUTION INTRAMUSCULAR; INTRAVENOUS; SUBCUTANEOUS at 15:05

## 2023-02-23 RX ADMIN — HYDROCODONE BITARTRATE AND HOMATROPINE METHYLBROMIDE 5 ML: 5; 1.5 SOLUTION ORAL at 00:36

## 2023-02-23 RX ADMIN — HYDROMORPHONE HYDROCHLORIDE 0.25 MG: 2 INJECTION, SOLUTION INTRAMUSCULAR; INTRAVENOUS; SUBCUTANEOUS at 15:15

## 2023-02-23 RX ADMIN — PROPOFOL 60 MG: 10 INJECTION, EMULSION INTRAVENOUS at 13:49

## 2023-02-23 RX ADMIN — Medication 2 G: at 13:50

## 2023-02-23 RX ADMIN — PROPOFOL 10 MG: 10 INJECTION, EMULSION INTRAVENOUS at 13:59

## 2023-02-23 RX ADMIN — PROPOFOL 50 MCG/KG/MIN: 10 INJECTION, EMULSION INTRAVENOUS at 14:09

## 2023-02-23 RX ADMIN — LIDOCAINE HYDROCHLORIDE 100 MG: 20 INJECTION, SOLUTION EPIDURAL; INFILTRATION; INTRACAUDAL; PERINEURAL at 13:49

## 2023-02-23 RX ADMIN — ROSUVASTATIN CALCIUM 20 MG: 20 TABLET, COATED ORAL at 22:03

## 2023-02-23 RX ADMIN — FENTANYL CITRATE 25 MCG: 50 INJECTION, SOLUTION INTRAMUSCULAR; INTRAVENOUS at 13:45

## 2023-02-23 ASSESSMENT — PAIN DESCRIPTION - DESCRIPTORS
DESCRIPTORS: ACHING;SORE
DESCRIPTORS: ACHING
DESCRIPTORS: ACHING
DESCRIPTORS: SORE

## 2023-02-23 ASSESSMENT — PAIN SCALES - GENERAL
PAINLEVEL_OUTOF10: 5
PAINLEVEL_OUTOF10: 0
PAINLEVEL_OUTOF10: 10
PAINLEVEL_OUTOF10: 6
PAINLEVEL_OUTOF10: 7
PAINLEVEL_OUTOF10: 6

## 2023-02-23 ASSESSMENT — PAIN - FUNCTIONAL ASSESSMENT
PAIN_FUNCTIONAL_ASSESSMENT: 0-10
PAIN_FUNCTIONAL_ASSESSMENT: PREVENTS OR INTERFERES SOME ACTIVE ACTIVITIES AND ADLS
PAIN_FUNCTIONAL_ASSESSMENT: PREVENTS OR INTERFERES SOME ACTIVE ACTIVITIES AND ADLS
PAIN_FUNCTIONAL_ASSESSMENT: ACTIVITIES ARE NOT PREVENTED

## 2023-02-23 ASSESSMENT — PAIN DESCRIPTION - ORIENTATION
ORIENTATION: POSTERIOR
ORIENTATION: MID
ORIENTATION: POSTERIOR

## 2023-02-23 ASSESSMENT — PAIN DESCRIPTION - PAIN TYPE
TYPE: SURGICAL PAIN
TYPE: CHRONIC PAIN

## 2023-02-23 ASSESSMENT — PAIN DESCRIPTION - FREQUENCY
FREQUENCY: INTERMITTENT
FREQUENCY: CONTINUOUS

## 2023-02-23 ASSESSMENT — PAIN DESCRIPTION - LOCATION
LOCATION: BACK

## 2023-02-23 ASSESSMENT — PAIN DESCRIPTION - ONSET
ONSET: GRADUAL
ONSET: ON-GOING

## 2023-02-23 ASSESSMENT — PAIN SCALES - WONG BAKER: WONGBAKER_NUMERICALRESPONSE: 0

## 2023-02-23 NOTE — PROGRESS NOTES
END OF SHIFT NOTE:    INTAKE/OUTPUT  02/21 0701 - 02/22 0700  In: -   Out: 2100 [Urine:2100]  Voiding: Yes  Catheter: Yes  Drain: no  Urinary Catheter 02/15/23 Moore (Active)   $ Urethral catheter insertion $ Not inserted for procedure 02/15/23 1250   Catheter Indications Urinary retention (acute or chronic), continuous bladder irrigation or bladder outlet obstruction 02/22/23 0045   Site Assessment No urethral drainage 02/22/23 0845   Urine Color Yellow 02/22/23 1110   Urine Appearance Sediment 02/22/23 1110   Collection Container Standard 02/22/23 0845   Securement Method Securing device (Describe) 02/22/23 0845   Catheter Care  Perineal wipes 02/21/23 1942   Catheter Best Practices  Drainage tube clipped to bed;Catheter secured to thigh; Tamper seal intact; Bag below bladder;Bag not on floor; Lack of dependent loop in tubing;Drainage bag less than half full 02/22/23 0845   Status Draining;Patent 02/22/23 0845   Output (mL) 200 mL 02/22/23 1712               Flatus: Patient does have flatus present. Stool: 0 occurrences. Characteristics:  Stool Appearance: Unable to assess  Stool Color: Unable to assess  Stool Amount: Unable to assess  Stool Assessment  Incontinence: Yes  Stool Appearance: Unable to assess  Stool Color: Unable to assess  Stool Amount: Unable to assess  Stool Source: Rectum  Last BM (including prior to admit): 02/21/23    Emesis: 0 occurrences. Characteristics:        VITAL SIGNS  Patient Vitals for the past 12 hrs:   Temp Pulse Resp BP SpO2   02/22/23 1800 -- 69 -- (!) 155/73 --   02/22/23 1550 98.4 °F (36.9 °C) 68 14 137/76 92 %   02/22/23 1152 98.2 °F (36.8 °C) 68 17 139/63 93 %   02/22/23 0802 98.4 °F (36.9 °C) 62 21 (!) 157/76 92 %       Pain Assessment  Pain Level: 4 (02/21/23 1415)  Pain Location: Back  Patient's Stated Pain Goal: 4    Ambulating  No    Shift report given to oncoming nurse at the bedside.     Viridiana Jay RN

## 2023-02-23 NOTE — PROGRESS NOTES
Occupational Therapy Note:    Attempted to see patient this AM for occupational therapy treatment  session. Patient with plans for kyphoplasty and bone biopsy today. Will follow and re-attempt as schedule permits/patient available.  Thank you,    Brooks Rodriguez, OT    [facility]

## 2023-02-23 NOTE — PROGRESS NOTES
TRANSFER - OUT REPORT:    Verbal report given to Travis Duarte RN on Brit Aguillon  being transferred to Pre Op for routine progression of patient care       Report consisted of patient's Situation, Background, Assessment and   Recommendations(SBAR). Information from the following report(s) Nurse Handoff Report was reviewed with the receiving nurse. [place] Assessment: No data recorded  Lines:   Peripheral IV 02/14/23 Proximal;Right;Ventral;Anterior Forearm (Active)   Site Assessment Clean, dry & intact 02/22/23 1921   Line Status Flushed;Capped 02/22/23 1921   Line Care Connections checked and tightened 02/22/23 1921   Phlebitis Assessment No symptoms 02/22/23 1921   Infiltration Assessment 0 02/22/23 1921   Alcohol Cap Used Yes 02/22/23 1921   Dressing Status Clean, dry & intact 02/22/23 1921   Dressing Type Transparent 02/22/23 1921        Opportunity for questions and clarification was provided.       Patient transported with:  [employer]

## 2023-02-23 NOTE — ANESTHESIA PRE PROCEDURE
Department of Anesthesiology  Preprocedure Note       Name:  Issa Mccord   Age:  80 y.o.  :  1931                                          MRN:  [id number]         Date:  2023      Surgeon: Romeo Copeland):  eTssa Jaeger MD    Procedure: Procedure(s):  T11 Kyphoplasty and Bone Biopsy    Medications prior to admission:   Prior to Admission medications    Medication Sig Start Date End Date Taking? Authorizing Provider   HYDROcodone-acetaminophen (NORCO) 5-325 MG per tablet Take 1 tablet by mouth every 6 hours as needed for Pain. Historical Provider, MD   metaxalone (SKELAXIN) 800 MG tablet  23   Historical Provider, MD   tiZANidine (ZANAFLEX) 2 MG tablet Take 1 tablet by mouth every 8 hours as needed (back pain) 23   Koko Bain MD   ondansetron (ZOFRAN-ODT) 4 MG disintegrating tablet Take 1 tablet by mouth 3 times daily as needed for Nausea or Vomiting 23   Bobbi Steinberg MD   irbesartan (AVAPRO) 75 MG tablet TAKE 1 TABLET DAILY 22   Alexandrea Gray MD   carvedilol (COREG) 3.125 MG tablet Take 1 tablet by mouth 2 times daily TAKE 1 TABLET TWICE DAILY WITH MEALS 22   Bobbi Steinberg MD   clopidogrel (PLAVIX) 75 MG tablet Take 1 tablet by mouth daily The details of the medication are not available because there are pending changes by a home health clinician.  22   Bobbi Steinberg MD   isosorbide mononitrate (IMDUR) 60 MG extended release tablet Take 1 tablet by mouth daily 22   Bobbi Steinberg MD   rosuvastatin (CRESTOR) 20 MG tablet Take 1 tablet by mouth daily TAKE 1 TABLET NIGHTLY 22   Bobbi Steinberg MD   spironolactone (ALDACTONE) 25 MG tablet Take 1 tablet by mouth daily 22   Bobbi Steinberg MD   Cranberry 250 MG TABS Take 4,200 mg by mouth 2 times daily  Patient not taking: No sig reported    Historical Provider, MD   Multiple Vitamins-Minerals (HAIR SKIN AND NAILS FORMULA PO) Take by mouth    Historical Provider, MD   aspirin 81 MG EC tablet Take 81 mg by mouth daily    Elizabeth Shepard MD   pantoprazole (PROTONIX) 20 MG tablet Take 1 tablet by mouth daily 6/23/22   Elizabteh Shepard MD   APPLE CIDER VINEGAR PO Take 480 mg by mouth daily  Patient not taking: No sig reported    Ar Automatic Reconciliation   TURMERIC PO Take 500 mg by mouth daily  Patient not taking: No sig reported    Ar Automatic Reconciliation   Cholecalciferol 50 MCG (2000 UT) CAPS Take 2,000 Units by mouth daily  Patient not taking: Reported on 2/14/2023    Ar Automatic Reconciliation   cyanocobalamin 100 MCG tablet Take 100 mcg by mouth daily  Patient not taking: No sig reported    Ar Automatic Reconciliation   Lidocaine, Anorectal, 5 % CREA Actual prescription: Lidocaine 5%: Neurontin/gabapentin 5% combined topical cream/gelApply to affected area up to 4 times a day as needed for pain  Patient not taking: No sig reported 6/28/21   Ar Automatic Reconciliation   loratadine (CLARITIN) 10 MG tablet Take 10 mg by mouth daily  Patient not taking: No sig reported    Ar Automatic Reconciliation   montelukast (SINGULAIR) 10 MG tablet Take 10 mg by mouth daily  Patient not taking: No sig reported 3/31/22   Ar Automatic Reconciliation       Current medications:    No current facility-administered medications for this visit. No current outpatient medications on file.      Facility-Administered Medications Ordered in Other Visits   Medication Dose Route Frequency Provider Last Rate Last Admin    lidocaine 1 % injection 1 mL  1 mL IntraDERmal Once PRN MD Debbie Young acetaminophen (TYLENOL) tablet 650 mg  650 mg Oral Once Zeke Harvey MD        famotidine (PEPCID) 20 mg in sodium chloride (PF) 0.9 % 10 mL injection  20 mg IntraVENous Once PRN Zeke Harvey MD        lactated ringers IV soln infusion   IntraVENous Continuous Zeke Harvey  mL/hr at 02/23/23 1112 New Bag at 02/23/23 1112    sodium chloride flush 0.9 % injection 5-40 mL  5-40 mL IntraVENous 2 times per day Cha Burch MD        sodium chloride flush 0.9 % injection 5-40 mL  5-40 mL IntraVENous PRN Cha Burch MD        midazolam PF (VERSED) injection 2 mg  2 mg IntraVENous Once PRN Cha Burch MD        ipratropium-albuterol (DUONEB) nebulizer solution 1 ampule  1 ampule Inhalation Once PRN Cha Burch MD        HYDROcodone homatropine (HYCODAN) 5-1.5 MG/5ML solution 5 mL  5 mL Oral Q4H PRN Rocio Gomez MD   5 mL at 02/23/23 0036    docusate sodium (COLACE) capsule 100 mg  100 mg Oral Daily Rocio Gomez MD   100 mg at 02/22/23 0845    rosuvastatin (CRESTOR) tablet 20 mg  20 mg Oral Nightly Rocio Gomez MD   20 mg at 02/22/23 2053    guaiFENesin [facility]) extended release tablet 600 mg  600 mg Oral BID Rocio Gomez MD   600 mg at 02/22/23 2053    [Held by provider] aspirin EC tablet 81 mg  81 mg Oral Daily Karthikeyan Franklin MD   81 mg at 02/16/23 0147    pantoprazole (PROTONIX) tablet 40 mg  40 mg Oral Daily Karthikeyan Franklin MD   40 mg at 02/22/23 0533    carvedilol (COREG) tablet 3.125 mg  3.125 mg Oral BID with meals Karthikeyan Franklin MD   3.125 mg at 02/23/23 0845    isosorbide mononitrate (IMDUR) extended release tablet 60 mg  60 mg Oral Daily Karthikeyan Franklin MD   60 mg at 02/23/23 0845    tiZANidine (ZANAFLEX) tablet 2 mg  2 mg Oral Q8H PRN Karthikeyan Franklin MD        lidocaine 4 % external patch 1 patch  1 patch TransDERmal Daily Karthikeyan Franklin MD   1 patch at 02/21/23 3001    [Held by provider] losartan (COZAAR) tablet 25 mg  25 mg Oral Daily Karthikeyan Franklin MD        [Held by provider] spironolactone (ALDACTONE) tablet 25 mg  25 mg Oral Daily Karthikeyan Franklin MD        sodium chloride flush 0.9 % injection 5-40 mL  5-40 mL IntraVENous 2 times per day Karthikeyan Franklin MD   10 mL at 02/22/23 2053    sodium chloride flush 0.9 % injection 5-40 mL  5-40 mL IntraVENous PRN Karthikeyan Franklin MD        0.9 % sodium chloride infusion IntraVENous PRN Claudine Dailey MD   Stopped at 02/15/23 1804    ondansetron (ZOFRAN-ODT) disintegrating tablet 4 mg  4 mg Oral Q8H PRN Claudine Dailey MD   4 mg at 02/21/23 0933    Or    ondansetron (ZOFRAN) injection 4 mg  4 mg IntraVENous Q6H PRN Claudine Dailey MD   4 mg at 02/18/23 1204    polyethylene glycol (GLYCOLAX) packet 17 g  17 g Oral Daily PRN Claudine Dailey MD   17 g at 02/22/23 0949    acetaminophen (TYLENOL) tablet 650 mg  650 mg Oral Q6H PRN Claudine Dailey MD   650 mg at 02/23/23 0845    Or    acetaminophen (TYLENOL) suppository 650 mg  650 mg Rectal Q6H PRN Claudine Dailey MD        oxyCODONE-acetaminophen (PERCOCET)  MG per tablet 1 tablet  1 tablet Oral Q4H PRN Claudine Dailey MD   1 tablet at 02/20/23 1421    morphine injection 2 mg  2 mg IntraVENous Q4H PRN Claudine Dailey MD   2 mg at 02/21/23 1315       Allergies:     Allergies   Allergen Reactions    Amlodipine Other (See Comments)     Other reaction(s): Unknown-Unspecified    Quinapril Other (See Comments)     Other reaction(s): Cough-Intolerance    Sertraline Other (See Comments)     Other reaction(s): Drowsiness    Prednisone Itching, Palpitations and Other (See Comments)     Per pt also had heart burn       Problem List:    Patient Active Problem List   Diagnosis Code    Mixed dyslipidemia E78.2    Osteoporosis M81.0    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 ([facility] [address] I25.118    Primary osteoarthritis of both knees M17.0    Ischemic heart disease I25.9    Hypertensive urgency I16.0    Esophageal reflux K21.9    CAD (coronary artery disease) I25.10    Left foot pain M79.672    HTN (hypertension) I10    CVD (cerebrovascular disease) I67.9    History of cerebellar stroke Z86.73    Chronic renal disease, stage III ([facility] [address] [128142] N18.30    Chronic systolic (congestive) heart failure I50.22    Strain of lumbar region S39.012A    Acute cystitis with hematuria N30.01    Compression fracture of body of thoracic vertebra (HCC) S22.000A    S/P kyphoplasty Z98.890    Intractable pain R52       Past Medical History:        Diagnosis Date    CAD (coronary artery disease)     2 MI    Calculus of kidney     Closed nondisplaced fracture of fifth left metatarsal bone 5/18/2021    Congestive heart failure ([facility] [address] [facility]     Coronary artery disease involving coronary bypass graft of native heart without angina pectoris 12/14/2015    CVD (cerebrovascular disease) 8/19/2013    Dyslipidemia 8/19/2013    GERD (gastroesophageal reflux disease)     HTN (hypertension) 8/19/2013    Left breast mass 4/13/2017    Diagnostic Mammogram negative.  Long term current use of anticoagulant therapy     Myocardial infarction [facility]) 2005, 2006    [facility], Dr. Casanova Hazard    Osteoporosis 8/19/2013    Positive H. pylori test 8/19/2013    Rectal polyp 10/9/2017    Anoscopy:  Revealed large cauliflower-like polyp at the anterior midline just above the dentate line, mobile soft, to have removed with Dr. Ambar Christine ([facility]), benign. No more fecal leakage.        Stroke (cerebrum) ([facility] [address] 2/16/2020    Stroke ([facility] [address] 1/7/2009    no deficits expect patient reports going to one side if she gets up too fast    Thromboembolus [facility])     pulmanary       Past Surgical History:        Procedure Laterality Date    APPENDECTOMY      BLADDER SUSPENSION  2005    COLONOSCOPY  2012   Jaya BOBBY Lizarraga.    @ [employer]    GYN      hysterectomy    HYSTERECTOMY, TOTAL ABDOMINAL (CERVIX REMOVED)  1972    IR KYPHOPLASTY THORACIC FIRST LEVEL  1/30/2023    IR KYPHOPLASTY THORACIC FIRST LEVEL 1/30/2023 [facility]    IR KYPHOPLASTY THORACIC FIRST LEVEL  2/9/2023    IR KYPHOPLASTY THORACIC FIRST LEVEL 2/9/2023 [facility]    LITHOTRIPSY  2008 x 2    ORTHOPEDIC SURGERY      2 rotater cuff right shoulder    RI UNLISTED PROCEDURE CARDIAC SURGERY      by pass    UROLOGICAL SURGERY      suspension       Social History: Social History     Tobacco Use    Smoking status: Never    Smokeless tobacco: Never   Substance Use Topics    Alcohol use: No                                Counseling given: Not Answered      Vital Signs (Current): There were no vitals filed for this visit. BP Readings from Last 3 Encounters:   02/23/23 (!) 157/78   02/13/23 (!) 160/70   02/09/23 (!) 141/70       NPO Status:                                                                                 BMI:   Wt Readings from Last 3 Encounters:   02/23/23 112 lb (50.8 kg)   02/06/23 135 lb (61.2 kg)   01/30/23 137 lb (62.1 kg)     There is no height or weight on file to calculate BMI.    CBC:   Lab Results   Component Value Date/Time    WBC 6.5 02/17/2023 08:20 AM    RBC 4.55 02/17/2023 08:20 AM    HGB 13.2 02/17/2023 08:20 AM    HCT 40.1 02/17/2023 08:20 AM    MCV 88.1 02/17/2023 08:20 AM    RDW 11.8 02/17/2023 08:20 AM     02/17/2023 08:20 AM       CMP:   Lab Results   Component Value Date/Time     02/17/2023 08:20 AM    K 3.7 02/17/2023 08:20 AM     02/17/2023 08:20 AM    CO2 27 02/17/2023 08:20 AM    BUN 14 02/17/2023 08:20 AM    CREATININE 0.90 02/17/2023 08:20 AM    GFRAA >60 07/05/2022 01:51 PM    AGRATIO 1.8 12/30/2021 11:05 AM    LABGLOM >60 02/17/2023 08:20 AM    GLUCOSE 95 02/17/2023 08:20 AM    PROT 7.0 02/14/2023 08:13 PM    CALCIUM 10.0 02/17/2023 08:20 AM    BILITOT 0.6 02/14/2023 08:13 PM    ALKPHOS 77 02/14/2023 08:13 PM    ALKPHOS 57 12/30/2021 11:05 AM    AST 26 02/14/2023 08:13 PM    ALT 16 02/14/2023 08:13 PM       POC Tests: No results for input(s): POCGLU, POCNA, POCK, POCCL, POCBUN, POCHEMO, POCHCT in the last 72 hours.     Coags:   Lab Results   Component Value Date/Time    PROTIME 13.2 03/18/2021 07:18 AM    INR 1.0 03/18/2021 07:18 AM    APTT >200.0 03/18/2021 06:36 PM       HCG (If Applicable): No results found for: PREGTESTUR, PREGSERUM, HCG, HCGQUANT     ABGs: No results found for: PHART, PO2ART, HDN0SAC, VMB4HEG, BEART, G7HMVAIX     Type & Screen (If Applicable):  No results found for: LABABO, LABRH    Drug/Infectious Status (If Applicable):  No results found for: HIV, HEPCAB    COVID-19 Screening (If Applicable): No results found for: COVID19        Anesthesia Evaluation  Patient summary reviewed and Nursing notes reviewed no history of anesthetic complications:   Airway: Mallampati: II  TM distance: >3 FB   Neck ROM: limited  Mouth opening: < 3 FB   Dental: normal exam         Pulmonary: breath sounds clear to auscultation                             Cardiovascular:  Exercise tolerance: no interval change,   (+) hypertension:, pacemaker (DDD @ 60 - not pacer dependent per last interrogation ): pacemaker, past MI (1988):, CAD:, CABG/stent (1988):, CHF (? ICM): systolic, murmur: Grade 2, Aortic, hyperlipidemia        Rhythm: regular  Rate: normal                 ROS comment: Jan 2023 EF 40-45% moderate AV sclerosis without stenosis     Neuro/Psych:   (+) CVA (last 2021): residual symptoms,             GI/Hepatic/Renal:   (+) GERD:, renal disease: CRI,           Endo/Other:    (+) : arthritis:., .                 Abdominal:             Vascular:   + PVD, aortic or cerebral, . ROS comment: PE. Other Findings:             Anesthesia Plan      TIVA     ASA 3       Induction: intravenous. Anesthetic plan and risks discussed with patient and child/children.                         Gera Milian MD   2/23/2023

## 2023-02-23 NOTE — ANESTHESIA PROCEDURE NOTES
Airway  Date/Time: 2/23/2023 1:52 PM  Urgency: elective    Airway not difficult    General Information and Staff    Patient location during procedure: OR  Resident/CRNA: ALESSANDRA Joyce - CRNA  Performed: resident/CRNA     Indications and Patient Condition  Indications for airway management: anesthesia  Spontaneous Ventilation: absent  Sedation level: deep  Preoxygenated: no  Patient position: sniffing  MILS not maintained throughout  Mask difficulty assessment: vent by bag mask    Final Airway Details  Final airway type: endotracheal airway      Successful airway: ETT  Cuffed: yes   Successful intubation technique: direct laryngoscopy  Facilitating devices/methods: intubating stylet  Endotracheal tube insertion site: oral  Blade: Sonny  Blade size: #3  ETT size (mm): 7.0  Cormack-Lehane Classification: grade I - full view of glottis  Placement verified by: chest auscultation and capnometry   Measured from: teeth  ETT to teeth (cm): 24  Number of attempts at approach: 1  Ventilation between attempts: bag mask  Number of other approaches attempted: 0    Additional Comments  PreO2 > 3 minutes. Standard IV induction by MDA. Atraumatic insertion. Positive equal and bilateral breath sounds noted with positive ETCO2 present. Lips and dentition unchanged from pre-op.    no

## 2023-02-23 NOTE — PROGRESS NOTES
Physical Therapy Note:    Attempted to see patient this AM for physical therapy treatment session. Patient plans for kyphoplasty and bone biopsy today. Will hold mobility assessment and follow post operatively.  Thank you,    Memo Gomez, PT, DPT  2/23/23

## 2023-02-23 NOTE — PROGRESS NOTES
Hospitalist Progress Note   Admit Date:  2023  7:07 PM   Name:  Daniel Faith   Age:  80 y.o. Sex:  female  :  1931   MRN:  [id number]   [place]:  [facility] [place]/    Presenting Complaint: No chief complaint on file. Reason(s) for Admission: Intractable pain [R52]     Hospital Course:   Ms. Valorie Bower is a 80 y.o. female with medical history of Daniel Faith is a 80 y.o. female with medical history of HTN, CAD, CHF, CKD who presents as a direct admit from home with intractable back/flank pain. Over 2 months period, she has been diagnosed with T10, then T9 fractures, and subsequently underwent kyphoplasties on  and , respectively. 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 Neurosurgery consulted and plan for kyphoplasty and bone biopsy on , after plavix wash out. Recommend TLSO when UOOB. Subjective & 24hr Events (23): Patient seen and examined in PACU. Doing well post-op, some pain still, groggy but awake and oriented. Assessment & Plan:   # T10 compression fracture, lumbar disc protrusion, T11 interosseous hemangioma with intractable pain   - S/p prior kyphoplasty on  and    - S/p kyphoplasty with Dr. Frances Dee today,    - Con't therapies, brace, pain mgmt    # Acute urinary retention   - Con't Moore today    # HTN // HLD   - ARB held. Con't Coreg, Imdur.   - Statin    # GERD   - PPI    # CAD   - Plavix held pre-op. Resume when ok with Surgery team.    # CKD3   - Baseline    PT/OT evals and PPD needed/ordered?   Yes  Diet:  Diet NPO  VTE prophylaxis: SCD's   Code status: Full Code    Hospital Problems:  Principal Problem:    Intractable pain  Active Problems:    Chronic renal disease, stage III (HCC) [555086]    Chronic systolic (congestive) heart failure    Compression fracture of T11 vertebra (HCC)    S/P kyphoplasty    Vertebral body hemangioma CAD (coronary artery disease)    HTN (hypertension)  Resolved Problems:    * No resolved hospital problems. *      Objective:   Patient Vitals for the past 24 hrs:   Temp Pulse Resp BP SpO2   02/23/23 1533 -- 69 -- (!) 170/77 97 %   02/23/23 1528 -- 69 -- (!) 167/75 93 %   02/23/23 1523 -- 72 -- (!) 167/77 96 %   02/23/23 1518 -- 70 -- (!) 169/79 98 %   02/23/23 1513 -- 79 -- (!) 175/77 96 %   02/23/23 1505 -- 70 -- (!) 161/73 95 %   02/23/23 1500 -- 70 -- (!) 158/72 94 %   02/23/23 1458 98.1 °F (36.7 °C) 70 14 (!) 174/78 94 %   02/23/23 1043 97.4 °F (36.3 °C) 69 16 (!) 157/78 99 %   02/23/23 0742 97.9 °F (36.6 °C) 72 -- (!) 153/76 92 %   02/23/23 0251 97.7 °F (36.5 °C) 67 17 (!) 141/77 91 %   02/23/23 0011 97.7 °F (36.5 °C) 65 18 (!) 150/75 92 %   02/22/23 2041 97.7 °F (36.5 °C) 67 19 132/66 94 %   02/22/23 1800 -- 69 -- (!) 155/73 --   02/22/23 1550 98.4 °F (36.9 °C) 68 14 137/76 92 %       Oxygen Therapy  SpO2: 97 %  Pulse via Oximetry: 70 beats per minute  Pulse Oximeter Device Mode: Continuous  Pulse Oximeter Device Location: Left, Hand  O2 Device: Nasal cannula  O2 Flow Rate (L/min): 2 L/min    Estimated body mass index is 18.08 kg/m² as calculated from the following:    Height as of this encounter: 5' 6\" (1.676 m). Weight as of this encounter: 112 lb (50.8 kg). Intake/Output Summary (Last 24 hours) at 2/23/2023 1539  Last data filed at 2/23/2023 1501  Gross per 24 hour   Intake --   Output 2002 ml   Net -2002 ml         Physical Exam:   Blood pressure (!) 170/77, pulse 69, temperature 98.1 °F (36.7 °C), resp. rate 14, height 5' 6\" (1.676 m), weight 112 lb (50.8 kg), SpO2 97 %. General:    Thin, awake, oriented, groggy but oriented. Head:  Normocephalic, atraumatic  Eyes:  Sclerae appear normal.  Pupils equally round. ENT:  Nares appear normal.  Moist oral mucosa  Neck:  No restricted ROM. Trachea midline   CV:   RRR. No m/r/g. No jugular venous distension. Lungs:   CTAB.   No wheezing, rhonchi, or rales.  Symmetric expansion. Abdomen:   Soft, nontender, nondistended. :  Moore, clear yellow urine in bag. Extremities: No cyanosis or clubbing. No edema  Skin:     No rashes and normal coloration. Warm and dry. Neuro:  CN II-XII grossly intact. Psych:  Normal mood and affect. I have personally reviewed labs and tests:  Recent Labs:  No results found for this or any previous visit (from the past 48 hour(s)). I have personally reviewed imaging studies:  Other Studies:  XR THORACIC SPINE (2 VIEWS)   Final Result   Status post kyphoplasty. No obvious complication. Total fluoroscopy time: One minute 36 seconds; two fluoroscopic spot images   saved      NC XR TECHNOLOGIST SERVICE   Final Result      MRI LUMBAR SPINE WO CONTRAST   Final Result   1. No acute musculoskeletal abnormality or compression fracture. 2. Multilevel degenerative disc and facet disease with a prominent left   paracentral/posterior lateral disc protrusion at L3-L4. This does not result in   central stenosis. There is mild left foramina narrowing. 3. Moderate bilateral foramina narrowing at L4-L5 with mild bilateral foramina   narrowing at L5-S1. MRI THORACIC SPINE WO CONTRAST   Final Result   1. Status post kyphoplasty at T9 and T10 without obvious complication. 2. 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 3. No central spinal stenosis or cord compression. 4. Bilateral pleural effusions. CT ABDOMEN PELVIS WO CONTRAST Additional Contrast? Radiologist Recommendation   Final Result         1. 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 No evidence of   obstructive uropathy. Bilateral renal calcifications noted. 2. Bilateral pleural effusions with bibasilar atelectasis present. New when   compared with the prior exam.   3. Diverticulosis. No CT evidence of diverticulitis. XR CHEST PORTABLE   Final Result      1. No acute cardiopulmonary process identified. This exam was interpreted by a board-certified, body-imaging fellowship trained    radiologist from [address] [place] [address]. If there are any questions regarding    this examination please feel free to contact a radiologist at [phone].       [address]    2/14/2023 9:00:00 PM          Current Meds:  Current Facility-Administered Medications   Medication Dose Route Frequency    ondansetron (ZOFRAN) injection 4 mg  4 mg IntraVENous Once PRN    oxyCODONE (ROXICODONE) immediate release tablet 5 mg  5 mg Oral PRN    Or    oxyCODONE (ROXICODONE) immediate release tablet 10 mg  10 mg Oral PRN    HYDROcodone homatropine (HYCODAN) 5-1.5 MG/5ML solution 5 mL  5 mL Oral Q4H PRN    docusate sodium (COLACE) capsule 100 mg  100 mg Oral Daily    rosuvastatin (CRESTOR) tablet 20 mg  20 mg Oral Nightly    guaiFENesin (MUCINEX) extended release tablet 600 mg  600 mg Oral BID    [Held by provider] aspirin EC tablet 81 mg  81 mg Oral Daily    pantoprazole (PROTONIX) tablet 40 mg  40 mg Oral Daily    carvedilol (COREG) tablet 3.125 mg  3.125 mg Oral BID with meals    isosorbide mononitrate (IMDUR) extended release tablet 60 mg  60 mg Oral Daily    tiZANidine (ZANAFLEX) tablet 2 mg  2 mg Oral Q8H PRN    lidocaine 4 % external patch 1 patch  1 patch TransDERmal Daily    [Held by provider] losartan (COZAAR) tablet 25 mg  25 mg Oral Daily    [Held by provider] spironolactone (ALDACTONE) tablet 25 mg  25 mg Oral Daily    sodium chloride flush 0.9 % injection 5-40 mL  5-40 mL IntraVENous 2 times per day    sodium chloride flush 0.9 % injection 5-40 mL  5-40 mL IntraVENous PRN    0.9 % sodium chloride infusion   IntraVENous PRN    ondansetron (ZOFRAN-ODT) disintegrating tablet 4 mg  4 mg Oral Q8H PRN    Or    ondansetron (ZOFRAN) injection 4 mg  4 mg IntraVENous Q6H PRN    polyethylene glycol (GLYCOLAX) packet 17 g  17 g Oral Daily PRN    acetaminophen (TYLENOL) tablet 650 mg  650 mg Oral Q6H PRN    Or    acetaminophen (TYLENOL) suppository 650 mg  650 mg Rectal Q6H PRN    oxyCODONE-acetaminophen (PERCOCET)  MG per tablet 1 tablet  1 tablet Oral Q4H PRN    morphine injection 2 mg  2 mg IntraVENous Q4H PRN       Signed:  Stefanie Galaviz MD    Part of this note may have been written by using a voice dictation software. The note has been proof read but may still contain some grammatical/other typographical errors.

## 2023-02-23 NOTE — PROGRESS NOTES
END OF SHIFT NOTE:    INTAKE/OUTPUT  02/22 0701 - 02/23 0700  In: -   Out: 5453 [Urine:1550]  Voiding: No  Catheter: Yes  Drain:   Urinary Catheter 02/15/23 Moore (Active)   $ Urethral catheter insertion $ Not inserted for procedure 02/15/23 1250   Catheter Indications Urinary retention (acute or chronic), continuous bladder irrigation or bladder outlet obstruction 02/23/23 0036   Site Assessment No urethral drainage 02/23/23 0036   Urine Color Yellow 02/23/23 0036   Urine Appearance Clear 02/23/23 0036   Collection Container Standard 02/23/23 0036   Securement Method Securing device (Describe) 02/23/23 0036   Catheter Care  Perineal wipes 02/22/23 1921   Catheter Best Practices  Drainage tube clipped to bed;Catheter secured to thigh; Tamper seal intact; Bag below bladder;Bag not on floor; Lack of dependent loop in tubing;Drainage bag less than half full 02/22/23 1921   Status Draining;Patent 02/22/23 1921   Output (mL) 200 mL 02/23/23 0559               Flatus: Patient does have flatus present. Stool:  occurrences. Characteristics:  Stool Appearance: Unable to assess  Stool Color: Unable to assess  Stool Amount: Unable to assess  Stool Assessment  Incontinence: Yes  Stool Appearance: Unable to assess  Stool Color: Unable to assess  Stool Amount: Unable to assess  Stool Source: Rectum  Last BM (including prior to admit): 02/21/23    Emesis:  occurrences. Characteristics:        VITAL SIGNS  Patient Vitals for the past 12 hrs:   Temp Pulse Resp BP SpO2   02/23/23 0251 97.7 °F (36.5 °C) 67 17 (!) 141/77 91 %   02/23/23 0011 97.7 °F (36.5 °C) 65 18 (!) 150/75 92 %   02/22/23 2041 97.7 °F (36.5 °C) 67 19 132/66 94 %   02/22/23 1800 -- 69 -- (!) 155/73 --       Pain Assessment  Pain Level: 3 (02/22/23 2053)  Pain Location: Back  Patient's Stated Pain Goal: 0 - No pain    Ambulating  No    Shift report given to oncoming nurse at the bedside.     Charmayne Galla, RN

## 2023-02-23 NOTE — PROGRESS NOTES
TRANSFER - IN REPORT:    Verbal report received from [place], [place] on Efrain Click  being received from PACU for routine progression of patient care      Report consisted of patient's Situation, Background, Assessment and   Recommendations(SBAR). Information from the following report(s) Nurse Handoff Report was reviewed with the receiving nurse. Opportunity for questions and clarification was provided. Assessment completed upon patient's arrival to unit and care assumed.

## 2023-02-23 NOTE — PROGRESS NOTES
NEUROSURGERY PROGRESS NOTE:   Admit Date: 2/14/2023  Subjective:   No acute overnight events. Objective:  BP (!) 157/78   Pulse 69   Temp 97.4 °F (36.3 °C) (Oral)   Resp 16   Ht 5' 6\" (1.676 m)   Wt 112 lb (50.8 kg)   SpO2 99%   BMI 18.08 kg/m²   General: No acute distress  CV: Regular Rate   Resp: No increased work of breathing, patient wearing a mask  Awake, alert, and oriented to person, place, time, and situation   Eyes open spontaneously   PERRL, EOMI, visual fields grossly intact to light and confrontation   Hearing grossly intact   Face symmetric and tongue mid-line on protrusion   No pronation or drift on exam   Patient with 5/5 strength in the bilateral upper and bilateral lower extremities   Sensation intact to light touch and pin-prick     Assessment and Plan:   Holger Pruitt 80 y.o. female who has a T11 hemangioma and T11 compression fracture below prior T9 and T10 vertebral augmentation. She has refractory pain and would benefit from a T11 Kyphoplasty and Vertebral body bone biopsy. Lori Fountain.  Giovanna Florence MD, [address] [place] [address]

## 2023-02-23 NOTE — OP NOTE
NEUROSURGERY OPERATIVE REPORT:    Patient Name: Ortiz Gilmore    Patient MRN: [id number]    Patient YOB: 1931    Date of Procedure: 2/23/2023    Procedure Name: Bilateral T11 Kyphoplasty     Pre-Procedure Diagnosis:   1. T11 Painful hemangioma and  Compression fracture     Post-Procedure Diagnosis:   SAME AS ABOVE     Surgeon: Javan Padgett. Beatrix Goldmann, MD    Anesthesiologist: Alla Barcenas MD     Surgical Staff: See Operative Record    Anesthesia: General endotracheal anesthesia     Estimated Blood Loss: 5 cc    Specimen: Attempted Vertebral Body Biopsy     Procedure Describe in Detail:   The patient was transported to OR and placed under general endotracheal anesthesia. The patient was turned to the prone position, was prepped and draped in a sterile fashion. A surgical pause time-out was performed at the beginning of the case prior to incision confirming procedure, sterility and functionality of instruments, surgical site, stability of patient, and anti-biotic administration. Simultaneous biplanar C-arm fluoroscopic imaging was used to identify the area overlying the T11 fracture. At this time local anesthetic was used to with epinephrine was used to infiltrate the muscular tissue and subcutaneous area. An 15 blade stab wound was made just over the left T11 pedicle. A cannulated pedicle needle was then passed through the pedicle using simultaneous biplanar C-arm fluoroscopic imaging. Upon reaching the dorsal aspect of the vertebral body a bone biopsy needle was then passed through the outer cannula of the needle into the anterior aspect of the vertebral body. This biopsy did not receive a successful core sample. Next, a inflatable balloon device was then passed through the outer cannula into the vertebral body. This balloon was then inflated creating a cavity in the vertebral body. The cavity creation device was withdrawn.  Next methylmethacrylate was then injected into the vertebral body a monitored with biplanar fluoroscopy. The procedure was repeated in the same fashion on the contralateral side. The wound was closed with a single 4-0 Monocryl and two staples. The wounds were sterilely bandaged. The patient tolerated the procedure well and all counts were correct throughout the case. The patient was allowed to recover from general endotracheal anesthesia and was transported to the PACU in stable condition. She had full symmetric strength following the procedure. Disposition: PACU and from the PACU discharge home     [address]  Torrance Bumpers, MD, [facility]

## 2023-02-24 PROBLEM — E44.1 MILD PROTEIN-CALORIE MALNUTRITION (HCC): Status: ACTIVE | Noted: 2023-02-24

## 2023-02-24 PROCEDURE — 97530 THERAPEUTIC ACTIVITIES: CPT

## 2023-02-24 PROCEDURE — 6370000000 HC RX 637 (ALT 250 FOR IP): Performed by: FAMILY MEDICINE

## 2023-02-24 PROCEDURE — 97164 PT RE-EVAL EST PLAN CARE: CPT

## 2023-02-24 PROCEDURE — 97535 SELF CARE MNGMENT TRAINING: CPT

## 2023-02-24 PROCEDURE — 97112 NEUROMUSCULAR REEDUCATION: CPT

## 2023-02-24 PROCEDURE — 2580000003 HC RX 258: Performed by: FAMILY MEDICINE

## 2023-02-24 PROCEDURE — 1100000000 HC RM PRIVATE

## 2023-02-24 RX ADMIN — ROSUVASTATIN CALCIUM 20 MG: 20 TABLET, COATED ORAL at 22:22

## 2023-02-24 RX ADMIN — OXYCODONE AND ACETAMINOPHEN 1 TABLET: 10; 325 TABLET ORAL at 22:21

## 2023-02-24 RX ADMIN — GUAIFENESIN 600 MG: 600 TABLET ORAL at 08:46

## 2023-02-24 RX ADMIN — PANTOPRAZOLE SODIUM 40 MG: 40 TABLET, DELAYED RELEASE ORAL at 06:11

## 2023-02-24 RX ADMIN — SODIUM CHLORIDE, PRESERVATIVE FREE 10 ML: 5 INJECTION INTRAVENOUS at 08:47

## 2023-02-24 RX ADMIN — ISOSORBIDE MONONITRATE 60 MG: 60 TABLET, EXTENDED RELEASE ORAL at 08:46

## 2023-02-24 RX ADMIN — CARVEDILOL 3.12 MG: 3.12 TABLET, FILM COATED ORAL at 16:12

## 2023-02-24 RX ADMIN — DOCUSATE SODIUM 100 MG: 100 CAPSULE, LIQUID FILLED ORAL at 08:46

## 2023-02-24 RX ADMIN — CARVEDILOL 3.12 MG: 3.12 TABLET, FILM COATED ORAL at 08:46

## 2023-02-24 RX ADMIN — OXYCODONE AND ACETAMINOPHEN 1 TABLET: 10; 325 TABLET ORAL at 06:11

## 2023-02-24 RX ADMIN — SODIUM CHLORIDE, PRESERVATIVE FREE 10 ML: 5 INJECTION INTRAVENOUS at 22:22

## 2023-02-24 RX ADMIN — OXYCODONE AND ACETAMINOPHEN 1 TABLET: 10; 325 TABLET ORAL at 01:54

## 2023-02-24 RX ADMIN — GUAIFENESIN 600 MG: 600 TABLET ORAL at 22:21

## 2023-02-24 ASSESSMENT — PAIN SCALES - GENERAL
PAINLEVEL_OUTOF10: 4
PAINLEVEL_OUTOF10: 4
PAINLEVEL_OUTOF10: 0
PAINLEVEL_OUTOF10: 5
PAINLEVEL_OUTOF10: 4
PAINLEVEL_OUTOF10: 6

## 2023-02-24 ASSESSMENT — PAIN DESCRIPTION - LOCATION
LOCATION: BACK
LOCATION: BACK
LOCATION: BACK;GENERALIZED
LOCATION: BACK
LOCATION: BACK

## 2023-02-24 ASSESSMENT — PAIN DESCRIPTION - DESCRIPTORS
DESCRIPTORS: SORE
DESCRIPTORS: ACHING
DESCRIPTORS: ACHING;SORE
DESCRIPTORS: ACHING

## 2023-02-24 ASSESSMENT — PAIN DESCRIPTION - ORIENTATION
ORIENTATION: MID
ORIENTATION: MID

## 2023-02-24 NOTE — ANESTHESIA POSTPROCEDURE EVALUATION
Department of Anesthesiology  Postprocedure Note    Patient: Shannan Seymour  MRN: [id number]  YOB: 1931  Date of evaluation: 2/23/2023      Procedure Summary     Date: 02/23/23 Room / Location: [facility] MAIN OR  / [facility] MAIN OR    Anesthesia Start: 1335 Anesthesia Stop: 1501    Procedure: T11 Kyphoplasty and Bone Biopsy (Back) Diagnosis:       Compression fracture of thoracic vertebra (HCC)      (Compression fracture of thoracic vertebra ([address] [S22.000A])    Providers: Nawaf Heck MD Responsible Provider: Joanna Ibarra MD    Anesthesia Type: MAC, TIVA ASA Status: 3          Anesthesia Type: MAC, TIVA    Balwinder Phase I: Balwinder Score: 10    Balwinder Phase II:        Anesthesia Post Evaluation    Patient location during evaluation: PACU  Patient participation: complete - patient participated  Level of consciousness: responsive to physical stimuli and responsive to verbal stimuli  Airway patency: patent  Nausea & Vomiting: no nausea  Complications: no  Cardiovascular status: blood pressure returned to baseline  Respiratory status: acceptable  Hydration status: euvolemic  Multimodal analgesia pain management approach

## 2023-02-24 NOTE — PROGRESS NOTES
ACUTE OCCUPATIONAL THERAPY GOALS:   (Developed with and agreed upon by patient and/or caregiver.)  1. Patient will complete lower body bathing and dressing with CGA and adaptive equipment as   needed. 2. Patient will completed upper body bathing and dressing with Mod I seated EOB and adaptive equipment as needed. 3. Patient will complete grooming seated at EOB with Mod I and adaptive equipment as needed. 4. Patient will complete toileting with CGA and adaptive equipment as needed. GOAL MET 2/24/23  5. Patient will tolerate/ 30 minutes of OT treatment with 1-2 rest breaks to increase activity bob/ance for ADLs. GOAL MET 2/24/23  6. Patient will complete functional transfers with Min A and adaptive equipment as needed. GOAL MET 2/24/23     Timeframe: 7 visits     OCCUPATIONAL THERAPY: Daily Note AM   OT Visit Days: 3       Time In/Out  OT Charge Capture  Rehab Caseload Tracker  OT Orders    Efrain Kirby is a 80 y.o. female   PRIMARY DIAGNOSIS: Intractable pain  Intractable pain [R52]  Procedure(s) (LRB):  T11 Kyphoplasty and Bone Biopsy (N/A)  1 Day Post-Op  Inpatient: Payor: [facility] HEALTH PLAN / Plan: [address] [place] [address] / Product Type: *No Product type* /     ASSESSMENT:     REHAB RECOMMENDATIONS: CURRENT LEVEL OF FUNCTION:  (Most Recently Demonstrated)   Recommendation to date pending progress:  Setting:  Short-term Rehab    Equipment:    To Be Determined Bathing:  Not Tested  Dressing:  Not Tested  Feeding/Grooming:  Contact Guard Assist  Toileting:  Stand by Assist  Functional Mobility:  Contact Guard Assist RW     ASSESSMENT:  Ms. Hoda Lomeli was received supine in bed. Pt required great encouragement to participate with therapy today. Pt required assistance for functional mobility and ADLs today due to decreased volition, generalized weakness, back soreness, decreased balance. Pt is progressing with functional mobility and ADLs. Pt to have surgery Thursday. Continue POC.        SUBJECTIVE:     Ms. Hoda Lomeli states, \"I'm not in pain I'm just sore. \"     Social/Functional Lives With: Alone  Type of Home: House  Home Layout: Two level  Home Access: Stairs to enter with rails  Entrance Stairs - Number of Steps: 6    OBJECTIVE:     Mindy Aguillon / Davonte Sleet / AIRWAY: Moore Catheter and IV    RESTRICTIONS/PRECAUTIONS:  Restrictions/Precautions  Restrictions/Precautions: Fall Risk        PAIN: VITALS / O2:   Pre Treatment:   Pain Assessment: None - Denies Pain        Post Treatment: none Vitals          Oxygen        MOBILITY: I Mod I S SBA CGA Min Mod Max Total  NT x2 Comments:   Bed Mobility    Rolling [] [] [] [] [x] [] [] [] [] [] [] To put TLSO on in bed   Supine to Sit [] [] [] [] [] [x] [] [] [] [] []    Scooting [] [] [] [] [] [] [] [] [] [] []    Sit to Supine [] [] [] [] [] [] [] [] [] [] []    Transfers    Sit to Stand [] [] [] [] [] [x] [] [] [] [] []    Bed to Chair [] [] [] [] [x] [] [] [] [] [] []    Stand to Sit [] [] [] [] [x] [] [] [] [] [] []    Tub/Shower [] [] [] [] [] [] [] [] [] [] []     Toilet [] [] [] [] [] [] [] [] [] [] []      [] [] [] [] [] [] [] [] [] [] []    I=Independent, Mod I=Modified Independent, S=Supervision/Setup, SBA=Standby Assistance, CGA=Contact Guard Assistance, Min=Minimal Assistance, Mod=Moderate Assistance, Max=Maximal Assistance, Total=Total Assistance, NT=Not Tested    ACTIVITIES OF DAILY LIVING: I Mod I S SBA CGA Min Mod Max Total NT Comments   BASIC ADLs:              Upper Body Bathing [] [] [] [] [] [] [] [] [] []    Lower Body Bathing [] [] [] [] [] [] [] [] [] []    Toileting [] [] [] [x] [] [] [] [] [] [] Regular toilet   Upper Body Dressing [] [] [] [] [] [] [x] [] [] [] rolling in bed to eyad TLSO   Lower Body Dressing [] [] [] [] [] [] [] [] [] []    Feeding [] [] [] [] [] [] [] [] [] []    Grooming [] [] [] [] [x] [] [] [] [] [] Standing EOS washing hands;  A for balance   Personal Device Care [] [] [] [] [] [] [] [] [] []    Functional Mobility [] [] [] [] [x] [] [] [] [] [] Bed > toilet > sink > chair RW   I=Independent, Mod I=Modified Independent, S=Supervision/Setup, SBA=Standby Assistance, CGA=Contact Guard Assistance, Min=Minimal Assistance, Mod=Moderate Assistance, Max=Maximal Assistance, Total=Total Assistance, NT=Not Tested    BALANCE: Good Fair+ Fair Fair- Poor NT Comments   Sitting Static [x] [x] [] [] [] []    Sitting Dynamic [] [] [] [] [] []              Standing Static [] [x] [x] [] [] []    Standing Dynamic [] [] [x] [x] [] []        PLAN:     FREQUENCY/DURATION   OT Plan of Care: 5 times/week for duration of hospital stay or until stated goals are met, whichever comes first.    TREATMENT:     TREATMENT:   Co-Treatment PT/OT necessary due to patient's decreased overall endurance/tolerance levels, as well as need for high level skilled assistance to complete functional transfers/mobility and functional tasks  Neuromuscular Re-education (23 Minutes): Patient participated in neuromuscular re-education including postural training, standing tolerance activity , and sitting balance activity   with minimal assistance, verbal cues, tactile cues, education, and adaptive equipment to improve sitting balance, standing balance, static balance, and dynamic balance in order to prepare for functional task, prepare for seated ADLs, prepare for standing ADLs, prepare for functional transfer, increase safety awareness, and prepare for self care. .   Self Care (9 minutes): Patient participated in toileting, upper body dressing, and grooming ADLs in unsupported sitting and standing with minimal manual cueing to increase independence, decrease assistance required, increase activity tolerance, increase safety awareness, and maintain precautions. Patient also participated in functional mobility, functional transfer, and adaptive equipment training to increase independence, decrease assistance required, increase activity tolerance, increase safety awareness, and maintain precautions. TREATMENT GRID:  N/A    AFTER TREATMENT PRECAUTIONS: Bed/Chair Locked, Call light within reach, Chair, Needs within reach, and RN notified    INTERDISCIPLINARY COLLABORATION:  RN/ PCT, PT/ PTA, and OT/ DOMINGUEZ    EDUCATION:  Education Given To: Patient  Education Provided: Role of Therapy;Plan of Care;Precautions; Fall Prevention Strategies  Education Outcome: Verbalized understanding    TOTAL TREATMENT DURATION AND TIME:  Time In: 1132  Time Out: [address]  Minutes: [address], OT

## 2023-02-24 NOTE — PROGRESS NOTES
ACUTE PHYSICAL THERAPY GOALS:   (Developed with and agreed upon by patient and/or caregiver.)  (1.) Marina Solorzano will perform bed mobility with MINIMAL ASSIST within 7 treatment day(s). (2.) Marina Solorzano will transfer from bed to chair and chair to bed with MINIMAL ASSIST using the least restrictive device within 7 treatment day(s). Met 2/24/2023   (3.) Marina Solorzano will ambulate with MINIMAL ASSIST for 50 feet with the least restrictive device within 7 treatment day(s). Partially met 2/24/2023   (4.) Marina Solorzano will perform bilateral lower extremity exercises x 15 min for HEP with SUPERVISION to improve strength, endurance, and functional mobility within 7 treatment day(s). Updated Goals 2/24/2023  LTG:  (1.)Ms. Zarate  will move from supine to sit and sit to supine via logrolling  to side in flat bed without siderails with contact guard assist within 7 day(s). (2.)Ms. Zarate  will transfer from bed to chair and chair to bed with contact guard assist using the least restrictive/no device within 7 day(s). (3.)Ms. Zarate  will ambulate with contact guard assist for 150+ feet with the least restrictive/no device within 7 day(s).        PHYSICAL THERAPY Daily Note and Re-evaluation  (Link to Caseload Tracking: PT Visit Days : 1  Acknowledge Orders  Time In/Out  PT Charge Capture  Rehab Caseload Tracker  TLSO on when up and out of bed  Marina Solorzano is a 80 y.o. female   PRIMARY DIAGNOSIS: Intractable pain  Intractable pain [R52]  1 Day Post-Op  Reason for Referral: Generalized Muscle Weakness (M62.81)  Difficulty in walking, Not elsewhere classified (R26.2)  Other abnormalities of gait and mobility (R26.89)  Inpatient: Payor: [address] [facility] [address] / Plan: [address] / Product Type: *No Product type* /     ASSESSMENT:     REHAB RECOMMENDATIONS:   Recommendation to date pending progress:  Setting:  Short-term Rehab     Equipment:    To Be Determined     ASSESSMENT:  Ms. Francisco Woodruff  underwent T10 kyphoplasty yesterday so is being reassessed. She reports no pain just soreness. She is agreeable for therapies with strong encouragement. Rolled Babs with min A and was assisted with jolene MATHISO. Came to sit with mod Ax2. She stood with min A of 2 and cueing for anterior lean at hips. Ambulated 20', 25' with RW and CGA. Patient left in recliner with all needs in reach, encouraged her to sit up through lunch. Patient has improved  since undergoing above surgery. Her AM-PAC has improved from 8 to 17. Goals of initial evaluation modified. She could benefit from skilled PT services to address her deficits and maximize her independence to reduce her risk of hospital-acquired impairments.      [facility] [address] [address] AM-PAC 6 Clicks Basic Mobility Inpatient Short Form  AM-PAC Basic Mobility - Inpatient   How much help is needed turning from your back to your side while in a flat bed without using bedrails?: A Little  How much help is needed moving from lying on your back to sitting on the side of a flat bed without using bedrails?: A Lot  How much help is needed moving to and from a bed to a chair?: A Little  How much help is needed standing up from a chair using your arms?: A Little  How much help is needed walking in hospital room?: A Little  How much help is needed climbing 3-5 steps with a railing?: A Little  [facility] Inpatient Mobility Raw Score : 17  AM-PAC Inpatient T-Scale Score : 42.13  Mobility Inpatient CMS 0-100% Score: 50.57  Mobility Inpatient CMS G-Code Modifier : CK    SUBJECTIVE:   Ms. Francisco Woodruff states, \"I was worn out yesterday\"     Social/Functional   Lives With: Alone  Type of Home: House  Home Layout: Two level  Home Access: Stairs to enter with rails  Entrance Stairs - Number of Steps: 6    OBJECTIVE:     PAIN: VITALS / O2: PRECAUTION / Daisha Mayans / DRAINS:   Pre Treatment:   Pain Assessment: None - Denies Pain    Post Treatment: 0 Vitals        Oxygen      Teresa Catheter    RESTRICTIONS/PRECAUTIONS:  Restrictions/Precautions: Fall Risk                 GROSS EVALUATION: Intact Impaired (Comments):   AROM []  Generally decreased, functional   PROM []    Strength []  Generally decreased, functional   Balance [] Sitting - Static: Good  Standing - Static: Fair  Standing - Dynamic: Poor   Posture []  Kyphotic   Sensation [x]     Coordination [x]      Tone [x]     Edema [x]    Activity Tolerance []  Decreased secondary to pain and fatigue    []      COGNITION/  PERCEPTION: Intact Impaired (Comments):   Orientation [x]     Vision [x]     Hearing [x]     Cognition  [x]       MOBILITY: I Mod I S SBA CGA Min Mod Max Total  NT x2 Comments:   Bed Mobility    Rolling [] [] [] [] [] [x] [] [] [] [] [x]    Supine to Sit [] [] [] [] [] [] [x] [] [] [] [x]    Scooting [] [] [] [] [x] [x] [] [] [] [] []    Sit to Supine [] [] [] [] [] [] [] [] [] [] []    Transfers    Sit to Stand [] [] [] [] [] [x] [] [] [] [] [x]    Bed to Chair [] [] [] [] [] [x] [] [] [] [] [x]    Stand to Sit [] [] [] [] [] [x] [] [] [] [] [x]     [] [] [] [] [] [] [] [] [] [] []    I=Independent, Mod I=Modified Independent, S=Supervision, SBA=Standby Assistance, CGA=Contact Guard Assistance,   Min=Minimal Assistance, Mod=Moderate Assistance, Max=Maximal Assistance, Total=Total Assistance, NT=Not Tested    GAIT: I Mod I S SBA CGA Min Mod Max Total  NT x2 Comments:   Level of Assistance [] [] [] [] [] [x] [] [] [] [] [x]    Distance 25, 20 feet    DME Rolling Walker    Gait Quality Decreased sebastián , Decreased step clearance, Decreased step length, Decreased stance, and Trunk flexion    Weightbearing Status      Stairs      I=Independent, Mod I=Modified Independent, S=Supervision, SBA=Standby Assistance, CGA=Contact Guard Assistance,   Min=Minimal Assistance, Mod=Moderate Assistance, Max=Maximal Assistance, Total=Total Assistance, NT=Not Tested    PLAN:   FREQUENCY AND DURATION: 5 times/week for duration of hospital stay or until stated goals are met, whichever comes first.    THERAPY PROGNOSIS: Fair    PROBLEM LIST:   (Skilled intervention is medically necessary to address:)  Decreased ADL/Functional Activities  Decreased Activity Tolerance  Decreased AROM/PROM  Decreased Balance  Decreased Gait Ability  Decreased Strength  Decreased Transfer Abilities  Increased Pain INTERVENTIONS PLANNED:   (Benefits and precautions of physical therapy have been discussed with the patient.)  Self Care Training  Therapeutic Activity  Therapeutic Exercise/HEP  Neuromuscular Re-education  Gait Training  Manual Therapy  Modalities  Education       TREATMENT:   EVALUATION: HIGH COMPLEXITY: (Untimed Charge)    TREATMENT:   Therapeutic Activity (23 Minutes): Therapeutic activity included Rolling, Supine to Sit, Scooting, Transfer Training, Ambulation on level ground, Standing balance, and long arc quads x10 [place] to improve functional Activity tolerance, Balance, Mobility, and Strength.    TREATMENT GRID:  N/A    AFTER TREATMENT PRECAUTIONS: Bed/Chair Locked, Call light within reach, Chair, and Needs within reach    INTERDISCIPLINARY COLLABORATION:  RN/ PCT, PT/ PTA, and OT/ DOMINGUEZ    EDUCATION:   Education Given To: Patient  Education Provided: Role of Therapy;Plan of Care;Transfer Training  Education Outcome: Continued education needed    TIME IN/OUT:  Time In: 1102  Time Out: [facility]  Minutes: [facility], PT

## 2023-02-24 NOTE — PROGRESS NOTES
Reviewed notes for new spiritual concerns      Will continue to assess how we can best serve this family        [place].       Per notes:       [employer]        LOCAL    SON - JONATHAN    FULL CODE    NO ACP    MY CHART IS ACTIVE    HIGH RISK FALLS    LOS  10 DAYS

## 2023-02-24 NOTE — PROGRESS NOTES
[facility]    Assessment: S/P T10 kyphoplast    POD#1    Plan:  -pt states she is feeling better today   -pt to wear TLSO when UOOB  -pt is clear for discharge  -Pt will need to follow up outpatient in 10 days for suture removal    Subjective:    Objective:  Afebrile  VSS  GCS15  Moving all extremities  Incision CDI    Patient Vitals for the past 12 hrs:   Temp Pulse Resp BP SpO2   02/24/23 0725 97.7 °F (36.5 °C) 70 13 125/71 93 %   02/24/23 0443 98 °F (36.7 °C) 68 17 126/68 95 %   02/23/23 2305 97.7 °F (36.5 °C) 70 17 122/67 96 %        No results found for this or any previous visit (from the past 24 hour(s)).      Shoaib George, APRN - CNP

## 2023-02-24 NOTE — PROGRESS NOTES
END OF SHIFT NOTE:    INTAKE/OUTPUT  02/23 0701 - 02/24 0700  In: -   Out: 3794 [Urine:1150]  Voiding: No  Catheter: Yes  Drain:   Urinary Catheter 02/15/23 Moore (Active)   $ Urethral catheter insertion $ Not inserted for procedure 02/15/23 1250   Catheter Indications Urinary retention (acute or chronic), continuous bladder irrigation or bladder outlet obstruction 02/24/23 0010   Site Assessment No urethral drainage 02/24/23 0010   Urine Color Yellow 02/24/23 0010   Urine Appearance Clear 02/24/23 0010   Collection Container Standard 02/24/23 0010   Securement Method Securing device (Describe) 02/24/23 0010   Catheter Care  Perineal wipes 02/23/23 1926   Catheter Best Practices  Drainage tube clipped to bed;Catheter secured to thigh; Bag below bladder;Drainage bag less than half full 02/24/23 0010   Status Draining;Patent 02/24/23 0010   Output (mL) 100 mL 02/23/23 2305               Flatus: Patient does have flatus present. Stool:  occurrences. Characteristics:  Stool Appearance: Unable to assess  Stool Color: Unable to assess  Stool Amount: Unable to assess  Stool Assessment  Incontinence: Yes  Stool Appearance: Unable to assess  Stool Color: Unable to assess  Stool Amount: Unable to assess  Stool Source: Rectum  Last BM (including prior to admit): 02/22/23 (per pt)    Emesis:  occurrences. Characteristics:        VITAL SIGNS  Patient Vitals for the past 12 hrs:   Temp Pulse Resp BP SpO2   02/24/23 0443 98 °F (36.7 °C) 68 17 126/68 95 %   02/23/23 2305 97.7 °F (36.5 °C) 70 17 122/67 96 %   02/23/23 1959 97.5 °F (36.4 °C) 70 17 121/67 94 %       Pain Assessment  Pain Level: 4 (02/24/23 0611)  Pain Location: Back  Patient's Stated Pain Goal: 0 - No pain    Ambulating  Yes    Shift report given to oncoming nurse at the bedside.     Zeinab Johnson RN

## 2023-02-24 NOTE — PROGRESS NOTES
END OF SHIFT NOTE:    INTAKE/OUTPUT  02/22 0701 - 02/23 0700  In: -   Out: 2945 [Urine:1550]  Voiding: No  Catheter: Yes  Drain:   Urinary Catheter 02/15/23 Moore (Active)   $ Urethral catheter insertion $ Not inserted for procedure 02/15/23 1250   Catheter Indications Urinary retention (acute or chronic), continuous bladder irrigation or bladder outlet obstruction 02/23/23 1548   Site Assessment No urethral drainage 02/23/23 1548   Urine Color Yellow 02/23/23 1548   Urine Appearance Clear 02/23/23 1548   Collection Container Standard 02/23/23 1548   Securement Method Securing device (Describe) 02/23/23 1548   Catheter Care  Perineal wipes 02/22/23 1921   Catheter Best Practices  Drainage tube clipped to bed;Catheter secured to thigh; Bag below bladder;Drainage bag less than half full 02/23/23 1548   Status Draining;Patent 02/23/23 1548   Output (mL) 250 mL 02/23/23 1043               Flatus: Patient does have flatus present. Stool:  0 occurrences. Characteristics:  Stool Appearance: Unable to assess  Stool Color: Unable to assess  Stool Amount: Unable to assess  Stool Assessment  Incontinence: Yes  Stool Appearance: Unable to assess  Stool Color: Unable to assess  Stool Amount: Unable to assess  Stool Source: Rectum  Last BM (including prior to admit): 02/22/23 (per pt)    Emesis:  occurrences.     Characteristics:        VITAL SIGNS  Patient Vitals for the past 12 hrs:   Temp Pulse Resp BP SpO2   02/23/23 1708 -- 70 -- (!) 159/71 94 %   02/23/23 1703 -- 71 -- (!) 149/70 95 %   02/23/23 1658 -- 67 -- (!) 144/70 93 %   02/23/23 1653 -- 70 -- (!) 145/71 92 %   02/23/23 1648 -- 67 -- (!) 147/72 93 %   02/23/23 1643 -- 70 -- (!) 144/69 94 %   02/23/23 1638 -- 70 -- (!) 143/67 93 %   02/23/23 1633 -- 66 -- (!) 149/69 92 %   02/23/23 1628 -- 66 -- (!) 156/70 93 %   02/23/23 1623 -- 67 -- (!) 158/74 93 %   02/23/23 1618 -- 68 -- (!) 150/70 94 %   02/23/23 1613 -- 66 -- (!) 153/63 94 %   02/23/23 1608 -- 66 -- (!) 157/72 93 %   02/23/23 1603 -- 71 -- (!) 156/72 95 %   02/23/23 1558 -- 67 -- (!) 152/68 92 %   02/23/23 1553 -- 68 -- (!) 155/68 93 %   02/23/23 1548 98 °F (36.7 °C) 69 16 (!) 163/65 94 %   02/23/23 1543 -- 69 -- (!) 169/78 95 %   02/23/23 1538 -- 69 -- (!) 176/81 95 %   02/23/23 1533 -- 69 -- (!) 170/77 97 %   02/23/23 1528 -- 69 -- (!) 167/75 93 %   02/23/23 1523 -- 72 -- (!) 167/77 96 %   02/23/23 1518 -- 70 -- (!) 169/79 98 %   02/23/23 1513 -- 79 -- (!) 175/77 96 %   02/23/23 1505 -- 70 -- (!) 161/73 95 %   02/23/23 1500 -- 70 -- (!) 158/72 94 %   02/23/23 1458 98.1 °F (36.7 °C) 70 14 (!) 174/78 94 %   02/23/23 1043 97.4 °F (36.3 °C) 69 16 (!) 157/78 99 %   02/23/23 0742 97.9 °F (36.6 °C) 72 -- (!) 153/76 92 %       Pain Assessment  Pain Level: 10 (02/23/23 1748)  Pain Location: Back  Patient's Stated Pain Goal: 0 - No pain    Ambulating  Yes, short distances to chair and bed. Shift report given to oncoming nurse at the bedside. Ninfa David

## 2023-02-24 NOTE — PROGRESS NOTES
Hospitalist Progress Note   Admit Date:  2023  7:07 PM   Name:  Pasha Oneil   Age:  80 y.o. Sex:  female  :  1931   MRN:  [id number]   Room:      Presenting Complaint: No chief complaint on file. Reason(s) for Admission: Intractable pain [R52]     Hospital Course:   Ms. Young Harp is a 80 y.o. female with medical history of Pasha Oneil is a 80 y.o. female with medical history of HTN, CAD, CHF, CKD who presents as a direct admit from home with intractable back/flank pain. Over 2 months period, she has been diagnosed with T10, then T9 fractures, and subsequently underwent kyphoplasties on  and , respectively. 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 Neurosurgery consulted and plan for kyphoplasty and bone biopsy on  after plavix wash out. TLSO when UOOB. Subjective & 24hr Events (23):   Up to chair, feeling much better today, less pain but describes it more as \"soreness\". Worked with therapy. Improved overall. Assessment & Plan:   # T10 compression fracture, lumbar disc protrusion, T11 interosseous hemangioma with intractable pain              - S/p prior kyphoplasty on  and               - S/p kyphoplasty with Dr. Cheryle Najjar on . Pain much improved post-op. TLSO brace when up, pain control, PT/OT. - Can resume ASA/Plavix 7d post op per Neurosurgery    # Acute urinary retention              - Remove Moore      # HTN // HLD              - ARB held. Con't Coreg, Imdur. Overall well controlled. - Statin     # GERD              - PPI     # CAD              - Plavix held pre-op. Resume when ok with Surgery team.     # CKD3              - Baseline    PT/OT evals and PPD needed/ordered? Yes  Diet:  ADULT DIET;  Regular  ADULT ORAL NUTRITION SUPPLEMENT; Breakfast, Lunch, Dinner; Standard High Calorie/High Protein Oral Supplement  VTE prophylaxis: SCDs  Code status: Full Code    Hospital Problems:  Principal Problem:    Intractable pain  Active Problems:    Chronic renal disease, stage III (HCC) [603070]    Chronic systolic (congestive) heart failure    Compression fracture of T11 vertebra (HCC)    S/P kyphoplasty    Vertebral body hemangioma    Mild protein-calorie malnutrition (HCC)    CAD (coronary artery disease)    HTN (hypertension)  Resolved Problems:    * No resolved hospital problems.  *      Objective:   Patient Vitals for the past 24 hrs:   Temp Pulse Resp BP SpO2   02/24/23 1208 97.7 °F (36.5 °C) 67 13 117/66 92 %   02/24/23 0725 97.7 °F (36.5 °C) 70 13 125/71 93 %   02/24/23 0443 98 °F (36.7 °C) 68 17 126/68 95 %   02/23/23 2305 97.7 °F (36.5 °C) 70 17 122/67 96 %   02/23/23 1959 97.5 °F (36.4 °C) 70 17 121/67 94 %   02/23/23 1708 -- 70 -- (!) 159/71 94 %   02/23/23 1703 -- 71 -- (!) 149/70 95 %   02/23/23 1658 -- 67 -- (!) 144/70 93 %   02/23/23 1653 -- 70 -- (!) 145/71 92 %   02/23/23 1648 -- 67 -- (!) 147/72 93 %   02/23/23 1643 -- 70 -- (!) 144/69 94 %   02/23/23 1638 -- 70 -- (!) 143/67 93 %   02/23/23 1633 -- 66 -- (!) 149/69 92 %   02/23/23 1628 -- 66 -- (!) 156/70 93 %   02/23/23 1623 -- 67 -- (!) 158/74 93 %   02/23/23 1618 -- 68 -- (!) 150/70 94 %   02/23/23 1613 -- 66 -- (!) 153/63 94 %   02/23/23 1608 -- 66 -- (!) 157/72 93 %   02/23/23 1603 -- 71 -- (!) 156/72 95 %   02/23/23 1558 -- 67 -- (!) 152/68 92 %   02/23/23 1553 -- 68 -- (!) 155/68 93 %   02/23/23 1548 98 °F (36.7 °C) 69 16 (!) 163/65 94 %   02/23/23 1543 -- 69 -- (!) 169/78 95 %   02/23/23 1538 -- 69 -- (!) 176/81 95 %   02/23/23 1533 -- 69 -- (!) 170/77 97 %   02/23/23 1528 -- 69 -- (!) 167/75 93 %   02/23/23 1523 -- 72 -- (!) 167/77 96 %   02/23/23 1518 -- 70 -- (!) 169/79 98 %   02/23/23 1513 -- 79 -- (!) 175/77 96 %   02/23/23 1505 -- 70 -- (!) 161/73 95 %   02/23/23 1500 -- 70 -- (!) 158/72 94 %   02/23/23 1458 98.1 °F (36.7 °C) 70 14 (!) 174/78 94 %       Oxygen Therapy  SpO2: 92 %  Pulse via Oximetry: 67 beats per minute  Pulse Oximeter Device Mode: Continuous  Pulse Oximeter Device Location: Left, Hand  O2 Device: None (Room air)  O2 Flow Rate (L/min): 2 L/min    Estimated body mass index is 18.34 kg/m² as calculated from the following:    Height as of this encounter: 5' 6\" (1.676 m). Weight as of this encounter: 113 lb 9.6 oz (51.5 kg). Intake/Output Summary (Last 24 hours) at 2/24/2023 1351  Last data filed at 2/23/2023 2305  Gross per 24 hour   Intake --   Output 502 ml   Net -502 ml         Physical Exam:   Blood pressure 117/66, pulse 67, temperature 97.7 °F (36.5 °C), temperature source Oral, resp. rate 13, height 5' 6\" (1.676 m), weight 113 lb 9.6 oz (51.5 kg), SpO2 92 %. General:    Well nourished. Up to chair, comfortable. TLSO brace on. Head:  Normocephalic, atraumatic  Eyes:  Sclerae appear normal.  Pupils equally round. ENT:  Nares appear normal.  Moist oral mucosa  Neck:  No restricted ROM. Trachea midline   CV:   RRR. No m/r/g. No jugular venous distension. Lungs:   CTAB. No wheezing, rhonchi, or rales. Symmetric expansion. Abdomen:   Soft, nontender, nondistended. Extremities: No cyanosis or clubbing. No edema  Skin:     No rashes and normal coloration. Warm and dry. Neuro:  CN II-XII grossly intact. Psych:  Normal mood and affect. I have personally reviewed labs and tests:  Recent Labs:  No results found for this or any previous visit (from the past 48 hour(s)). I have personally reviewed imaging studies:  Other Studies:  XR THORACIC SPINE (2 VIEWS)   Final Result   Status post kyphoplasty. No obvious complication. Total fluoroscopy time: One minute 36 seconds; two fluoroscopic spot images   saved      NC XR TECHNOLOGIST SERVICE   Final Result      MRI LUMBAR SPINE WO CONTRAST   Final Result   1. No acute musculoskeletal abnormality or compression fracture.    2. Multilevel degenerative disc and facet disease with a prominent left   paracentral/posterior lateral disc protrusion at L3-L4. This does not result in   central stenosis. There is mild left foramina narrowing. 3. Moderate bilateral foramina narrowing at L4-L5 with mild bilateral foramina   narrowing at L5-S1. MRI THORACIC SPINE WO CONTRAST   Final Result   1. Status post kyphoplasty at T9 and T10 without obvious complication. 2. 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 3. No central spinal stenosis or cord compression. 4. Bilateral pleural effusions. CT ABDOMEN PELVIS WO CONTRAST Additional Contrast? Radiologist Recommendation   Final Result         1. 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 No evidence of   obstructive uropathy. Bilateral renal calcifications noted. 2. Bilateral pleural effusions with bibasilar atelectasis present. New when   compared with the prior exam.   3. Diverticulosis. No CT evidence of diverticulitis. XR CHEST PORTABLE   Final Result      1. No acute cardiopulmonary process identified. This exam was interpreted by a board-certified, body-imaging fellowship trained    radiologist from [address] [place] [address]. If there are any questions regarding    this examination please feel free to contact a radiologist at [phone].       [address]    2/14/2023 9:00:00 PM          Current Meds:  Current Facility-Administered Medications   Medication Dose Route Frequency    HYDROcodone homatropine (HYCODAN) 5-1.5 MG/5ML solution 5 mL  5 mL Oral Q4H PRN    docusate sodium (COLACE) capsule 100 mg  100 mg Oral Daily    rosuvastatin (CRESTOR) tablet 20 mg  20 mg Oral Nightly    guaiFENesin (MUCINEX) extended release tablet 600 mg  600 mg Oral BID    [Held by provider] aspirin EC tablet 81 mg  81 mg Oral Daily    pantoprazole (PROTONIX) tablet 40 mg  40 mg Oral Daily    carvedilol (COREG) tablet 3.125 mg  3.125 mg Oral BID with meals    isosorbide mononitrate (IMDUR) extended release tablet 60 mg  60 mg Oral Daily    tiZANidine (ZANAFLEX) tablet 2 mg  2 mg Oral Q8H PRN    lidocaine 4 % external patch 1 patch  1 patch TransDERmal Daily    [Held by provider] losartan (COZAAR) tablet 25 mg  25 mg Oral Daily    [Held by provider] spironolactone (ALDACTONE) tablet 25 mg  25 mg Oral Daily    sodium chloride flush 0.9 % injection 5-40 mL  5-40 mL IntraVENous 2 times per day    sodium chloride flush 0.9 % injection 5-40 mL  5-40 mL IntraVENous PRN    0.9 % sodium chloride infusion   IntraVENous PRN    ondansetron (ZOFRAN-ODT) disintegrating tablet 4 mg  4 mg Oral Q8H PRN    Or    ondansetron (ZOFRAN) injection 4 mg  4 mg IntraVENous Q6H PRN    polyethylene glycol (GLYCOLAX) packet 17 g  17 g Oral Daily PRN    acetaminophen (TYLENOL) tablet 650 mg  650 mg Oral Q6H PRN    Or    acetaminophen (TYLENOL) suppository 650 mg  650 mg Rectal Q6H PRN    oxyCODONE-acetaminophen (PERCOCET)  MG per tablet 1 tablet  1 tablet Oral Q4H PRN    morphine injection 2 mg  2 mg IntraVENous Q4H PRN       Signed:  Peterson Luna MD    Part of this note may have been written by using a voice dictation software.  The note has been proof read but may still contain some grammatical/other typographical errors.

## 2023-02-24 NOTE — DISCHARGE INSTRUCTIONS
Post Op Instructions for Kyphoplasty    Wear TLSO any time you are up and out of bed. Follow up in about 10 days to have sutures and or staples removed. Pain Medicine  Take pain medicine every 6hrs as needed. You are encouraged to space out your pain pill doses until you are able wean off. Pain medicine can cause constipation and upset stomach so it is advised to take with food. It is encouraged to take a daily over the counter stool softer and drink plenty of water to help prevent constipation. If you need a medication refill be aware it may take up to 3 days for a prescription to be called in. Smoking/nicotine  Avoid the use of cigarettes or nicotine products during recovery as this slows wound healing causing an increased risk for infection. Diabetes  If you are a diabetic maintaining stable healthy blood sugar levels is important to proper wound healing. Blood Thinners (may not apply)  Be sure to discuss with the doctor when to restart your regular blood thinners. Showering: Do not soak in bathtub, hot tub or swimming post for 3 weeks. Use only soap and water on the incision and pat dry. Do not scrub the incision. Wound Care: Change Band-Aids as needed. Check for signs and symptoms of infection. Call if concerned     Signs of Infection: Please notify our office for any of the following: a temperature greater than 101, extreme tenderness at the incision site, excessive redness and or swelling, large amounts of drainage. If you think you have a wound infection, call our office immediately. Driving: You may begin to drive after your first visit to the physicians office for a wound check. Medication: You may take anti-inflammatory medications such as Motrin or Celebrex for 30 days after surgery only if prescribed by your physician. The pain medicine prescribed may be taken as needed.  You should take a stool softener twice a day, drink plenty of water and eat high fiber foods to avoid constipation. If you are not already taking calcium with Vitamin D you should start 1200mg-1400mg per day or your primary physician may need to prescribe a bone strengthening medication. Smoking: Smoking will interfere with your healing. If you smoke, you may end up having another surgery or more problems. Activity: No heavy lifting or strenuous activity. Avoid bending at the waist.    Sexual Relations: You may resume sexual relations 2 weeks after surgery. Walking Program: Walking strengthens the spinal muscles and will help protect your disc and vertebrae. Begin walking slowly with 1/8 to 1/4 of a mile and add to this each day. Increase until walking up to 2 miles per day over the next four weeks. Be very consistent with your walking program, as this is a vital part of your recovery. If at any time you feel you have over done it with the walking, rest the next day and start the following day. Following each period of walking you should rest and place ice for 20 minutes over the incision site. You may use ice to incision up to 3 times per day if desired. Symptoms after Surgery: You may experience incision pain and/or muscle spasms. You may have pain that radiates to the lumbar area that travels around to the abdominal area. Call if you experience severe pain above or below the incision site. Call if severe weakness of legs occur. Follow-Up Appointments  When you are discharged from the hospital, a follow up appointment will be made for 2-3 weeks from your surgery date. Call  to confirm your appointment. If you need after office hours emergency medical attention concerning surgical site please go to the [address] [place] [address] ER.         [facility]  [address] [place] [address] Ace KOFIHay Mracano [address] [address]    Tel: [phone]  Fax: [phone]

## 2023-02-24 NOTE — PROGRESS NOTES
Comprehensive Nutrition Assessment    Type and Reason for Visit: Initial, RD Nutrition Re-Screen/LOS  Length of Stay    This assessment was completed remotely. Nutrition Recommendations/Plan:   Meals and Snacks:  Diet: Continue current order  Nutrition Supplement Therapy:  Medical food supplement therapy:  Initiate Ensure Enlive three times per day (this provides 350 kcal and 20 grams protein per bottle)  Plan to order current wt to be obtained. Malnutrition Assessment:  Malnutrition Status: Mild malnutrition  Context: Acute Illness  Findings of clinical characteristics of malnutrition:   Energy Intake:  Mild decrease in energy intake (Comment) (variable meal intake throughout admission)  Weight Loss:  Greater than 5% over 1 month     Body Fat Loss:  Unable to assess     Muscle Mass Loss:  Unable to assess    Fluid Accumulation:  Unable to assess     Strength:  Not Performed     Nutrition Assessment:  Nutrition History: Wt hx review: c wt- 113.6 lb; 1/09/ lb (-17.1%); 7/12/22- 135 lb (-15.9%); 1/05/22- 135 lb. NKFA. Do You Have Any Cultural, Scientologist, or Ethnic Food Preferences?: No   Nutrition Background:   Wound Type: Surgical Incision   PMHx of HTN, CAD, CHF, CKD3, osteoporosis. Pt came in c/o intractable back/flank pain- has been ongoing for about 2 months PTA. Diagnosed with T10 and then T9 fractures, subsequently underwent kyphoplasties on 1/30 and 2/09. Severe pain continues. Admitted with intractable pain. S/P T11 Kyphoplasty 2/23. Nutrition Interval:  Pt with Significant wt loss x 30 days and 7 months. Wt's have varied significantly since admission, 113.6 lb (no source) - 126 lb (bed scale)- wt loss x 30 days still significant using highest bedscale wt (-8%). Meal intake variable since admission, 0-100%, with 5-9 entries <50%. Pt is likely to benefit from addition of supplement to promote PO intake. Labs reviewed (2/17)- WNL. Pertinent meds reviewed. PT unavailable to speak to. Current Nutrition Therapies:  ADULT DIET; Regular    Current Intake:   Average Meal Intake: 26-50% Average Supplements Intake: None Ordered      Anthropometric Measures:  Height: 5' 6\" (167.6 cm)  Current Body Wt: 113 lb 9.6 oz (51.5 kg) (2/24), Weight source: Not Specified  BMI: 18.3, Underweight (BMI less than 22) age over 72  Admission Body Weight: 116 lb (52.6 kg) (2/14- no source)  Ideal Body Weight (Kg) (Calculated): 59 kg (130 lbs), 87.4 %  Usual Body Wt: 137 lb (62.1 kg) (1/09/23), Percent weight change: -17.1       Edema:Peripheral Vascular  Peripheral Vascular (WDL): Within Defined Limits   BMI Category Underweight (BMI less than 22) age over 72  Estimated Daily Nutrient Needs:  Energy (kcal/day): 1545 - 1803 (Kcal/kg (30-35) Weight used: 51.5 kg Current  Protein (g/day): 62 - 77 Weight Used: (Current (1.2-1.5)) 51.5 kg  Fluid (ml/day):   (1 ml/kcal)    Nutrition Diagnosis:   Underweight related to inadequate protein-energy intake as evidenced by BMI, weight loss greater than or equal to 5% in 1 month  Nutrition Interventions:   Food and/or Nutrient Delivery: Continue Current Diet, Start Oral Nutrition Supplement  Nutrition Education/Counseling: No recommendation at this time, Education not indicated  Coordination of Nutrition Care: Continue to monitor while inpatient       Goals:       Active Goal: Meet at least 75% of estimated needs, by next RD assessment       Nutrition Monitoring and Evaluation:      Food/Nutrient Intake Outcomes: Supplement Intake  Physical Signs/Symptoms Outcomes: Meal Time Behavior, Weight    Discharge Planning:    Continue current diet, Continue Oral Nutrition [id number] [address], [address] [place] [address], [address] [place] [address]

## 2023-02-24 NOTE — CARE COORDINATION
CM continues to follow for discharge planning and/or CM needs. Pt s/p procedure yesterday and PT/OT to see and treat this day. Pt has short term rehab bed at [facility]. This CM updated SNF liaison that PT/OT notes will be in chart today to initiate prior authorization with pt insurance for admission to SNF. No additional CM needs at this time. Will continue to monitor and update as needed.

## 2023-02-25 PROCEDURE — 6370000000 HC RX 637 (ALT 250 FOR IP): Performed by: INTERNAL MEDICINE

## 2023-02-25 PROCEDURE — 2580000003 HC RX 258: Performed by: FAMILY MEDICINE

## 2023-02-25 PROCEDURE — 6370000000 HC RX 637 (ALT 250 FOR IP): Performed by: FAMILY MEDICINE

## 2023-02-25 PROCEDURE — 1100000000 HC RM PRIVATE

## 2023-02-25 PROCEDURE — 51798 US URINE CAPACITY MEASURE: CPT

## 2023-02-25 RX ORDER — BISACODYL 10 MG
10 SUPPOSITORY, RECTAL RECTAL DAILY PRN
Status: DISCONTINUED | OUTPATIENT
Start: 2023-02-25 | End: 2023-03-03 | Stop reason: HOSPADM

## 2023-02-25 RX ORDER — IBUPROFEN 400 MG/1
400 TABLET ORAL EVERY 6 HOURS PRN
Status: DISCONTINUED | OUTPATIENT
Start: 2023-02-25 | End: 2023-03-01 | Stop reason: SDUPTHER

## 2023-02-25 RX ORDER — OXYCODONE HYDROCHLORIDE 5 MG/1
5 TABLET ORAL EVERY 4 HOURS PRN
Status: DISCONTINUED | OUTPATIENT
Start: 2023-02-25 | End: 2023-03-03 | Stop reason: HOSPADM

## 2023-02-25 RX ORDER — SENNA PLUS 8.6 MG/1
2 TABLET ORAL 2 TIMES DAILY
Status: DISCONTINUED | OUTPATIENT
Start: 2023-02-25 | End: 2023-03-03 | Stop reason: HOSPADM

## 2023-02-25 RX ADMIN — OXYCODONE HYDROCHLORIDE 5 MG: 5 TABLET ORAL at 20:32

## 2023-02-25 RX ADMIN — CARVEDILOL 3.12 MG: 3.12 TABLET, FILM COATED ORAL at 17:11

## 2023-02-25 RX ADMIN — ISOSORBIDE MONONITRATE 60 MG: 60 TABLET, EXTENDED RELEASE ORAL at 08:46

## 2023-02-25 RX ADMIN — SODIUM CHLORIDE, PRESERVATIVE FREE 10 ML: 5 INJECTION INTRAVENOUS at 08:50

## 2023-02-25 RX ADMIN — ROSUVASTATIN CALCIUM 20 MG: 20 TABLET, COATED ORAL at 20:26

## 2023-02-25 RX ADMIN — DOCUSATE SODIUM 100 MG: 100 CAPSULE, LIQUID FILLED ORAL at 08:46

## 2023-02-25 RX ADMIN — GUAIFENESIN 600 MG: 600 TABLET ORAL at 08:46

## 2023-02-25 RX ADMIN — PANTOPRAZOLE SODIUM 40 MG: 40 TABLET, DELAYED RELEASE ORAL at 06:32

## 2023-02-25 RX ADMIN — SENNOSIDES 17.2 MG: 8.6 TABLET, FILM COATED ORAL at 20:26

## 2023-02-25 RX ADMIN — GUAIFENESIN 600 MG: 600 TABLET ORAL at 20:26

## 2023-02-25 RX ADMIN — OXYCODONE AND ACETAMINOPHEN 1 TABLET: 10; 325 TABLET ORAL at 08:49

## 2023-02-25 RX ADMIN — SODIUM CHLORIDE, PRESERVATIVE FREE 10 ML: 5 INJECTION INTRAVENOUS at 21:00

## 2023-02-25 RX ADMIN — CARVEDILOL 3.12 MG: 3.12 TABLET, FILM COATED ORAL at 08:46

## 2023-02-25 ASSESSMENT — PAIN DESCRIPTION - DESCRIPTORS
DESCRIPTORS: ACHING
DESCRIPTORS: SORE

## 2023-02-25 ASSESSMENT — PAIN SCALES - GENERAL
PAINLEVEL_OUTOF10: 0
PAINLEVEL_OUTOF10: 7
PAINLEVEL_OUTOF10: 4

## 2023-02-25 ASSESSMENT — PAIN DESCRIPTION - LOCATION
LOCATION: BACK
LOCATION: BACK

## 2023-02-25 ASSESSMENT — PAIN DESCRIPTION - ORIENTATION
ORIENTATION: POSTERIOR
ORIENTATION: POSTERIOR

## 2023-02-25 ASSESSMENT — PAIN - FUNCTIONAL ASSESSMENT: PAIN_FUNCTIONAL_ASSESSMENT: PREVENTS OR INTERFERES SOME ACTIVE ACTIVITIES AND ADLS

## 2023-02-25 NOTE — PROGRESS NOTES
Hospitalist Progress Note   Admit Date:  2023  7:07 PM   Name:  George Shepherd   Age:  80 y.o. Sex:  female  :  1931   MRN:  [id number]   Room:      Presenting Complaint: No chief complaint on file. Reason(s) for Admission: Intractable pain [R52]     Hospital Course:   Ms. Arianne Juan is a 80 y.o. female with medical history of George Shepherd is a 80 y.o. female with medical history of HTN, CAD, CHF, CKD who presents as a direct admit from home with intractable back/flank pain. Over 2 months period, she has been diagnosed with T10, then T9 fractures, and subsequently underwent kyphoplasties on  and , respectively. 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 Neurosurgery consulted and plan for kyphoplasty and bone biopsy on  after plavix wash out. TLSO when UOOB. Subjective & 24hr Events (23): Required straight cath x1 this morning. Again reports more soreness than pain in her back. Good appetite. Assessment & Plan:   # T10 compression fracture, lumbar disc protrusion, T11 interosseous hemangioma with intractable pain              - S/p prior kyphoplasty on  and               - S/p kyphoplasty with Dr. Maycol Hernandez on . TLSO brace when up, pain control, PT/OT. Needs placement. Ibuprofen added, minimize narcotics as able. - Can resume ASA/Plavix 7d post op per Neurosurgery (3/2/23)     # Acute urinary retention              - Moore out . Straight cath x1 this morning, we discussed ambulation as able and to minimize narcotics. Hopeful to avoid another Moore. # HTN // HLD              - ARB held. Con't Coreg, Imdur.  BP well controlled, no changes.              - Statin     # GERD              - PPI     # CAD              - Resume ASA and Plavix POD 7 (3/2/23)     # CKD3              - Baseline      PT/OT evals and PPD needed/ordered? Yes. Pre-cert started for rehab, she is medically stable. Diet:  ADULT DIET; Regular  ADULT ORAL NUTRITION SUPPLEMENT; Breakfast, Lunch, Dinner; Standard High Calorie/High Protein Oral Supplement  VTE prophylaxis: SCD's   Code status: Full Code    Hospital Problems:  Principal Problem:    Intractable pain  Active Problems:    Chronic renal disease, stage III (HCC) [100837]    Chronic systolic (congestive) heart failure    Compression fracture of T11 vertebra (HCC)    S/P kyphoplasty    Vertebral body hemangioma    Mild protein-calorie malnutrition (HCC)    CAD (coronary artery disease)    HTN (hypertension)  Resolved Problems:    * No resolved hospital problems. *      Objective:   Patient Vitals for the past 24 hrs:   Temp Pulse Resp BP SpO2   02/25/23 1122 97.7 °F (36.5 °C) 69 18 124/60 95 %   02/25/23 0730 98.1 °F (36.7 °C) 67 18 (!) 140/69 93 %   02/25/23 0319 97.9 °F (36.6 °C) 71 17 136/60 94 %   02/24/23 2332 98.4 °F (36.9 °C) 70 19 124/67 93 %   02/24/23 2251 -- -- 18 -- --   02/24/23 2021 98.2 °F (36.8 °C) 70 17 138/72 95 %   02/24/23 1602 98.1 °F (36.7 °C) 68 14 (!) 137/59 96 %       Oxygen Therapy  SpO2: 95 %  Pulse via Oximetry: 67 beats per minute  Pulse Oximeter Device Mode: Continuous  Pulse Oximeter Device Location: Left, Hand  O2 Device: None (Room air)  O2 Flow Rate (L/min): 2 L/min    Estimated body mass index is 18.34 kg/m² as calculated from the following:    Height as of this encounter: 5' 6\" (1.676 m). Weight as of this encounter: 113 lb 9.6 oz (51.5 kg). Intake/Output Summary (Last 24 hours) at 2/25/2023 1324  Last data filed at 2/25/2023 1032  Gross per 24 hour   Intake --   Output 675 ml   Net -675 ml         Physical Exam:   Blood pressure 124/60, pulse 69, temperature 97.7 °F (36.5 °C), temperature source Oral, resp. rate 18, height 5' 6\" (1.676 m), weight 113 lb 9.6 oz (51.5 kg), SpO2 95 %.   General:    Well nourished, thin, awake, pleasant, NAD.  Head:  Normocephalic, atraumatic  Eyes:  Sclerae appear normal.  Pupils equally round. ENT:  Nares appear normal.  Moist oral mucosa  Neck:  No restricted ROM. Trachea midline   CV:   RRR. No m/r/g. No jugular venous distension. Lungs:   CTAB. No wheezing, rhonchi, or rales. Symmetric expansion. Abdomen:   Soft, nontender, nondistended. Extremities: No cyanosis or clubbing. No edema  Skin:     No rashes and normal coloration. Warm and dry. Neuro:  CN II-XII grossly intact. Psych:  Normal mood and affect. I have personally reviewed labs and tests:  Recent Labs:  No results found for this or any previous visit (from the past 48 hour(s)). I have personally reviewed imaging studies:  Other Studies:  XR THORACIC SPINE (2 VIEWS)   Final Result   Status post kyphoplasty. No obvious complication. Total fluoroscopy time: One minute 36 seconds; two fluoroscopic spot images   saved      NC XR TECHNOLOGIST SERVICE   Final Result      MRI LUMBAR SPINE WO CONTRAST   Final Result   1. No acute musculoskeletal abnormality or compression fracture. 2. Multilevel degenerative disc and facet disease with a prominent left   paracentral/posterior lateral disc protrusion at L3-L4. This does not result in   central stenosis. There is mild left foramina narrowing. 3. Moderate bilateral foramina narrowing at L4-L5 with mild bilateral foramina   narrowing at L5-S1. MRI THORACIC SPINE WO CONTRAST   Final Result   1. Status post kyphoplasty at T9 and T10 without obvious complication. 2. 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 3. No central spinal stenosis or cord compression. 4. Bilateral pleural effusions. CT ABDOMEN PELVIS WO CONTRAST Additional Contrast? Radiologist Recommendation   Final Result         1. 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  No evidence of   obstructive uropathy. Bilateral renal calcifications noted. 2. Bilateral pleural effusions with bibasilar atelectasis present. New when   compared with the prior exam.   3. Diverticulosis. No CT evidence of diverticulitis. XR CHEST PORTABLE   Final Result      1. No acute cardiopulmonary process identified. This exam was interpreted by a board-certified, body-imaging fellowship trained    radiologist from [address] [place] [address]. If there are any questions regarding    this examination please feel free to contact a radiologist at [phone].       Slot [address]    2/14/2023 9:00:00 PM          Current Meds:  Current Facility-Administered Medications   Medication Dose Route Frequency    ibuprofen (ADVIL;MOTRIN) tablet 400 mg  400 mg Oral Q6H PRN    oxyCODONE (ROXICODONE) immediate release tablet 5 mg  5 mg Oral Q4H PRN    HYDROcodone homatropine (HYCODAN) 5-1.5 MG/5ML solution 5 mL  5 mL Oral Q4H PRN    docusate sodium (COLACE) capsule 100 mg  100 mg Oral Daily    rosuvastatin (CRESTOR) tablet 20 mg  20 mg Oral Nightly    guaiFENesin (MUCINEX) extended release tablet 600 mg  600 mg Oral BID    [Held by provider] aspirin EC tablet 81 mg  81 mg Oral Daily    pantoprazole (PROTONIX) tablet 40 mg  40 mg Oral Daily    carvedilol (COREG) tablet 3.125 mg  3.125 mg Oral BID with meals    isosorbide mononitrate (IMDUR) extended release tablet 60 mg  60 mg Oral Daily    tiZANidine (ZANAFLEX) tablet 2 mg  2 mg Oral Q8H PRN    lidocaine 4 % external patch 1 patch  1 patch TransDERmal Daily    [Held by provider] losartan (COZAAR) tablet 25 mg  25 mg Oral Daily    [Held by provider] spironolactone (ALDACTONE) tablet 25 mg  25 mg Oral Daily    sodium chloride flush 0.9 % injection 5-40 mL  5-40 mL IntraVENous 2 times per day    sodium chloride flush 0.9 % injection 5-40 mL  5-40 mL IntraVENous PRN    0.9 % sodium chloride infusion   IntraVENous PRN    ondansetron (ZOFRAN-ODT) disintegrating tablet 4 mg  4 mg Oral Q8H PRN    Or    ondansetron (ZOFRAN) injection 4 mg  4 mg IntraVENous Q6H PRN    polyethylene glycol (GLYCOLAX) packet 17 g  17 g Oral Daily PRN    morphine injection 2 mg  2 mg IntraVENous Q4H PRN       Signed:  Lawrence Coronado MD    Part of this note may have been written by using a voice dictation software. The note has been proof read but may still contain some grammatical/other typographical errors.

## 2023-02-26 LAB
GLUCOSE BLD STRIP.AUTO-MCNC: 115 MG/DL (ref 65–100)
SERVICE CMNT-IMP: ABNORMAL

## 2023-02-26 PROCEDURE — 82962 GLUCOSE BLOOD TEST: CPT

## 2023-02-26 PROCEDURE — 6370000000 HC RX 637 (ALT 250 FOR IP): Performed by: INTERNAL MEDICINE

## 2023-02-26 PROCEDURE — 1100000000 HC RM PRIVATE

## 2023-02-26 PROCEDURE — 6360000002 HC RX W HCPCS: Performed by: FAMILY MEDICINE

## 2023-02-26 PROCEDURE — 6370000000 HC RX 637 (ALT 250 FOR IP): Performed by: FAMILY MEDICINE

## 2023-02-26 PROCEDURE — 2580000003 HC RX 258: Performed by: FAMILY MEDICINE

## 2023-02-26 RX ORDER — ACETAMINOPHEN 325 MG/1
650 TABLET ORAL EVERY 4 HOURS PRN
Status: DISCONTINUED | OUTPATIENT
Start: 2023-02-26 | End: 2023-03-03 | Stop reason: HOSPADM

## 2023-02-26 RX ADMIN — ROSUVASTATIN CALCIUM 20 MG: 20 TABLET, COATED ORAL at 20:55

## 2023-02-26 RX ADMIN — BISACODYL 10 MG: 10 SUPPOSITORY RECTAL at 09:11

## 2023-02-26 RX ADMIN — DOCUSATE SODIUM 100 MG: 100 CAPSULE, LIQUID FILLED ORAL at 07:36

## 2023-02-26 RX ADMIN — CARVEDILOL 3.12 MG: 3.12 TABLET, FILM COATED ORAL at 17:00

## 2023-02-26 RX ADMIN — SODIUM CHLORIDE, PRESERVATIVE FREE 10 ML: 5 INJECTION INTRAVENOUS at 20:56

## 2023-02-26 RX ADMIN — ACETAMINOPHEN 650 MG: 325 TABLET ORAL at 19:08

## 2023-02-26 RX ADMIN — OXYCODONE HYDROCHLORIDE 5 MG: 5 TABLET ORAL at 06:10

## 2023-02-26 RX ADMIN — ONDANSETRON 4 MG: 2 INJECTION INTRAMUSCULAR; INTRAVENOUS at 20:52

## 2023-02-26 RX ADMIN — CARVEDILOL 3.12 MG: 3.12 TABLET, FILM COATED ORAL at 07:36

## 2023-02-26 RX ADMIN — PANTOPRAZOLE SODIUM 40 MG: 40 TABLET, DELAYED RELEASE ORAL at 06:06

## 2023-02-26 RX ADMIN — ISOSORBIDE MONONITRATE 60 MG: 60 TABLET, EXTENDED RELEASE ORAL at 07:36

## 2023-02-26 RX ADMIN — ACETAMINOPHEN 650 MG: 325 TABLET ORAL at 13:21

## 2023-02-26 RX ADMIN — TIZANIDINE 2 MG: 2 TABLET ORAL at 00:04

## 2023-02-26 RX ADMIN — SENNOSIDES 17.2 MG: 8.6 TABLET, FILM COATED ORAL at 07:36

## 2023-02-26 RX ADMIN — GUAIFENESIN 600 MG: 600 TABLET ORAL at 07:36

## 2023-02-26 RX ADMIN — SODIUM CHLORIDE, PRESERVATIVE FREE 10 ML: 5 INJECTION INTRAVENOUS at 07:37

## 2023-02-26 RX ADMIN — SENNOSIDES 17.2 MG: 8.6 TABLET, FILM COATED ORAL at 20:55

## 2023-02-26 RX ADMIN — TIZANIDINE 2 MG: 2 TABLET ORAL at 17:37

## 2023-02-26 RX ADMIN — ONDANSETRON 4 MG: 2 INJECTION INTRAMUSCULAR; INTRAVENOUS at 13:22

## 2023-02-26 RX ADMIN — GUAIFENESIN 600 MG: 600 TABLET ORAL at 20:55

## 2023-02-26 RX ADMIN — ONDANSETRON 4 MG: 4 TABLET, ORALLY DISINTEGRATING ORAL at 07:16

## 2023-02-26 ASSESSMENT — PAIN DESCRIPTION - LOCATION
LOCATION: BACK
LOCATION: ABDOMEN
LOCATION: ABDOMEN;HIP

## 2023-02-26 ASSESSMENT — PAIN SCALES - GENERAL
PAINLEVEL_OUTOF10: 5
PAINLEVEL_OUTOF10: 0
PAINLEVEL_OUTOF10: 5
PAINLEVEL_OUTOF10: 7
PAINLEVEL_OUTOF10: 6
PAINLEVEL_OUTOF10: 4

## 2023-02-26 ASSESSMENT — PAIN DESCRIPTION - DESCRIPTORS
DESCRIPTORS: ACHING
DESCRIPTORS: ACHING;DULL
DESCRIPTORS: ACHING
DESCRIPTORS: ACHING
DESCRIPTORS: SHARP

## 2023-02-26 ASSESSMENT — PAIN DESCRIPTION - PAIN TYPE
TYPE: SURGICAL PAIN

## 2023-02-26 ASSESSMENT — PAIN DESCRIPTION - ONSET
ONSET: GRADUAL
ONSET: GRADUAL

## 2023-02-26 ASSESSMENT — PAIN DESCRIPTION - ORIENTATION
ORIENTATION: RIGHT;LEFT
ORIENTATION: ANTERIOR
ORIENTATION: MID;RIGHT;LEFT
ORIENTATION: POSTERIOR
ORIENTATION: RIGHT;LEFT

## 2023-02-26 ASSESSMENT — PAIN DESCRIPTION - FREQUENCY
FREQUENCY: INTERMITTENT

## 2023-02-26 NOTE — PROGRESS NOTES
END OF SHIFT NOTE:    INTAKE/OUTPUT  No intake/output data recorded. Voiding: Yes  Catheter: No  Drain:              Flatus: Patient does have flatus present. Stool: 0 occurrences. Characteristics:    Emesis: 0 occurrences. Characteristics:        VITAL SIGNS  Patient Vitals for the past 12 hrs:   Temp Pulse Resp BP SpO2   02/25/23 1711 -- 73 -- 137/75 --   02/25/23 1509 97.5 °F (36.4 °C) 69 18 (!) 108/56 94 %   02/25/23 1122 97.7 °F (36.5 °C) 69 18 124/60 95 %   02/25/23 0730 98.1 °F (36.7 °C) 67 18 (!) 140/69 93 %       Pain Assessment  Pain Level: 4 (02/25/23 0849)  Pain Location: Back  Patient's Stated Pain Goal: 0 - No pain    Ambulating  No    Shift report given to oncoming nurse at the bedside.     Radha Hein RN

## 2023-02-26 NOTE — PROGRESS NOTES
END OF SHIFT NOTE:    INTAKE/OUTPUT  02/25 0701 - 02/26 0700  In: 240 [P.O.:240]  Out: 1425 [Urine:1425]  Voiding: yes  Catheter: no  Drain:              Flatus: Patient does have flatus present. Stool:  0 occurrences. Characteristics:       Emesis: 0 occurrences. Characteristics:        VITAL SIGNS  Patient Vitals for the past 12 hrs:   Temp Pulse Resp BP SpO2   02/26/23 0322 98.4 °F (36.9 °C) 69 18 (!) 141/67 95 %   02/25/23 2341 99 °F (37.2 °C) 70 18 139/79 92 %   02/25/23 2032 -- -- 20 -- --   02/25/23 2006 98.2 °F (36.8 °C) 65 19 (!) 122/53 95 %   02/25/23 1711 -- 73 -- 137/75 --       Pain Assessment                Ambulating  No; few steps in room when getting up to bedside commode. Needed max assist of 1 to get up. Had brace on when up. Back drsg dry/intact. Took zanaflex at hs; slept long periods afterwards. Shift report given to oncoming nurse at the bedside.     Tadeo Florentino RN

## 2023-02-27 LAB
ANION GAP SERPL CALC-SCNC: 5 MMOL/L (ref 2–11)
BUN SERPL-MCNC: 11 MG/DL (ref 8–23)
CALCIUM SERPL-MCNC: 9.8 MG/DL (ref 8.3–10.4)
CHLORIDE SERPL-SCNC: 106 MMOL/L (ref 101–110)
CO2 SERPL-SCNC: 25 MMOL/L (ref 21–32)
CREAT SERPL-MCNC: 0.8 MG/DL (ref 0.6–1)
GLUCOSE SERPL-MCNC: 109 MG/DL (ref 65–100)
MAGNESIUM SERPL-MCNC: 2.2 MG/DL (ref 1.8–2.4)
POTASSIUM SERPL-SCNC: 3.5 MMOL/L (ref 3.5–5.1)
SODIUM SERPL-SCNC: 136 MMOL/L (ref 133–143)

## 2023-02-27 PROCEDURE — 1100000000 HC RM PRIVATE

## 2023-02-27 PROCEDURE — 83735 ASSAY OF MAGNESIUM: CPT

## 2023-02-27 PROCEDURE — 2700000000 HC OXYGEN THERAPY PER DAY

## 2023-02-27 PROCEDURE — 94760 N-INVAS EAR/PLS OXIMETRY 1: CPT

## 2023-02-27 PROCEDURE — 80048 BASIC METABOLIC PNL TOTAL CA: CPT

## 2023-02-27 PROCEDURE — 97530 THERAPEUTIC ACTIVITIES: CPT

## 2023-02-27 PROCEDURE — 6370000000 HC RX 637 (ALT 250 FOR IP): Performed by: FAMILY MEDICINE

## 2023-02-27 PROCEDURE — 97112 NEUROMUSCULAR REEDUCATION: CPT

## 2023-02-27 PROCEDURE — 6370000000 HC RX 637 (ALT 250 FOR IP): Performed by: INTERNAL MEDICINE

## 2023-02-27 PROCEDURE — 2580000003 HC RX 258: Performed by: FAMILY MEDICINE

## 2023-02-27 PROCEDURE — 36415 COLL VENOUS BLD VENIPUNCTURE: CPT

## 2023-02-27 PROCEDURE — 97116 GAIT TRAINING THERAPY: CPT

## 2023-02-27 PROCEDURE — 97535 SELF CARE MNGMENT TRAINING: CPT

## 2023-02-27 RX ADMIN — LOSARTAN POTASSIUM 25 MG: 25 TABLET, FILM COATED ORAL at 07:39

## 2023-02-27 RX ADMIN — ISOSORBIDE MONONITRATE 60 MG: 60 TABLET, EXTENDED RELEASE ORAL at 07:39

## 2023-02-27 RX ADMIN — PANTOPRAZOLE SODIUM 40 MG: 40 TABLET, DELAYED RELEASE ORAL at 05:56

## 2023-02-27 RX ADMIN — SENNOSIDES 17.2 MG: 8.6 TABLET, FILM COATED ORAL at 21:31

## 2023-02-27 RX ADMIN — GUAIFENESIN 600 MG: 600 TABLET ORAL at 07:41

## 2023-02-27 RX ADMIN — GUAIFENESIN 600 MG: 600 TABLET ORAL at 21:32

## 2023-02-27 RX ADMIN — CARVEDILOL 3.12 MG: 3.12 TABLET, FILM COATED ORAL at 07:39

## 2023-02-27 RX ADMIN — ROSUVASTATIN CALCIUM 20 MG: 20 TABLET, COATED ORAL at 21:32

## 2023-02-27 RX ADMIN — SENNOSIDES 17.2 MG: 8.6 TABLET, FILM COATED ORAL at 07:39

## 2023-02-27 RX ADMIN — SODIUM CHLORIDE, PRESERVATIVE FREE 10 ML: 5 INJECTION INTRAVENOUS at 22:00

## 2023-02-27 RX ADMIN — DOCUSATE SODIUM 100 MG: 100 CAPSULE, LIQUID FILLED ORAL at 07:39

## 2023-02-27 RX ADMIN — ACETAMINOPHEN 650 MG: 325 TABLET ORAL at 05:56

## 2023-02-27 RX ADMIN — CARVEDILOL 3.12 MG: 3.12 TABLET, FILM COATED ORAL at 16:45

## 2023-02-27 ASSESSMENT — PAIN DESCRIPTION - LOCATION: LOCATION: BACK

## 2023-02-27 ASSESSMENT — PAIN SCALES - GENERAL
PAINLEVEL_OUTOF10: 5
PAINLEVEL_OUTOF10: 5
PAINLEVEL_OUTOF10: 0
PAINLEVEL_OUTOF10: 4

## 2023-02-27 ASSESSMENT — PAIN - FUNCTIONAL ASSESSMENT: PAIN_FUNCTIONAL_ASSESSMENT: PREVENTS OR INTERFERES SOME ACTIVE ACTIVITIES AND ADLS

## 2023-02-27 ASSESSMENT — PAIN DESCRIPTION - ORIENTATION: ORIENTATION: POSTERIOR

## 2023-02-27 ASSESSMENT — PAIN DESCRIPTION - DESCRIPTORS: DESCRIPTORS: SORE

## 2023-02-27 NOTE — PROGRESS NOTES
Physical Therapy Note:    Attempted to see patient this AM for physical therapy treatment  session. Patient refused stating she is nauseated and in pain, not feeling well. Will follow and re-attempt as schedule permits/patient available.  Thank you,    KATE Salinas, PT     Rehab Caseload Tracker

## 2023-02-27 NOTE — PROGRESS NOTES
Hospitalist Progress Note   Admit Date:  2023  7:07 PM   Name:  Dilan Gaffney   Age:  80 y.o. Sex:  female  :  1931   MRN:  [id number]   Room:      Presenting Complaint: No chief complaint on file. Reason(s) for Admission: Intractable pain [R52]     Hospital Course:   Ms. Karen Mayen is a 80 y.o. female with medical history of [place] Gulshan is a 80 y.o. female with medical history of HTN, CAD, CHF, CKD who presents as a direct admit from home with intractable back/flank pain. Over 2 months period, she has been diagnosed with T10, then T9 fractures, and subsequently underwent kyphoplasties on  and , respectively. 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 Neurosurgery consulted and plan for kyphoplasty and bone biopsy on  after plavix wash out. TLSO when UOOB. Subjective & 24hr Events (23):   Awake in bed, son present. Pain is better, seems frustrated at her progress, but overall she is much improved compared to the past few weeks. Worn out after ambulating to BR this morning, had BM. Assessment & Plan:   # T10 compression fracture, lumbar disc protrusion, T11 interosseous hemangioma with intractable pain              - S/p prior kyphoplasty on  and               - S/p kyphoplasty with Dr. Valencia Monday on . TLSO brace when up, pain control, PT/OT. Rehab pending.              - Resume ASA/Plavix 7d post op per Neurosurgery (3/2/23)     # Acute urinary retention              - Moore out . # HTN // HLD              - Con't Coreg, ARB, Imdur.               - Statin     # GERD              - PPI     # CAD              - Resume ASA and Plavix POD 7 (3/2/23)     # CKD3              - Baseline    PT/OT evals and PPD needed/ordered? Yes  Diet:  ADULT DIET;  Regular  ADULT ORAL NUTRITION SUPPLEMENT; Breakfast, Lunch, Dinner; Standard High Calorie/High Protein Oral Supplement  VTE prophylaxis: SCD's   Code status: Full Code    Hospital Problems:  Principal Problem:    Intractable pain  Active Problems:    Chronic renal disease, stage III ([facility]) [434800]    Chronic systolic (congestive) heart failure    Compression fracture of T11 vertebra (HCC)    S/P kyphoplasty    Vertebral body hemangioma    Mild protein-calorie malnutrition (HCC)    CAD (coronary artery disease)    HTN (hypertension)  Resolved Problems:    * No resolved hospital problems. *      Objective:   Patient Vitals for the past 24 hrs:   Temp Pulse Resp BP SpO2   02/27/23 1142 97.7 °F (36.5 °C) 66 16 (!) 103/52 96 %   02/27/23 0659 97.9 °F (36.6 °C) 73 16 135/62 96 %   02/27/23 0309 98.2 °F (36.8 °C) 73 18 139/66 96 %   02/26/23 2327 98.4 °F (36.9 °C) 68 18 128/63 93 %   02/26/23 2049 -- 60 -- -- 92 %   02/26/23 1957 -- 64 18 (!) 108/53 90 %   02/26/23 1514 98.2 °F (36.8 °C) 68 18 123/70 94 %       Oxygen Therapy  SpO2: 96 %  Pulse via Oximetry: 67 beats per minute  Pulse Oximeter Device Mode: Continuous  Pulse Oximeter Device Location: Left, Hand  O2 Device: None (Room air)  O2 Flow Rate (L/min): 2 L/min    Estimated body mass index is 18.34 kg/m² as calculated from the following:    Height as of this encounter: 5' 6\" (1.676 m). Weight as of this encounter: 113 lb 9.6 oz (51.5 kg). Intake/Output Summary (Last 24 hours) at 2/27/2023 1310  Last data filed at 2/27/2023 1034  Gross per 24 hour   Intake 480 ml   Output 600 ml   Net -120 ml         Physical Exam:   Blood pressure (!) 103/52, pulse 66, temperature 97.7 °F (36.5 °C), temperature source Oral, resp. rate 16, height 5' 6\" (1.676 m), weight 113 lb 9.6 oz (51.5 kg), SpO2 96 %. General:    Awake, thin, oriented x3, NAD. Comfortable in bed. Head:  Normocephalic, atraumatic  Eyes:  Sclerae appear normal.  Pupils equally round. ENT:  Nares appear normal.  Moist oral mucosa  Neck:  No restricted ROM. Trachea midline   CV:   RRR. No m/r/g. No jugular venous distension. Lungs:   CTAB. No wheezing, rhonchi, or rales. Symmetric expansion. Abdomen:   Soft, nontender, nondistended. Extremities: No cyanosis or clubbing. No edema  Skin:     No rashes and normal coloration. Warm and dry. Neuro:  CN II-XII grossly intact. Psych:  Normal mood and affect. I have personally reviewed labs and tests:  Recent Labs:  Recent Results (from the past 48 hour(s))   POCT Glucose    Collection Time: 02/26/23  1:30 PM   Result Value Ref Range    POC Glucose 115 (H) 65 - 100 mg/dL    Performed by: "[employer]"    Basic Metabolic Panel w/ Reflex to MG    Collection Time: 02/27/23  6:36 AM   Result Value Ref Range    Sodium 136 133 - 143 mmol/L    Potassium 3.5 3.5 - 5.1 mmol/L    Chloride 106 101 - 110 mmol/L    CO2 25 21 - 32 mmol/L    Anion Gap 5 2 - 11 mmol/L    Glucose 109 (H) 65 - 100 mg/dL    BUN 11 8 - 23 MG/DL    Creatinine 0.80 0.6 - 1.0 MG/DL    Est, Glom Filt Rate >60 >60 ml/min/1.73m2    Calcium 9.8 8.3 - 10.4 MG/DL   Magnesium    Collection Time: 02/27/23  6:36 AM   Result Value Ref Range    Magnesium 2.2 1.8 - 2.4 mg/dL       I have personally reviewed imaging studies:  Other Studies:  XR THORACIC SPINE (2 VIEWS)   Final Result   Status post kyphoplasty. No obvious complication. Total fluoroscopy time: One minute 36 seconds; two fluoroscopic spot images   saved      NC XR TECHNOLOGIST SERVICE   Final Result      MRI LUMBAR SPINE WO CONTRAST   Final Result   1. No acute musculoskeletal abnormality or compression fracture. 2. Multilevel degenerative disc and facet disease with a prominent left   paracentral/posterior lateral disc protrusion at L3-L4. This does not result in   central stenosis. There is mild left foramina narrowing. 3. Moderate bilateral foramina narrowing at L4-L5 with mild bilateral foramina   narrowing at L5-S1. MRI THORACIC SPINE WO CONTRAST   Final Result   1.  Status post kyphoplasty at T9 and T10 without obvious complication. 2. 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 3. No central spinal stenosis or cord compression. 4. Bilateral pleural effusions. CT ABDOMEN PELVIS WO CONTRAST Additional Contrast? Radiologist Recommendation   Final Result         1. 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 No evidence of   obstructive uropathy. Bilateral renal calcifications noted. 2. Bilateral pleural effusions with bibasilar atelectasis present. New when   compared with the prior exam.   3. Diverticulosis. No CT evidence of diverticulitis. XR CHEST PORTABLE   Final Result      1. No acute cardiopulmonary process identified. This exam was interpreted by a board-certified, body-imaging fellowship trained    radiologist from [address] [place] [address]. If there are any questions regarding    this examination please feel free to contact a radiologist at [phone].       [address]    2/14/2023 9:00:00 PM          Current Meds:  Current Facility-Administered Medications   Medication Dose Route Frequency    acetaminophen (TYLENOL) tablet 650 mg  650 mg Oral Q4H PRN    ibuprofen (ADVIL;MOTRIN) tablet 400 mg  400 mg Oral Q6H PRN    oxyCODONE (ROXICODONE) immediate release tablet 5 mg  5 mg Oral Q4H PRN    senna (SENOKOT) tablet 17.2 mg  2 tablet Oral BID    bisacodyl (DULCOLAX) suppository 10 mg  10 mg Rectal Daily PRN    HYDROcodone homatropine (HYCODAN) 5-1.5 MG/5ML solution 5 mL  5 mL Oral Q4H PRN    docusate sodium (COLACE) capsule 100 mg  100 mg Oral Daily    rosuvastatin (CRESTOR) tablet 20 mg  20 mg Oral Nightly    guaiFENesin (MUCINEX) extended release tablet 600 mg  600 mg Oral BID    [Held by provider] aspirin EC tablet 81 mg  81 mg Oral Daily    pantoprazole (PROTONIX) tablet 40 mg  40 mg Oral Daily    carvedilol (COREG) tablet 3.125 mg  3.125 mg Oral BID with meals    isosorbide mononitrate (IMDUR) extended release tablet 60 mg  60 mg Oral Daily    tiZANidine (ZANAFLEX) tablet 2 mg  2 mg Oral Q8H PRN    lidocaine 4 % external patch 1 patch  1 patch TransDERmal Daily    losartan (COZAAR) tablet 25 mg  25 mg Oral Daily    [Held by provider] spironolactone (ALDACTONE) tablet 25 mg  25 mg Oral Daily    sodium chloride flush 0.9 % injection 5-40 mL  5-40 mL IntraVENous 2 times per day    sodium chloride flush 0.9 % injection 5-40 mL  5-40 mL IntraVENous PRN    0.9 % sodium chloride infusion   IntraVENous PRN    ondansetron (ZOFRAN-ODT) disintegrating tablet 4 mg  4 mg Oral Q8H PRN    Or    ondansetron (ZOFRAN) injection 4 mg  4 mg IntraVENous Q6H PRN    polyethylene glycol (GLYCOLAX) packet 17 g  17 g Oral Daily PRN    morphine injection 2 mg  2 mg IntraVENous Q4H PRN       Signed:  Charlotte Morales MD    Part of this note may have been written by using a voice dictation software. The note has been proof read but may still contain some grammatical/other typographical errors.

## 2023-02-27 NOTE — PROGRESS NOTES
END OF SHIFT NOTE:    INTAKE/OUTPUT  02/25 0701 - 02/26 0700  In: 240 [P.O.:240]  Out: 7617 [Urine:1425]  Voiding: Yes  Catheter: No  Drain:  n/a            Flatus: Patient does have flatus present. Stool: 1 occurrences. Characteristics:  Stool Appearance: Soft  Stool Color: Brown  Stool Amount: Large  Stool Assessment  Incontinence: Yes  Stool Appearance: Soft  Stool Color: Brown  Stool Amount: Large  Stool Source: Rectum  Last BM (including prior to admit): 02/26/23    Emesis: 0 occurrences. Characteristics:        VITAL SIGNS  Patient Vitals for the past 12 hrs:   Temp Pulse Resp BP SpO2   02/26/23 1514 98.2 °F (36.8 °C) 68 18 123/70 94 %   02/26/23 1124 97.7 °F (36.5 °C) 65 18 138/62 --       Pain Assessment  Pain Level: 5 (02/26/23 1908)  Pain Location: Back  Patient's Stated Pain Goal: 3    Ambulating  No    Shift report given to oncoming nurse at the bedside.     Thressa Dancer, RN

## 2023-02-27 NOTE — CARE COORDINATION
CM continues to follow for discharge planning  and/or CM needs. Discharge plan remains that pt will transition to [facility] for [address]. Prior authorization initiated on Friday and  remains pending at this time. Once prior authorization received pt medically stable for discharge. No additional CM needs at this time. Will continue to monitor and update as needed.

## 2023-02-27 NOTE — PROGRESS NOTES
ACUTE PHYSICAL THERAPY GOALS:   (Developed with and agreed upon by patient and/or caregiver.)  (1.) Trinh Maher will perform bed mobility with MINIMAL ASSIST within 7 treatment day(s). (2.) Trinh Maher will transfer from bed to chair and chair to bed with MINIMAL ASSIST using the least restrictive device within 7 treatment day(s). Met 2/24/2023   (3.) Trinh Maher will ambulate with MINIMAL ASSIST for 50 feet with the least restrictive device within 7 treatment day(s). Partially met 2/24/2023   (4.) Trinh Maher will perform bilateral lower extremity exercises x 15 min for HEP with SUPERVISION to improve strength, endurance, and functional mobility within 7 treatment day(s). Updated Goals 2/24/2023  LTG:  (1.)Ms. Zarate  will move from supine to sit and sit to supine via logrolling  to side in flat bed without siderails with contact guard assist within 7 day(s). (2.)Ms. Zarate  will transfer from bed to chair and chair to bed with contact guard assist using the least restrictive/no device within 7 day(s). (3.)Ms. Zarate  will ambulate with contact guard assist for 150+ feet with the least restrictive/no device within 7 day(s).        PHYSICAL THERAPY Daily Note and PM  (Link to Caseload Tracking: PT Visit Days : 2  Acknowledge Orders  Time In/Out  PT Charge Capture  Rehab Caseload Tracker  TLSO on when up and out of bed  Trinh Maher is a 80 y.o. female   PRIMARY DIAGNOSIS: Intractable pain  Intractable pain [R52]  4 Days Post-Op  Reason for Referral: Generalized Muscle Weakness (M62.81)  Difficulty in walking, Not elsewhere classified (R26.2)  Other abnormalities of gait and mobility (R26.89)  Inpatient: Payor: [address] [address] / Plan: [address] / Product Type: *No Product type* /     ASSESSMENT:     REHAB RECOMMENDATIONS:   Recommendation to date pending progress:  Setting:  Short-term Rehab     Equipment:    To Be Determined     ASSESSMENT:  Ms. Cookie Yun seen this afternoon, presents awake in bed with son at bedside, agreeable to therapy. Pt required min assist with rolling, max assist with donning TLSO in supine, education provided on log rolling technique, mod assist with supine to sit secondary to trunk weakness. Pt able to perform sit to stand from bed to RW with CGA, ambulated 150' with CGA for safety. Pt also able to transfer on/off commode with CGA for safety. Pt transferred to chair with brakes locked and needs within reach, family member in room with pt.  Will continue to follow     [facility] Mobility - Inpatient   How much help is needed turning from your back to your side while in a flat bed without using bedrails?: A Little  How much help is needed moving from lying on your back to sitting on the side of a flat bed without using bedrails?: A Little  How much help is needed moving to and from a bed to a chair?: A Little  How much help is needed standing up from a chair using your arms?: A Little  How much help is needed walking in hospital room?: A Little  How much help is needed climbing 3-5 steps with a railing?: A Little  AM-PAC Inpatient Mobility Raw Score : 18  AM-PAC Inpatient T-Scale Score : 43.63  Mobility Inpatient CMS 0-100% Score: 46.58  Mobility Inpatient CMS G-Code Modifier : CK    SUBJECTIVE:   Ms. Vivek To states, \"I will try walking\"     Social/Functional   Lives With: Alone  Type of Home: House  Home Layout: Two level  Home Access: Stairs to enter with rails  Entrance Stairs - Number of Steps: 6    OBJECTIVE:     PAIN: VITALS / O2: PRECAUTION / Joan Shad / DRAINS:   Pre Treatment:   Pain Assessment: 0-10  Pain Level: 5    Post Treatment: 5/10 Vitals        Oxygen   Pt o2 in 90s during entire session   Moore Catheter    RESTRICTIONS/PRECAUTIONS:  Restrictions/Precautions: Fall Risk                 GROSS EVALUATION: Intact Impaired (Comments):   AROM []  Generally decreased, functional   PROM [] Strength []  Generally decreased, functional   Balance []     Posture []  Kyphotic   Sensation [x]     Coordination [x]      Tone [x]     Edema [x]    Activity Tolerance []  Decreased secondary to pain and fatigue    []      COGNITION/  PERCEPTION: Intact Impaired (Comments):   Orientation [x]     Vision [x]     Hearing [x]     Cognition  [x]       MOBILITY: I Mod I S SBA CGA Min Mod Max Total  NT x2 Comments:   Bed Mobility    Rolling [] [] [] [] [] [x] [] [] [] [] [x]    Supine to Sit [] [] [] [] [] [] [x] [] [] [] [x]    Scooting [] [] [] [] [] [] [] [] [] [] []    Sit to Supine [] [] [] [] [] [] [] [] [] [] []    Transfers    Sit to Stand [] [] [] [] [x] [] [] [] [] [] [x]    Bed to Chair [] [] [] [] [x] [] [] [] [] [] [x]    Stand to Sit [] [] [] [] [x] [] [] [] [] [] [x]     [] [] [] [] [] [] [] [] [] [] []    I=Independent, Mod I=Modified Independent, S=Supervision, SBA=Standby Assistance, CGA=Contact Guard Assistance,   Min=Minimal Assistance, Mod=Moderate Assistance, Max=Maximal Assistance, Total=Total Assistance, NT=Not Tested    GAIT: I Mod I S SBA CGA Min Mod Max Total  NT x2 Comments:   Level of Assistance [] [] [] [] [] [x] [] [] [] [] [x]    [id number] 150 feet    DME Rolling Walker    Gait Quality Decreased sebastián , Decreased step clearance, Decreased step length, Decreased stance, and Trunk flexion    Weightbearing Status      Stairs      I=Independent, Mod I=Modified Independent, S=Supervision, SBA=Standby Assistance, CGA=Contact Guard Assistance,   Min=Minimal Assistance, Mod=Moderate Assistance, Max=Maximal Assistance, Total=Total Assistance, NT=Not Tested    PLAN:   FREQUENCY AND DURATION: 5 times/week for duration of hospital stay or until stated goals are met, whichever comes first.    THERAPY PROGNOSIS: Fair    PROBLEM LIST:   (Skilled intervention is medically necessary to address:)  Decreased ADL/Functional Activities  Decreased Activity Tolerance  Decreased AROM/PROM  Decreased Balance  Decreased Gait Ability  Decreased Strength  Decreased Transfer Abilities  Increased Pain INTERVENTIONS PLANNED:   (Benefits and precautions of physical therapy have been discussed with the patient.)  Self Care Training  Therapeutic Activity  Therapeutic Exercise/HEP  Neuromuscular Re-education  Gait Training  Manual Therapy  Modalities  Education       TREATMENT:   EVALUATION: HIGH COMPLEXITY: (Untimed Charge)    TREATMENT:   Co-Treatment PT/OT necessary due to patient's decreased overall endurance/tolerance levels, as well as need for high level skilled assistance to complete functional transfers/mobility and functional tasks  Therapeutic Activity (14 Minutes): Therapeutic activity included Rolling, Supine to Sit, Transfer Training, Ambulation on level ground, and Standing balance to improve functional Activity tolerance, Balance, Mobility, and Strength. Gait Training (15 Minutes): Gait training for 150 feet utilizing [facility]. Patient required Manual and Verbal cueing to improve Dynamic Standing Balance and Gait Mechanics. TREATMENT GRID:  N/A    AFTER TREATMENT PRECAUTIONS: Bed/Chair Locked, Call light within reach, Chair, Needs within reach, and Visitors at bedside    INTERDISCIPLINARY COLLABORATION:  RN/ PCT and OT/ DOMINGUEZ    EDUCATION:   Education Given To: Patient  Education Provided: Transfer Training; Fall Prevention Strategies;Precautions  Education Method: Verbal;Demonstration  Barriers to Learning: None  Education Outcome: Continued education needed    TIME IN/OUT:  Time In: 1411  Time Out: [address]  Minutes: [address] PT

## 2023-02-27 NOTE — PROGRESS NOTES
END OF SHIFT NOTE:    INTAKE/OUTPUT  02/26 0701 - 02/27 0700  In: 340 [P.O.:340]  Out: 600 [Urine:600]  Voiding: Yes  Catheter: No  Drain:              Flatus: Patient does have flatus present. Stool:  occurrences. Characteristics:  Stool Appearance: Soft  Stool Color: Brown  Stool Amount: Large  Stool Assessment  Incontinence: Yes  Stool Appearance: Soft  Stool Color: Brown  Stool Amount: Large  Stool Source: Rectum  Last BM (including prior to admit): 02/26/23    Emesis:  occurrences. Characteristics:        VITAL SIGNS  Patient Vitals for the past 12 hrs:   Temp Pulse Resp BP SpO2   02/27/23 1536 97.9 °F (36.6 °C) 67 18 (!) 104/54 94 %   02/27/23 1142 97.7 °F (36.5 °C) 66 16 (!) 103/52 96 %   02/27/23 0659 97.9 °F (36.6 °C) 73 16 135/62 96 %       Pain Assessment  Pain Level: 5 (02/27/23 1610)  Pain Location: Back  Patient's Stated Pain Goal: 0 - No pain    Ambulating  Yes    Shift report given to oncoming nurse at the bedside.     Nick Duran, RN

## 2023-02-27 NOTE — PROGRESS NOTES
ACUTE OCCUPATIONAL THERAPY GOALS:   (Developed with and agreed upon by patient and/or caregiver.)  1. Patient will complete lower body bathing and dressing with CGA and adaptive equipment as   needed. 2. Patient will completed upper body bathing and dressing with Mod I seated EOB and adaptive equipment as needed. 3. Patient will complete grooming seated at EOB with Mod I and adaptive equipment as needed. 4. Patient will complete toileting with CGA and adaptive equipment as needed. GOAL MET 2/24/23  5. Patient will tolerate/ 30 minutes of OT treatment with 1-2 rest breaks to increase activity bob/ance for ADLs. GOAL MET 2/24/23  6. Patient will complete functional transfers with Min A and adaptive equipment as needed. GOAL MET 2/24/23     Timeframe: 7 visits     OCCUPATIONAL THERAPY: Daily Note AM   OT Visit Days: 4       Time In/Out  OT Charge Capture  Rehab Caseload Tracker  OT Orders    Lucien Torres is a 80 y.o. female   PRIMARY DIAGNOSIS: Intractable pain  Intractable pain [R52]  Procedure(s) (LRB):  T11 Kyphoplasty and Bone Biopsy (N/A)  4 Days Post-Op  Inpatient: Payor: [facility]ALTH PLAN / Plan: [address] [place] [address] / Product Type: *No Product type* /     ASSESSMENT:     REHAB RECOMMENDATIONS: CURRENT LEVEL OF FUNCTION:  (Most Recently Demonstrated)   Recommendation to date pending progress:  Setting:  Short-term Rehab    Equipment:    To Be Determined Bathing:  Not Tested  Dressing:  Not Tested  Feeding/Grooming:  Contact Guard Assist  Toileting:  Minimal Assist  Functional Mobility:  Contact Guard Assist RW     ASSESSMENT:  Ms. Shivani Carter was received supine in bed. Pt required great encouragement to participate with therapy today. Pt required assistance for functional mobility and ADLs today due to decreased volition, generalized weakness, back soreness, decreased balance. Pt walked in hallway for first time today and used the regular toilet instead of the BSC.  Pt is progressing with functional mobility and ADLs. Continue POC. SUBJECTIVE:     Ms. Alexandru Robison states, \"I'm not sitting in the chair for an hour again. \"     Social/Functional Lives With: Alone  Type of Home: House  Home Layout: Two level  Home Access: Stairs to enter with rails  Entrance Stairs - Number of Steps: 6    OBJECTIVE:     Patrick Ayala / Anaya Goldenen / AIRWAY: Moore Catheter and IV    RESTRICTIONS/PRECAUTIONS:  Restrictions/Precautions  Restrictions/Precautions: Fall Risk        PAIN: VITALS / O2:   Pre Treatment:   Pain Assessment: 0-10  Pain Level: 5        Post Treatment: 5/10 Vitals          Oxygen        MOBILITY: I Mod I S SBA CGA Min Mod Max Total  NT x2 Comments:   Bed Mobility    Rolling [] [] [] [] [x] [] [] [] [] [] [] To put TLSO on in bed   Supine to Sit [] [] [] [] [x] [x] [] [] [] [] []    Scooting [] [] [] [] [] [] [] [] [] [] []    Sit to Supine [] [] [] [] [] [] [] [] [] [] []    Transfers    Sit to Stand [] [] [] [] [x] [x] [] [] [] [] []    Bed to Chair [] [] [] [] [x] [] [] [] [] [] []    Stand to Sit [] [] [] [] [x] [] [] [] [] [] []    Tub/Shower [] [] [] [] [] [] [] [] [] [] []     Toilet [] [] [] [] [x] [x] [] [] [] [] []  Regular toilet    [] [] [] [] [] [] [] [] [] [] []    I=Independent, Mod I=Modified Independent, S=Supervision/Setup, SBA=Standby Assistance, CGA=Contact Guard Assistance, Min=Minimal Assistance, Mod=Moderate Assistance, Max=Maximal Assistance, Total=Total Assistance, NT=Not Tested    ACTIVITIES OF DAILY LIVING: I Mod I S SBA CGA Min Mod Max Total NT Comments   BASIC ADLs:              Upper Body Bathing [] [] [] [] [] [] [] [] [] []    Lower Body Bathing [] [] [] [] [] [] [] [] [] []    Toileting [] [] [] [] [] [x] [] [] [] [] Regular toilet brief [id number]   Upper Body Dressing [] [] [] [] [] [] [x] [] [] [] rolling in bed to eyad TLSO   Lower Body Dressing [] [] [] [] [] [] [] [] [] []    Feeding [] [] [] [] [] [] [] [] [] []    Grooming [] [] [] [] [x] [] [] [] [] [] Standing EOS washing hands;  A for balance   Personal Device Care [] [] [] [] [] [] [] [] [] []    Functional Mobility [] [] [] [] [x] [] [] [] [] [] Bed > hallway > toilet > sink > chair RW   I=Independent, Mod I=Modified Independent, S=Supervision/Setup, SBA=Standby Assistance, CGA=Contact Guard Assistance, Min=Minimal Assistance, Mod=Moderate Assistance, Max=Maximal Assistance, Total=Total Assistance, NT=Not Tested    BALANCE: Good Fair+ Fair Fair- Poor NT Comments   Sitting Static [x] [x] [] [] [] []    Sitting Dynamic [] [] [] [] [] []              Standing Static [] [x] [x] [] [] []    Standing Dynamic [] [] [x] [] [] []        PLAN:     FREQUENCY/DURATION   OT Plan of Care: 5 times/week for duration of hospital stay or until stated goals are met, whichever comes first.    TREATMENT:     TREATMENT:   Co-Treatment PT/OT necessary due to patient's decreased overall endurance/tolerance levels, as well as need for high level skilled assistance to complete functional transfers/mobility and functional tasks  Neuromuscular Re-education (19 Minutes): Patient participated in neuromuscular re-education including postural training, standing tolerance activity , and sitting balance activity   with minimal assistance, verbal cues, tactile cues, education, and adaptive equipment to improve sitting balance, standing balance, static balance, and dynamic balance in order to prepare for functional task, prepare for seated ADLs, prepare for standing ADLs, prepare for functional transfer, increase safety awareness, and prepare for self care. .   Self Care (10 minutes): Patient participated in toileting, upper body dressing, and grooming ADLs in unsupported sitting and standing with minimal manual cueing to increase independence, decrease assistance required, increase activity tolerance, increase safety awareness, and maintain precautions.  Patient also participated in functional mobility, functional transfer, and adaptive equipment training to increase independence, decrease assistance required, increase activity tolerance, increase safety awareness, and maintain precautions. TREATMENT GRID:  N/A    AFTER TREATMENT PRECAUTIONS: Bed/Chair Locked, Call light within reach, Chair, Needs within reach, and RN notified    INTERDISCIPLINARY COLLABORATION:  RN/ PCT, PT/ PTA, and OT/ DOMINGUEZ    EDUCATION:  Education Given To: Patient  Education Provided: Role of Therapy;Plan of Care;Precautions; Fall Prevention Strategies  Education Outcome: Verbalized understanding    TOTAL TREATMENT DURATION AND TIME:  Time In: 1411  Time Out: 1440  Minutes: [address] OT

## 2023-02-27 NOTE — PROGRESS NOTES
END OF SHIFT NOTE:    INTAKE/OUTPUT  02/26 0701 - 02/27 0700  In: 340 [P.O.:340]  Out: 600 [Urine:600]  Voiding: yes  Catheter: no  Drain:              Flatus: Patient does have flatus present. Stool:  0 occurrences. Characteristics:       Emesis: 0 occurrences. Characteristics:        VITAL SIGNS  Patient Vitals for the past 12 hrs:   Temp Pulse Resp BP SpO2   02/27/23 0309 98.2 °F (36.8 °C) 73 18 139/66 96 %   02/26/23 2327 98.4 °F (36.9 °C) 68 18 128/63 93 %   02/26/23 2049 -- 60 -- -- 92 %   02/26/23 1957 -- 64 18 (!) 108/53 90 %       Pain Assessment                Ambulating  No; pivots to bedside commode. Wears brace when up. Allevyn to bony prominences. Given zofran before hs meds to help control nausea. Shift report given to oncoming nurse at the bedside.     Wes Centeno RN

## 2023-02-28 LAB
SARS-COV-2 RDRP RESP QL NAA+PROBE: NOT DETECTED
SOURCE: NORMAL

## 2023-02-28 PROCEDURE — 2700000000 HC OXYGEN THERAPY PER DAY

## 2023-02-28 PROCEDURE — 6370000000 HC RX 637 (ALT 250 FOR IP): Performed by: FAMILY MEDICINE

## 2023-02-28 PROCEDURE — 6360000002 HC RX W HCPCS: Performed by: FAMILY MEDICINE

## 2023-02-28 PROCEDURE — 87635 SARS-COV-2 COVID-19 AMP PRB: CPT

## 2023-02-28 PROCEDURE — 94760 N-INVAS EAR/PLS OXIMETRY 1: CPT

## 2023-02-28 PROCEDURE — 1100000000 HC RM PRIVATE

## 2023-02-28 PROCEDURE — 2580000003 HC RX 258: Performed by: FAMILY MEDICINE

## 2023-02-28 PROCEDURE — 97112 NEUROMUSCULAR REEDUCATION: CPT

## 2023-02-28 PROCEDURE — 6370000000 HC RX 637 (ALT 250 FOR IP): Performed by: INTERNAL MEDICINE

## 2023-02-28 PROCEDURE — 97530 THERAPEUTIC ACTIVITIES: CPT

## 2023-02-28 PROCEDURE — 6360000002 HC RX W HCPCS: Performed by: INTERNAL MEDICINE

## 2023-02-28 RX ORDER — PROCHLORPERAZINE EDISYLATE 5 MG/ML
10 INJECTION INTRAMUSCULAR; INTRAVENOUS EVERY 6 HOURS PRN
Status: DISCONTINUED | OUTPATIENT
Start: 2023-02-28 | End: 2023-03-03 | Stop reason: HOSPADM

## 2023-02-28 RX ADMIN — SODIUM CHLORIDE, PRESERVATIVE FREE 10 ML: 5 INJECTION INTRAVENOUS at 09:43

## 2023-02-28 RX ADMIN — GUAIFENESIN 600 MG: 600 TABLET ORAL at 10:40

## 2023-02-28 RX ADMIN — PANTOPRAZOLE SODIUM 40 MG: 40 TABLET, DELAYED RELEASE ORAL at 06:07

## 2023-02-28 RX ADMIN — LOSARTAN POTASSIUM 25 MG: 25 TABLET, FILM COATED ORAL at 10:40

## 2023-02-28 RX ADMIN — ONDANSETRON 4 MG: 2 INJECTION INTRAMUSCULAR; INTRAVENOUS at 09:38

## 2023-02-28 RX ADMIN — ONDANSETRON 4 MG: 2 INJECTION INTRAMUSCULAR; INTRAVENOUS at 17:55

## 2023-02-28 RX ADMIN — SODIUM CHLORIDE, PRESERVATIVE FREE 10 ML: 5 INJECTION INTRAVENOUS at 21:56

## 2023-02-28 RX ADMIN — ROSUVASTATIN CALCIUM 20 MG: 20 TABLET, COATED ORAL at 20:43

## 2023-02-28 RX ADMIN — DOCUSATE SODIUM 100 MG: 100 CAPSULE, LIQUID FILLED ORAL at 10:40

## 2023-02-28 RX ADMIN — PROCHLORPERAZINE EDISYLATE 10 MG: 5 INJECTION INTRAMUSCULAR; INTRAVENOUS at 12:08

## 2023-02-28 RX ADMIN — ISOSORBIDE MONONITRATE 60 MG: 60 TABLET, EXTENDED RELEASE ORAL at 10:40

## 2023-02-28 RX ADMIN — SENNOSIDES 17.2 MG: 8.6 TABLET, FILM COATED ORAL at 20:42

## 2023-02-28 RX ADMIN — CARVEDILOL 3.12 MG: 3.12 TABLET, FILM COATED ORAL at 18:37

## 2023-02-28 RX ADMIN — CARVEDILOL 3.12 MG: 3.12 TABLET, FILM COATED ORAL at 10:40

## 2023-02-28 RX ADMIN — SENNOSIDES 17.2 MG: 8.6 TABLET, FILM COATED ORAL at 10:40

## 2023-02-28 NOTE — PROGRESS NOTES
END OF SHIFT NOTE:    INTAKE/OUTPUT  02/27 0701 - 02/28 0700  In: 240 [P.O.:240]  Out: 600 [Urine:600]  Voiding: Yes  Catheter: No  Drain:              Flatus: Patient does have flatus present. Stool: 0 occurrences. Characteristics:  Stool Appearance: Soft  Stool Color: Brown  Stool Amount: Large  Stool Assessment  Incontinence: Yes  Stool Appearance: Soft  Stool Color: Brown  Stool Amount: Large  Stool Source: Rectum  Last BM (including prior to admit): 02/27/23    Emesis: 0 occurrences. Characteristics:        VITAL SIGNS  Patient Vitals for the past 12 hrs:   Temp Pulse Resp BP SpO2   02/28/23 0417 98 °F (36.7 °C) 67 17 (!) 143/68 97 %   02/27/23 2313 98 °F (36.7 °C) 66 17 (!) 110/58 94 %   02/27/23 1929 98.1 °F (36.7 °C) 70 17 (!) 122/53 94 %       Pain Assessment  Pain Level: 5 (02/27/23 1610)  Pain Location: Back  Patient's Stated Pain Goal: 0 - No pain    Ambulating  No    Shift report given to oncoming nurse at the bedside.     Caleb Nam RN

## 2023-02-28 NOTE — PROGRESS NOTES
ACUTE PHYSICAL THERAPY GOALS:   (Developed with and agreed upon by patient and/or caregiver.)  .) Bishop Fuchs will perform bed mobility with MINIMAL ASSIST within 7 treatment day(s). (2.) Bishop Fuchs will transfer from bed to chair and chair to bed with MINIMAL ASSIST using the least restrictive device within 7 treatment day(s). Met 2/24/2023   (3.) Bishop Fuchs will ambulate with MINIMAL ASSIST for 50 feet with the least restrictive device within 7 treatment day(s). Partially met 2/24/2023   (4.) Bishop Fuchs will perform bilateral lower extremity exercises x 15 min for HEP with SUPERVISION to improve strength, endurance, and functional mobility within 7 treatment day(s). Updated Goals 2/24/2023  LTG:  (1.)Ms. Zarate  will move from supine to sit and sit to supine via logrolling  to side in flat bed without siderails with contact guard assist within 7 day(s). (2.)Ms. Zarate  will transfer from bed to chair and chair to bed with contact guard assist using the least restrictive/no device within 7 day(s). (3.)Ms. Zarate  will ambulate with contact guard assist for 150+ feet with the least restrictive/no device within 7 day(s). PHYSICAL THERAPY: Daily Note AM   (Link to Caseload Tracking: PT Visit Days : 3  Time In/Out PT Charge Capture  Rehab Caseload Tracker  Orders  TLSO on when up and out of bed    Bishop Fuchs is a 80 y.o. female   PRIMARY DIAGNOSIS: Intractable pain  Intractable pain [R52]  Procedure(s) (LRB):  T11 Kyphoplasty and Bone Biopsy (N/A)  5 Days Post-Op  Inpatient: Payor: [address] [place] [address] / Plan: [address] [place], [place] [address] / Product Type: *No Product type* /     ASSESSMENT:     REHAB RECOMMENDATIONS:   Recommendation to date pending progress:  Setting:  Short-term Rehab    Equipment:    To Be Determined     ASSESSMENT:  Ms. Claudetta [place] is supine in bed and states that she is very nauseous but has been medicated for it. It took some strong encouragement  to get her to participate. OT present . Braced donned supine with total assist .  Log rolled to sit up. Sitting balance good. Sit to stand to the walker with contact guard assist and ambulated x 200 feet with slow sebastián and one sitting rest break. MD visits during treatment session. Patient is returned to the room and to the recliner, she was positioned for comfort and with needs within reach. Making progress towards goals. Continue PT efforts.   STR at d/c     SUBJECTIVE:   Ms. Rocael Tejeda states, \"I have been sick all night\"     Social/Functional Lives With: Alone  Type of Home: House  Home Layout: Two level  Home Access: Stairs to enter with rails  Entrance Stairs - Number of Steps: 6  OBJECTIVE:     PAIN: Tanisha Clive / O2: Nav Prado / Rock Palacio / Chandrika Cool:   Pre Treatment: 23/10         Post Treatment: 23/10 Vitals 98%       Oxygen    O2    RESTRICTIONS/PRECAUTIONS:  Restrictions/Precautions  Restrictions/Precautions: Fall Risk  Restrictions/Precautions: Fall Risk     MOBILITY: I Mod I S SBA CGA Min Mod Max Total  NT x2 Comments:   Bed Mobility    Rolling [] [] [] [] [] [x] [] [] [] [] []    Supine to Sit [] [] [] [] [] [x] [] [] [] [] []    Scooting [] [] [] [] [] [x] [] [] [] [] []    Sit to Supine [] [] [] [] [] [] [] [] [] [x] []    Transfers    Sit to Stand [] [] [] [] [x] [] [] [] [] [] []    Bed to Chair [] [] [] [] [] [] [] [] [] [x] []    Stand to Sit [] [] [] [] [x] [] [] [] [] [] []     [] [] [] [] [] [] [] [] [] [] []    I=Independent, Mod I=Modified Independent, S=Supervision, SBA=Standby Assistance, CGA=Contact Guard Assistance,   Min=Minimal Assistance, Mod=Moderate Assistance, Max=Maximal Assistance, Total=Total Assistance, NT=Not Tested    BALANCE: Good Fair+ Fair Fair- Poor NT Comments   Sitting Static [x] [] [] [] [] []    Sitting Dynamic [x] [] [] [] [] []              Standing Static [] [x] [] [] [] []    Standing Dynamic [] [] [] [x] [] []      GAIT: I Mod I S SBA CGA Min Mod Max Total  NT x2 Comments:   Level of Assistance [] [] [] [] [] [x] [] [] [] [] []    Distance   200 feet    DME Rolling Walker    Gait Quality Decreased sebastián , Decreased step clearance, Decreased step length, Decreased stance, and Trunk flexion    Weightbearing Status      Stairs      I=Independent, Mod I=Modified Independent, S=Supervision, SBA=Standby Assistance, CGA=Contact Guard Assistance,   Min=Minimal Assistance, Mod=Moderate Assistance, Max=Maximal Assistance, Total=Total Assistance, NT=Not Tested    PLAN:   FREQUENCY AND DURATION: 5 times/week for duration of hospital stay or until stated goals are met, whichever comes first.    TREATMENT:   TREATMENT:   Co-Treatment PT/OT necessary due to patient's decreased overall endurance/tolerance levels, as well as need for high level skilled assistance to complete functional transfers/mobility and functional tasks  Therapeutic Activity (29 Minutes): Therapeutic activity included Rolling, Supine to Sit, Scooting, Transfer Training, Ambulation on level ground, Sitting balance , and Standing balance to improve functional Activity tolerance, Balance, Coordination, Mobility, and Strength.     TREATMENT GRID:  N/A    AFTER TREATMENT PRECAUTIONS: Alarm Activated, Bed/Chair Locked, Call light within reach, Chair, Needs within reach, and RN notified    INTERDISCIPLINARY COLLABORATION:  RN/ PCT, PT/ PTA, and OT/ DOMINGUEZ    EDUCATION:  review POC and importance of mobility in the recovery process    TIME IN/OUT:  Time In: 1100  Time Out: [address]  Minutes: 34    Tammy Hicks PTA

## 2023-02-28 NOTE — PROGRESS NOTES
Hospitalist Progress Note   Admit Date:  2023  7:07 PM   Name:  Jordana Zarate   Age:  89 y.o.  Sex:  female  :  1931   MRN:  [id number]   Room:      Presenting Complaint: No chief complaint on file.     Reason(s) for Admission: Intractable pain [R52]     Hospital Course:   Ms. Zarate is a 89 y.o. female with medical history of Jordana Zarate is a 89 y.o. female with medical history of HTN, CAD, CHF, CKD who presents as a direct admit from home with intractable back/flank pain.  Over 2 months period, she has been diagnosed with T10, then T9 fractures, and subsequently underwent kyphoplasties on  and , respectively. 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  Neurosurgery consulted and plan for kyphoplasty and bone biopsy on  after plavix wash out. TLSO when UOOB.    Subjective & 24hr Events (23):   Still some nausea but no vomiting. Marginal oral intake. She did ambulate to the end of the hallway today, took a break and got back to her room with therapy. Son present. Discussed improved pain control and need to increase oral intake.    Assessment & Plan:   # T10 compression fracture, lumbar disc protrusion, T11 interosseous hemangioma with intractable pain              - Prior kypho x2,  and               - S/p kyphoplasty with Dr. Chilel on . TLSO brace when up, pain control, PT/OT. Rehab pending. Improving.              - Resume ASA/Plavix 7d post op per Neurosurgery (3/2/23)     # Acute urinary retention              - Moore out . Resolved.     # HTN // HLD              - Con't Coreg, ARB, Imdur.               - Statin     # GERD              - PPI     # CAD              - Resume ASA and Plavix POD 7 (3/2/23)     # CKD3              - Baseline    PT/OT evals and PPD needed/ordered?  Yes  Diet:  ADULT DIET; Regular  ADULT ORAL NUTRITION  SUPPLEMENT; Breakfast, Lunch, Dinner; Standard High Calorie/High Protein Oral Supplement  VTE prophylaxis: SCD's   Code status: Full Code    Hospital Problems:  Principal Problem:    Intractable pain  Active Problems:    Chronic renal disease, stage III (HCC) [837159]    Chronic systolic (congestive) heart failure    Compression fracture of T11 vertebra (HCC)    S/P kyphoplasty    Vertebral body hemangioma    Mild protein-calorie malnutrition (HCC)    CAD (coronary artery disease)    HTN (hypertension)  Resolved Problems:    * No resolved hospital problems. *      Objective:   Patient Vitals for the past 24 hrs:   Temp Pulse Resp BP SpO2   02/28/23 1222 98.1 °F (36.7 °C) 65 18 111/60 92 %   02/28/23 1040 -- 72 -- (!) 145/60 --   02/28/23 0826 98.1 °F (36.7 °C) 68 19 138/74 94 %   02/28/23 0417 98 °F (36.7 °C) 67 17 (!) 143/68 97 %   02/27/23 2313 98 °F (36.7 °C) 66 17 (!) 110/58 94 %   02/27/23 1929 98.1 °F (36.7 °C) 70 17 (!) 122/53 94 %   02/27/23 1536 97.9 °F (36.6 °C) 67 18 (!) 104/54 94 %       Oxygen Therapy  SpO2: 92 %  Pulse via Oximetry: 67 beats per minute  Pulse Oximeter Device Mode: Intermittent  Pulse Oximeter Device Location: Left, Hand  O2 Device: Nasal cannula  O2 Flow Rate (L/min): 2 L/min  Oxygen Therapy: Supplemental oxygen    Estimated body mass index is 18.66 kg/m² as calculated from the following:    Height as of this encounter: 5' 6\" (1.676 m). Weight as of this encounter: 115 lb 9.6 oz (52.4 kg). Intake/Output Summary (Last 24 hours) at 2/28/2023 1226  Last data filed at 2/28/2023 0417  Gross per 24 hour   Intake --   Output 600 ml   Net -600 ml         Physical Exam:   Blood pressure 111/60, pulse 65, temperature 98.1 °F (36.7 °C), temperature source Oral, resp. rate 18, height 5' 6\" (1.676 m), weight 115 lb 9.6 oz (52.4 kg), SpO2 92 %. General:    Well nourished. Thin. TLSO brace is on.     Head:  Normocephalic, atraumatic  Eyes:  Sclerae appear normal.  Pupils equally round.  ENT:  Nares appear normal.  Moist oral mucosa  Neck:  No restricted ROM. Trachea midline   CV:   RRR. No m/r/g. No jugular venous distension. Lungs:   CTAB. No wheezing, rhonchi, or rales. Symmetric expansion. RA O2. Abdomen:   Soft, nontender, nondistended. Extremities: No cyanosis or clubbing. No edema  Skin:     No rashes and normal coloration. Warm and dry. Neuro:  CN II-XII grossly intact. Psych:  Normal mood and affect. I have personally reviewed labs and tests:  Recent Labs:  Recent Results (from the past 48 hour(s))   POCT Glucose    Collection Time: 02/26/23  1:30 PM   Result Value Ref Range    POC Glucose 115 (H) 65 - 100 mg/dL    Performed by: [employer]    Basic Metabolic Panel w/ Reflex to MG    Collection Time: 02/27/23  6:36 AM   Result Value Ref Range    Sodium 136 133 - 143 mmol/L    Potassium 3.5 3.5 - 5.1 mmol/L    Chloride 106 101 - 110 mmol/L    CO2 25 21 - 32 mmol/L    Anion Gap 5 2 - 11 mmol/L    Glucose 109 (H) 65 - 100 mg/dL    BUN 11 8 - 23 MG/DL    Creatinine 0.80 0.6 - 1.0 MG/DL    Est, Glom Filt Rate >60 >60 ml/min/1.73m2    Calcium 9.8 8.3 - 10.4 MG/DL   Magnesium    Collection Time: 02/27/23  6:36 AM   Result Value Ref Range    Magnesium 2.2 1.8 - 2.4 mg/dL       I have personally reviewed imaging studies:  Other Studies:  XR THORACIC SPINE (2 VIEWS)   Final Result   Status post kyphoplasty. No obvious complication. Total fluoroscopy time: One minute 36 seconds; two fluoroscopic spot images   saved      NC XR TECHNOLOGIST SERVICE   Final Result      MRI LUMBAR SPINE WO CONTRAST   Final Result   1. No acute musculoskeletal abnormality or compression fracture. 2. Multilevel degenerative disc and facet disease with a prominent left   paracentral/posterior lateral disc protrusion at L3-L4. This does not result in   central stenosis. There is mild left foramina narrowing.    3. Moderate bilateral foramina narrowing at L4-L5 with mild bilateral foramina narrowing at L5-S1. MRI THORACIC SPINE WO CONTRAST   Final Result   1. Status post kyphoplasty at T9 and T10 without obvious complication. 2. 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 3. No central spinal stenosis or cord compression. 4. Bilateral pleural effusions. CT ABDOMEN PELVIS WO CONTRAST Additional Contrast? Radiologist Recommendation   Final Result         1. 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 No evidence of   obstructive uropathy. Bilateral renal calcifications noted. 2. Bilateral pleural effusions with bibasilar atelectasis present. New when   compared with the prior exam.   3. Diverticulosis. No CT evidence of diverticulitis. XR CHEST PORTABLE   Final Result      1. No acute cardiopulmonary process identified. This exam was interpreted by a board-certified, body-imaging fellowship trained    radiologist from [address] [place] [address]. If there are any questions regarding    this examination please feel free to contact a radiologist at [phone].       [address] [employer]    2/14/2023 9:00:00 PM          Current Meds:  Current Facility-Administered Medications   Medication Dose Route Frequency    prochlorperazine (COMPAZINE) injection 10 mg  10 mg IntraVENous Q6H PRN    acetaminophen (TYLENOL) tablet 650 mg  650 mg Oral Q4H PRN    ibuprofen (ADVIL;MOTRIN) tablet 400 mg  400 mg Oral Q6H PRN    oxyCODONE (ROXICODONE) immediate release tablet 5 mg  5 mg Oral Q4H PRN    senna (SENOKOT) tablet 17.2 mg  2 tablet Oral BID    bisacodyl (DULCOLAX) suppository 10 mg  10 mg Rectal Daily PRN    HYDROcodone homatropine (HYCODAN) 5-1.5 MG/5ML solution 5 mL  5 mL Oral Q4H PRN    docusate sodium (COLACE) capsule 100 mg  100 mg Oral Daily    rosuvastatin (CRESTOR) tablet 20 mg  20 mg Oral Nightly    guaiFENesin (MUCINEX) extended release tablet 600 mg  600 mg Oral BID    [Held by provider] aspirin EC tablet 81 mg  81 mg Oral Daily    pantoprazole (PROTONIX) tablet 40 mg  40 mg Oral Daily    carvedilol (COREG) tablet 3.125 mg  3.125 mg Oral BID with meals    isosorbide mononitrate (IMDUR) extended release tablet 60 mg  60 mg Oral Daily    tiZANidine (ZANAFLEX) tablet 2 mg  2 mg Oral Q8H PRN    lidocaine 4 % external patch 1 patch  1 patch TransDERmal Daily    losartan (COZAAR) tablet 25 mg  25 mg Oral Daily    [Held by provider] spironolactone (ALDACTONE) tablet 25 mg  25 mg Oral Daily    sodium chloride flush 0.9 % injection 5-40 mL  5-40 mL IntraVENous 2 times per day    sodium chloride flush 0.9 % injection 5-40 mL  5-40 mL IntraVENous PRN    0.9 % sodium chloride infusion   IntraVENous PRN    ondansetron (ZOFRAN-ODT) disintegrating tablet 4 mg  4 mg Oral Q8H PRN    Or    ondansetron (ZOFRAN) injection 4 mg  4 mg IntraVENous Q6H PRN    polyethylene glycol (GLYCOLAX) packet 17 g  17 g Oral Daily PRN    morphine injection 2 mg  2 mg IntraVENous Q4H PRN       Signed:  Kimmie Vázquez MD    Part of this note may have been written by using a voice dictation software. The note has been proof read but may still contain some grammatical/other typographical errors.

## 2023-02-28 NOTE — PROGRESS NOTES
ACUTE OCCUPATIONAL THERAPY GOALS:   (Developed with and agreed upon by patient and/or caregiver.)  1. Patient will complete lower body bathing and dressing with CGA and adaptive equipment as   needed. 2. Patient will completed upper body bathing and dressing with Mod I seated EOB and adaptive equipment as needed. 3. Patient will complete grooming seated at EOB with Mod I and adaptive equipment as needed. 4. Patient will complete toileting with CGA and adaptive equipment as needed. GOAL MET 2/24/23  5. Patient will tolerate/ 30 minutes of OT treatment with 1-2 rest breaks to increase activity bob/ance for ADLs. GOAL MET 2/24/23  6. Patient will complete functional transfers with Min A and adaptive equipment as needed. GOAL MET 2/24/23     Timeframe: 7 visits     OCCUPATIONAL THERAPY: Daily Note AM   OT Visit Days: 5       Time In/Out  OT Charge Capture  Rehab Caseload Tracker  OT Orders    Daisy Landaverde is a 80 y.o. female   PRIMARY DIAGNOSIS: Intractable pain  Intractable pain [R52]  Procedure(s) (LRB):  T11 Kyphoplasty and Bone Biopsy (N/A)  5 Days Post-Op  Inpatient: Payor: [address] [place] [address] / Plan: [address] [place] [address] / Product Type: *No Product type* /     ASSESSMENT:     REHAB RECOMMENDATIONS: CURRENT LEVEL OF FUNCTION:  (Most Recently Demonstrated)   Recommendation to date pending progress:  Setting:  Short-term Rehab vs.  24/7 care    Equipment:    To Be Determined Bathing:  Not Tested  Dressing:  Not Tested  Feeding/Grooming:  Supervision/Setup  Toileting:  Not Tested  Functional Mobility:  Contact Guard Assist RW     ASSESSMENT:  Ms. Aileen Beaver was received supine in bed nauseous. Pt did not eat breakfast.  It took great encouragement for pt to participate with therapy today. Pt is self limiting. Pt required assistance for functional mobility and ADLs today due to decreased volition, generalized weakness, back pain, nausea, decreased balance. Pt needs to be walking to toilet and not just BSC.  One seated break during ambulation today. Pt is progressing with functional mobility and ADLs. Continue POC. SUBJECTIVE:     Ms. Alexandru Robison states, \"You guys really like to push don't ya?\"     Social/Functional Lives With: Alone  Type of Home: House  Home Layout: Two level  Home Access: Stairs to enter with rails  Entrance Stairs - Number of Steps: 6    OBJECTIVE:     Deyanne Lucy / Anaya Goldenen / AIRWAY: Moore Catheter and IV    RESTRICTIONS/PRECAUTIONS:  Restrictions/Precautions  Restrictions/Precautions: Fall Risk        PAIN: VITALS / O2:   Pre Treatment:   Pain Assessment:  (23/10)        Post Treatment: 23/10 RN notified Vitals          Oxygen   WFL RA. Does not need oxygen.      MOBILITY: I Mod I S SBA CGA Min Mod Max Total  NT x2 Comments:   Bed Mobility    Rolling [] [] [] [] [x] [] [] [] [] [] [] To put TLSO on in bed   Supine to Sit [] [] [] [] [x] [] [] [] [] [] []    Scooting [] [] [] [x] [] [] [] [] [] [] []    Sit to Supine [] [] [] [] [] [] [] [] [] [] []    Transfers    Sit to Stand [] [] [] [x] [x] [] [] [] [] [] []    Bed to Chair [] [] [] [] [x] [] [] [] [] [] []    Stand to Sit [] [] [] [x] [x] [] [] [] [] [] []    Tub/Shower [] [] [] [] [] [] [] [] [] [] []     Toilet [] [] [] [] [] [] [] [] [] [] []      [] [] [] [] [] [] [] [] [] [] []    I=Independent, Mod I=Modified Independent, S=Supervision/Setup, SBA=Standby Assistance, CGA=Contact Guard Assistance, Min=Minimal Assistance, Mod=Moderate Assistance, Max=Maximal Assistance, Total=Total Assistance, NT=Not Tested    ACTIVITIES OF DAILY LIVING: I Mod I S SBA CGA Min Mod Max Total NT Comments   BASIC ADLs:              Upper Body Bathing [] [] [] [] [] [] [] [] [] []    Lower Body Bathing [] [] [] [] [] [] [] [] [] []    Toileting [] [] [] [] [] [] [] [] [] []    Upper Body Dressing [] [] [] [] [] [] [] [x] [] [] to eyad MATHISO in bed   Lower Body Dressing [] [] [] [] [] [] [] [] [] []    Feeding [] [] [] [] [] [] [] [] [] []    Grooming [] [] [x] [] [] [] [] [] [] [] Seated washing face, eating cracker set up only   Personal Device Care [] [] [] [] [] [] [] [] [] []    Functional Mobility [] [] [] [] [x] [] [] [] [] [] Bed > hallway > chair RW   I=Independent, Mod I=Modified Independent, S=Supervision/Setup, SBA=Standby Assistance, CGA=Contact Guard Assistance, Min=Minimal Assistance, Mod=Moderate Assistance, Max=Maximal Assistance, Total=Total Assistance, NT=Not Tested    BALANCE: Good Fair+ Fair Fair- Poor NT Comments   Sitting Static [x] [] [] [] [] []    Sitting Dynamic [] [] [] [] [] []              Standing Static [] [x] [] [] [] []    Standing Dynamic [] [x] [x] [] [] []        PLAN:     FREQUENCY/DURATION   OT Plan of Care: 5 times/week for duration of hospital stay or until stated goals are met, whichever comes first.    TREATMENT:     TREATMENT:   Co-Treatment PT/OT necessary due to patient's decreased overall endurance/tolerance levels, as well as need for high level skilled assistance to complete functional transfers/mobility and functional tasks  Neuromuscular Re-education (23 Minutes): Patient participated in neuromuscular re-education including postural training, standing tolerance activity , and sitting balance activity   with minimal assistance and tactile cues to improve sitting balance, standing balance, static balance, and dynamic balance in order to prepare for functional task, prepare for seated ADLs, prepare for standing ADLs, prepare for functional transfer, increase safety awareness, and prepare for self care. .     TREATMENT GRID:  N/A    AFTER TREATMENT PRECAUTIONS: Bed/Chair Locked, Call light within reach, Chair, Needs within reach, and RN notified    INTERDISCIPLINARY COLLABORATION:  RN/ PCT, PT/ PTA, and OT/ DOMINGUEZ    EDUCATION:  Education Given To: Patient  Education Provided: Role of Therapy;Plan of Care  Education Outcome: Verbalized understanding    TOTAL TREATMENT DURATION AND TIME:  Time In: 5724  Time Out: 1128  Minutes: [address] [place] [address],

## 2023-02-28 NOTE — PLAN OF CARE
Problem: Discharge Planning  Goal: Discharge to home or other facility with appropriate resources  Outcome: Progressing  Flowsheets (Taken 2/27/2023 1940)  Discharge to home or other facility with appropriate resources:   Identify barriers to discharge with patient and caregiver   Arrange for needed discharge resources and transportation as appropriate     Problem: Pain  Goal: Verbalizes/displays adequate comfort level or baseline comfort level  Outcome: Progressing  Flowsheets (Taken 2/27/2023 1925 by Ernie Juarez RN)  Verbalizes/displays adequate comfort level or baseline comfort level:   Encourage patient to monitor pain and request assistance   Administer analgesics based on type and severity of pain and evaluate response   Assess pain using appropriate pain scale     Problem: Chronic Conditions and Co-morbidities  Goal: Patient's chronic conditions and co-morbidity symptoms are monitored and maintained or improved  Outcome: Progressing  Flowsheets (Taken 2/27/2023 1940)  Care Plan - Patient's Chronic Conditions and Co-Morbidity Symptoms are Monitored and Maintained or Improved: Monitor and assess patient's chronic conditions and comorbid symptoms for stability, deterioration, or improvement     Problem: Safety - Adult  Goal: Free from fall injury  Outcome: Progressing  Flowsheets (Taken 2/27/2023 1940)  Free From Fall Injury: Instruct family/caregiver on patient safety     Problem: ABCDS Injury Assessment  Goal: Absence of physical injury  Outcome: Progressing  Flowsheets (Taken 2/27/2023 1940)  Absence of Physical Injury: Implement safety measures based on patient assessment     Problem: Skin/Tissue Integrity  Goal: Absence of new skin breakdown  Description: 1. Monitor for areas of redness and/or skin breakdown  2. Assess vascular access sites hourly  3. Every 4-6 hours minimum:  Change oxygen saturation probe site  4.   Every 4-6 hours:  If on nasal continuous positive airway pressure, respiratory therapy assess nares and determine need for appliance change or resting period.   Outcome: Progressing

## 2023-03-01 PROCEDURE — 6370000000 HC RX 637 (ALT 250 FOR IP): Performed by: FAMILY MEDICINE

## 2023-03-01 PROCEDURE — 1100000000 HC RM PRIVATE

## 2023-03-01 PROCEDURE — 97535 SELF CARE MNGMENT TRAINING: CPT

## 2023-03-01 PROCEDURE — 97116 GAIT TRAINING THERAPY: CPT

## 2023-03-01 PROCEDURE — 6370000000 HC RX 637 (ALT 250 FOR IP): Performed by: INTERNAL MEDICINE

## 2023-03-01 PROCEDURE — 2580000003 HC RX 258: Performed by: FAMILY MEDICINE

## 2023-03-01 PROCEDURE — 97530 THERAPEUTIC ACTIVITIES: CPT

## 2023-03-01 RX ORDER — CLOPIDOGREL BISULFATE 75 MG/1
75 TABLET ORAL DAILY
Status: DISCONTINUED | OUTPATIENT
Start: 2023-03-02 | End: 2023-03-03 | Stop reason: HOSPADM

## 2023-03-01 RX ADMIN — SODIUM CHLORIDE, PRESERVATIVE FREE 10 ML: 5 INJECTION INTRAVENOUS at 08:22

## 2023-03-01 RX ADMIN — SENNOSIDES 17.2 MG: 8.6 TABLET, FILM COATED ORAL at 08:21

## 2023-03-01 RX ADMIN — CARVEDILOL 3.12 MG: 3.12 TABLET, FILM COATED ORAL at 08:21

## 2023-03-01 RX ADMIN — SODIUM CHLORIDE, PRESERVATIVE FREE 10 ML: 5 INJECTION INTRAVENOUS at 20:45

## 2023-03-01 RX ADMIN — PANTOPRAZOLE SODIUM 40 MG: 40 TABLET, DELAYED RELEASE ORAL at 05:31

## 2023-03-01 RX ADMIN — SENNOSIDES 17.2 MG: 8.6 TABLET, FILM COATED ORAL at 20:45

## 2023-03-01 RX ADMIN — ISOSORBIDE MONONITRATE 60 MG: 60 TABLET, EXTENDED RELEASE ORAL at 08:21

## 2023-03-01 RX ADMIN — ROSUVASTATIN CALCIUM 20 MG: 20 TABLET, COATED ORAL at 20:46

## 2023-03-01 RX ADMIN — LOSARTAN POTASSIUM 25 MG: 25 TABLET, FILM COATED ORAL at 08:21

## 2023-03-01 RX ADMIN — DOCUSATE SODIUM 100 MG: 100 CAPSULE, LIQUID FILLED ORAL at 08:21

## 2023-03-01 NOTE — CARE COORDINATION
CM continues to follow for discharge planning and/or CM needs. Discharge plan at this time remains that she will transition to [facility] pending insurance authorization. This CM in communication with Beverly, admissions coordinator at [facility], confirms that they are in contact with pt's insurance and have provided all the updated clinical information and her insurance auth still says pending. This CM called pt's insurance company 2/28/23 @ 2:00pm and spoke with representative, Marleen Quarles, from 2:00pm-2:35pm trying to check status of pt's prior authorization. He called this CM back at 3:25pm and reports all information they need has been received and he reports her insurance is still 'in progress' - reference ID # for prior auth is [id number]. No additional CM needs at this time. Will continue to monitor and update as needed.

## 2023-03-01 NOTE — PROGRESS NOTES
ACUTE OCCUPATIONAL THERAPY GOALS:   (Developed with and agreed upon by patient and/or caregiver.)  1. Patient will complete lower body bathing and dressing with CGA and adaptive equipment as needed. 2. Patient will completed upper body bathing and dressing with Mod I seated EOB and adaptive equipment as needed. 3. Patient will complete grooming seated at EOB with Mod I and adaptive equipment as needed. GOAL MET 3/1/23  4. Patient will complete toileting with CGA and adaptive equipment as needed. GOAL MET 2/24/23  5. Patient will tolerate/ 30 minutes of OT treatment with 1-2 rest breaks to increase activity bob/ance for ADLs. GOAL MET 2/24/23  6. Patient will complete functional transfers with Min A and adaptive equipment as needed. GOAL MET 2/24/23     Timeframe: 7 visits     OCCUPATIONAL THERAPY: Daily Note AM   OT Visit Days: 6       Time In/Out  OT Charge Capture  Rehab Caseload Tracker  OT Orders    David Zambrano is a 80 y.o. female   PRIMARY DIAGNOSIS: Intractable pain  Intractable pain [R52]  Procedure(s) (LRB):  T11 Kyphoplasty and Bone Biopsy (N/A)  6 Days Post-Op  Inpatient: Payor: [address] [place] [address] / Plan: [address] [place] [address] / Product Type: *No Product type* /     ASSESSMENT:     REHAB RECOMMENDATIONS: CURRENT LEVEL OF FUNCTION:  (Most Recently Demonstrated)   Recommendation to date pending progress:  Setting:  Short-term Rehab vs.  24/7 care    Equipment:    To Be Determined Bathing:  Not Tested  Dressing:  Not Tested  Feeding/Grooming:  Stand by Assist- CGA  Toileting:  Not Tested  Functional Mobility:  Contact Guard Assist RW     ASSESSMENT:  Ms. Tera Bueno was received sitting up in chair, just finished working with PT and having a BM. Pt with better attitude towards therapy today. Pt required assistance for functional mobility and ADLs today due to  generalized weakness, back pain, nausea, decreased balance. Pt was dry heaving in sink today during session. Encouraged pt to eat more.  V/c required to bend at hips and to not bend back while leaning over sink to complete ADLS. Pt is progressing with functional mobility and ADLs. Continue POC. SUBJECTIVE:     Ms. Viv Pérez states, \"Might as well. \"     Social/Functional Lives With: Alone  Type of Home: House  Home Layout: Two level  Home Access: Stairs to enter with rails  Entrance Stairs - Number of Steps: 6    OBJECTIVE:     LINES / Willie Rounds / AIRWAY: IV    RESTRICTIONS/PRECAUTIONS:  Restrictions/Precautions  Restrictions/Precautions: Fall Risk        PAIN: VITALS / O2:   Pre Treatment:   Pain Assessment:  (little, but not quanitified)        Post Treatment: same as above Vitals          Oxygen   WFL RA. Does not need oxygen.      MOBILITY: I Mod I S SBA CGA Min Mod Max Total  NT x2 Comments:   Bed Mobility    Rolling [] [] [] [] [] [] [] [] [] [] []    Supine to Sit [] [] [] [] [] [] [] [] [] [] []    Scooting [] [] [] [] [] [] [] [] [] [] []    Sit to Supine [] [] [] [] [] [] [] [] [] [] []    Transfers    Sit to Stand [] [] [] [x] [] [] [] [] [] [] []    Bed to Chair [] [] [] [x] [x] [] [] [] [] [] []    Stand to Sit [] [] [] [x] [] [] [] [] [] [] []    Tub/Shower [] [] [] [] [] [] [] [] [] [] []     Toilet [] [] [] [] [] [] [] [] [] [] []      [] [] [] [] [] [] [] [] [] [] []    I=Independent, Mod I=Modified Independent, S=Supervision/Setup, SBA=Standby Assistance, CGA=Contact Guard Assistance, Min=Minimal Assistance, Mod=Moderate Assistance, Max=Maximal Assistance, Total=Total Assistance, NT=Not Tested    ACTIVITIES OF DAILY LIVING: I Mod I S SBA CGA Min Mod Max Total NT Comments   BASIC ADLs:              Upper Body Bathing [] [] [] [] [] [] [] [] [] []    Lower Body Bathing [] [] [] [] [] [] [] [] [] []    Toileting [] [] [] [] [] [] [] [] [] []    Upper Body Dressing [] [] [] [] [] [] [] [] [] []    Lower Body Dressing [] [] [] [] [] [] [] [] [] []    Feeding [] [] [] [] [] [] [] [] [] []    Grooming [] [] [] [x] [x] [] [] [] [] [] Standing EOS brushing teeth and hair; A for posture and balance   Personal Device Care [] [] [] [] [] [] [] [] [] []    Functional Mobility [] [] [] [x] [x] [] [] [] [] [] Chair > sink > chair RW   I=Independent, Mod I=Modified Independent, S=Supervision/Setup, SBA=Standby Assistance, CGA=Contact Guard Assistance, Min=Minimal Assistance, Mod=Moderate Assistance, Max=Maximal Assistance, Total=Total Assistance, NT=Not Tested    BALANCE: Good Fair+ Fair Fair- Poor NT Comments   Sitting Static [x] [] [] [] [] []    Sitting Dynamic [] [] [] [] [] []              Standing Static [x] [x] [] [] [] []    Standing Dynamic [] [x] [] [] [] []        PLAN:     FREQUENCY/DURATION   OT Plan of Care: 5 times/week for duration of hospital stay or until stated goals are met, whichever comes first.    TREATMENT:     TREATMENT:   Co-Treatment PT/OT necessary due to patient's decreased overall endurance/tolerance levels, as well as need for high level skilled assistance to complete functional transfers/mobility and functional tasks  Self Care (18 minutes): Patient participated in grooming ADLs in standing with minimal verbal and tactile cueing to increase independence, decrease assistance required, increase activity tolerance, increase safety awareness, and maintain precautions. Patient also participated in functional mobility training to increase independence, decrease assistance required, increase activity tolerance, increase safety awareness, and maintain precautions.      TREATMENT GRID:  N/A    AFTER TREATMENT PRECAUTIONS: Bed/Chair Locked, Call light within reach, Chair, Needs within reach, and RN notified    INTERDISCIPLINARY COLLABORATION:  RN/ PCT, PT/ PTA, and OT/ DOMINGUEZ    EDUCATION:  Education Given To: Patient  Education Provided: Role of Therapy;Plan of Care;Precautions  Education Outcome: Verbalized understanding    TOTAL TREATMENT DURATION AND TIME:  Time In: 5629  Time Out: [address]  Minutes: 2500 Jaime Neal OT

## 2023-03-01 NOTE — PROGRESS NOTES
ACUTE PHYSICAL THERAPY GOALS:   (Developed with and agreed upon by patient and/or caregiver.)  .) Srivastava Thumb Arcade will perform bed mobility with MINIMAL ASSIST within 7 treatment day(s). (2.) Srivastava Chanelle will transfer from bed to chair and chair to bed with MINIMAL ASSIST using the least restrictive device within 7 treatment day(s). Met 2/24/2023   (3.) Srivastava Courts will ambulate with MINIMAL ASSIST for 50 feet with the least restrictive device within 7 treatment day(s). Partially met 2/24/2023   (4.) Srivastava Courts will perform bilateral lower extremity exercises x 15 min for HEP with SUPERVISION to improve strength, endurance, and functional mobility within 7 treatment day(s). Updated Goals 2/24/2023  LTG:  (1.)Ms. Zarate  will move from supine to sit and sit to supine via logrolling  to side in flat bed without siderails with contact guard assist within 7 day(s). (2.)Ms. Zarate  will transfer from bed to chair and chair to bed with contact guard assist using the least restrictive/no device within 7 day(s). (3.)Ms. Zarate  will ambulate with contact guard assist for 150+ feet with the least restrictive/no device within 7 day(s). PHYSICAL THERAPY: Daily Note AM   (Link to Caseload Tracking: PT Visit Days : 3  Time In/Out PT Charge Capture  Rehab Caseload Tracker  Orders  TLSO on when up and out of bed    Atif Roca is a 80 y.o. female   PRIMARY DIAGNOSIS: Intractable pain  Intractable pain [R52]  Procedure(s) (LRB):  T11 Kyphoplasty and Bone Biopsy (N/A)  6 Days Post-Op  Inpatient: Payor: [facility] [employer] / Plan: [facility] [employer] / Product Type: *No Product type* /     ASSESSMENT:     REHAB RECOMMENDATIONS:   Recommendation to date pending progress:  Setting:  Short-term Rehab    Equipment:    To Be Determined     ASSESSMENT:  Ms. Kamille Clement is supine in bed and is being cleaned up bu the PCT now is ready to get up therefor her brace needs to bw donned while the patient is supine.    Braced donned supine with total assist .  Log rolled to sit up. Sitting balance good. Sit to stand to the walker with contact guard assist and ambulated x 200 feet with slow sebastián and one sitting rest break. Patient is returned to the room and to the recliner, she was positioned for comfort and with needs within reach. OT enters. Good session and patient is  making progress towards goals. Continue PT efforts.   STR at d/c     SUBJECTIVE:   Ms. Natali Stock states, \"What you want me to do now\"     Social/Functional Lives With: Alone  Type of Home: House  Home Layout: Two level  Home Access: Stairs to enter with rails  Entrance Stairs - Number of Steps: 6  OBJECTIVE:     PAIN: Anice Nian / O2: Verenice Arteaga / Isidoro Blizzard / Becca Becky:   Pre Treatment: not rated         Post Treatment: not rated Vitals        Oxygen    O2    RESTRICTIONS/PRECAUTIONS:  Restrictions/Precautions  Restrictions/Precautions: Fall Risk  Restrictions/Precautions: Fall Risk     MOBILITY: I Mod I S SBA CGA Min Mod Max Total  NT x2 Comments:   Bed Mobility    Rolling [] [] [] [] [] [x] [] [] [] [] []    Supine to Sit [] [] [] [] [] [x] [] [] [] [] []    Scooting [] [] [] [] [] [x] [] [] [] [] []    Sit to Supine [] [] [] [] [] [] [] [] [] [x] []    Transfers    Sit to Stand [] [] [] [] [x] [] [] [] [] [] []    Bed to Chair [] [] [] [] [] [] [] [] [] [x] []    Stand to Sit [] [] [] [] [x] [] [] [] [] [] []     [] [] [] [] [] [] [] [] [] [] []    I=Independent, Mod I=Modified Independent, S=Supervision, SBA=Standby Assistance, CGA=Contact Guard Assistance,   Min=Minimal Assistance, Mod=Moderate Assistance, Max=Maximal Assistance, Total=Total Assistance, NT=Not Tested    BALANCE: Good Fair+ Fair Fair- Poor NT Comments   Sitting Static [x] [] [] [] [] []    Sitting Dynamic [x] [] [] [] [] []              Standing Static [] [x] [] [] [] []    Standing Dynamic [] [] [] [x] [] []      GAIT: I Mod I S SBA CGA Min Mod Max Total  NT x2 Comments:   Level of Assistance [] [] [] [] [] [x] [] [] [] [] []    Distance   200 feet    DME Rolling Walker    Gait Quality Decreased sebastián , Decreased step clearance, Decreased step length, Decreased stance, and Trunk flexion    Weightbearing Status      Stairs      I=Independent, Mod I=Modified Independent, S=Supervision, SBA=Standby Assistance, CGA=Contact Guard Assistance,   Min=Minimal Assistance, Mod=Moderate Assistance, Max=Maximal Assistance, Total=Total Assistance, NT=Not Tested    PLAN:   FREQUENCY AND DURATION: 5 times/week for duration of hospital stay or until stated goals are met, whichever comes first.    TREATMENT:   TREATMENT:   Therapeutic Activity (14 Minutes): Therapeutic activity included Rolling, Supine to Sit, Scooting, Transfer Training, Ambulation on level ground, Sitting balance , and Standing balance to improve functional Activity tolerance, Balance, Coordination, Mobility, and Strength. Gait Training (16 Minutes): Gait training for 200 feet utilizing 5 [facility]. Patient required Tactile and Verbal cueing to improve Activity Pacing, Assistive Device Utilization, and Gait Mechanics.      TREATMENT GRID:  N/A    AFTER TREATMENT PRECAUTIONS: Alarm Activated, Bed/Chair Locked, Call light within reach, Chair, Needs within reach, and RN notified    INTERDISCIPLINARY COLLABORATION:  RN/ PCT and PT/ PTA    EDUCATION:  review POC and importance of mobility in the recovery process    TIME IN/OUT:  Time In: 0900  Time Out: 0930  Minutes: 27    Tammy Hicks PTA

## 2023-03-01 NOTE — PLAN OF CARE
Problem: Discharge Planning  Goal: Discharge to home or other facility with appropriate resources  Outcome: Progressing  Flowsheets (Taken 2/28/2023 2000)  Discharge to home or other facility with appropriate resources:   Identify barriers to discharge with patient and caregiver   Arrange for needed discharge resources and transportation as appropriate     Problem: Pain  Goal: Verbalizes/displays adequate comfort level or baseline comfort level  Outcome: Progressing     Problem: Chronic Conditions and Co-morbidities  Goal: Patient's chronic conditions and co-morbidity symptoms are monitored and maintained or improved  Outcome: Progressing  Flowsheets (Taken 2/28/2023 2000)  Care Plan - Patient's Chronic Conditions and Co-Morbidity Symptoms are Monitored and Maintained or Improved: Monitor and assess patient's chronic conditions and comorbid symptoms for stability, deterioration, or improvement     Problem: Safety - Adult  Goal: Free from fall injury  Outcome: Progressing  Flowsheets (Taken 2/28/2023 2000)  Free From Fall Injury: Instruct family/caregiver on patient safety     Problem: ABCDS Injury Assessment  Goal: Absence of physical injury  Outcome: Progressing  Flowsheets (Taken 2/28/2023 2000)  Absence of Physical Injury: Implement safety measures based on patient assessment     Problem: Skin/Tissue Integrity  Goal: Absence of new skin breakdown  Description: 1. Monitor for areas of redness and/or skin breakdown  2. Assess vascular access sites hourly  3. Every 4-6 hours minimum:  Change oxygen saturation probe site  4. Every 4-6 hours:  If on nasal continuous positive airway pressure, respiratory therapy assess nares and determine need for appliance change or resting period.   Outcome: Progressing

## 2023-03-01 NOTE — PROGRESS NOTES
Hospitalist Progress Note   Admit Date:  2023  7:07 PM   Name:  Bishop Fuchs   Age:  80 y.o. Sex:  female  :  1931   MRN:  [id number]   Room:      Presenting Complaint: No chief complaint on file. Reason(s) for Admission: Intractable pain [R52]     Hospital Course:   Ms. Claudetta Florida is a 80 y.o. female with medical history of Bishop Fuchs is a 80 y.o. female with medical history of HTN, CAD, CHF, CKD who presents as a direct admit from home with intractable back/flank pain. Over 2 months period, she has been diagnosed with T10, then T9 fractures, and subsequently underwent kyphoplasties on  and , respectively. 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 Neurosurgery consulted and plan for kyphoplasty and bone biopsy on  after plavix wash out. TLSO when UOOB. Subjective & 24hr Events (23): She is feeling much better today. Up to chair, worked with therapy already. Had grits and coffee earlier, no vomiting. Pain currently well controlled. Awaiting insurance pre-cert for rehab. Assessment & Plan:   # T10 compression fracture, lumbar disc protrusion, T11 interosseous hemangioma with intractable pain              - Prior kypho x2,  and               - S/p kyphoplasty with Dr. Kulwant Romano on . TLSO brace when up, pain control, PT/OT. Rehab pending. Improving.              - ASA and Plavix ordered to resume tomorrow, 3/2 (POD #7)     # Acute urinary retention              - Resolved. Moore out . # HTN // HLD              - Con't Coreg, ARB, Imdur.               - Statin     # GERD              - PPI     # CAD              - Resume ASA and Plavix POD 7 (3/2/23)     # CKD3              - Baseline    She remains medically stable for discharge and is accepted to [facility]. We are awaiting insurance authorization, CM assisting.      PT/OT evals and PPD needed/ordered? Yes  Diet:  ADULT DIET; Regular  ADULT ORAL NUTRITION SUPPLEMENT; Breakfast, Lunch, Dinner; Standard High Calorie/High Protein Oral Supplement  VTE prophylaxis: SCD's   Code status: Full Code    Hospital Problems:  Principal Problem:    Intractable pain  Active Problems:    Chronic renal disease, stage III (HCC) [394051]    Chronic systolic (congestive) heart failure    Compression fracture of T11 vertebra (HCC)    S/P kyphoplasty    Vertebral body hemangioma    Mild protein-calorie malnutrition (HCC)    CAD (coronary artery disease)    HTN (hypertension)  Resolved Problems:    * No resolved hospital problems. *      Objective:   Patient Vitals for the past 24 hrs:   Temp Pulse Resp BP SpO2   03/01/23 1136 98.6 °F (37 °C) 66 16 (!) 109/48 92 %   03/01/23 0821 -- 67 -- 133/64 --   03/01/23 0711 98.8 °F (37.1 °C) 67 18 133/64 94 %   03/01/23 0228 97.5 °F (36.4 °C) 62 17 (!) 138/59 94 %   02/28/23 2312 97.7 °F (36.5 °C) 64 17 (!) 132/58 94 %   02/28/23 2012 97.7 °F (36.5 °C) 64 17 (!) 120/59 96 %   02/28/23 1837 -- 70 -- 118/62 --   02/28/23 1708 99 °F (37.2 °C) 73 18 121/61 95 %   02/28/23 1222 98.1 °F (36.7 °C) 65 18 111/60 92 %       Oxygen Therapy  SpO2: 92 %  Pulse via Oximetry: 67 beats per minute  Pulse Oximeter Device Mode: Intermittent  Pulse Oximeter Device Location: Left, Hand  O2 Device: None (Room air)  O2 Flow Rate (L/min): 2 L/min  Oxygen Therapy: Supplemental oxygen    Estimated body mass index is 18.66 kg/m² as calculated from the following:    Height as of this encounter: 5' 6\" (1.676 m). Weight as of this encounter: 115 lb 9.6 oz (52.4 kg). Intake/Output Summary (Last 24 hours) at 3/1/2023 1140  Last data filed at 3/1/2023 1030  Gross per 24 hour   Intake 350 ml   Output 1 ml   Net 349 ml         Physical Exam:   Blood pressure (!) 109/48, pulse 66, temperature 98.6 °F (37 °C), temperature source Oral, resp.  rate 16, height 5' 6\" (1.676 m), weight 115 lb 9.6 oz (52.4 kg), SpO2 92 %. General:    Well nourished, but thin. Awake, but tired. Head:  Normocephalic, atraumatic  Eyes:  Sclerae appear normal.  Pupils equally round. ENT:  Nares appear normal.  Moist oral mucosa  Neck:  No restricted ROM. Trachea midline   CV:   RRR. No m/r/g. No jugular venous distension. Lungs:   CTAB. No wheezing, rhonchi, or rales. Symmetric expansion. Abdomen:   Soft, nontender, nondistended. Extremities: No cyanosis or clubbing. No edema  MSK:  TLSO brace is on. Skin:     No rashes and normal coloration. Warm and dry. Neuro:  CN II-XII grossly intact. Psych:  Normal mood and affect. I have personally reviewed labs and tests:  Recent Labs:  Recent Results (from the past 48 hour(s))   COVID-19, Rapid    Collection Time: 02/28/23 12:16 PM    Specimen: Nasopharyngeal   Result Value Ref Range    Source NASAL      SARS-CoV-2, Rapid Not detected NOTD         I have personally reviewed imaging studies:  Other Studies:  XR THORACIC SPINE (2 VIEWS)   Final Result   Status post kyphoplasty. No obvious complication. Total fluoroscopy time: One minute 36 seconds; two fluoroscopic spot images   saved      NC XR TECHNOLOGIST SERVICE   Final Result      MRI LUMBAR SPINE WO CONTRAST   Final Result   1. No acute musculoskeletal abnormality or compression fracture. 2. Multilevel degenerative disc and facet disease with a prominent left   paracentral/posterior lateral disc protrusion at L3-L4. This does not result in   central stenosis. There is mild left foramina narrowing. 3. Moderate bilateral foramina narrowing at L4-L5 with mild bilateral foramina   narrowing at L5-S1. MRI THORACIC SPINE WO CONTRAST   Final Result   1. Status post kyphoplasty at T9 and T10 without obvious complication. 2. 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    3. No central spinal stenosis or cord compression. 4. Bilateral pleural effusions. CT ABDOMEN PELVIS WO CONTRAST Additional Contrast? Radiologist Recommendation   Final Result         1. 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 No evidence of   obstructive uropathy. Bilateral renal calcifications noted. 2. Bilateral pleural effusions with bibasilar atelectasis present. New when   compared with the prior exam.   3. Diverticulosis. No CT evidence of diverticulitis. XR CHEST PORTABLE   Final Result      1. No acute cardiopulmonary process identified. This exam was interpreted by a board-certified, body-imaging fellowship trained    radiologist from [address] [place] [address]. If there are any questions regarding    this examination please feel free to contact a radiologist at [phone].       [address]    2/14/2023 9:00:00 PM          Current Meds:  Current Facility-Administered Medications   Medication Dose Route Frequency    prochlorperazine (COMPAZINE) injection 10 mg  10 mg IntraVENous Q6H PRN    acetaminophen (TYLENOL) tablet 650 mg  650 mg Oral Q4H PRN    oxyCODONE (ROXICODONE) immediate release tablet 5 mg  5 mg Oral Q4H PRN    senna (SENOKOT) tablet 17.2 mg  2 tablet Oral BID    bisacodyl (DULCOLAX) suppository 10 mg  10 mg Rectal Daily PRN    HYDROcodone homatropine (HYCODAN) 5-1.5 MG/5ML solution 5 mL  5 mL Oral Q4H PRN    docusate sodium (COLACE) capsule 100 mg  100 mg Oral Daily    rosuvastatin (CRESTOR) tablet 20 mg  20 mg Oral Nightly    [Held by provider] aspirin EC tablet 81 mg  81 mg Oral Daily    pantoprazole (PROTONIX) tablet 40 mg  40 mg Oral Daily    carvedilol (COREG) tablet 3.125 mg  3.125 mg Oral BID with meals    isosorbide mononitrate (IMDUR) extended release tablet 60 mg  60 mg Oral Daily    tiZANidine (ZANAFLEX) tablet 2 mg  2 mg Oral Q8H PRN    lidocaine 4 % external patch 1 patch  1 patch TransDERmal Daily    losartan (COZAAR) tablet 25 mg  25 mg Oral Daily [Held by provider] spironolactone (ALDACTONE) tablet 25 mg  25 mg Oral Daily    sodium chloride flush 0.9 % injection 5-40 mL  5-40 mL IntraVENous 2 times per day    sodium chloride flush 0.9 % injection 5-40 mL  5-40 mL IntraVENous PRN    0.9 % sodium chloride infusion   IntraVENous PRN    ondansetron (ZOFRAN-ODT) disintegrating tablet 4 mg  4 mg Oral Q8H PRN    Or    ondansetron (ZOFRAN) injection 4 mg  4 mg IntraVENous Q6H PRN    polyethylene glycol (GLYCOLAX) packet 17 g  17 g Oral Daily PRN    morphine injection 2 mg  2 mg IntraVENous Q4H PRN       Signed:  Isidra Oneil MD    Part of this note may have been written by using a voice dictation software. The note has been proof read but may still contain some grammatical/other typographical errors.

## 2023-03-01 NOTE — PROGRESS NOTES
END OF SHIFT NOTE:    INTAKE/OUTPUT  No intake/output data recorded. Voiding: Yes  Catheter: No  Drain:              Flatus: Patient does have flatus present. Stool: 1 occurrences. Characteristics:  Stool Appearance: Soft  Stool Color: Brown  Stool Amount: Large  Stool Assessment  Incontinence: Yes  Stool Appearance: Soft  Stool Color: Brown  Stool Amount: Large  Stool Source: Rectum  Last BM (including prior to admit): 02/27/23    Emesis: 0 occurrences. Characteristics:        VITAL SIGNS  Patient Vitals for the past 12 hrs:   Temp Pulse Resp BP SpO2   03/01/23 0228 97.5 °F (36.4 °C) 62 17 (!) 138/59 94 %   02/28/23 2312 97.7 °F (36.5 °C) 64 17 (!) 132/58 94 %   02/28/23 2012 97.7 °F (36.5 °C) 64 17 (!) 120/59 96 %   02/28/23 1837 -- 70 -- 118/62 --       Pain Assessment  Pain Level: 5 (02/27/23 1610)  Pain Location: Back  Patient's Stated Pain Goal: 0 - No pain    Ambulating  Yes    Shift report given to oncoming nurse at the bedside.     Robert Anguiano RN

## 2023-03-02 PROCEDURE — 1100000000 HC RM PRIVATE

## 2023-03-02 PROCEDURE — 97535 SELF CARE MNGMENT TRAINING: CPT

## 2023-03-02 PROCEDURE — 6370000000 HC RX 637 (ALT 250 FOR IP): Performed by: FAMILY MEDICINE

## 2023-03-02 PROCEDURE — 2580000003 HC RX 258: Performed by: FAMILY MEDICINE

## 2023-03-02 PROCEDURE — 6370000000 HC RX 637 (ALT 250 FOR IP): Performed by: INTERNAL MEDICINE

## 2023-03-02 PROCEDURE — 97116 GAIT TRAINING THERAPY: CPT

## 2023-03-02 PROCEDURE — 97530 THERAPEUTIC ACTIVITIES: CPT

## 2023-03-02 RX ADMIN — SODIUM CHLORIDE, PRESERVATIVE FREE 10 ML: 5 INJECTION INTRAVENOUS at 21:09

## 2023-03-02 RX ADMIN — CLOPIDOGREL BISULFATE 75 MG: 75 TABLET ORAL at 08:18

## 2023-03-02 RX ADMIN — SODIUM CHLORIDE, PRESERVATIVE FREE 10 ML: 5 INJECTION INTRAVENOUS at 08:25

## 2023-03-02 RX ADMIN — ISOSORBIDE MONONITRATE 60 MG: 60 TABLET, EXTENDED RELEASE ORAL at 08:17

## 2023-03-02 RX ADMIN — ASPIRIN 81 MG: 81 TABLET ORAL at 08:17

## 2023-03-02 RX ADMIN — CARVEDILOL 3.12 MG: 3.12 TABLET, FILM COATED ORAL at 08:18

## 2023-03-02 RX ADMIN — ROSUVASTATIN CALCIUM 20 MG: 20 TABLET, COATED ORAL at 21:09

## 2023-03-02 RX ADMIN — OXYCODONE HYDROCHLORIDE 5 MG: 5 TABLET ORAL at 22:53

## 2023-03-02 RX ADMIN — LOSARTAN POTASSIUM 25 MG: 25 TABLET, FILM COATED ORAL at 08:17

## 2023-03-02 RX ADMIN — CARVEDILOL 3.12 MG: 3.12 TABLET, FILM COATED ORAL at 16:57

## 2023-03-02 RX ADMIN — SENNOSIDES 17.2 MG: 8.6 TABLET, FILM COATED ORAL at 21:09

## 2023-03-02 RX ADMIN — PANTOPRAZOLE SODIUM 40 MG: 40 TABLET, DELAYED RELEASE ORAL at 06:07

## 2023-03-02 RX ADMIN — ONDANSETRON 4 MG: 4 TABLET, ORALLY DISINTEGRATING ORAL at 22:53

## 2023-03-02 RX ADMIN — SENNOSIDES 17.2 MG: 8.6 TABLET, FILM COATED ORAL at 08:17

## 2023-03-02 RX ADMIN — ONDANSETRON 4 MG: 4 TABLET, ORALLY DISINTEGRATING ORAL at 08:33

## 2023-03-02 RX ADMIN — ACETAMINOPHEN 650 MG: 325 TABLET ORAL at 08:25

## 2023-03-02 RX ADMIN — DOCUSATE SODIUM 100 MG: 100 CAPSULE, LIQUID FILLED ORAL at 08:18

## 2023-03-02 ASSESSMENT — PAIN DESCRIPTION - FREQUENCY: FREQUENCY: INTERMITTENT

## 2023-03-02 ASSESSMENT — PAIN DESCRIPTION - LOCATION
LOCATION: BACK
LOCATION: BACK

## 2023-03-02 ASSESSMENT — PAIN DESCRIPTION - DESCRIPTORS
DESCRIPTORS: ACHING;SORE
DESCRIPTORS: ACHING

## 2023-03-02 ASSESSMENT — PAIN - FUNCTIONAL ASSESSMENT: PAIN_FUNCTIONAL_ASSESSMENT: PREVENTS OR INTERFERES SOME ACTIVE ACTIVITIES AND ADLS

## 2023-03-02 ASSESSMENT — PAIN SCALES - GENERAL
PAINLEVEL_OUTOF10: 5
PAINLEVEL_OUTOF10: 7

## 2023-03-02 ASSESSMENT — PAIN DESCRIPTION - ORIENTATION
ORIENTATION: LEFT
ORIENTATION: MID

## 2023-03-02 ASSESSMENT — PAIN DESCRIPTION - ONSET: ONSET: GRADUAL

## 2023-03-02 ASSESSMENT — PAIN DESCRIPTION - PAIN TYPE: TYPE: SURGICAL PAIN

## 2023-03-02 NOTE — PROGRESS NOTES
Hospitalist Progress Note   Admit Date:  2023  7:07 PM   Name:  Vanesa De Anda   Age:  80 y.o. Sex:  female  :  1931   MRN:  [id number]   Room:  221/    Presenting Complaint: No chief complaint on file. Reason(s) for Admission: Intractable pain [R52]     Hospital Course:   Ms. Chloe Norton is a 80 y.o. female with medical history of Vanesa De Anda is a 80 y.o. female with medical history of HTN, CAD, CHF, CKD who presents as a direct admit from home with intractable back/flank pain. Over 2 months period, she has been diagnosed with T10, then T9 fractures, and subsequently underwent kyphoplasties on  and , respectively. 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 Neurosurgery consulted and plan for kyphoplasty and bone biopsy on  after plavix wash out. TLSO when UOOB. Subjective & 24hr Events (23): Patient seen and examined at bedside. No acute events. Complaining of TLSO brace being cumbersome and tight. Pain in left lower back at nightime but not currently. Poor pO intake for days and attributes it to the having nausea. Reports nausea medications not helping much. Encouraged to ask for antiemetics prior to food and to ask for different one if zofran isnt helping. Pain currently well controlled. Awaiting insurance pre-cert for rehab. Assessment & Plan:   T10 compression fracture, lumbar disc protrusion, T11 interosseous hemangioma with intractable pain  Prior kypho x2,  and   S/p kyphoplasty with Dr. Alta Ortez on .   - TLSO brace per   - pain control with PRN morphin 2mg IV, oxycodone 5mg PRN  - PT/OT - [facility] accepted patient but insurance pending  - ASA and plavix resumed today     Acute urinary retention  - Resolved. Moore out .       HTN // HLD  - Con't Coreg, ARB, Imdur.   - Statin     GERD: PPI     CAD  - ASA and Plavix CKD3  Stable and at Baseline    - She remains medically stable for discharge and is accepted to [facility]. We are awaiting insurance authorization, CM assisting. PT/OT evals and PPD needed/ordered? Yes  Diet:  ADULT DIET; Regular  ADULT ORAL NUTRITION SUPPLEMENT; Breakfast, Lunch, Dinner; Standard High Calorie/High Protein Oral Supplement  VTE prophylaxis: SCD's   Code status: Full Code    Hospital Problems:  Principal Problem:    Intractable pain  Active Problems:    Chronic renal disease, stage III (HCC) [780388]    Chronic systolic (congestive) heart failure    Compression fracture of T11 vertebra (HCC)    S/P kyphoplasty    Vertebral body hemangioma    Mild protein-calorie malnutrition (HCC)    CAD (coronary artery disease)    HTN (hypertension)  Resolved Problems:    * No resolved hospital problems. *      Objective:   Patient Vitals for the past 24 hrs:   Temp Pulse Resp BP SpO2   03/02/23 1133 97.7 °F (36.5 °C) 76 18 (!) 106/57 96 %   03/02/23 0713 98.2 °F (36.8 °C) 60 18 (!) 124/58 92 %   03/02/23 0342 98.2 °F (36.8 °C) 73 18 (!) 127/51 97 %   03/01/23 2344 98.8 °F (37.1 °C) 69 18 (!) 131/53 --   03/01/23 2034 99.1 °F (37.3 °C) 70 18 124/69 94 %   03/01/23 1523 99 °F (37.2 °C) 65 17 (!) 118/50 92 %       Oxygen Therapy  SpO2: 96 %  Pulse via Oximetry: 67 beats per minute  Pulse Oximeter Device Mode: Intermittent  Pulse Oximeter Device Location: Left, Hand  O2 Device: Nasal cannula  O2 Flow Rate (L/min): 2 L/min  Oxygen Therapy: Supplemental oxygen    Estimated body mass index is 18.66 kg/m² as calculated from the following:    Height as of this encounter: 5' 6\" (1.676 m). Weight as of this encounter: 115 lb 9.6 oz (52.4 kg). Intake/Output Summary (Last 24 hours) at 3/2/2023 1319  Last data filed at 3/2/2023 0825  Gross per 24 hour   Intake 720 ml   Output --   Net 720 ml         Physical Exam:   Blood pressure (!) 106/57, pulse 76, temperature 97.7 °F (36.5 °C), temperature source Oral, resp.  rate 18, height 5' 6\" (1.676 m), weight 115 lb 9.6 oz (52.4 kg), SpO2 96 %. General:    Well nourished, but thin. Awake, but tired. Head:  Normocephalic, atraumatic  Eyes:  Sclerae appear normal.  Pupils equally round. ENT:  Nares appear normal.  Moist oral mucosa  Neck:  No restricted ROM. Trachea midline   CV:   RRR. No m/r/g. No jugular venous distension. Lungs:   CTAB. No wheezing. Symmetric expansion. Abdomen:   Soft, nontender, nondistended. Extremities: No cyanosis or clubbing. No edema  MSK:  TLSO brace is on. Skin:     No rashes and normal coloration. Warm and dry. Neuro:  CN II-XII grossly intact. Psych:  Normal mood and affect. I have personally reviewed labs and tests:  Recent Labs:  No results found for this or any previous visit (from the past 48 hour(s)). I have personally reviewed imaging studies:  Other Studies:  XR THORACIC SPINE (2 VIEWS)   Final Result   Status post kyphoplasty. No obvious complication. Total fluoroscopy time: One minute 36 seconds; two fluoroscopic spot images   saved      [facility] XR TECHNOLOGIST SERVICE   Final Result      MRI LUMBAR SPINE WO CONTRAST   Final Result   1. No acute musculoskeletal abnormality or compression fracture. 2. Multilevel degenerative disc and facet disease with a prominent left   paracentral/posterior lateral disc protrusion at L3-L4. This does not result in   central stenosis. There is mild left foramina narrowing. 3. Moderate bilateral foramina narrowing at L4-L5 with mild bilateral foramina   narrowing at L5-S1. MRI THORACIC SPINE WO CONTRAST   Final Result   1. Status post kyphoplasty at T9 and T10 without obvious complication. 2. 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 3. No central spinal stenosis or cord compression. 4. Bilateral pleural effusions.              CT ABDOMEN PELVIS WO CONTRAST Additional Contrast? Radiologist Recommendation   Final Result         1. 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 No evidence of   obstructive uropathy. Bilateral renal calcifications noted. 2. Bilateral pleural effusions with bibasilar atelectasis present. New when   compared with the prior exam.   3. Diverticulosis. No CT evidence of diverticulitis. XR CHEST PORTABLE   Final Result      1. No acute cardiopulmonary process identified. This exam was interpreted by a board-certified, body-imaging fellowship trained    radiologist from [address] [place] [address]. If there are any questions regarding    this examination please feel free to contact a radiologist at [phone].       [address]    2/14/2023 9:00:00 PM          Current Meds:  Current Facility-Administered Medications   Medication Dose Route Frequency    clopidogrel (PLAVIX) tablet 75 mg  75 mg Oral Daily    prochlorperazine (COMPAZINE) injection 10 mg  10 mg IntraVENous Q6H PRN    acetaminophen (TYLENOL) tablet 650 mg  650 mg Oral Q4H PRN    oxyCODONE (ROXICODONE) immediate release tablet 5 mg  5 mg Oral Q4H PRN    senna (SENOKOT) tablet 17.2 mg  2 tablet Oral BID    bisacodyl (DULCOLAX) suppository 10 mg  10 mg Rectal Daily PRN    HYDROcodone homatropine (HYCODAN) 5-1.5 MG/5ML solution 5 mL  5 mL Oral Q4H PRN    docusate sodium (COLACE) capsule 100 mg  100 mg Oral Daily    rosuvastatin (CRESTOR) tablet 20 mg  20 mg Oral Nightly    aspirin EC tablet 81 mg  81 mg Oral Daily    pantoprazole (PROTONIX) tablet 40 mg  40 mg Oral Daily    carvedilol (COREG) tablet 3.125 mg  3.125 mg Oral BID with meals    isosorbide mononitrate (IMDUR) extended release tablet 60 mg  60 mg Oral Daily    tiZANidine (ZANAFLEX) tablet 2 mg  2 mg Oral Q8H PRN    lidocaine 4 % external patch 1 patch  1 patch TransDERmal Daily    losartan (COZAAR) tablet 25 mg  25 mg Oral Daily    [Held by provider] spironolactone (ALDACTONE) tablet 25 mg  25 mg Oral Daily    sodium chloride flush 0.9 % injection 5-40 mL  5-40 mL IntraVENous 2 times per day    sodium chloride flush 0.9 % injection 5-40 mL  5-40 mL IntraVENous PRN    0.9 % sodium chloride infusion   IntraVENous PRN    ondansetron (ZOFRAN-ODT) disintegrating tablet 4 mg  4 mg Oral Q8H PRN    Or    ondansetron (ZOFRAN) injection 4 mg  4 mg IntraVENous Q6H PRN    polyethylene glycol (GLYCOLAX) packet 17 g  17 g Oral Daily PRN    morphine injection 2 mg  2 mg IntraVENous Q4H PRN       Signed:  Zahraa Preciado MD    Part of this note may have been written by using a voice dictation software. The note has been proof read but may still contain some grammatical/other typographical errors.

## 2023-03-02 NOTE — PROGRESS NOTES
END OF SHIFT NOTE:    INTAKE/OUTPUT  No intake/output data recorded. Voiding: Yes  Catheter: No  Drain:              Flatus: Patient does have flatus present. Stool: 2 occurrences. Characteristics:  Stool Appearance: Soft  Stool Color: Brown  Stool Amount: Small  Stool Assessment  Incontinence: Yes  Stool Appearance: Soft  Stool Color: Brown  Stool Amount: Small  Stool Source: Rectum  Last BM (including prior to admit): 03/01/23    Emesis: 0 occurrences. Characteristics:        VITAL SIGNS  Patient Vitals for the past 12 hrs:   Temp Pulse Resp BP SpO2   03/01/23 1523 99 °F (37.2 °C) 65 17 (!) 118/50 92 %   03/01/23 1136 98.6 °F (37 °C) 66 16 (!) 109/48 92 %   03/01/23 0821 -- 67 -- 133/64 --       Pain Assessment  Pain Level: 5 (02/27/23 1610)  Pain Location: Back  Patient's Stated Pain Goal: 0 - No pain    Ambulating  YES with PT & OT    Shift report given to oncoming nurse at the bedside.     Mercy Azul RN

## 2023-03-02 NOTE — PROGRESS NOTES
Comprehensive Nutrition Assessment    Type and Reason for Visit: Reassess  Length of Stay    This assessment was completed remotely. Nutrition Recommendations/Plan:   Meals and Snacks:  Diet: Continue current order. Monitor/encourage PO intake. Nutrition Supplement Therapy:  Medical food supplement therapy:  Continue Ensure Enlive three times per day (this provides 350 kcal and 20 grams protein per bottle)     Malnutrition Assessment:  Malnutrition Status: Mild malnutrition  Context: Acute Illness  Findings of clinical characteristics of malnutrition:   Energy Intake:  Mild decrease in energy intake (Comment) (variable meal intake throughout admission)  Weight Loss:  Greater than 5% over 1 month     Body Fat Loss:  Unable to assess     Muscle Mass Loss:  Unable to assess    Fluid Accumulation:  Unable to assess     Strength:  Not Performed     Nutrition Assessment:  Nutrition History: Wt hx review: c wt- 113.6 lb; 1/09/ lb (-17.1%); 7/12/22- 135 lb (-15.9%); 1/05/22- 135 lb. NKFA. Do You Have Any Cultural, Methodist, or Ethnic Food Preferences?: No   Nutrition Background:   Wound Type: Surgical Incision   PMHx of HTN, CAD, CHF, CKD3, osteoporosis. Pt came in c/o intractable back/flank pain- has been ongoing for about 2 months PTA. Diagnosed with T10 and then T9 fractures, subsequently underwent kyphoplasties on 1/30 and 2/09. Severe pain continues. Admitted with intractable pain. S/P T11 Kyphoplasty 2/23. Nutrition Interval:  Pt with Significant wt loss x 30 days and 7 months. Wt's have varied significantly since admission, 113.6 lb (no source) - 126 lb (bed scale)- wt loss x 30 days still significant using highest bedscale wt (-8%). Pt with variable PO intake, 0-100%. No documentation for supplement intake. Pt unavailable to speak to x2. Pt with planned d/c to SNF for STR, awaiting authorization. Current Nutrition Therapies:  ADULT DIET;  Regular  ADULT ORAL NUTRITION SUPPLEMENT; Breakfast, Lunch, Dinner; Standard High Calorie/High Protein Oral Supplement    Current Intake:   Average Meal Intake: 51-75% Average Supplements Intake: Unable to assess      Anthropometric Measures:  Height: 5' 6\" (167.6 cm)  Current Body Wt: 113 lb 9.6 oz (51.5 kg) (2/24), Weight source: Not Specified  BMI: 18.3, Underweight (BMI less than 22) age over 72  Admission Body Weight: 116 lb (52.6 kg) (2/14- no source)  Ideal Body Weight (Kg) (Calculated): 59 kg (130 lbs), 87.4 %  Usual Body Wt: 137 lb (62.1 kg) (1/09/23), Percent weight change: -17.1       Edema:    BMI Category Underweight (BMI less than 22) age over 72  Estimated Daily Nutrient Needs:  Energy (kcal/day): 1545 - 1803 (Kcal/kg (30-35) Weight used: 51.5 kg Current  Protein (g/day): 62 - 77 Weight Used: (Current (1.2-1.5)) 51.5 kg  Fluid (ml/day):   (1 ml/kcal)    Nutrition Diagnosis:   Underweight related to inadequate protein-energy intake as evidenced by BMI, weight loss greater than or equal to 5% in 1 month  Nutrition Interventions:   Food and/or Nutrient Delivery: Continue Current Diet, Continue Oral Nutrition Supplement  Nutrition Education/Counseling: No recommendation at this time, Education not indicated  Coordination of Nutrition Care: Continue to monitor while inpatient       Goals:   Previous Goal Met: Progressing toward Goal(s)  Active Goal: Meet at least 75% of estimated needs, by next RD assessment       Nutrition Monitoring and Evaluation:      Food/Nutrient Intake Outcomes: Supplement Intake  Physical Signs/Symptoms Outcomes: Meal Time Behavior, Weight    Discharge Planning:    Continue current diet    Vita Gowers, [address] [place] [address], [address] [place] [address]

## 2023-03-02 NOTE — PROGRESS NOTES
ACUTE OCCUPATIONAL THERAPY GOALS:   (Developed with and agreed upon by patient and/or caregiver.)  1. Patient will complete lower body bathing and dressing with CGA and adaptive equipment as needed. 2. Patient will completed upper body bathing and dressing with Mod I seated EOB and adaptive equipment as needed. 3. Patient will complete grooming seated at EOB with Mod I and adaptive equipment as needed. GOAL MET 3/1/23  4. Patient will complete toileting with CGA and adaptive equipment as needed. GOAL MET 2/24/23  5. Patient will tolerate/ 30 minutes of OT treatment with 1-2 rest breaks to increase activity bob/ance for ADLs. GOAL MET 2/24/23  6. Patient will complete functional transfers with Min A and adaptive equipment as needed. GOAL MET 2/24/23     Timeframe: 7 visits     OCCUPATIONAL THERAPY: Daily Note AM   OT Visit Days: 7       Time In/Out  OT Charge Capture  Rehab Caseload Tracker  OT Orders    Trinh Maher is a 80 y.o. female   PRIMARY DIAGNOSIS: Intractable pain  Intractable pain [R52]  Procedure(s) (LRB):  T11 Kyphoplasty and Bone Biopsy (N/A)  7 Days Post-Op  Inpatient: Payor: [address] [place] [address] / Plan: [address] [place] [address] / Product Type: *No Product type* /     ASSESSMENT:     REHAB RECOMMENDATIONS: CURRENT LEVEL OF FUNCTION:  (Most Recently Demonstrated)   Recommendation to date pending progress:  Setting:  Short-term Rehab     Equipment:    To Be Determined Bathing:  Minimal Assist  Dressing: Moderate Assist  Feeding/Grooming:  Supervision/Setup  Toileting:  Not Tested  Functional Mobility:  Contact Guard Assist RW     ASSESSMENT:  Ms. Cookie Yun was received supine in bed. Pt required assistance for functional mobility and ADLs today due to  generalized weakness, back pain, BLT precautions, decreased balance. Pt required multiple v/c about BLT precautions while in shower today. Pt still needs A for LB ADLs due to precautions. Pt is progressing towards goals and has almost met all of them.  Pt would meet more goals if AE were used. Continue POC. SUBJECTIVE:     Ms. Kamille Clement states, \"That felt so good! \"     Social/Functional Lives With: Alone  Type of Home: House  Home Layout: Two level  Home Access: Stairs to enter with rails  Entrance Stairs - Number of Steps: 6    OBJECTIVE:     LINES / Bambi Roys / AIRWAY: IV    RESTRICTIONS/PRECAUTIONS:  Restrictions/Precautions  Restrictions/Precautions: Fall Risk        PAIN: VITALS / O2:   Pre Treatment:   Pain Assessment: None - Denies Pain        Post Treatment: none Vitals          Oxygen   WFL RA. Does not need oxygen.      MOBILITY: I Mod I S SBA CGA Min Mod Max Total  NT x2 Comments:   Bed Mobility    Rolling [] [] [] [] [] [] [] [] [] [] []    Supine to Sit [] [] [] [] [] [] [] [] [] [] []    Scooting [] [] [] [] [] [] [] [] [] [] []    Sit to Supine [] [] [] [] [] [] [] [] [] [] []    Transfers    Sit to Stand [] [] [] [x] [] [] [] [] [] [] []    Bed to Chair [] [] [] [] [] [] [] [] [] [] []    Stand to Sit [] [] [] [x] [] [] [] [] [] [] []    Tub/Shower [] [] [] [] [x] [] [] [] [] [] []  Shower bench, walk in shower   Toilet [] [] [] [] [] [] [] [] [] [] []      [] [] [] [] [] [] [] [] [] [] []    I=Independent, Mod I=Modified Independent, S=Supervision/Setup, SBA=Standby Assistance, CGA=Contact Guard Assistance, Min=Minimal Assistance, Mod=Moderate Assistance, Max=Maximal Assistance, Total=Total Assistance, NT=Not Tested    ACTIVITIES OF DAILY LIVING: I Mod I S SBA CGA Min Mod Max Total NT Comments   BASIC ADLs:              Upper Body Bathing [] [] [x] [] [] [] [] [] [] [] Seated shower bench in walk in shower, HHS   Lower Body Bathing [] [] [] [] [] [x] [] [] [] [] BK for washing, drying   Toileting [] [] [] [] [] [] [] [] [] []    Upper Body Dressing [] [] [] [] [] [] [x] [] [] [] TLSO   Lower Body Dressing [] [] [] [] [] [] [] [x] [] [] socks   Feeding [] [] [] [] [] [] [] [] [] []    Grooming [] [] [x] [] [] [] [] [] [] [] Seated washing face in shower Personal Device Care [] [] [] [] [] [] [] [] [] []    Functional Mobility [] [] [] [x] [x] [] [] [] [] [] Bed > shower > bed rollator   I=Independent, Mod I=Modified Independent, S=Supervision/Setup, SBA=Standby Assistance, CGA=Contact Guard Assistance, Min=Minimal Assistance, Mod=Moderate Assistance, Max=Maximal Assistance, Total=Total Assistance, NT=Not Tested    BALANCE: Good Fair+ Fair Fair- Poor NT Comments   Sitting Static [x] [] [] [] [] []    Sitting Dynamic [] [] [] [] [] []              Standing Static [x] [x] [] [] [] []    Standing Dynamic [] [x] [] [] [] []        PLAN:     FREQUENCY/DURATION   OT Plan of Care: 5 times/week for duration of hospital stay or until stated goals are met, whichever comes first.    TREATMENT:     TREATMENT:   Co-Treatment PT/OT necessary due to patient's decreased overall endurance/tolerance levels, as well as need for high level skilled assistance to complete functional transfers/mobility and functional tasks  Self Care (38 minutes): Patient participated in upper body bathing, lower body bathing, upper body dressing, lower body dressing, and grooming ADLs in supported sitting, unsupported sitting, and standing with moderate verbal, manual, and tactile cueing to increase independence, decrease assistance required, increase activity tolerance, increase safety awareness, and maintain precautions. Patient also participated in functional mobility, functional transfer, and adaptive equipment training to increase independence, decrease assistance required, increase activity tolerance, increase safety awareness, and maintain precautions.      TREATMENT GRID:  N/A    AFTER TREATMENT PRECAUTIONS: Alarm Activated, Bed, Bed/Chair Locked, Call light within reach, Needs within reach, RN notified, and Side rails x2    INTERDISCIPLINARY COLLABORATION:  RN/ PCT, PT/ PTA, and OT/ DOMINGUEZ    EDUCATION:  Education Given To: Patient  Education Provided: Role of Therapy;Plan of Care  Education Outcome: Verbalized understanding    TOTAL TREATMENT DURATION AND TIME:  Time In: 2349  Time Out: [phone]  Minutes: [address], OT

## 2023-03-02 NOTE — PROGRESS NOTES
END OF SHIFT NOTE:    INTAKE/OUTPUT  03/01 0701 - 03/02 0700  In: 950 [P.O.:950]  Out: -   Voiding: Yes  Catheter: No  Drain:              Flatus: Patient does have flatus present. Stool: 1 occurrences. Characteristics:  Stool Appearance: Soft  Stool Color: Brown  Stool Amount: Large  Stool Assessment  Incontinence: No  Stool Appearance: Soft  Stool Color: Brown  Stool Amount: Large  Stool Source: Rectum  Last BM (including prior to admit): 03/02/23    Emesis: 0 occurrences. Characteristics:        VITAL SIGNS  Patient Vitals for the past 12 hrs:   Temp Pulse Resp BP SpO2   03/02/23 1641 97.4 °F (36.3 °C) 73 18 (!) 121/50 96 %   03/02/23 1133 97.7 °F (36.5 °C) 76 18 (!) 106/57 96 %   03/02/23 0713 98.2 °F (36.8 °C) 60 18 (!) 124/58 92 %       Pain Assessment  Pain Level: 5 (03/02/23 0825)  Pain Location: Back  Patient's Stated Pain Goal: 0 - No pain    Ambulating  Yes    Shift report given to oncoming nurse at the bedside.     Gianna Rutledge, RN

## 2023-03-02 NOTE — PROGRESS NOTES
Patient / Reanna Hannah has information concerning patients insurance in the room. Son states this Hospitalist needs to contact patients primary MD so he can resume care. The information is on a slip of paper on the patients white board.

## 2023-03-02 NOTE — CARE COORDINATION
UPDATE @ 3:48PM:  Pt's insurance approved admission to SNF for STR. This CM updated pt and son on approval.  Transport arranged for 9:30am via [place] for tomorrow. Pt and son aware. No additional CM needs at this time. Will continue to monitor and update as needed. 10:26AM: This CM called pt's insurance again today regarding  prior authorization for admission to [facility] for [address]. This CM  was on call from 9:54am-10:05am (for any future reference due to insurance system reporting  call's are recorded). This CM spoke with [employer] Billing from 9:57am-10:05am.  She reports the prior authorization is still pending and that they have anywhere from 3-14 days to make determination. This CM requested to speak with supervisor regarding an expedited determination as the pt is  in the hospital and medically stable to discharge to SNF for STR. Dashawn Billing reports that she is able to request an expedited determination which would come back in 24-48 hours. This CM asked for her to put that in. Reference number for the call is [id number]. This CM  relayed information to Beverly at [facility]. She reports that they already had requested an expedited determination - they are trying  to see what can be  done from their end  as well. At this point, all parties have remained  in constant communication and spoken multiple times with pt insurance. Awaiting determination from insurance for admission to SNF for STR. Will continue to monitor and update as needed.

## 2023-03-02 NOTE — PROGRESS NOTES
ACUTE PHYSICAL THERAPY GOALS:   (Developed with and agreed upon by patient and/or caregiver.)  .) David Zambrano will perform bed mobility with MINIMAL ASSIST within 7 treatment day(s). (2.) David Zambrano will transfer from bed to chair and chair to bed with MINIMAL ASSIST using the least restrictive device within 7 treatment day(s). Met 2/24/2023   (3.) David Zambrano will ambulate with MINIMAL ASSIST for 50 feet with the least restrictive device within 7 treatment day(s). Partially met 2/24/2023   (4.) Daivd Zambrano will perform bilateral lower extremity exercises x 15 min for HEP with SUPERVISION to improve strength, endurance, and functional mobility within 7 treatment day(s). Updated Goals 2/24/2023  LTG:  (1.)Ms. Zarate  will move from supine to sit and sit to supine via logrolling  to side in flat bed without siderails with contact guard assist within 7 day(s). (2.)Ms. Zarate  will transfer from bed to chair and chair to bed with contact guard assist using the least restrictive/no device within 7 day(s). (3.)Ms. Zarate  will ambulate with contact guard assist for 150+ feet with the least restrictive/no device within 7 day(s). PHYSICAL THERAPY: Daily Note AM   (Link to Caseload Tracking: PT Visit Days : 5  Time In/Out PT Charge Capture  Rehab Caseload Tracker  Orders  TLSO on when up and out of bed    David Zambrano is a 80 y.o. female   PRIMARY DIAGNOSIS: Intractable pain  Intractable pain [R52]  Procedure(s) (LRB):  T11 Kyphoplasty and Bone Biopsy (N/A)  7 Days Post-Op  Inpatient: Payor: [address] [place], [place] [address]vd / [facility] / Product Type: *No Product type* /     ASSESSMENT:     REHAB RECOMMENDATIONS:   Recommendation to date pending progress:  Setting:  Short-term Rehab    Equipment:    To Be Determined     ASSESSMENT:  Ms. Tera Bueno is supine in bed and is being cleaned up bu the PCT now is ready to get up therefor her brace needs to be donned while the patient is supine.    Braced donned supine with total assist .  Log rolled to sit up. Sitting balance good. Sit to stand to the walker with contact guard assist and ambulated x 150 feet with slow sebastián and one sitting rest break. Patient is returned to the room and to the recliner, she was positioned for comfort and with needs within reach. MD visits during treatment session. Good session and patient is  making progress towards goals. Continue PT efforts.   STR at d/c     SUBJECTIVE:   Ms. Hoda Lomeli states, \"I just don't feel well\"     Social/Functional Lives With: Alone  Type of Home: House  Home Layout: Two level  Home Access: Stairs to enter with rails  Entrance Stairs - Number of Steps: 6  OBJECTIVE:     PAIN: Ryne Poster / O2: Kim Olguin / Jahaira Ceron / Brandy Verde:   Pre Treatment: not rated         Post Treatment: not rated Vitals        Oxygen    O2    RESTRICTIONS/PRECAUTIONS:  Restrictions/Precautions  Restrictions/Precautions: Fall Risk  Restrictions/Precautions: Fall Risk     MOBILITY: I Mod I S SBA CGA Min Mod Max Total  NT x2 Comments:   Bed Mobility    Rolling [] [] [] [] [] [x] [] [] [] [] []    Supine to Sit [] [] [] [] [] [x] [] [] [] [] []    Scooting [] [] [] [] [] [x] [] [] [] [] []    Sit to Supine [] [] [] [] [] [] [] [] [] [x] []    Transfers    Sit to Stand [] [] [] [] [x] [] [] [] [] [] []    Bed to Chair [] [] [] [] [] [] [] [] [] [x] []    Stand to Sit [] [] [] [] [x] [] [] [] [] [] []     [] [] [] [] [] [] [] [] [] [] []    I=Independent, Mod I=Modified Independent, S=Supervision, SBA=Standby Assistance, CGA=Contact Guard Assistance,   Min=Minimal Assistance, Mod=Moderate Assistance, Max=Maximal Assistance, Total=Total Assistance, NT=Not Tested    BALANCE: Good Fair+ Fair Fair- Poor NT Comments   Sitting Static [x] [] [] [] [] []    Sitting Dynamic [x] [] [] [] [] []              Standing Static [] [x] [] [] [] []    Standing Dynamic [] [] [x] [] [] []      GAIT: I Mod I S SBA CGA Min Mod Max Total  NT x2 Comments:   Level of Assistance [] [] [] [] [] [x] [] [] [] [] []    Distance   150 feet    DME Rolling Walker    Gait Quality Decreased sebastián , Decreased step clearance, Decreased step length, Decreased stance, and Trunk flexion    Weightbearing Status      Stairs      I=Independent, Mod I=Modified Independent, S=Supervision, SBA=Standby Assistance, CGA=Contact Guard Assistance,   Min=Minimal Assistance, Mod=Moderate Assistance, Max=Maximal Assistance, Total=Total Assistance, NT=Not Tested    PLAN:   FREQUENCY AND DURATION: 5 times/week for duration of hospital stay or until stated goals are met, whichever comes first.    TREATMENT:   TREATMENT:   Therapeutic Activity (10 Minutes): Therapeutic activity included Rolling, Supine to Sit, Scooting, Transfer Training, Ambulation on level ground, Sitting balance , and Standing balance to improve functional Activity tolerance, Balance, Coordination, Mobility, and Strength. Gait Training (15 Minutes): Gait training for 200 feet utilizing [address]. Patient required Tactile and Verbal cueing to improve Activity Pacing, Assistive Device Utilization, and Gait Mechanics.      TREATMENT GRID:  N/A    AFTER TREATMENT PRECAUTIONS: Alarm Activated, Bed/Chair Locked, Call light within reach, Chair, Needs within reach, and RN notified    INTERDISCIPLINARY COLLABORATION:  MD/ PA/ NP , RN/ PCT, and PT/ PTA    EDUCATION:  review POC and importance of mobility in the recovery process    TIME IN/OUT:  Time In: 1037  Time Out: [facility]  Minutes: 25    Tammy Hicks, PTA

## 2023-03-03 VITALS
HEART RATE: 79 BPM | RESPIRATION RATE: 16 BRPM | SYSTOLIC BLOOD PRESSURE: 138 MMHG | TEMPERATURE: 97.8 F | WEIGHT: 115.6 LBS | HEIGHT: 66 IN | OXYGEN SATURATION: 91 % | DIASTOLIC BLOOD PRESSURE: 78 MMHG | BODY MASS INDEX: 18.58 KG/M2

## 2023-03-03 PROCEDURE — 6370000000 HC RX 637 (ALT 250 FOR IP): Performed by: FAMILY MEDICINE

## 2023-03-03 PROCEDURE — 6370000000 HC RX 637 (ALT 250 FOR IP): Performed by: INTERNAL MEDICINE

## 2023-03-03 PROCEDURE — 2580000003 HC RX 258: Performed by: FAMILY MEDICINE

## 2023-03-03 RX ORDER — ONDANSETRON 4 MG/1
4 TABLET, ORALLY DISINTEGRATING ORAL 3 TIMES DAILY PRN
Qty: 21 TABLET | Refills: 0
Start: 2023-03-03

## 2023-03-03 RX ORDER — HYDROCODONE BITARTRATE AND ACETAMINOPHEN 5; 325 MG/1; MG/1
1 TABLET ORAL EVERY 6 HOURS PRN
Qty: 12 TABLET | Refills: 0 | Status: SHIPPED | OUTPATIENT
Start: 2023-03-03 | End: 2023-03-06

## 2023-03-03 RX ADMIN — CLOPIDOGREL BISULFATE 75 MG: 75 TABLET ORAL at 08:30

## 2023-03-03 RX ADMIN — PANTOPRAZOLE SODIUM 40 MG: 40 TABLET, DELAYED RELEASE ORAL at 05:49

## 2023-03-03 RX ADMIN — ISOSORBIDE MONONITRATE 60 MG: 60 TABLET, EXTENDED RELEASE ORAL at 08:30

## 2023-03-03 RX ADMIN — ASPIRIN 81 MG: 81 TABLET ORAL at 08:30

## 2023-03-03 RX ADMIN — LOSARTAN POTASSIUM 25 MG: 25 TABLET, FILM COATED ORAL at 08:30

## 2023-03-03 RX ADMIN — OXYCODONE HYDROCHLORIDE 5 MG: 5 TABLET ORAL at 08:30

## 2023-03-03 RX ADMIN — SODIUM CHLORIDE, PRESERVATIVE FREE 10 ML: 5 INJECTION INTRAVENOUS at 08:32

## 2023-03-03 RX ADMIN — CARVEDILOL 3.12 MG: 3.12 TABLET, FILM COATED ORAL at 08:30

## 2023-03-03 ASSESSMENT — PAIN - FUNCTIONAL ASSESSMENT: PAIN_FUNCTIONAL_ASSESSMENT: ACTIVITIES ARE NOT PREVENTED

## 2023-03-03 ASSESSMENT — PAIN SCALES - GENERAL: PAINLEVEL_OUTOF10: 7

## 2023-03-03 ASSESSMENT — PAIN DESCRIPTION - DESCRIPTORS: DESCRIPTORS: ACHING

## 2023-03-03 ASSESSMENT — PAIN DESCRIPTION - LOCATION: LOCATION: BACK;INCISION

## 2023-03-03 ASSESSMENT — PAIN DESCRIPTION - PAIN TYPE: TYPE: SURGICAL PAIN

## 2023-03-03 ASSESSMENT — PAIN DESCRIPTION - ONSET: ONSET: GRADUAL

## 2023-03-03 ASSESSMENT — PAIN DESCRIPTION - FREQUENCY: FREQUENCY: INTERMITTENT

## 2023-03-03 NOTE — PROGRESS NOTES
END OF SHIFT NOTE:    INTAKE/OUTPUT  03/02 0701 - 03/03 0700  In: 240 [P.O.:240]  Out: 800 [Urine:800]  Voiding: Yes  Catheter: No  Drain:              Flatus: Patient does have flatus present. Stool:  occurrences. Characteristics:  Stool Appearance: Soft  Stool Color: Brown  Stool Amount: Large  Stool Assessment  Incontinence: No  Stool Appearance: Soft  Stool Color: Brown  Stool Amount: Large  Stool Source: Rectum  Last BM (including prior to admit): 03/02/23    Emesis:  occurrences. Characteristics:        VITAL SIGNS  Patient Vitals for the past 12 hrs:   Temp Pulse Resp BP SpO2   03/03/23 0254 97.5 °F (36.4 °C) 76 16 131/62 97 %   03/02/23 2324 97.9 °F (36.6 °C) 68 16 (!) 119/58 92 %       Pain Assessment  Pain Level: 7 (03/02/23 2253)  Pain Location: Back  Patient's Stated Pain Goal: 0 - No pain    Ambulating  Yes    Shift report given to oncoming nurse at the bedside.     Vidya Olivares RN

## 2023-03-03 NOTE — CARE COORDINATION
Pt with discharge orders this day. Pt to transition to SNF for STR. Insurance approved admission to [facility] for [employer]. Pt and son remain agreeable with no voiced concerns. Transport arranged for 9:30am  via [employer]. No additional CM needs at discharge. 02/14/23 2000   Service Assessment   Patient Orientation Alert and Oriented   Cognition Alert   History Provided By Child/Family; Patient   Primary [address]   Patient's Healthcare Decision Maker is: Legal Next of Kin   PCP Verified by CM Yes   Last Visit to PCP Within last 3 months   Prior Functional Level Independent in ADLs/IADLs   Current Functional Level Other (see comment)  (TBD by clinical team)   Can patient return to prior living arrangement Yes   Ability to make needs known: Good   Family able to assist with home care needs: Yes   Would you like for me to discuss the discharge plan with any other family members/significant others, and if so, who? Yes   Discharge Planning   Type of [facility] Prior To Admission None   Potential Assistance Needed N/A   Type of Home Care Services None   Patient expects to be discharged to: [address] Discharge   Mode of Transport at Discharge BLS   Confirm Follow Up Transport Other (see comment)  [facility])   Condition of Participation: Discharge Planning   The Plan for Transition of Care is related to the following treatment goals: SNF for STR   The Patient and/or Patient Representative was provided with a Choice of Provider? Patient;Patient Representative   Name of the Patient Representative who was provided with the Choice of Provider and agrees with the Discharge Plan? Ms. Hoda Lomeli   The Patient and/Or Patient Representative agree with the Discharge Plan?  Yes   Freedom of Choice list was provided with basic dialogue that supports the patient's individualized plan of care/goals, treatment preferences, and shares the quality data associated with the providers?   Yes

## 2023-03-03 NOTE — DISCHARGE SUMMARY
Hospitalist Discharge Summary   Admit Date:  2023  7:07 PM   DC Note date: 3/3/2023  Name:  Daisy Landaverde   Age:  80 y.o. Sex:  female  :  1931   MRN:  [id number]   Room:    PCP:  Liz Carvajal MD    Presenting Complaint: No chief complaint on file. Initial Admission Diagnosis: Intractable pain [R52]     Problem List for this Hospitalization (present on admission):    Principal Problem:    Intractable pain  Active Problems:    Chronic renal disease, stage III (HCC) [536506]    Chronic systolic (congestive) heart failure    Compression fracture of T11 vertebra (HCC)    S/P kyphoplasty    Vertebral body hemangioma    Mild protein-calorie malnutrition (HCC)    CAD (coronary artery disease)    HTN (hypertension)  Resolved Problems:    * No resolved hospital problems. [facility] Course:  Please refer to the admission H&P for details of presentation. In summary, Daisy Landaverde is a 80 y.o. female with past medical history significant for 80 y.o. female with medical history of HTN, CAD, CHF, CKD who presents as a direct admit from home with intractable back/flank pain. Over 2 months period, she has been diagnosed with T10, then T9 fractures, and subsequently underwent kyphoplasties on  and , respectively. 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 Neurosurgery consulted and plan for kyphoplasty and bone biopsy on  after plavix wash out. TLSO when UOOB. Patient will need to [facility] and Neurosurgery on dc for post-op followup. Patient is medically stable for discharge. Patient is to continue taking medications as prescribed and to follow up with PCP on discharge. Patient is instructed to to call a physician or return to ED if any concerns/symptoms worsened.    Discharge summary and encounter summary was sent to PCP electronically via \"Comm Mgt\" link in [facility], if possible. Disposition: [facility]  Diet: ADULT DIET; Regular  ADULT ORAL NUTRITION SUPPLEMENT; Breakfast, Lunch, Dinner; Standard High Calorie/High Protein Oral Supplement  Code Status: Full Code    Follow Ups:   Contact information for follow-up providers     [facility]. Schedule an appointment as   soon as possible for a visit in 5 day(s). Why: For suture removal, For wound re-check  Contact information:  [address]  [address] Iraida Barbosa MD Follow up. Specialty: Internal Medicine  Why: As needed  Contact information:  [address] [facility]  [address] [facility]  [phone]             Valentino Schillings, MD .    Specialty: Internal Medicine  Contact information:  Rick Vanessa [address] [facility] [address]  [phone]                   Contact information for after-discharge care     Discharge Destination     [address] [facility] . Service: Skilled Nursing  Contact information:  [facility] [facility]  [phone]                           Time spent in patient discharge and coordination 36 minutes. Plan was discussed with patient. All questions answered. Patient was stable at time of discharge. Instructions given to call a physician or return if any concerns. Current Discharge Medication List        CONTINUE these medications which have CHANGED    Details   HYDROcodone-acetaminophen (NORCO) 5-325 MG per tablet Take 1 tablet by mouth every 6 hours as needed for Pain for up to 3 days. Max Daily Amount: 4 tablets  Qty: 12 tablet, Refills: 0    Comments: Reduce doses taken as pain becomes manageable  Associated Diagnoses: S/P kyphoplasty;  Compression fracture of T11 vertebra, initial encounter ([facility])      ondansetron (ZOFRAN-ODT) 4 MG disintegrating tablet Take 1 tablet by mouth 3 times daily as needed for Nausea or Vomiting  Qty: 21 tablet, Refills: 0    Associated Diagnoses: Nausea           CONTINUE these medications which have NOT CHANGED    Details   tiZANidine (ZANAFLEX) 2 MG tablet Take 1 tablet by mouth every 8 hours as needed (back pain)  Qty: 30 tablet, Refills: 0    Associated Diagnoses: Acute midline low back pain without sciatica      irbesartan (AVAPRO) 75 MG tablet TAKE 1 TABLET DAILY  Qty: 90 tablet, Refills: 3      carvedilol (COREG) 3.125 MG tablet Take 1 tablet by mouth 2 times daily TAKE 1 TABLET TWICE DAILY WITH MEALS  Qty: 180 tablet, Refills: 3    Associated Diagnoses: Primary hypertension; Chronic systolic (congestive) heart failure (HCC)      clopidogrel (PLAVIX) 75 MG tablet Take 1 tablet by mouth daily The details of the medication are not available because there are pending changes by a home health clinician.   Qty: 90 tablet, Refills: 3    Associated Diagnoses: Coronary artery disease of bypass graft of native heart with stable angina pectoris (HCC)      isosorbide mononitrate (IMDUR) 60 MG extended release tablet Take 1 tablet by mouth daily  Qty: 90 tablet, Refills: 3    Associated Diagnoses: Coronary artery disease of bypass graft of native heart with stable angina pectoris (HCC)      rosuvastatin (CRESTOR) 20 MG tablet Take 1 tablet by mouth daily TAKE 1 TABLET NIGHTLY  Qty: 90 tablet, Refills: 3    Associated Diagnoses: Mixed dyslipidemia      Multiple Vitamins-Minerals (HAIR SKIN AND NAILS FORMULA PO) Take by mouth      aspirin 81 MG EC tablet Take 81 mg by mouth daily      pantoprazole (PROTONIX) 20 MG tablet Take 1 tablet by mouth daily  Qty: 90 tablet, Refills: 3           STOP taking these medications       metaxalone (SKELAXIN) 800 MG tablet Comments:   Reason for Stopping:         lidocaine (LIDODERM) 5 % Comments:   Reason for Stopping:         spironolactone (ALDACTONE) 25 MG tablet Comments:   Reason for Stopping:         Cranberry 250 MG TABS Comments:   Reason for Stopping:         APPLE CIDER VINEGAR PO Comments:   Reason for Stopping:         TURMERIC PO Comments:   Reason for Stopping:         Cholecalciferol 50 MCG (2000 UT) CAPS Comments:   Reason for Stopping:         cyanocobalamin 100 MCG tablet Comments:   Reason for Stopping:         Lidocaine, Anorectal, 5 % CREA Comments:   Reason for Stopping:         loratadine (CLARITIN) 10 MG tablet Comments:   Reason for Stopping:         montelukast (SINGULAIR) 10 MG tablet Comments:   Reason for Stopping:               Some medications may have been reported old/obsolete and marked \"stop taking\" by the system; in reality pt was already off these meds; defer to outpatient or prescribing providers. Procedures done this admission:  Procedure(s):  T11 Kyphoplasty and Bone Biopsy    Consults this admission:  IP CONSULT TO ORTHOPEDIC SURGERY  IP CONSULT TO ORTHOTIST    Echocardiogram results:  02/14/23    TRANSTHORACIC ECHOCARDIOGRAM (TTE) COMPLETE (CONTRAST/BUBBLE/3D PRN) 02/16/2023  4:48 PM, 02/16/2023 12:00 AM (Final)    Interpretation Summary    Left Ventricle: Normal left ventricular systolic function with a visually estimated EF of 40 - 45%. Left ventricle size is normal. Normal wall thickness. There is moderate severity mid to distal anteroseptal and anteroapical hypokinesis. Indeterminate diastolic function. Aortic Valve: Moderate sclerosis of the aortic valve cusp. Mitral Valve: Mildly thickened leaflet, at the anterior leaflet. Moderate annular calcification of the mitral valve. Mild regurgitation. Tricuspid Valve: Mild regurgitation. Normal RVSP. The estimated RVSP is 33 mmHg. Technical qualifiers: Color flow Doppler was performed and pulse wave and/or continuous wave Doppler was performed. Contrast used: Definity.     Signed by: Joyce Hauser MD on 2/16/2023  4:48 PM, Signed by: Unknown Provider Result on 2/16/2023 12:00 AM      Diagnostic Imaging/Tests:   CT ABDOMEN PELVIS WO CONTRAST Additional Contrast? Radiologist Recommendation    Result Date: 2/15/2023  1. 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 No evidence of obstructive uropathy. Bilateral renal calcifications noted. 2. Bilateral pleural effusions with bibasilar atelectasis present. New when compared with the prior exam. 3. Diverticulosis. No CT evidence of diverticulitis. XR THORACIC SPINE (2 VIEWS)    Result Date: 2/23/2023  Status post kyphoplasty. No obvious complication. Total fluoroscopy time: One minute 36 seconds; two fluoroscopic spot images saved    XR THORACIC SPINE (3 VIEWS)    Result Date: 2/6/2023  New T9 compression fracture    MRI THORACIC SPINE WO CONTRAST    Result Date: 2/16/2023  1. Status post kyphoplasty at T9 and T10 without obvious complication. 2. 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 3. No central spinal stenosis or cord compression. 4. Bilateral pleural effusions. MRI LUMBAR SPINE WO CONTRAST    Result Date: 2/16/2023  1. No acute musculoskeletal abnormality or compression fracture. 2. Multilevel degenerative disc and facet disease with a prominent left paracentral/posterior lateral disc protrusion at L3-L4. This does not result in central stenosis. There is mild left foramina narrowing. 3. Moderate bilateral foramina narrowing at L4-L5 with mild bilateral foramina narrowing at L5-S1. XR CHEST PORTABLE    Result Date: 2/14/2023  1. No acute cardiopulmonary process identified. This exam was interpreted by a board-certified, body-imaging fellowship trained radiologist from [address] [place] [address]. If there are any questions regarding this examination please feel free to contact a radiologist at [phone].  Slot [address] 2/14/2023 9:00:00 PM    IR KYPHOPLASTY THORACIC 1 VERTEBRAL BODY    Addendum Date: 2/13/2023    Addendum: Osteoporotic compression fracture of T9    Result Date: 2/13/2023  Successful T9 Kyphoplasty. Plan: The patient will remain at bedrest for 2 hours with her head at approximately 30-45 degrees elevation. Recommend return to clinic in 2-4 weeks for followup evaluation. Labs: Results:       BMP, Mg, Phos No results for input(s): NA, K, CL, CO2, ANIONGAP, BUN, CREATININE, LABGLOM, GFRAA, CALCIUM, GLUCOSE, MG, PHOS in the last 72 hours. CBC No results for input(s): WBC, RBC, HGB, HCT, MCV, MCH, MCHC, RDW, PLT, MPV, NRBC, SEGS, LYMPHOPCT, EOSRELPCT, MONOPCT, BASOPCT, IMMGRAN, SEGSABS, LYMPHSABS, EOSABS, MONOSABS, BASOSABS, ABSIMMGRAN in the last 72 hours. LFT No results for input(s): BILITOT, BILIDIR, ALKPHOS, AST, ALT, PROT, LABALBU, GLOB in the last 72 hours.    Cardiac  No results found for: NTPROBNP, TROPHS   Coags Lab Results   Component Value Date/Time    PROTIME 13.2 03/18/2021 07:18 AM    PROTIME 13.2 02/03/2021 11:52 AM    INR 1.0 03/18/2021 07:18 AM    INR 1.0 02/03/2021 11:52 AM    APTT >200.0 03/18/2021 06:36 PM      A1c Lab Results   Component Value Date/Time    LABA1C 5.9 02/17/2020 05:23 AM     02/17/2020 05:23 AM      Lipids Lab Results   Component Value Date/Time    CHOL 134 01/25/2023 09:59 AM    LDLCALC 76.4 01/25/2023 09:59 AM    LABVLDL 17.6 01/25/2023 09:59 AM    HDL 40 01/25/2023 09:59 AM    CHOLHDLRATIO 3.4 01/25/2023 09:59 AM    TRIG 88 01/25/2023 09:59 AM      Thyroid  Lab Results   Component Value Date/Time    TSHELE 2.11 04/13/2021 01:32 PM    NNM7LXD 1.860 07/05/2022 01:51 PM        Most Recent UA Lab Results   Component Value Date/Time    COLORU YELLOW/STRAW 02/15/2023 06:07 PM    APPEARANCE CLOUDY 02/15/2023 06:07 PM    SPECGRAV 1.019 02/15/2023 06:07 PM    LABPH 6.5 02/15/2023 06:07 PM    PROTEINU 30 02/15/2023 06:07 PM    GLUCOSEU Negative 02/15/2023 06:07 PM    KETUA Negative 02/15/2023 06:07 PM    BILIRUBINUR Negative 02/15/2023 06:07 PM    BILIRUBINUR Negative 12/30/2021 11:05 AM    BLOODU LARGE 02/15/2023 06:07 PM UROBILINOGEN 1.0 02/15/2023 06:07 PM    NITRU Negative 02/15/2023 06:07 PM    LEUKOCYTESUR TRACE 02/15/2023 06:07 PM    WBCUA 5-10 02/15/2023 06:07 PM    RBCUA 0 02/15/2023 06:07 PM    EPITHUA 0-3 02/15/2023 06:07 PM    BACTERIA 2+ 02/15/2023 06:07 PM    LABCAST 0 02/15/2023 06:07 PM    MUCUS 0 02/15/2023 06:07 PM        No results for input(s): CULTURE in the last 720 hours. All Labs from Last 24 Hrs:  No results found for this or any previous visit (from the past 24 hour(s)).     Allergies   Allergen Reactions    Amlodipine Other (See Comments)     Other reaction(s): Unknown-Unspecified    Quinapril Other (See Comments)     Other reaction(s): Cough-Intolerance    Sertraline Other (See Comments)     Other reaction(s): Drowsiness    Prednisone Itching, Palpitations and Other (See Comments)     Per pt also had heart burn     Immunization History   Administered Date(s) Administered    COVID-19, PFIZER PURPLE top, DILUTE for use, (age 15 y+), 30mcg/0.3mL 01/20/2021    Influenza Virus Vaccine 09/24/2012, 10/10/2013, 10/20/2014, 10/01/2021    Influenza, FLUAD, (age 72 y+), Adjuvanted, 0.5mL 09/24/2020, 09/15/2022    Influenza, FLUZONE (age 72 y+), High Dose, 0.7mL 10/10/2020    Influenza, High Dose (Fluzone 65 yrs and older) 11/16/2015, 09/26/2016, 10/09/2017, 09/18/2018    Influenza, Triv, inactivated, subunit, adjuvanted, IM (Fluad 65 yrs and older) 09/23/2019    PPD Test 02/15/2023    Pneumococcal Conjugate 13-valent ([id number]) 06/14/2016    Pneumococcal Vaccine 10/01/2010    Pneumococcal conjugate PCV20, PF (Prevnar 20) 07/12/2022    Tdap (Boostrix, Adacel) 09/02/2013       Recent Vital Data:  Patient Vitals for the past 24 hrs:   Temp Pulse Resp BP SpO2   03/03/23 0736 97.8 °F (36.6 °C) 79 16 138/78 91 %   03/03/23 0254 97.5 °F (36.4 °C) 76 16 131/62 97 %   03/02/23 2324 97.9 °F (36.6 °C) 68 16 (!) 119/58 92 %   03/02/23 1853 97.9 °F (36.6 °C) 65 16 136/63 96 %   03/02/23 1641 97.4 °F (36.3 °C) 73 18 (!) 121/50 96 %   03/02/23 1133 97.7 °F (36.5 °C) 76 18 (!) 106/57 96 %       Oxygen Therapy  SpO2: 91 %  Pulse via Oximetry: 67 beats per minute  Pulse Oximeter Device Mode: Intermittent  Pulse Oximeter Device Location: Left, Hand  O2 Device: None (Room air)  O2 Flow Rate (L/min): 2 L/min  Oxygen Therapy: Supplemental oxygen    Estimated body mass index is 18.66 kg/m² as calculated from the following:    Height as of this encounter: 5' 6\" (1.676 m). Weight as of this encounter: 115 lb 9.6 oz (52.4 kg). Intake/Output Summary (Last 24 hours) at 3/3/2023 0906  Last data filed at 3/3/2023 0045  Gross per 24 hour   Intake 120 ml   Output 800 ml   Net -680 ml         Physical Exam:    General:          Well nourished, but thin. Awake  Head:               Normocephalic, atraumatic  Eyes:               Sclerae appear normal.  Pupils equally round. ENT:                Nares appear normal.  Moist oral mucosa  Neck:               No restricted ROM. Trachea midline   CV:                  RRR. No m/r/g. No jugular venous distension. Lungs:             CTAB. No wheezing. Symmetric expansion. Abdomen:        Soft, nontender, nondistended. Extremities:     No cyanosis or clubbing. No edema  MSK:               TLSO brace is on. Skin:                No rashes and normal coloration. Warm and dry. Neuro:             CN II-XII grossly intact. Psych:             Normal mood and affect. Signed:  Kathy Chen MD    Part of this note may have been written by using a voice dictation software. The note has been proof read but may still contain some grammatical/other typographical errors.

## 2023-03-06 ENCOUNTER — CARE COORDINATION (OUTPATIENT)
Dept: CARE COORDINATION | Facility: CLINIC | Age: 88
End: 2023-03-06

## 2023-03-06 NOTE — CARE COORDINATION
Transition of care outreach postponed for 14 days due to patient's discharge to SNF. Patient d/c to [facility] for short term rehab.

## 2023-03-13 ENCOUNTER — NURSE ONLY (OUTPATIENT)
Dept: NEUROSURGERY | Age: 88
End: 2023-03-13

## 2023-03-13 VITALS
BODY MASS INDEX: 18.66 KG/M2 | OXYGEN SATURATION: 95 % | HEART RATE: 54 BPM | TEMPERATURE: 97.3 F | SYSTOLIC BLOOD PRESSURE: 124 MMHG | DIASTOLIC BLOOD PRESSURE: 65 MMHG | HEIGHT: 66 IN

## 2023-03-13 DIAGNOSIS — S22.080S COMPRESSION FRACTURE OF T11 VERTEBRA, SEQUELA: Primary | ICD-10-CM

## 2023-03-13 RX ORDER — HYDROCODONE BITARTRATE AND ACETAMINOPHEN 5; 325 MG/1; MG/1
1 TABLET ORAL EVERY 6 HOURS PRN
COMMUNITY

## 2023-03-13 NOTE — PROGRESS NOTES
Subjective: Patient remains at [facility] for rehab. She declares no pain but does not like wearing the TLSO brace for history of recent vertebral augmentations. She is accompanied by her son    Consent: verbal consent obtained and given by the patient. Risks of removal include bleeding,infection,reopening and excessive scarring. Objective:  /65   Pulse 54   Temp 97.3 °F (36.3 °C) (Temporal)   Ht 5' 6\" (1.676 m)   SpO2 95%   BMI 18.66 kg/m²   Pain Scale: 0 - No pain/10    General: patient is cooperative; mood and affect are appropriate; denies any fevers,chills or headache; denies any fatigue or weakness  HEENT:trachea is midline; no voice, visual or swallowing difficulty; good range of motion with neck; mucous membranes are normal  Neuro:alert and oriented; denies any dizziness; gait and coordination not observed; ambulates with assistance of a walker at the rehab  Other findings:informed patient and son unable to obtain a bone biopsy  Wound: 2 thoracic incisions healing well - incisions cleansed with betadine ,sprayed with ethyl chloride; removed all staples using a staple removal tool and intact; re cleansed   Number of sutures/staples removed: 4 staples    Assessment:  Post op check: Patient is here for initial post-op check.  Dr. Sharonda Prakash  is the supervising physician in clinic today and approves of today's treatment plan      Plan:  Post-op instructions: strict 2-5 pound weight limit; frequent positional changes; no bending ,lifting or twisting;instructed on proper body mechanics; instructed on [place]el TLSO brace to wear when above 30 degrees  Incisional care: not to saturate and pat dry;do not itch scratch or submerge  Management and expected pain:advised patient of expected residual muscular pain  Post-op complications: patient instructed to observe for infection, deep vein thrombosis,constipation and pneumonia  Therapy/exercises: per [facility] goals  Return appointment with any follow-up films:Patient demonstrated knowledge of procedure and post op instructions; patient was given the opportunity for questions and clarification was provided; recent T11 kypho with no apparent injury with previous T9 and T10 vertebral augmentation- no follow films to be obtained for post op visit

## 2023-03-14 ENCOUNTER — OFFICE VISIT (OUTPATIENT)
Dept: INTERNAL MEDICINE CLINIC | Facility: CLINIC | Age: 88
End: 2023-03-14

## 2023-03-14 VITALS
HEART RATE: 74 BPM | WEIGHT: 124 LBS | HEIGHT: 66 IN | DIASTOLIC BLOOD PRESSURE: 64 MMHG | SYSTOLIC BLOOD PRESSURE: 120 MMHG | OXYGEN SATURATION: 90 % | BODY MASS INDEX: 19.93 KG/M2 | TEMPERATURE: 98.1 F

## 2023-03-14 DIAGNOSIS — S22.080D COMPRESSION FRACTURE OF T11 VERTEBRA WITH ROUTINE HEALING, SUBSEQUENT ENCOUNTER: ICD-10-CM

## 2023-03-14 DIAGNOSIS — Z09 HOSPITAL DISCHARGE FOLLOW-UP: Primary | ICD-10-CM

## 2023-03-14 DIAGNOSIS — M80.00XG AGE-RELATED OSTEOPOROSIS WITH CURRENT PATHOLOGICAL FRACTURE WITH DELAYED HEALING, SUBSEQUENT ENCOUNTER: ICD-10-CM

## 2023-03-14 DIAGNOSIS — Z98.890 S/P KYPHOPLASTY: ICD-10-CM

## 2023-03-14 RX ORDER — TAMSULOSIN HYDROCHLORIDE 0.4 MG/1
CAPSULE ORAL
COMMUNITY
Start: 2023-03-06

## 2023-03-14 RX ORDER — PHENAZOPYRIDINE HYDROCHLORIDE 200 MG/1
TABLET, FILM COATED ORAL
COMMUNITY
Start: 2023-03-13

## 2023-03-14 ASSESSMENT — ENCOUNTER SYMPTOMS: BACK PAIN: 1

## 2023-03-14 NOTE — PROGRESS NOTES
Post-Discharge Transitional Care Follow Up      Golden Valdivia   YOB: 1931    Date of Office Visit:  3/14/2023  Date of Hospital Admission: 2/14/23  Date of Hospital Discharge: 3/3/23  Readmission Risk Score (high >=14%. Medium >=10%):Readmission Risk Score: 12.6      Care management risk score Rising risk (score 2-5) and Complex Care (Scores >=6): No Risk Score On File     Non face to face  following discharge, date last encounter closed (first attempt may have been earlier): *No documented post hospital discharge outreach found in the last 14 days     Call initiated 2 business days of discharge: *No response recorded in the last 14 days     Hospital discharge follow-up  Hospital Course:  Please refer to the admission H&P for details of presentation. In summary, Golden Valdivia is a 80 y.o. female with past medical history significant for 80 y.o. female with medical history of HTN, CAD, CHF, CKD who presents as a direct admit from home with intractable back/flank pain. Over 2 months period, she has been diagnosed with T10, then T9 fractures, and subsequently underwent kyphoplasties on 1//30 and 2/9, respectively. 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 Neurosurgery consulted and plan for kyphoplasty and bone biopsy on 2/23 after plavix wash out. TLSO when UOOB.   Patient will need to [facility] Neurosurgery on dc for post-op followup.   -     CO DISCHARGE MEDS RECONCILED W/ CURRENT OUTPATIENT MED LIST  Compression fracture of T11 vertebra with routine healing, subsequent encounter  -     Brace  -     UNABLE TO FIND; TLSO Full-Body Back Brace Support for Compression Fractures, Osteoporosis, Upper Spine Injuries, Disp-1 each, R-0Print  -     DME Order for (Specify) as OP  S/P kyphoplasty  -     Brace  -     UNABLE TO FIND; TLSO Full-Body Back Brace Support for Compression Fractures, Osteoporosis, Upper Spine Injuries, Disp-1 each, R-0Print  -     DME Order for (Specify) as OP  Age-related osteoporosis with current pathological fracture with delayed healing, subsequent encounter  -     DME Order for [facility]) as OP    Medical Decision Making: moderate complexity  No follow-ups on file. On this date 3/14/2023 I have spent 37 minutes reviewing previous notes, test results and face to face with the patient discussing the diagnosis and importance of compliance with the treatment plan as well as documenting on the day of the visit. TLSO brace. durable medical equipment      Procedures    Brace    NM DISCHARGE MEDS RECONCILED W/ CURRENT OUTPATIENT MED LIST    DME Order for (Specify) as OP     - DME device ordered - TLSO, BACK BRACE SUPPORT, SIZE FOR 34-36 INCH WAIST. FULL BACK SUPPORT NEEDED   - Diagnosis: T11 Compression fracture, s/p kyphoplasty  - Length of Need: 3 Months      NPI [id number]    Ht Readings from Last 1 Encounters:   03/14/23 5' 6\" (1.676 m)     Wt Readings from Last 1 Encounters:   03/14/23 124 lb (56.2 kg)     Estimated body mass index is 20.01 kg/m² as calculated from the following:    Height as of this encounter: 5' 6\" (1.676 m). Weight as of this encounter: 124 lb (56.2 kg). Person To Contact To Arrange Delivery: Rich Solorzano  at [phone] (home) [phone] (work)   Address For Delivery: [address]   Diagnosis:    Diagnosis Orders   1. Hospital discharge follow-up  NM DISCHARGE MEDS RECONCILED W/ CURRENT OUTPATIENT MED LIST      2. Compression fracture of T11 vertebra with routine healing, subsequent encounter  Brace    UNABLE TO FIND    DME Order for (Specify) as OP      3. S/P kyphoplasty  Brace    UNABLE TO FIND    DME Order for (Specify) as OP      4.  Age-related osteoporosis with current pathological fracture with delayed healing, subsequent encounter  DME Order for [facility]) as OP          Equipment Required: TLSO brace. Length Of Time Equipment Is Needed: (i.e. 3 weeks, 6 months, lifetime, etc.) 6 weeks  Delivery:Urgency: Urgent, Date Needed: now, 3/14/23, and Discharge Date (From Hospital/SNF): 3/3/23    Electronically Signed by Jade Knigth MD on 3/14/2023 at 1:45 PM      Subjective:   HPI  Guerda Nina, 80 y.o. , White (non-) female   Here with her son. Back pain improved. S/p kyphoplasty T11. Hospital Course:  Please refer to the admission H&P for details of presentation. In summary, Guerda Nina is a 80 y.o. female with past medical history significant for 80 y.o. female with medical history of HTN, CAD, CHF, CKD who presents as a direct admit from home with intractable back/flank pain. Over 2 months period, she has been diagnosed with T10, then T9 fractures, and subsequently underwent kyphoplasties on 1//30 and 2/9, respectively. 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 Neurosurgery consulted and plan for kyphoplasty and bone biopsy on 2/23 after plavix wash out. TLSO when UOOB. Patient will need to [facility] Neuro[facility] on dc for post-op followup. Inpatient course: Discharge summary reviewed- see chart. Interval history/Current status: pain much improved. Mobility and strength have been affected. Working with PT and currently at rehab facility. Here with her son today.       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)    Primary osteoarthritis of both knees    Ischemic heart disease    Hypertensive urgency    Esophageal reflux    CAD (coronary artery disease)    Left foot pain    HTN (hypertension)    CVD (cerebrovascular disease)    History of cerebellar stroke    Chronic renal disease, stage III (HCC) [732539]    Chronic systolic (congestive) heart failure    Strain of lumbar region    Acute cystitis with hematuria    Compression fracture of T11 vertebra (HCC)    S/P kyphoplasty    Intractable pain    Vertebral body hemangioma    Mild protein-calorie malnutrition (HCC)       Medications listed as ordered at the time of discharge from hospital     Medication List            Accurate as of March 14, 2023  1:45 PM. If you have any questions, ask your nurse or doctor.                START taking these medications      UNABLE TO FIND  TLSO Full-Body Back Brace Support for Compression Fractures, Osteoporosis, Upper Spine Injuries  Started by: VARGHESE ABRAMS MD            CONTINUE taking these medications      aspirin 81 MG EC tablet     carvedilol 3.125 MG tablet  Commonly known as: COREG  Take 1 tablet by mouth 2 times daily TAKE 1 TABLET TWICE DAILY WITH MEALS     clopidogrel 75 MG tablet  Commonly known as: PLAVIX  Take 1 tablet by mouth daily The details of the medication are not available because there are pending changes by a home health clinician.     HAIR SKIN AND NAILS FORMULA PO     HYDROcodone-acetaminophen 5-325 MG per tablet  Commonly known as: NORCO     irbesartan 75 MG tablet  Commonly known as: AVAPRO  TAKE 1 TABLET DAILY     isosorbide mononitrate 60 MG extended release tablet  Commonly known as: IMDUR  Take 1 tablet by mouth daily     ondansetron 4 MG disintegrating tablet  Commonly known as: ZOFRAN-ODT  Take 1 tablet by mouth 3 times daily as needed for Nausea or Vomiting     OXYGEN     pantoprazole 20 MG tablet  Commonly known as: PROTONIX  Take 1 tablet by mouth daily     phenazopyridine 200 MG tablet  Commonly known as: PYRIDIUM     rosuvastatin 20 MG tablet  Commonly known as: CRESTOR  Take 1 tablet by mouth daily TAKE 1 TABLET NIGHTLY     tamsulosin 0.4 MG capsule  Commonly known as: FLOMAX     tiZANidine 2 MG tablet  Commonly known as: ZANAFLEX  Take 1 tablet by mouth every 8 hours as needed (back pain)              Where to Get Your Medications        Information about where to get these medications is not yet available    Ask your nurse or doctor about these medications  UNABLE TO FIND          Medications marked \"taking\" at this time  Outpatient Medications Marked as Taking for the 3/14/23 encounter (Office Visit) with Oma Grace MD   Medication Sig Dispense Refill    UNABLE TO FIND TLSO Full-Body Back Brace Support for Compression Fractures, Osteoporosis, Upper Spine Injuries 1 each 0    OXYGEN Inhale 2 L into the lungs 2 liters prn      HYDROcodone-acetaminophen (NORCO) 5-325 MG per tablet Take 1 tablet by mouth every 6 hours as needed for Pain. ondansetron (ZOFRAN-ODT) 4 MG disintegrating tablet Take 1 tablet by mouth 3 times daily as needed for Nausea or Vomiting 21 tablet 0    irbesartan (AVAPRO) 75 MG tablet TAKE 1 TABLET DAILY 90 tablet 3    carvedilol (COREG) 3.125 MG tablet Take 1 tablet by mouth 2 times daily TAKE 1 TABLET TWICE DAILY WITH MEALS 180 tablet 3    clopidogrel (PLAVIX) 75 MG tablet Take 1 tablet by mouth daily The details of the medication are not available because there are pending changes by a home health clinician. 90 tablet 3    isosorbide mononitrate (IMDUR) 60 MG extended release tablet Take 1 tablet by mouth daily 90 tablet 3    rosuvastatin (CRESTOR) 20 MG tablet Take 1 tablet by mouth daily TAKE 1 TABLET NIGHTLY 90 tablet 3    Multiple Vitamins-Minerals (HAIR SKIN AND NAILS FORMULA PO) Take by mouth      aspirin 81 MG EC tablet Take 81 mg by mouth daily      pantoprazole (PROTONIX) 20 MG tablet Take 1 tablet by mouth daily 90 tablet 3        Medications patient taking as of now reconciled against medications ordered at time of hospital discharge: Yes    Review of Systems   Musculoskeletal:  Positive for back pain (improved. 1 or 2 out of 10 in spine. s/p kypho). Current TLSO brace is ill fitting. TOO LARGE AND HURTS HER BREASTS WHERE THE WAIST SUPPORT PINCHES. Neurological:  Positive for weakness (trunk). Objective:    /64 (Site: Left Upper Arm, Position: Sitting, Cuff Size: Small Adult)   Pulse 74   Temp 98.1 °F (36.7 °C) (Skin)   Ht 5' 6\" (1.676 m)   Wt 124 lb (56.2 kg)   SpO2 90%   BMI 20.01 kg/m²   Physical Exam  Abdominal:      Comments: TLSO BRACE ILL FITTING. Too large. Removed and adjusted, waist support still ill fitting and pinches breast tissue. Uncomfortable. Back of brace uncomfortable.          An electronic signature was used to authenticate this note.  --Yair Genao MD

## 2023-03-14 NOTE — PATIENT INSTRUCTIONS
TLSO Full-Body Back Brace Support for Compression Fractures, Osteoporosis, Upper Spine Injuries    Probably needs a medium or large. The current brace is too big and uncomfortable.

## 2023-03-16 ENCOUNTER — TELEPHONE (OUTPATIENT)
Dept: INTERNAL MEDICINE CLINIC | Facility: CLINIC | Age: 88
End: 2023-03-16

## 2023-03-16 DIAGNOSIS — U07.1 COVID-19: Primary | ICD-10-CM

## 2023-03-17 DIAGNOSIS — U07.1 COVID-19: ICD-10-CM

## 2023-03-17 NOTE — TELEPHONE ENCOUNTER
Patient's son called stating Paxlovid cannot be filled at [facility]. Please send to [facility] on [address].

## 2023-03-21 ENCOUNTER — APPOINTMENT (OUTPATIENT)
Dept: GENERAL RADIOLOGY | Age: 88
DRG: 542 | End: 2023-03-21
Payer: COMMERCIAL

## 2023-03-21 ENCOUNTER — TELEPHONE (OUTPATIENT)
Dept: NEUROSURGERY | Age: 88
End: 2023-03-21

## 2023-03-21 ENCOUNTER — HOSPITAL ENCOUNTER (INPATIENT)
Age: 88
LOS: 8 days | Discharge: HOME | DRG: 542 | End: 2023-03-30
Attending: EMERGENCY MEDICINE | Admitting: FAMILY MEDICINE
Payer: COMMERCIAL

## 2023-03-21 ENCOUNTER — APPOINTMENT (OUTPATIENT)
Dept: CT IMAGING | Age: 88
DRG: 542 | End: 2023-03-21
Payer: COMMERCIAL

## 2023-03-21 ENCOUNTER — CLINICAL DOCUMENTATION (OUTPATIENT)
Dept: NEUROSURGERY | Age: 88
End: 2023-03-21

## 2023-03-21 DIAGNOSIS — S22.080A COMPRESSION FRACTURE OF T12 VERTEBRA, INITIAL ENCOUNTER (HCC): Primary | ICD-10-CM

## 2023-03-21 DIAGNOSIS — U07.1 COVID-19 VIRUS INFECTION: ICD-10-CM

## 2023-03-21 DIAGNOSIS — R09.02 HYPOXIA: ICD-10-CM

## 2023-03-21 DIAGNOSIS — E87.6 HYPOKALEMIA: ICD-10-CM

## 2023-03-21 DIAGNOSIS — M54.50 ACUTE MIDLINE LOW BACK PAIN WITHOUT SCIATICA: ICD-10-CM

## 2023-03-21 DIAGNOSIS — S22.080A T12 COMPRESSION FRACTURE, INITIAL ENCOUNTER (HCC): ICD-10-CM

## 2023-03-21 LAB
ALBUMIN SERPL-MCNC: 2.4 G/DL (ref 3.2–4.6)
ALBUMIN/GLOB SERPL: 0.7 (ref 0.4–1.6)
ALP SERPL-CCNC: 67 U/L (ref 50–136)
ALT SERPL-CCNC: 20 U/L (ref 12–65)
ANION GAP SERPL CALC-SCNC: 5 MMOL/L (ref 2–11)
APPEARANCE UR: ABNORMAL
AST SERPL-CCNC: 34 U/L (ref 15–37)
BACTERIA URNS QL MICRO: ABNORMAL /HPF
BASOPHILS # BLD: 0 K/UL (ref 0–0.2)
BASOPHILS NFR BLD: 0 % (ref 0–2)
BILIRUB SERPL-MCNC: 0.6 MG/DL (ref 0.2–1.1)
BILIRUB UR QL: NEGATIVE
BUN SERPL-MCNC: 23 MG/DL (ref 8–23)
CALCIUM SERPL-MCNC: 9.2 MG/DL (ref 8.3–10.4)
CASTS URNS QL MICRO: ABNORMAL /LPF
CHLORIDE SERPL-SCNC: 99 MMOL/L (ref 101–110)
CO2 SERPL-SCNC: 30 MMOL/L (ref 21–32)
COLOR UR: ABNORMAL
CREAT SERPL-MCNC: 1 MG/DL (ref 0.6–1)
CRYSTALS URNS QL MICRO: ABNORMAL /LPF
DIFFERENTIAL METHOD BLD: ABNORMAL
EOSINOPHIL # BLD: 0 K/UL (ref 0–0.8)
EOSINOPHIL NFR BLD: 0 % (ref 0.5–7.8)
ERYTHROCYTE [DISTWIDTH] IN BLOOD BY AUTOMATED COUNT: 13.4 % (ref 11.9–14.6)
GLOBULIN SER CALC-MCNC: 3.4 G/DL (ref 2.8–4.5)
GLUCOSE SERPL-MCNC: 110 MG/DL (ref 65–100)
GLUCOSE UR STRIP.AUTO-MCNC: NEGATIVE MG/DL
HCT VFR BLD AUTO: 40.1 % (ref 35.8–46.3)
HGB BLD-MCNC: 12.9 G/DL (ref 11.7–15.4)
HGB UR QL STRIP: ABNORMAL
IMM GRANULOCYTES # BLD AUTO: 0 K/UL (ref 0–0.5)
IMM GRANULOCYTES NFR BLD AUTO: 0 % (ref 0–5)
KETONES UR QL STRIP.AUTO: NEGATIVE MG/DL
LACTATE SERPL-SCNC: 0.8 MMOL/L (ref 0.4–2)
LEUKOCYTE ESTERASE UR QL STRIP.AUTO: ABNORMAL
LYMPHOCYTES # BLD: 1.1 K/UL (ref 0.5–4.6)
LYMPHOCYTES NFR BLD: 36 % (ref 13–44)
MAGNESIUM SERPL-MCNC: 2.1 MG/DL (ref 1.8–2.4)
MCH RBC QN AUTO: 27.9 PG (ref 26.1–32.9)
MCHC RBC AUTO-ENTMCNC: 32.2 G/DL (ref 31.4–35)
MCV RBC AUTO: 86.8 FL (ref 82–102)
MONOCYTES # BLD: 0.2 K/UL (ref 0.1–1.3)
MONOCYTES NFR BLD: 8 % (ref 4–12)
NEUTS SEG # BLD: 1.7 K/UL (ref 1.7–8.2)
NEUTS SEG NFR BLD: 56 % (ref 43–78)
NITRITE UR QL STRIP.AUTO: NEGATIVE
NRBC # BLD: 0.02 K/UL (ref 0–0.2)
OTHER OBSERVATIONS: ABNORMAL
PH UR STRIP: 6 (ref 5–9)
PLATELET # BLD AUTO: 175 K/UL (ref 150–450)
PMV BLD AUTO: 10.8 FL (ref 9.4–12.3)
POTASSIUM SERPL-SCNC: 2.9 MMOL/L (ref 3.5–5.1)
POTASSIUM SERPL-SCNC: 3 MMOL/L (ref 3.5–5.1)
PROCALCITONIN SERPL-MCNC: <0.05 NG/ML (ref 0–0.49)
PROT SERPL-MCNC: 5.8 G/DL (ref 6.3–8.2)
PROT UR STRIP-MCNC: 30 MG/DL
RBC # BLD AUTO: 4.62 M/UL (ref 4.05–5.2)
SODIUM SERPL-SCNC: 134 MMOL/L (ref 133–143)
SP GR UR REFRACTOMETRY: 1.01 (ref 1–1.02)
UROBILINOGEN UR QL STRIP.AUTO: 0.2 EU/DL (ref 0.2–1)
WBC # BLD AUTO: 3 K/UL (ref 4.3–11.1)
WBC URNS QL MICRO: ABNORMAL /HPF

## 2023-03-21 PROCEDURE — 87086 URINE CULTURE/COLONY COUNT: CPT

## 2023-03-21 PROCEDURE — 83605 ASSAY OF LACTIC ACID: CPT

## 2023-03-21 PROCEDURE — 72128 CT CHEST SPINE W/O DYE: CPT

## 2023-03-21 PROCEDURE — 71045 X-RAY EXAM CHEST 1 VIEW: CPT

## 2023-03-21 PROCEDURE — 93005 ELECTROCARDIOGRAM TRACING: CPT | Performed by: EMERGENCY MEDICINE

## 2023-03-21 PROCEDURE — 83735 ASSAY OF MAGNESIUM: CPT

## 2023-03-21 PROCEDURE — 2580000003 HC RX 258: Performed by: EMERGENCY MEDICINE

## 2023-03-21 PROCEDURE — 84132 ASSAY OF SERUM POTASSIUM: CPT

## 2023-03-21 PROCEDURE — 81001 URINALYSIS AUTO W/SCOPE: CPT

## 2023-03-21 PROCEDURE — 80053 COMPREHEN METABOLIC PANEL: CPT

## 2023-03-21 PROCEDURE — 84145 PROCALCITONIN (PCT): CPT

## 2023-03-21 PROCEDURE — 85025 COMPLETE CBC W/AUTO DIFF WBC: CPT

## 2023-03-21 PROCEDURE — 99285 EMERGENCY DEPT VISIT HI MDM: CPT

## 2023-03-21 PROCEDURE — 72100 X-RAY EXAM L-S SPINE 2/3 VWS: CPT

## 2023-03-21 PROCEDURE — 51701 INSERT BLADDER CATHETER: CPT

## 2023-03-21 RX ORDER — POTASSIUM CHLORIDE 7.45 MG/ML
10 INJECTION INTRAVENOUS
Status: DISCONTINUED | OUTPATIENT
Start: 2023-03-22 | End: 2023-03-22

## 2023-03-21 RX ORDER — SODIUM CHLORIDE, SODIUM LACTATE, POTASSIUM CHLORIDE, CALCIUM CHLORIDE 600; 310; 30; 20 MG/100ML; MG/100ML; MG/100ML; MG/100ML
INJECTION, SOLUTION INTRAVENOUS CONTINUOUS
Status: DISCONTINUED | OUTPATIENT
Start: 2023-03-21 | End: 2023-03-23

## 2023-03-21 RX ADMIN — SODIUM CHLORIDE, POTASSIUM CHLORIDE, SODIUM LACTATE AND CALCIUM CHLORIDE: 600; 310; 30; 20 INJECTION, SOLUTION INTRAVENOUS at 20:35

## 2023-03-21 ASSESSMENT — PAIN DESCRIPTION - DESCRIPTORS: DESCRIPTORS: ACHING

## 2023-03-21 ASSESSMENT — ENCOUNTER SYMPTOMS
BLOOD IN STOOL: 0
COUGH: 1
ABDOMINAL PAIN: 0
DIARRHEA: 0
BACK PAIN: 1
WHEEZING: 0
SHORTNESS OF BREATH: 1
VOMITING: 0

## 2023-03-21 ASSESSMENT — PAIN DESCRIPTION - LOCATION: LOCATION: BACK

## 2023-03-21 ASSESSMENT — PAIN DESCRIPTION - FREQUENCY: FREQUENCY: CONTINUOUS

## 2023-03-21 ASSESSMENT — LIFESTYLE VARIABLES
HOW OFTEN DO YOU HAVE A DRINK CONTAINING ALCOHOL: NEVER
HOW MANY STANDARD DRINKS CONTAINING ALCOHOL DO YOU HAVE ON A TYPICAL DAY: PATIENT DOES NOT DRINK

## 2023-03-21 ASSESSMENT — PAIN - FUNCTIONAL ASSESSMENT: PAIN_FUNCTIONAL_ASSESSMENT: 0-10

## 2023-03-21 ASSESSMENT — PAIN DESCRIPTION - ORIENTATION: ORIENTATION: LOWER

## 2023-03-21 ASSESSMENT — PAIN SCALES - GENERAL: PAINLEVEL_OUTOF10: 5

## 2023-03-21 NOTE — PROGRESS NOTES
Dr. Mohit Fofana reviewed xray report of T-spine- will await CD to be dropped off by son.  Next appointment 4-17

## 2023-03-21 NOTE — ED TRIAGE NOTES
Patient arrives to ED via EMS from [facility]. Patient reports back pain. Patient had surgery on her back here by Rehan Gillis MD. Patient is also Covid positive.

## 2023-03-21 NOTE — ED TRIAGE NOTES
Pt arrives via EMS from [facility] w/ CC of back pain. Pt had back surgery 2-3 wks ago and has had increasing pain since Saturday. Pt tested positive for COVID yesterday.  Pt A & O x 4. VS unremarkable w/ ems

## 2023-03-21 NOTE — TELEPHONE ENCOUNTER
Spoke to Mr Joann Iniguez- advised him if patient is in route to [facility] she will need to be assessed by the ER MD for a treatment plan.  He states [facility] feels that the patient needs to be evaluated by the ER and is beyond their care at this point

## 2023-03-22 PROBLEM — S22.080A T12 COMPRESSION FRACTURE, INITIAL ENCOUNTER (HCC): Status: ACTIVE | Noted: 2023-03-22

## 2023-03-22 PROBLEM — E87.6 HYPOKALEMIA: Status: ACTIVE | Noted: 2023-03-22

## 2023-03-22 LAB
EKG ATRIAL RATE: 69 BPM
EKG DIAGNOSIS: NORMAL
EKG P AXIS: -20 DEGREES
EKG P-R INTERVAL: 201 MS
EKG Q-T INTERVAL: 479 MS
EKG QRS DURATION: 90 MS
EKG QTC CALCULATION (BAZETT): 514 MS
EKG R AXIS: -9 DEGREES
EKG T AXIS: 76 DEGREES
EKG VENTRICULAR RATE: 69 BPM
MAGNESIUM SERPL-MCNC: 1.9 MG/DL (ref 1.8–2.4)
MAGNESIUM SERPL-MCNC: 1.9 MG/DL (ref 1.8–2.4)
POTASSIUM SERPL-SCNC: 2.7 MMOL/L (ref 3.5–5.1)
POTASSIUM SERPL-SCNC: 2.9 MMOL/L (ref 3.5–5.1)

## 2023-03-22 PROCEDURE — 6360000002 HC RX W HCPCS: Performed by: FAMILY MEDICINE

## 2023-03-22 PROCEDURE — 36415 COLL VENOUS BLD VENIPUNCTURE: CPT

## 2023-03-22 PROCEDURE — 6370000000 HC RX 637 (ALT 250 FOR IP): Performed by: FAMILY MEDICINE

## 2023-03-22 PROCEDURE — 6370000000 HC RX 637 (ALT 250 FOR IP): Performed by: GENERAL PRACTICE

## 2023-03-22 PROCEDURE — 6370000000 HC RX 637 (ALT 250 FOR IP): Performed by: NURSE PRACTITIONER

## 2023-03-22 PROCEDURE — 1100000003 HC PRIVATE W/ TELEMETRY

## 2023-03-22 PROCEDURE — 2580000003 HC RX 258: Performed by: FAMILY MEDICINE

## 2023-03-22 PROCEDURE — 83735 ASSAY OF MAGNESIUM: CPT

## 2023-03-22 PROCEDURE — 84132 ASSAY OF SERUM POTASSIUM: CPT

## 2023-03-22 RX ORDER — CLOPIDOGREL BISULFATE 75 MG/1
75 TABLET ORAL DAILY
Status: DISCONTINUED | OUTPATIENT
Start: 2023-03-22 | End: 2023-03-30 | Stop reason: HOSPADM

## 2023-03-22 RX ORDER — ONDANSETRON 2 MG/ML
4 INJECTION INTRAMUSCULAR; INTRAVENOUS EVERY 6 HOURS PRN
Status: DISCONTINUED | OUTPATIENT
Start: 2023-03-22 | End: 2023-03-30 | Stop reason: HOSPADM

## 2023-03-22 RX ORDER — HYDROCODONE BITARTRATE AND ACETAMINOPHEN 5; 325 MG/1; MG/1
1 TABLET ORAL EVERY 6 HOURS PRN
Status: DISCONTINUED | OUTPATIENT
Start: 2023-03-22 | End: 2023-03-26

## 2023-03-22 RX ORDER — LOSARTAN POTASSIUM 25 MG/1
25 TABLET ORAL DAILY
Status: DISCONTINUED | OUTPATIENT
Start: 2023-03-22 | End: 2023-03-30 | Stop reason: HOSPADM

## 2023-03-22 RX ORDER — ALBUTEROL SULFATE 90 UG/1
2 AEROSOL, METERED RESPIRATORY (INHALATION) EVERY 6 HOURS PRN
Status: DISCONTINUED | OUTPATIENT
Start: 2023-03-22 | End: 2023-03-30 | Stop reason: HOSPADM

## 2023-03-22 RX ORDER — PANTOPRAZOLE SODIUM 40 MG/1
40 TABLET, DELAYED RELEASE ORAL
Status: DISCONTINUED | OUTPATIENT
Start: 2023-03-22 | End: 2023-03-30 | Stop reason: HOSPADM

## 2023-03-22 RX ORDER — MULTIVITAMIN WITH IRON
1 TABLET ORAL DAILY
Status: DISCONTINUED | OUTPATIENT
Start: 2023-03-22 | End: 2023-03-30 | Stop reason: HOSPADM

## 2023-03-22 RX ORDER — POTASSIUM CHLORIDE 20 MEQ/1
40 TABLET, EXTENDED RELEASE ORAL
Status: COMPLETED | OUTPATIENT
Start: 2023-03-22 | End: 2023-03-22

## 2023-03-22 RX ORDER — CARVEDILOL 3.12 MG/1
3.12 TABLET ORAL 2 TIMES DAILY WITH MEALS
Status: DISCONTINUED | OUTPATIENT
Start: 2023-03-22 | End: 2023-03-30 | Stop reason: HOSPADM

## 2023-03-22 RX ORDER — ISOSORBIDE MONONITRATE 30 MG/1
60 TABLET, EXTENDED RELEASE ORAL DAILY
Status: DISCONTINUED | OUTPATIENT
Start: 2023-03-22 | End: 2023-03-30 | Stop reason: HOSPADM

## 2023-03-22 RX ORDER — SODIUM CHLORIDE 9 MG/ML
INJECTION, SOLUTION INTRAVENOUS PRN
Status: DISCONTINUED | OUTPATIENT
Start: 2023-03-22 | End: 2023-03-30 | Stop reason: HOSPADM

## 2023-03-22 RX ORDER — ACETAMINOPHEN 325 MG/1
650 TABLET ORAL EVERY 6 HOURS PRN
Status: DISCONTINUED | OUTPATIENT
Start: 2023-03-22 | End: 2023-03-30 | Stop reason: HOSPADM

## 2023-03-22 RX ORDER — ROSUVASTATIN CALCIUM 20 MG/1
20 TABLET, COATED ORAL
Status: DISCONTINUED | OUTPATIENT
Start: 2023-03-22 | End: 2023-03-30 | Stop reason: HOSPADM

## 2023-03-22 RX ORDER — POLYETHYLENE GLYCOL 3350 17 G/17G
17 POWDER, FOR SOLUTION ORAL DAILY PRN
Status: DISCONTINUED | OUTPATIENT
Start: 2023-03-22 | End: 2023-03-30 | Stop reason: HOSPADM

## 2023-03-22 RX ORDER — SODIUM CHLORIDE 0.9 % (FLUSH) 0.9 %
5-40 SYRINGE (ML) INJECTION PRN
Status: DISCONTINUED | OUTPATIENT
Start: 2023-03-22 | End: 2023-03-30 | Stop reason: HOSPADM

## 2023-03-22 RX ORDER — SODIUM CHLORIDE 0.9 % (FLUSH) 0.9 %
5-40 SYRINGE (ML) INJECTION EVERY 12 HOURS SCHEDULED
Status: DISCONTINUED | OUTPATIENT
Start: 2023-03-22 | End: 2023-03-30 | Stop reason: HOSPADM

## 2023-03-22 RX ORDER — FAMOTIDINE 20 MG/1
10 TABLET, FILM COATED ORAL
Status: DISCONTINUED | OUTPATIENT
Start: 2023-03-22 | End: 2023-03-30 | Stop reason: HOSPADM

## 2023-03-22 RX ORDER — ENOXAPARIN SODIUM 100 MG/ML
40 INJECTION SUBCUTANEOUS DAILY
Status: DISCONTINUED | OUTPATIENT
Start: 2023-03-22 | End: 2023-03-30 | Stop reason: HOSPADM

## 2023-03-22 RX ORDER — ONDANSETRON 4 MG/1
4 TABLET, ORALLY DISINTEGRATING ORAL EVERY 8 HOURS PRN
Status: DISCONTINUED | OUTPATIENT
Start: 2023-03-22 | End: 2023-03-30 | Stop reason: HOSPADM

## 2023-03-22 RX ORDER — TIZANIDINE 2 MG/1
2 TABLET ORAL EVERY 8 HOURS PRN
Status: DISCONTINUED | OUTPATIENT
Start: 2023-03-22 | End: 2023-03-30 | Stop reason: HOSPADM

## 2023-03-22 RX ORDER — TAMSULOSIN HYDROCHLORIDE 0.4 MG/1
0.4 CAPSULE ORAL DAILY
Status: DISCONTINUED | OUTPATIENT
Start: 2023-03-22 | End: 2023-03-30 | Stop reason: HOSPADM

## 2023-03-22 RX ORDER — ASPIRIN 81 MG/1
81 TABLET ORAL DAILY
Status: DISCONTINUED | OUTPATIENT
Start: 2023-03-22 | End: 2023-03-30 | Stop reason: HOSPADM

## 2023-03-22 RX ORDER — POTASSIUM CHLORIDE 20 MEQ/1
40 TABLET, EXTENDED RELEASE ORAL ONCE
Status: COMPLETED | OUTPATIENT
Start: 2023-03-22 | End: 2023-03-22

## 2023-03-22 RX ORDER — ACETAMINOPHEN 650 MG/1
650 SUPPOSITORY RECTAL EVERY 6 HOURS PRN
Status: DISCONTINUED | OUTPATIENT
Start: 2023-03-22 | End: 2023-03-30 | Stop reason: HOSPADM

## 2023-03-22 RX ADMIN — HYDROCODONE BITARTRATE AND ACETAMINOPHEN 1 TABLET: 5; 325 TABLET ORAL at 04:13

## 2023-03-22 RX ADMIN — POTASSIUM CHLORIDE 40 MEQ: 20 TABLET, EXTENDED RELEASE ORAL at 13:28

## 2023-03-22 RX ADMIN — ISOSORBIDE MONONITRATE 60 MG: 30 TABLET, EXTENDED RELEASE ORAL at 10:00

## 2023-03-22 RX ADMIN — HYDROCODONE BITARTRATE AND ACETAMINOPHEN 1 TABLET: 5; 325 TABLET ORAL at 11:33

## 2023-03-22 RX ADMIN — B-COMPLEX W/ C & FOLIC ACID TAB 1 TABLET: TAB at 10:00

## 2023-03-22 RX ADMIN — POTASSIUM CHLORIDE 40 MEQ: 20 TABLET, EXTENDED RELEASE ORAL at 10:00

## 2023-03-22 RX ADMIN — LOSARTAN POTASSIUM 25 MG: 25 TABLET, FILM COATED ORAL at 10:00

## 2023-03-22 RX ADMIN — HYDROCODONE BITARTRATE AND ACETAMINOPHEN 1 TABLET: 5; 325 TABLET ORAL at 23:27

## 2023-03-22 RX ADMIN — CLOPIDOGREL BISULFATE 75 MG: 75 TABLET ORAL at 10:00

## 2023-03-22 RX ADMIN — FAMOTIDINE 10 MG: 20 TABLET ORAL at 23:26

## 2023-03-22 RX ADMIN — POTASSIUM CHLORIDE 20 MEQ: 1500 TABLET, EXTENDED RELEASE ORAL at 01:08

## 2023-03-22 RX ADMIN — ENOXAPARIN SODIUM 40 MG: 100 INJECTION SUBCUTANEOUS at 10:00

## 2023-03-22 RX ADMIN — POTASSIUM CHLORIDE 40 MEQ: 20 TABLET, EXTENDED RELEASE ORAL at 16:39

## 2023-03-22 RX ADMIN — CARVEDILOL 3.12 MG: 3.12 TABLET, FILM COATED ORAL at 10:00

## 2023-03-22 RX ADMIN — ROSUVASTATIN 20 MG: 20 TABLET, FILM COATED ORAL at 23:26

## 2023-03-22 RX ADMIN — SODIUM CHLORIDE, PRESERVATIVE FREE 10 ML: 5 INJECTION INTRAVENOUS at 10:00

## 2023-03-22 RX ADMIN — ASPIRIN 81 MG: 81 TABLET, COATED ORAL at 10:00

## 2023-03-22 RX ADMIN — TAMSULOSIN HYDROCHLORIDE 0.4 MG: 0.4 CAPSULE ORAL at 10:00

## 2023-03-22 RX ADMIN — SODIUM CHLORIDE, PRESERVATIVE FREE 10 ML: 5 INJECTION INTRAVENOUS at 21:00

## 2023-03-22 RX ADMIN — CARVEDILOL 3.12 MG: 3.12 TABLET, FILM COATED ORAL at 16:39

## 2023-03-22 ASSESSMENT — PAIN DESCRIPTION - LOCATION
LOCATION: BACK
LOCATION: BACK

## 2023-03-22 ASSESSMENT — PAIN DESCRIPTION - ORIENTATION
ORIENTATION: LOWER;MID
ORIENTATION: MID;LOWER

## 2023-03-22 ASSESSMENT — PAIN SCALES - GENERAL
PAINLEVEL_OUTOF10: 8
PAINLEVEL_OUTOF10: 8

## 2023-03-22 ASSESSMENT — PAIN DESCRIPTION - DESCRIPTORS
DESCRIPTORS: SHARP
DESCRIPTORS: SHARP

## 2023-03-22 NOTE — PROGRESS NOTES
TRANSFER - IN REPORT:    Verbal report received from Estela Acosta RN   on Ellyn Fisher  being received from ER for routine progression of patient care      Report consisted of patient's Situation, Background, Assessment and   Recommendations(SBAR). Information from the following report(s) ED Encounter Summary and ED SBAR was reviewed with the receiving nurse. Opportunity for questions and clarification was provided. Assessment completed upon patient's arrival to unit and care assumed.

## 2023-03-22 NOTE — H&P
Disease Father     Stroke Mother     Heart Disease Mother     Post-op Cognitive Dysfunction Neg Hx     Other Neg Hx     Breast Cancer Neg Hx     Heart Disease Brother     Heart Disease Brother     Christine Hypertherm Neg Hx     Pseudochol. Deficiency Neg Hx     Delayed Awakening Neg Hx     Post-op Nausea/Vomiting Neg Hx         Immunization History   Administered Date(s) Administered    COVID-19, PFIZER PURPLE top, DILUTE for use, (age 15 y+), 30mcg/0.3mL 01/20/2021    Influenza Virus Vaccine 09/24/2012, 10/10/2013, 10/20/2014, 10/01/2021    Influenza, FLUAD, (age 72 y+), Adjuvanted, 0.5mL 09/24/2020, 09/15/2022    Influenza, FLUZONE (age 72 y+), High Dose, 0.7mL 10/10/2020    Influenza, High Dose (Fluzone 65 yrs and older) 11/16/2015, 09/26/2016, 10/09/2017, 09/18/2018    Influenza, Triv, inactivated, subunit, adjuvanted, IM (Fluad 65 yrs and older) 09/23/2019    PPD Test 02/15/2023    Pneumococcal Vaccine 10/01/2010    Pneumococcal, PCV-13, PREVNAR 13, (age 6w+), IM, 0.5mL 06/14/2016    Pneumococcal, PCV20, PREVNAR 21, (age 18y+), IM, 0.5mL 07/12/2022    TDaP, ADACEL (age 10y-63y), Manuela Shanks (age 10y+), IM, 0.5mL 09/02/2013     Allergies   Allergen Reactions    Amlodipine Other (See Comments)     Other reaction(s): Unknown-Unspecified    Quinapril Other (See Comments)     Other reaction(s): Cough-Intolerance    Sertraline Other (See Comments)     Other reaction(s): Drowsiness    Prednisone Itching, Palpitations and Other (See Comments)     Per pt also had heart burn     Prior to Admit Medications:  Current Outpatient Medications   Medication Instructions    aspirin 81 mg, Oral, DAILY    carvedilol (COREG) 3.125 mg, Oral, 2 TIMES DAILY, TAKE 1 TABLET TWICE DAILY WITH MEALS    clopidogrel (PLAVIX) 75 mg, Oral, DAILY, The details of the medication are not available because there are pending changes by a home health clinician.     HYDROcodone-acetaminophen (NORCO) 5-325 MG per tablet 1 tablet, Oral, EVERY 6 HOURS PRN Unchanged small right pleural effusion. Bibasilar consolidation most likely representing atelectasis. Colonic diverticulosis without diverticulitis in the field-of-view. Multiple nonobstructing bilateral kidney stones, no hydronephrosis on either side. No other fractures. Sacrum intact where visualized. Benign hemangioma in the T6 vertebral body. Moderate disc bulges at L3-L4 and L4-L5. Diffuse facet arthrosis throughout the cervical spine. 1. Acute T12 compression fracture with 20% loss of height, not present 5 weeks ago. 2. Redemonstration of T9, T10 and T11 compression fractures with indwelling kyphoplasty cement. This examination was interpreted by Fauzia Brown M.D. Fauzia Brown M.D. 3/21/2023 10:27:00 PM      Echocardiogram:  02/14/23    TRANSTHORACIC ECHOCARDIOGRAM (TTE) COMPLETE (CONTRAST/BUBBLE/3D PRN) 02/16/2023  4:48 PM, 02/16/2023 12:00 AM (Final)    Interpretation Summary    Left Ventricle: Normal left ventricular systolic function with a visually estimated EF of 40 - 45%. Left ventricle size is normal. Normal wall thickness. There is moderate severity mid to distal anteroseptal and anteroapical hypokinesis. Indeterminate diastolic function. Aortic Valve: Moderate sclerosis of the aortic valve cusp. Mitral Valve: Mildly thickened leaflet, at the anterior leaflet. Moderate annular calcification of the mitral valve. Mild regurgitation. Tricuspid Valve: Mild regurgitation. Normal RVSP. The estimated RVSP is 33 mmHg. Technical qualifiers: Color flow Doppler was performed and pulse wave and/or continuous wave Doppler was performed. Contrast used: Definity.     Signed by: Hilary Alarcon MD on 2/16/2023  4:48 PM, Signed by: Unknown Provider Result on 2/16/2023 12:00 AM        Orders Placed This Encounter   Medications    lactated ringers IV soln infusion    DISCONTD: potassium chloride 10 mEq/100 mL IVPB (Peripheral Line)    potassium chloride (KLOR-CON M) extended release

## 2023-03-22 NOTE — ED PROVIDER NOTES
on top of COVID regarding pneumonia. Potentially new compression fracture. Possibility of dehydration and electrolyte abnormalities. Screening blood work. Check EKG. Check urine and chest x-ray for infection. Further imaging of back. 12:41 AM    Sats of 90% on room air. Resting more comfortably on a liter. Chest x-ray improving. No evidence for sepsis. Hypokalemia. New compression fracture prevents patient from getting around. Son states TLSO brace was much too big and caused issues. Discussed with hospitalist regarding admission for any issues with COVID management and potential hypoxemia/dehydration deconditioning due to COVID along with compression fracture. Suspect need for neurosurgical consultation again. Risk of Complications and/or Morbidity of Patient Management:  Patient was admitted I have communication with admitting physician     Eron Pascual is a 80 y.o. female who presents to the Emergency Department with chief complaint of    Chief Complaint   Patient presents with    Back Pain      72-year-old female was brought over by EMS. History obtained from the patient, her son, nursing facility. None of the 3 are particularly good historians. Patient's son states she has had a decline over the last week or 2 and the nursing facility sent her to be evaluated. Patient states her back pain is worsening and she feels weak. Nursing facility states patient was diagnosed with COVID a few days ago. Chest x-ray reveals pneumonia and she was placed on antibiotics. Nursing facility states that she has been very weak when attempting to get up and get around. She is in the facility for rehab due to multiple lower thoracic compression fractures. Poor p.o. intake. They have not none of any particular shortness of breath. Patient states some some slight chest pain with cough. No abdominal pain no vomiting or diarrhea. She has no focal numbness or weakness. Just generally weak all over.   I include: automated   exposure control, adjustment for patient size, and or use of iterative   reconstruction. Comparison:  MRI thoracic and lumbar spine from 2/15/2023    Findings:    Acute T12 compression fracture with 20% loss of height, no retropulsion, not   present on the prior scans 5 weeks ago. T9, T10 and T11 compression fractures with indwelling kyphoplasty. Unchanged   mild retropulsion of the T10 and T11 vertebral bodies, with moderate spinal   canal stenosis at T11    Diffuse atherosclerotic disease again noted throughout the chest, abdomen and   pelvis, including the coronary arteries and aorta. Pacemaker noted. Significant   interval enlargement of a left pleural effusion, now moderate. Unchanged small   right pleural effusion. Bibasilar consolidation most likely representing   atelectasis. Colonic diverticulosis without diverticulitis in the field-of-view. Multiple nonobstructing bilateral kidney stones, no hydronephrosis on either   side. No other fractures. Sacrum intact where visualized. Benign hemangioma in the T6 vertebral body. Moderate disc bulges at L3-L4 and   L4-L5. Diffuse facet arthrosis throughout the cervical spine. Impression    1. Acute T12 compression fracture with 20% loss of height, not present 5 weeks   ago. 2. Redemonstration of T9, T10 and T11 compression fractures with indwelling   kyphoplasty cement. This examination was interpreted by Jonathon Oakley M.D.       Jonathon Oakley M.D.   3/21/2023 10:27:00 PM   CBC with Auto Differential   Result Value Ref Range    WBC 3.0 (L) 4.3 - 11.1 K/uL    RBC 4.62 4.05 - 5.2 M/uL    Hemoglobin 12.9 11.7 - 15.4 g/dL    Hematocrit 40.1 35.8 - 46.3 %    MCV 86.8 82 - 102 FL    MCH 27.9 26.1 - 32.9 PG    MCHC 32.2 31.4 - 35.0 g/dL    RDW 13.4 11.9 - 14.6 %    Platelets 296 292 - 664 K/uL    MPV 10.8 9.4 - 12.3 FL    nRBC 0.02 0.0 - 0.2 K/uL    Differential Type AUTOMATED      Seg Neutrophils 56 43 - 78 %

## 2023-03-22 NOTE — PROGRESS NOTES
Hospitalist Progress Note   Admit Date:  3/21/2023  6:55 PM   Name:  Adalgisa Medina   Age:  80 y.o. Sex:  female  :  1931   MRN:  [id number]   Room:  [facility]    Presenting Complaint: Back Pain     Reason(s) for Admission: Hypokalemia [E87.6]  T12 compression fracture, initial encounter ([facility] [S22.080A]  Compression fracture of T12 vertebra, initial encounter ([facility] [S22.080A]  COVID-19 virus infection [U07.1]     Hospital Course:   Adalgisa Medina is a 80 y.o. female with medical history of  of HTN, CAD, Chr Sys. CHF, CKD stage 3, mild protein/calorie malnutrition, osteoporosis with 3 recent Thoracic compression fractures (vertebras 9, 10, 11), s/p Kyphoplasty at all 3 levels, , Feb., ,  who presented with history that she was at [facility] for rehab but staff there noticed patient was getting some irritation under her arms and across her thoracic region from the TLSO brace she was supposed to be wearing and they took it off. She began having more back pain w/o brace on. They also noticed her not eating / drinking as much, stated not feeling well, vomited once and had some hypoxia at 88% RA (pt not normally on o2 at baseline). CXR at [facility] showed PNA and they tested pt for covid: positive. They placed pt on omnicef / azithromycin antibx combo dipika 3/16/23, for 5 few days and then  they had just added paxlovid the day pt arrived here 3/21/23 (only rec'd one dose PTA). Hypoxic on arrival herein the ED 88-90% RA, but 93% or more on 1L NC. CXR here no acute infiltrate. Pt was dehydrated on the arrival exam, and w a low potassium;IV fld resuscitation initiated and potassium replaced. A CT thoracic spine was performed which showed: Impression       1. Acute T12 compression fracture with 20% loss of height, not present 5 weeks    ago. 2. Redemonstration of T9, T10 and T11 compression fractures with indwelling    kyphoplasty cement. Neurosurgery consulted. See plan below for additional details. Collection Time: 03/21/23  8:02 PM   Result Value Ref Range    Sodium 134 133 - 143 mmol/L    Potassium 3.0 (L) 3.5 - 5.1 mmol/L    Chloride 99 (L) 101 - 110 mmol/L    CO2 30 21 - 32 mmol/L    Anion Gap 5 2 - 11 mmol/L    Glucose 110 (H) 65 - 100 mg/dL    BUN 23 8 - 23 MG/DL    Creatinine 1.00 0.6 - 1.0 MG/DL    Est, Glom Filt Rate 53 (L) >60 ml/min/1.73m2    Calcium 9.2 8.3 - 10.4 MG/DL    Total Bilirubin 0.6 0.2 - 1.1 MG/DL    ALT 20 12 - 65 U/L    AST 34 15 - 37 U/L    Alk Phosphatase 67 50 - 136 U/L    Total Protein 5.8 (L) 6.3 - 8.2 g/dL    Albumin 2.4 (L) 3.2 - 4.6 g/dL    Globulin 3.4 2.8 - 4.5 g/dL    Albumin/Globulin Ratio 0.7 0.4 - 1.6     Urinalysis    Collection Time: 03/21/23  8:02 PM   Result Value Ref Range    Color, UA YELLOW/STRAW      Appearance CLOUDY      Specific [place], UA 1.013 1.001 - 1.023      pH, Urine 6.0 5.0 - 9.0      Protein, UA 30 (A) NEG mg/dL    Glucose, UA Negative mg/dL    Ketones, Urine Negative NEG mg/dL    Bilirubin Urine Negative NEG      Blood, Urine TRACE (A) NEG      Urobilinogen, Urine 0.2 0.2 - 1.0 EU/dL    Nitrite, Urine Negative NEG      Leukocyte Esterase, Urine MODERATE (A) NEG      WBC, UA 10-20 0 /hpf    BACTERIA, URINE TRACE 0 /hpf    Casts 0-3 0 /lpf    Crystals OCCASIONAL 0 /LPF    Other observations RESULTS VERIFIED MANUALLY     Lactic Acid    Collection Time: 03/21/23  8:02 PM   Result Value Ref Range    Lactic Acid, Plasma 0.8 0.4 - 2.0 MMOL/L   Procalcitonin    Collection Time: 03/21/23  8:02 PM   Result Value Ref Range    Procalcitonin <0.05 0.00 - 0.49 ng/mL   Magnesium    Collection Time: 03/21/23  8:02 PM   Result Value Ref Range    Magnesium 2.1 1.8 - 2.4 mg/dL   EKG 12 Lead    Collection Time: 03/21/23  8:25 PM   Result Value Ref Range    Ventricular Rate 69 BPM    Atrial Rate 69 BPM    P-R Interval 201 ms    QRS Duration 90 ms    Q-T Interval 479 ms    QTc Calculation (Bazett) 514 ms    P Axis -20 degrees    R Axis -9 degrees    T Axis 76 degrees

## 2023-03-22 NOTE — ED NOTES
TRANSFER - OUT REPORT:    Verbal report given to [facility] DAKSHA on Mohit Reddy  being transferred to 8th floor for routine progression of patient care       Report consisted of patient's Situation, Background, Assessment and   Recommendations(SBAR). Information from the following report(s) Nurse Handoff Report, ED Encounter Summary, MAR, Recent Results, Med Rec Status, and Neuro Assessment was reviewed with the receiving nurse. [place] Assessment: Presents to emergency department  because of falls (Syncope, seizure, or loss of consciousness): No, Age > 79: Yes, Altered Mental Status, Intoxication with alcohol or substance confusion (Disorientation, impaired judgment, poor safety awaremess, or inability to follow instructions): No, Impaired Mobility: Ambulates or transfers with assistive devices or assistance; Unable to ambulate or transer.: Yes  Lines:   Peripheral IV 03/21/23 Left Hand (Active)   Site Assessment Clean, dry & intact 03/21/23 7541        Opportunity for questions and clarification was provided.       Patient transported with:  Wero Villa RN  03/22/23 8031

## 2023-03-22 NOTE — CARE COORDINATION
MSN, CM:  patient was admitted overnight from [facility]. Patient's potassium was low and she had no wore her TLSO brace for a few day and now having back pain. Patient requires help with ADL's and requires a rollator for ambulation. Patient does have active family members. Patient is retired and her family drives her to all appointments. PT/OT consulted for evaluation and recommendations. Case Management will continue to follow for any discharge needs. 03/22/23 0974   Service Assessment   Patient Orientation Alert and Oriented   History Provided By Patient   Primary Caregiver Other (Comment)  ([facility])   Support Systems Family Members   Patient's [address] is: Legal Next of Rene [address]   PCP Verified by CM No  (In facility MD)   Prior Functional Level Assistance with the following:;Shopping;Cooking;Housework;Dressing   Current Functional Level Other (see comment)  (PT/OT consutled)   Can patient return to prior living arrangement Yes   Ability to make needs known: Good   Family able to assist with home care needs: Yes   Would you like for me to discuss the discharge plan with any other family members/significant others, and if so, who?  Yes  (family)   Financial Resources Medicare   Community Resources None   Social/Functional History   Lives With Other (comment)  ([facility])   Type of [address] [place] to enter with rails   Entrance Stairs - Number of Steps 6   Entrance Stairs - Rails Both   Bathroom Shower/Tub Walk-in shower   Bathroom Toilet Handicap height   Bathroom Equipment Grab bars in shower   [address] Help From Family   ADL Assistance Needs assistance   [address] Needs assistance   Ambulation Assistance Needs assistance   Transfer Assistance Needs assistance   Active  No   Patient's  Info family   Occupation Retired Discharge Planning   Type of [address] [place] [address] Rd  ([facility])   Living Arrangements Other (Comment)  ([facility])   Current Services Prior To Admission Rick SALVADOR Ordered?  No   Potential Assistance Purchasing Medications No   Type of Home Care Services None   Patient expects to be discharged to: Skilled nursing facility   One/Two Story Residence Two story   History of falls? 0

## 2023-03-22 NOTE — PROGRESS NOTES
Pt arrived to floor via stretcher alert and oriented. Pt assisted to unit bed with staff assist and a lateral transfer. Pt tolerated well.

## 2023-03-22 NOTE — PROGRESS NOTES
Comprehensive Nutrition Assessment    Type and Reason for Visit: Initial, Positive Nutrition Screen  Malnutrition Screening Tool: Malnutrition Screen  Have you recently lost weight without trying?: 2 to 13 pounds (1 point)  Have you been eating poorly because of a decreased appetite?: Yes (1 point)  Malnutrition Screening Tool Score: 2    Nutrition Recommendations/Plan:   Meals and Snacks:  Diet: Continue current order  Nutrition Supplement Therapy:  Medical food supplement therapy:  Continue Ensure Enlive three times per day (this provides 350 kcal and 20 grams protein per bottle)     Malnutrition Assessment:  Malnutrition Status: Insufficient data  NFPE deferred d/t COVID-19 isolation  Mild malnutrition noted at prior admission on 2/14/23    Nutrition Assessment:  Nutrition History: Unable to obtain. Pt in COVID-19 isolation. RD attempted to call pt x2 w/no answer. Will attempt again upon reassessment. EMR shows weight trending up recently. Pertinent weights: 52.4 kg on 2/28/23, 56.2 kg on 3/14/23, and 56.7 kg on 3/21/23. Do You Have Any Cultural, Hinduism, or Ethnic Food Preferences?: No   Nutrition Background:       PMH significant for HTN, CAD, CHF, CKD3, mild protein calorie malnutrition, and osteoporosis. Pt also with recent T12 compression fracture noted. Pt presents this admission for weakness, poor appetite, nausea, and vomiting. Upon admission pt was found to be COVID-19 +. Nutrition Interval:  RD attempted to call pt x2 with no answer. Discussed pt with EDELMIRA Bahrdwaj who reports pt did not eat much breakfast but did drink an ensure. States pt ate almost her whole lunch. RN also notes son stating that pt has been eating well. Will continue EE TID since RN reports pt is drinking these.       Current Nutrition Therapies:  ADULT ORAL NUTRITION SUPPLEMENT; Breakfast, Lunch, Dinner; Standard High Calorie/High Protein Oral Supplement  ADULT DIET; Easy to Chew    Current Intake:   Average Meal Intake: 26-50%, 51-75% (intake variable per Charlene Crouch) Average Supplements Intake: 51-75% (per RN)      Anthropometric Measures:  Height: 5' 6\" (167.6 cm)  Current Body Wt: 125 lb (56.7 kg) (3/21), Weight source: Stated  BMI: 20.2, Underweight (BMI less than 22) age over 72     Ideal Body Weight (Kg) (Calculated): 59 kg (130 lbs), 96.2 %  Usual Body Wt:  , Percent weight change:         BMI Category Underweight (BMI less than 22) age over 72    Estimated Daily Nutrient Needs:  Energy (kcal/day): 6509-1080 (25-30 kcal/kg) (Kcal/kg Weight used: 56.7 kg Current  Protein (g/day): 59-71 (1-1.2 g/kg) Weight Used: (Ideal) 59.1 kg  Fluid (ml/day):   (1 ml/kcal)    Nutrition Diagnosis:   No nutrition diagnosis at this time     Nutrition Interventions:   Food and/or Nutrient Delivery: Continue Current Diet, Continue Oral Nutrition Supplement     Coordination of Nutrition Care: Continue to monitor while inpatient  Plan of Care discussed with: EDELMIRA Anguiano    Goals:       Active Goal: Meet at least 75% of estimated needs, by next RD assessment       Nutrition Monitoring and Evaluation:      Food/Nutrient Intake Outcomes: Food and Nutrient Intake, Supplement Intake  Physical Signs/Symptoms Outcomes: Weight, Meal Time Behavior    Discharge Planning:    Continue current diet, Continue Oral Nutrition Supplement    Kacey Malcolm RD

## 2023-03-23 ENCOUNTER — APPOINTMENT (OUTPATIENT)
Dept: GENERAL RADIOLOGY | Age: 88
DRG: 542 | End: 2023-03-23
Payer: COMMERCIAL

## 2023-03-23 PROBLEM — E83.42 HYPOMAGNESEMIA: Status: ACTIVE | Noted: 2023-03-23

## 2023-03-23 PROBLEM — I47.20 V-TACH (HCC): Status: ACTIVE | Noted: 2023-03-23

## 2023-03-23 LAB
ANION GAP SERPL CALC-SCNC: 2 MMOL/L (ref 2–11)
ANION GAP SERPL CALC-SCNC: 2 MMOL/L (ref 2–11)
BASOPHILS # BLD: 0 K/UL (ref 0–0.2)
BASOPHILS NFR BLD: 1 % (ref 0–2)
BUN SERPL-MCNC: 11 MG/DL (ref 8–23)
BUN SERPL-MCNC: 14 MG/DL (ref 8–23)
CALCIUM SERPL-MCNC: 9 MG/DL (ref 8.3–10.4)
CALCIUM SERPL-MCNC: 9.5 MG/DL (ref 8.3–10.4)
CHLORIDE SERPL-SCNC: 105 MMOL/L (ref 101–110)
CHLORIDE SERPL-SCNC: 106 MMOL/L (ref 101–110)
CO2 SERPL-SCNC: 30 MMOL/L (ref 21–32)
CO2 SERPL-SCNC: 31 MMOL/L (ref 21–32)
CREAT SERPL-MCNC: 0.6 MG/DL (ref 0.6–1)
CREAT SERPL-MCNC: 0.6 MG/DL (ref 0.6–1)
DIFFERENTIAL METHOD BLD: NORMAL
EOSINOPHIL # BLD: 0.1 K/UL (ref 0–0.8)
EOSINOPHIL NFR BLD: 1 % (ref 0.5–7.8)
ERYTHROCYTE [DISTWIDTH] IN BLOOD BY AUTOMATED COUNT: 13.2 % (ref 11.9–14.6)
GLUCOSE SERPL-MCNC: 81 MG/DL (ref 65–100)
GLUCOSE SERPL-MCNC: 94 MG/DL (ref 65–100)
HCT VFR BLD AUTO: 38.1 % (ref 35.8–46.3)
HGB BLD-MCNC: 12.3 G/DL (ref 11.7–15.4)
IMM GRANULOCYTES # BLD AUTO: 0 K/UL (ref 0–0.5)
IMM GRANULOCYTES NFR BLD AUTO: 1 % (ref 0–5)
LYMPHOCYTES # BLD: 1.8 K/UL (ref 0.5–4.6)
LYMPHOCYTES NFR BLD: 41 % (ref 13–44)
MAGNESIUM SERPL-MCNC: 1.8 MG/DL (ref 1.8–2.4)
MCH RBC QN AUTO: 27.9 PG (ref 26.1–32.9)
MCHC RBC AUTO-ENTMCNC: 32.3 G/DL (ref 31.4–35)
MCV RBC AUTO: 86.4 FL (ref 82–102)
MONOCYTES # BLD: 0.3 K/UL (ref 0.1–1.3)
MONOCYTES NFR BLD: 6 % (ref 4–12)
NEUTS SEG # BLD: 2.2 K/UL (ref 1.7–8.2)
NEUTS SEG NFR BLD: 50 % (ref 43–78)
NRBC # BLD: 0 K/UL (ref 0–0.2)
NT PRO BNP: 1851 PG/ML
NT PRO BNP: 2531 PG/ML
PLATELET # BLD AUTO: 169 K/UL (ref 150–450)
PMV BLD AUTO: 10.7 FL (ref 9.4–12.3)
POTASSIUM SERPL-SCNC: 2.6 MMOL/L (ref 3.5–5.1)
POTASSIUM SERPL-SCNC: 3.7 MMOL/L (ref 3.5–5.1)
PROCALCITONIN SERPL-MCNC: <0.05 NG/ML (ref 0–0.49)
RBC # BLD AUTO: 4.41 M/UL (ref 4.05–5.2)
SODIUM SERPL-SCNC: 138 MMOL/L (ref 133–143)
SODIUM SERPL-SCNC: 138 MMOL/L (ref 133–143)
WBC # BLD AUTO: 4.3 K/UL (ref 4.3–11.1)

## 2023-03-23 PROCEDURE — 6360000002 HC RX W HCPCS: Performed by: NURSE PRACTITIONER

## 2023-03-23 PROCEDURE — 83735 ASSAY OF MAGNESIUM: CPT

## 2023-03-23 PROCEDURE — 85025 COMPLETE CBC W/AUTO DIFF WBC: CPT

## 2023-03-23 PROCEDURE — 6370000000 HC RX 637 (ALT 250 FOR IP): Performed by: NURSE PRACTITIONER

## 2023-03-23 PROCEDURE — 36415 COLL VENOUS BLD VENIPUNCTURE: CPT

## 2023-03-23 PROCEDURE — 83880 ASSAY OF NATRIURETIC PEPTIDE: CPT

## 2023-03-23 PROCEDURE — 2580000003 HC RX 258: Performed by: FAMILY MEDICINE

## 2023-03-23 PROCEDURE — 6370000000 HC RX 637 (ALT 250 FOR IP): Performed by: FAMILY MEDICINE

## 2023-03-23 PROCEDURE — 80048 BASIC METABOLIC PNL TOTAL CA: CPT

## 2023-03-23 PROCEDURE — 1100000003 HC PRIVATE W/ TELEMETRY

## 2023-03-23 PROCEDURE — 71045 X-RAY EXAM CHEST 1 VIEW: CPT

## 2023-03-23 PROCEDURE — 84145 PROCALCITONIN (PCT): CPT

## 2023-03-23 RX ORDER — POTASSIUM CHLORIDE 20 MEQ/1
40 TABLET, EXTENDED RELEASE ORAL 2 TIMES DAILY WITH MEALS
Status: DISCONTINUED | OUTPATIENT
Start: 2023-03-23 | End: 2023-03-23

## 2023-03-23 RX ORDER — POTASSIUM CHLORIDE 7.45 MG/ML
10 INJECTION INTRAVENOUS
Status: DISCONTINUED | OUTPATIENT
Start: 2023-03-23 | End: 2023-03-23

## 2023-03-23 RX ORDER — GUAIFENESIN/DEXTROMETHORPHAN 100-10MG/5
5 SYRUP ORAL EVERY 4 HOURS PRN
Status: DISCONTINUED | OUTPATIENT
Start: 2023-03-23 | End: 2023-03-30 | Stop reason: HOSPADM

## 2023-03-23 RX ORDER — LANOLIN ALCOHOL/MO/W.PET/CERES
400 CREAM (GRAM) TOPICAL DAILY
Status: COMPLETED | OUTPATIENT
Start: 2023-03-23 | End: 2023-03-25

## 2023-03-23 RX ADMIN — POTASSIUM CHLORIDE 10 MEQ: 7.46 INJECTION, SOLUTION INTRAVENOUS at 08:49

## 2023-03-23 RX ADMIN — CARVEDILOL 3.12 MG: 3.12 TABLET, FILM COATED ORAL at 08:51

## 2023-03-23 RX ADMIN — GUAIFENESIN AND DEXTROMETHORPHAN 5 ML: 100; 10 SYRUP ORAL at 20:57

## 2023-03-23 RX ADMIN — POTASSIUM CHLORIDE 40 MEQ: 1500 TABLET, EXTENDED RELEASE ORAL at 08:51

## 2023-03-23 RX ADMIN — MAGNESIUM GLUCONATE 500 MG ORAL TABLET 400 MG: 500 TABLET ORAL at 08:50

## 2023-03-23 RX ADMIN — FAMOTIDINE 10 MG: 20 TABLET ORAL at 20:57

## 2023-03-23 RX ADMIN — ISOSORBIDE MONONITRATE 60 MG: 30 TABLET, EXTENDED RELEASE ORAL at 08:49

## 2023-03-23 RX ADMIN — POTASSIUM CHLORIDE 10 MEQ: 7.46 INJECTION, SOLUTION INTRAVENOUS at 11:50

## 2023-03-23 RX ADMIN — SODIUM CHLORIDE, PRESERVATIVE FREE 10 ML: 5 INJECTION INTRAVENOUS at 20:59

## 2023-03-23 RX ADMIN — PANTOPRAZOLE SODIUM 40 MG: 40 TABLET, DELAYED RELEASE ORAL at 06:00

## 2023-03-23 RX ADMIN — TIZANIDINE 2 MG: 2 TABLET ORAL at 20:57

## 2023-03-23 RX ADMIN — CARVEDILOL 3.12 MG: 3.12 TABLET, FILM COATED ORAL at 17:13

## 2023-03-23 RX ADMIN — HYDROCODONE BITARTRATE AND ACETAMINOPHEN 1 TABLET: 5; 325 TABLET ORAL at 20:58

## 2023-03-23 RX ADMIN — B-COMPLEX W/ C & FOLIC ACID TAB 1 TABLET: TAB at 08:51

## 2023-03-23 RX ADMIN — POTASSIUM CHLORIDE 10 MEQ: 7.46 INJECTION, SOLUTION INTRAVENOUS at 10:40

## 2023-03-23 RX ADMIN — SODIUM CHLORIDE, PRESERVATIVE FREE 10 ML: 5 INJECTION INTRAVENOUS at 08:55

## 2023-03-23 RX ADMIN — TAMSULOSIN HYDROCHLORIDE 0.4 MG: 0.4 CAPSULE ORAL at 08:50

## 2023-03-23 RX ADMIN — TIZANIDINE 2 MG: 2 TABLET ORAL at 09:51

## 2023-03-23 RX ADMIN — SODIUM CHLORIDE, POTASSIUM CHLORIDE, SODIUM LACTATE AND CALCIUM CHLORIDE: 600; 310; 30; 20 INJECTION, SOLUTION INTRAVENOUS at 06:12

## 2023-03-23 RX ADMIN — LOSARTAN POTASSIUM 25 MG: 25 TABLET, FILM COATED ORAL at 08:49

## 2023-03-23 RX ADMIN — ROSUVASTATIN 20 MG: 20 TABLET, FILM COATED ORAL at 20:58

## 2023-03-23 RX ADMIN — HYDROCODONE BITARTRATE AND ACETAMINOPHEN 1 TABLET: 5; 325 TABLET ORAL at 05:59

## 2023-03-23 RX ADMIN — HYDROCODONE BITARTRATE AND ACETAMINOPHEN 1 TABLET: 5; 325 TABLET ORAL at 12:06

## 2023-03-23 ASSESSMENT — PAIN - FUNCTIONAL ASSESSMENT: PAIN_FUNCTIONAL_ASSESSMENT: ACTIVITIES ARE NOT PREVENTED

## 2023-03-23 ASSESSMENT — PAIN DESCRIPTION - ORIENTATION
ORIENTATION: LOWER;MID
ORIENTATION: LEFT
ORIENTATION: MID

## 2023-03-23 ASSESSMENT — PAIN SCALES - GENERAL
PAINLEVEL_OUTOF10: 10
PAINLEVEL_OUTOF10: 3
PAINLEVEL_OUTOF10: 6
PAINLEVEL_OUTOF10: 2
PAINLEVEL_OUTOF10: 10
PAINLEVEL_OUTOF10: 7
PAINLEVEL_OUTOF10: 1
PAINLEVEL_OUTOF10: 0

## 2023-03-23 ASSESSMENT — PAIN DESCRIPTION - LOCATION
LOCATION: BACK
LOCATION: ARM

## 2023-03-23 ASSESSMENT — PAIN DESCRIPTION - DESCRIPTORS
DESCRIPTORS: ACHING;BURNING
DESCRIPTORS: ACHING

## 2023-03-23 ASSESSMENT — PAIN SCALES - WONG BAKER
WONGBAKER_NUMERICALRESPONSE: 0
WONGBAKER_NUMERICALRESPONSE: 2
WONGBAKER_NUMERICALRESPONSE: 0
WONGBAKER_NUMERICALRESPONSE: 0

## 2023-03-23 NOTE — PROGRESS NOTES
PRN Norco 5/325 mg given for pain. Pt repositioned in bed and heels elevated for comfort. Pt denied any other needs at this time. NAD noted. All needs within reach.

## 2023-03-23 NOTE — CARE COORDINATION
MSN, CM:  patient was admitted on 2/14/2023 for c/o back/flank pain; DX: Intractable pain. Patient has consults from orthopedic surgery and orthotist.  Patient was discharged on 3/3/2023 to [facility] for rehab services. Patient had T11 Kyphoplasty and bone biopsy during this admission. Patient instructed to stop Metaxalone, Lidocaine, Spironolactone, Cranberry tab, Apple Cider Vinegar, Turmeric, Cholecalciferol, Cyanocobalamin, Lidocaine, Claritin, and Singulair. Patient had a change in dosage for Norco and Zofran. Patient was readmitted on 3/22/2023 for back pain.

## 2023-03-23 NOTE — FLOWSHEET NOTE
Patient on 2 L NC. Safety measures noted. Will continue to monitor per policy.      03/23/23 [address] Shift Handoff   Handoff Received From Michael Ruiz RN   Handoff Communication Face to Face

## 2023-03-23 NOTE — PROGRESS NOTES
thick somewhat yellowish amount of mucus while I am in the room. She is satting 91% on 2 L, was found to have her oxygen off overnight and desatted to 86%. There is no wheezing on examination. Afebrile. WBC 4.3. Patient did have COVID-pneumonia in rehab prior to arrival and was treated, also has a history of congestive heart failure; either could be the underlying cause of her symptoms today. Added robitussin DM. Chest x-ray was ordered, as well as a BNP and  procalcitonin levels. Fluids were stopped. Replacing potassium, magnesium; did not budge w po. Will need IV potassium. Pt aware. Assessment & Plan:     Principal Problem:    T12 compression fracture, initial encounter [facility])  Plan: Dr Madisyn wilkinson Neurosurgery following.   -Plan for kyphoplasty of T12 on tues 3/28  -Still healing from prior kyphoplasty T9, 10, 11;  if OOB needs TLSO brace on  -Antiplatelets and AC both on hold for the week prior to procedure.   -Plan is for pt to return to rehab center upon discharge. Active Problems:    Chronic renal disease, stage III ([facility] [499163]  Plan: cr 0.6  -Fluids have been stopped for overload sx.   -Monitor.   -Avoid NSAIDs. Hypokalemia  Plan: 2.6 yesterday, failed to budge with oral replacement of 40 meq x3 doses, potassium still 2.6 this a.m. on repeat labs. -Continue oral potassium replacement 40 meq twice daily and add IV potassium 10 mEq x 4 doses. -Recheck BMP later this afternoon to make sure that it is improving  -Mag 1.8 (on orals)      A/C CHF exacerbation:  -most likely causing her s/sx of cough, congestion  -BNP elevated >1800  -CXR this am indicated pulm edema as well, over infectious process. Remains afebrile with normal WBC ct and procalcitonin levels. - Stop IV flds  -HOLD off on diuretics at this time, due to having difficulty increasing pts potassium back to normal limits depiste tx over the past 24hrs, reassess after replacement today. Discussed w Dr Kraig Martinez attending.        9 beat resp. rate 20, height 5' 6\" (1.676 m), weight 132 lb 4.4 oz (60 kg), SpO2 91 %. General:    Elderly, frail appearing  Head:  Normocephalic, atraumatic  Eyes:  Sclerae appear normal.  Pupils equally round. ENT:  Nares appear normal.  Moist oral mucosa  Neck:  No restricted ROM. Trachea midline   CV:   RRR. No m/r/g. No jugular venous distension. Lungs: On 2 L NC, thick mucus expectorated during my exam.   Abdomen:   Soft, nontender, nondistended. Extremities: No cyanosis or clubbing. No edema  Skin:     No rashes and normal coloration. Warm and dry. Neuro:  CN II-XII grossly intact. Back brace at bedside. Psych:  Normal mood and affect.       I have personally reviewed labs and tests:  Recent Labs:  Recent Results (from the past 48 hour(s))   CBC with Auto Differential    Collection Time: 03/21/23  8:02 PM   Result Value Ref Range    WBC 3.0 (L) 4.3 - 11.1 K/uL    RBC 4.62 4.05 - 5.2 M/uL    Hemoglobin 12.9 11.7 - 15.4 g/dL    Hematocrit 40.1 35.8 - 46.3 %    MCV 86.8 82 - 102 FL    MCH 27.9 26.1 - 32.9 PG    MCHC 32.2 31.4 - 35.0 g/dL    RDW 13.4 11.9 - 14.6 %    Platelets 181 940 - 527 K/uL    MPV 10.8 9.4 - 12.3 FL    nRBC 0.02 0.0 - 0.2 K/uL    Differential Type AUTOMATED      Seg Neutrophils 56 43 - 78 %    Lymphocytes 36 13 - 44 %    Monocytes 8 4.0 - 12.0 %    Eosinophils % 0 (L) 0.5 - 7.8 %    Basophils 0 0.0 - 2.0 %    Immature Granulocytes 0 0.0 - 5.0 %    Segs Absolute 1.7 1.7 - 8.2 K/UL    Absolute Lymph # 1.1 0.5 - 4.6 K/UL    Absolute Mono # 0.2 0.1 - 1.3 K/UL    Absolute Eos # 0.0 0.0 - 0.8 K/UL    Basophils Absolute 0.0 0.0 - 0.2 K/UL    Absolute Immature Granulocyte 0.0 0.0 - 0.5 K/UL   Comprehensive Metabolic Panel    Collection Time: 03/21/23  8:02 PM   Result Value Ref Range    Sodium 134 133 - 143 mmol/L    Potassium 3.0 (L) 3.5 - 5.1 mmol/L    Chloride 99 (L) 101 - 110 mmol/L    CO2 30 21 - 32 mmol/L    Anion Gap 5 2 - 11 mmol/L    Glucose 110 (H) 65 - 100 mg/dL    BUN 23 8 - 23

## 2023-03-23 NOTE — CARE COORDINATION
MSN, CM:  received a call from Beverly at [facility] and stated patient's son did not want patient to return to facility. Spoke with patient's son Kiley Andre and he had requested that patient come home with [facility] and palliative care upon discharge. This will be arranged prior to discharge. Case Management will continue to follow.

## 2023-03-24 LAB
ANION GAP SERPL CALC-SCNC: 2 MMOL/L (ref 2–11)
BACTERIA SPEC CULT: NORMAL
BASOPHILS # BLD: 0 K/UL (ref 0–0.2)
BASOPHILS NFR BLD: 0 % (ref 0–2)
BUN SERPL-MCNC: 11 MG/DL (ref 8–23)
CALCIUM SERPL-MCNC: 9.4 MG/DL (ref 8.3–10.4)
CHLORIDE SERPL-SCNC: 107 MMOL/L (ref 101–110)
CO2 SERPL-SCNC: 30 MMOL/L (ref 21–32)
CREAT SERPL-MCNC: 0.6 MG/DL (ref 0.6–1)
DIFFERENTIAL METHOD BLD: ABNORMAL
EOSINOPHIL # BLD: 0.1 K/UL (ref 0–0.8)
EOSINOPHIL NFR BLD: 3 % (ref 0.5–7.8)
ERYTHROCYTE [DISTWIDTH] IN BLOOD BY AUTOMATED COUNT: 13.4 % (ref 11.9–14.6)
GLUCOSE SERPL-MCNC: 84 MG/DL (ref 65–100)
HCT VFR BLD AUTO: 36.7 % (ref 35.8–46.3)
HGB BLD-MCNC: 11.6 G/DL (ref 11.7–15.4)
IMM GRANULOCYTES # BLD AUTO: 0 K/UL (ref 0–0.5)
IMM GRANULOCYTES NFR BLD AUTO: 0 % (ref 0–5)
LYMPHOCYTES # BLD: 1.7 K/UL (ref 0.5–4.6)
LYMPHOCYTES NFR BLD: 47 % (ref 13–44)
MAGNESIUM SERPL-MCNC: 2 MG/DL (ref 1.8–2.4)
MCH RBC QN AUTO: 27.4 PG (ref 26.1–32.9)
MCHC RBC AUTO-ENTMCNC: 31.6 G/DL (ref 31.4–35)
MCV RBC AUTO: 86.8 FL (ref 82–102)
MONOCYTES # BLD: 0.3 K/UL (ref 0.1–1.3)
MONOCYTES NFR BLD: 7 % (ref 4–12)
NEUTS SEG # BLD: 1.6 K/UL (ref 1.7–8.2)
NEUTS SEG NFR BLD: 43 % (ref 43–78)
NRBC # BLD: 0 K/UL (ref 0–0.2)
PLATELET # BLD AUTO: 160 K/UL (ref 150–450)
PMV BLD AUTO: 10.3 FL (ref 9.4–12.3)
POTASSIUM SERPL-SCNC: 3.2 MMOL/L (ref 3.5–5.1)
RBC # BLD AUTO: 4.23 M/UL (ref 4.05–5.2)
SERVICE CMNT-IMP: NORMAL
SODIUM SERPL-SCNC: 139 MMOL/L (ref 133–143)
WBC # BLD AUTO: 3.7 K/UL (ref 4.3–11.1)

## 2023-03-24 PROCEDURE — 92610 EVALUATE SWALLOWING FUNCTION: CPT

## 2023-03-24 PROCEDURE — 6370000000 HC RX 637 (ALT 250 FOR IP): Performed by: NURSE PRACTITIONER

## 2023-03-24 PROCEDURE — 6360000002 HC RX W HCPCS: Performed by: NURSE PRACTITIONER

## 2023-03-24 PROCEDURE — 1100000000 HC RM PRIVATE

## 2023-03-24 PROCEDURE — 6370000000 HC RX 637 (ALT 250 FOR IP): Performed by: FAMILY MEDICINE

## 2023-03-24 PROCEDURE — 80048 BASIC METABOLIC PNL TOTAL CA: CPT

## 2023-03-24 PROCEDURE — 2580000003 HC RX 258: Performed by: NURSE PRACTITIONER

## 2023-03-24 PROCEDURE — 2580000003 HC RX 258: Performed by: FAMILY MEDICINE

## 2023-03-24 PROCEDURE — 36415 COLL VENOUS BLD VENIPUNCTURE: CPT

## 2023-03-24 PROCEDURE — 83735 ASSAY OF MAGNESIUM: CPT

## 2023-03-24 PROCEDURE — 85025 COMPLETE CBC W/AUTO DIFF WBC: CPT

## 2023-03-24 RX ORDER — POTASSIUM CHLORIDE 20 MEQ/1
40 TABLET, EXTENDED RELEASE ORAL ONCE
Status: COMPLETED | OUTPATIENT
Start: 2023-03-24 | End: 2023-03-24

## 2023-03-24 RX ORDER — SODIUM CHLORIDE, SODIUM LACTATE, POTASSIUM CHLORIDE, CALCIUM CHLORIDE 600; 310; 30; 20 MG/100ML; MG/100ML; MG/100ML; MG/100ML
INJECTION, SOLUTION INTRAVENOUS CONTINUOUS
Status: DISCONTINUED | OUTPATIENT
Start: 2023-03-24 | End: 2023-03-24

## 2023-03-24 RX ADMIN — POTASSIUM CHLORIDE 40 MEQ: 1500 TABLET, EXTENDED RELEASE ORAL at 10:38

## 2023-03-24 RX ADMIN — LOSARTAN POTASSIUM 25 MG: 25 TABLET, FILM COATED ORAL at 09:08

## 2023-03-24 RX ADMIN — FAMOTIDINE 10 MG: 20 TABLET ORAL at 21:00

## 2023-03-24 RX ADMIN — ISOSORBIDE MONONITRATE 60 MG: 30 TABLET, EXTENDED RELEASE ORAL at 09:08

## 2023-03-24 RX ADMIN — ROSUVASTATIN 20 MG: 20 TABLET, FILM COATED ORAL at 21:00

## 2023-03-24 RX ADMIN — HYDROCODONE BITARTRATE AND ACETAMINOPHEN 1 TABLET: 5; 325 TABLET ORAL at 20:59

## 2023-03-24 RX ADMIN — SODIUM CHLORIDE, PRESERVATIVE FREE 10 ML: 5 INJECTION INTRAVENOUS at 09:24

## 2023-03-24 RX ADMIN — HYDROCODONE BITARTRATE AND ACETAMINOPHEN 1 TABLET: 5; 325 TABLET ORAL at 10:43

## 2023-03-24 RX ADMIN — TAMSULOSIN HYDROCHLORIDE 0.4 MG: 0.4 CAPSULE ORAL at 09:09

## 2023-03-24 RX ADMIN — SODIUM CHLORIDE, PRESERVATIVE FREE 10 ML: 5 INJECTION INTRAVENOUS at 21:00

## 2023-03-24 RX ADMIN — CARVEDILOL 3.12 MG: 3.12 TABLET, FILM COATED ORAL at 09:09

## 2023-03-24 RX ADMIN — CEFTRIAXONE 1000 MG: 1 INJECTION, POWDER, FOR SOLUTION INTRAMUSCULAR; INTRAVENOUS at 10:53

## 2023-03-24 RX ADMIN — MAGNESIUM GLUCONATE 500 MG ORAL TABLET 400 MG: 500 TABLET ORAL at 09:09

## 2023-03-24 RX ADMIN — PANTOPRAZOLE SODIUM 40 MG: 40 TABLET, DELAYED RELEASE ORAL at 09:09

## 2023-03-24 ASSESSMENT — PAIN SCALES - WONG BAKER: WONGBAKER_NUMERICALRESPONSE: 0

## 2023-03-24 ASSESSMENT — PAIN SCALES - GENERAL
PAINLEVEL_OUTOF10: 4
PAINLEVEL_OUTOF10: 6
PAINLEVEL_OUTOF10: 2
PAINLEVEL_OUTOF10: 0
PAINLEVEL_OUTOF10: 0
PAINLEVEL_OUTOF10: 2

## 2023-03-24 ASSESSMENT — PAIN DESCRIPTION - ORIENTATION: ORIENTATION: MID

## 2023-03-24 ASSESSMENT — PAIN DESCRIPTION - DESCRIPTORS
DESCRIPTORS: ACHING;BURNING
DESCRIPTORS: ACHING

## 2023-03-24 ASSESSMENT — PAIN DESCRIPTION - LOCATION
LOCATION: BACK
LOCATION: BACK

## 2023-03-24 NOTE — FLOWSHEET NOTE
Patient on 2 L NC. Safety measures noted. Will continue to monitor per policy.      03/24/23 0701   Handoff   Communication Given Shift Handoff   Handoff Received From Danyelle 7 Keith, RN   Handoff Communication Face to Face

## 2023-03-24 NOTE — PROGRESS NOTES
SPEECH LANGUAGE PATHOLOGY: DYSPHAGIA  Initial Assessment and Discharge    NAME: Osbaldo Lucero  : 1931  MRN: [id number]    ADMISSION DATE: 3/21/2023  PRIMARY DIAGNOSIS: T12 compression fracture, initial encounter ([facility]  Hypokalemia [E87.6]  T12 compression fracture, initial encounter ([facility] [S22.080A]  Compression fracture of T12 vertebra, initial encounter ([facility] [P02.411E]  COVID-19 virus infection [U07.1]    ICD-10: Treatment Diagnosis: R13.11 Dysphagia, Oral Phase    RECOMMENDATIONS   Diet:  Diet Solids Recommendation: Easy to Chew  Liquid Consistency Recommendation: Thin    Medications: PO     Recommendations:  (No dysphagia treatment indicated)     Compensatory Swallowing Strategies:Upright as possible for all oral intake     Therapeutic Intervention:Patient/Family education     Patient continues to require skilled intervention:  No. Please re-consult if new concerns arise. ASSESSMENT    Dysphagia Diagnosis: Swallow function appears WFL  Dysphagia Impression : Patient presents with oropharyngeal swallow that is within normal limits. No s/sx of dysphagia observed or reported by patient and family. Continue easy to chew diet and thin liquids. Medications as tolerated. No additional speech therapy indicated at htis time. GENERAL    Subjective: Patient seen for dysphagia evaluation with noontime meal. Son at bedside assisting. Per RN, patient has been stating \"I can't swallow\", but would then swallow without apparent difficulty. Patient states these comments are due to nausea rather than dysphagia. Behavior/Cognition: Alert; Cooperative;Pleasant mood  Communication Observation: Functional  Follows Directions: Simple        O2 Device: Nasal cannula        History of Present Injury/Illness: Ms. Cantrell Ashish  has a past medical history of CAD (coronary artery disease), Calculus of kidney, Closed nondisplaced fracture of fifth left metatarsal bone, Congestive heart failure ([facility] Coronary artery disease involving coronary bypass graft of native heart without angina pectoris, CVD (cerebrovascular disease), Dyslipidemia, GERD (gastroesophageal reflux disease), HTN (hypertension), Left breast mass, Long term current use of anticoagulant therapy, Myocardial infarction ([address] Osteoporosis, Positive H. pylori test, Rectal polyp, Stroke (cerebrum) ([address] Stroke ([address] and Thromboembolus ([address] . She also  has a past surgical history that includes orthopedic surgery; Colonoscopy (2012); pr unlisted procedure cardiac surgery; gyn; Lithotripsy (2008 x 2); Urological Surgery; Appendectomy; Coronary artery bypass graft (1988); bladder suspension (2005); Hysterectomy, total abdominal (1972); IR KYPHOPLASTY THORACIC 1 VERTEBRAL BODY (1/30/2023); IR KYPHOPLASTY THORACIC 1 VERTEBRAL BODY (2/9/2023); and Spine surgery (N/A, 2/23/2023). Prior Dysphagia History: None     Current Diet : Easy to chew  Current Liquid Diet : Thin    Pain:   Patient does not c/o pain                                          OBJECTIVE    Oral Motor Function:  Patient Positioning: Upright in bed   Oral Motor   Labial: No impairment  Dentition: Intact  Oral Hygiene: Moist;Clean  Lingual: No impairment  Mandible: No impairment  Dentition: Adequate        Baseline Vocal Quality: Normal    Oropharyngeal Phase:        Patient seen with easy to chew textures and thin liquids. Son assisting with meal. Patient reports complaints of swallow difficulty are in reference to nausea rather than dysphagia. She only endorses difficulty with dry, particulate foods such as corn breaks. Mastication was appropriate with meat, potatoes, and carrots. Appropriate oral clearance across 16 bites. Serial straw sips also consumed without s/sx of airway compromise.                  PLAN    Duration/Frequency: No treatment indicated at this time    Dysphagia Outcome and Severity Scale (SIMA)  Dysphagia Outcome Severity Scale: Level 6: Within functional limits/Modified

## 2023-03-24 NOTE — PROGRESS NOTES
NEUROSURGERY PLAN OF CARE NOTE:     Chart and Imaging Reviewed:    Ellyn Fisher 80 y.o. femalew with PMH of  HTN, CAD, Chr Sys. CHF, CKD stage 3, mild protein/calorie malnutrition, osteoporosis presented to [facility] with back pain following recent T11 Vertebral augmentation on 02/23/2023, with active COVID infection, hypoxia, PNA, hypokalemia and dehydration. I have independently reviewed and interpreted the patient's CT Thoracic Spine that demonstrates a very minimal superior endplate compression deformity of the T12 verterbra. She is not currently a surgical candidate on candidate for general endotracheal anesthesia. Medicine services have been documenting that the patient is schedule for a T12 kyphoplasty with neurosurgery on 3/28/2023 for which she is not scheduled at this time. Recommend TLSO brace when out of bed and pain control. If brace is not fitting properly please place consult for orthotist to evaluate patient for adjustments. She needs to be medically optimized. Please review anesthesia protocols regarding recent and active COVID/respiratory infections for elective procedures. She would require at least four weeks from time of asymptomatic COVID infection for anesthesia, let alone symptomatic infection. Also neurosurgery never notified the patient or medicine services that she was scheduled for a kyphoplasty. I spent a total of 5-10 minutes of medical consultative discussion and review. Gisell Pham.  Gregg Toledo

## 2023-03-24 NOTE — PROGRESS NOTES
Basophils 1 0.0 - 2.0 %    Immature Granulocytes 1 0.0 - 5.0 %    Segs Absolute 2.2 1.7 - 8.2 K/UL    Absolute Lymph # 1.8 0.5 - 4.6 K/UL    Absolute Mono # 0.3 0.1 - 1.3 K/UL    Absolute Eos # 0.1 0.0 - 0.8 K/UL    Basophils Absolute 0.0 0.0 - 0.2 K/UL    Absolute Immature Granulocyte 0.0 0.0 - 0.5 K/UL   Magnesium    Collection Time: 03/23/23  3:40 AM   Result Value Ref Range    Magnesium 1.8 1.8 - 2.4 mg/dL   Procalcitonin    Collection Time: 03/23/23  3:40 AM   Result Value Ref Range    Procalcitonin <0.05 0.00 - 0.49 ng/mL   Brain Natriuretic Peptide    Collection Time: 03/23/23  3:40 AM   Result Value Ref Range    NT Pro-BNP 1,851 (H) <450 PG/ML   Basic Metabolic Panel    Collection Time: 03/23/23  2:47 PM   Result Value Ref Range    Sodium 138 133 - 143 mmol/L    Potassium 3.7 3.5 - 5.1 mmol/L    Chloride 106 101 - 110 mmol/L    CO2 30 21 - 32 mmol/L    Anion Gap 2 2 - 11 mmol/L    Glucose 94 65 - 100 mg/dL    BUN 11 8 - 23 MG/DL    Creatinine 0.60 0.6 - 1.0 MG/DL    Est, Glom Filt Rate >60 >60 ml/min/1.73m2    Calcium 9.0 8.3 - 10.4 MG/DL   Brain Natriuretic Peptide    Collection Time: 03/23/23  2:47 PM   Result Value Ref Range    NT Pro-BNP 2,531 (H) <450 PG/ML   CBC with Auto Differential    Collection Time: 03/24/23  3:51 AM   Result Value Ref Range    WBC 3.7 (L) 4.3 - 11.1 K/uL    RBC 4.23 4.05 - 5.2 M/uL    Hemoglobin 11.6 (L) 11.7 - 15.4 g/dL    Hematocrit 36.7 35.8 - 46.3 %    MCV 86.8 82 - 102 FL    MCH 27.4 26.1 - 32.9 PG    MCHC 31.6 31.4 - 35.0 g/dL    RDW 13.4 11.9 - 14.6 %    Platelets 885 176 - 897 K/uL    MPV 10.3 9.4 - 12.3 FL    nRBC 0.00 0.0 - 0.2 K/uL    Differential Type AUTOMATED      Seg Neutrophils 43 43 - 78 %    Lymphocytes 47 (H) 13 - 44 %    Monocytes 7 4.0 - 12.0 %    Eosinophils % 3 0.5 - 7.8 %    Basophils 0 0.0 - 2.0 %    Immature Granulocytes 0 0.0 - 5.0 %    Segs Absolute 1.6 (L) 1.7 - 8.2 K/UL    Absolute Lymph # 1.7 0.5 - 4.6 K/UL    Absolute Mono # 0.3 0.1 - 1.3 K/UL but may still contain some grammatical/other typographical errors.

## 2023-03-24 NOTE — PROGRESS NOTES
Physician Progress Note      Nilam Auguste  CSN #:                  [id number]  :                       1931  ADMIT DATE:       3/21/2023 6:55 PM  [address] [place] DATE:  RESPONDING  PROVIDER #:        Shekhar Trivedi NP          QUERY TEXT:    Pt admitted with  T12 compression fracture. Pt noted to have history of   osteoporosis. If possible, please document in progress notes and discharge   summary if you are evaluating and/or treating any of the following: The medical record reflects the following:  Risk Factors: 89 yr, Hx fractures at T9, T10 and T11, Osteoporosis  Clinical Indicators: Ct spine shows Acute T12 compression fracture  Treatment: multivitamin, ortho consult,  Options provided:  -- Osteoporotic T12 vertebra fracture  -- Other - I will add my own diagnosis  -- Disagree - Not applicable / Not valid  -- Disagree - Clinically unable to determine / Unknown  -- Refer to Clinical Documentation Reviewer    PROVIDER RESPONSE TEXT:    This patient has an osteoporotic T12 vertebra fracture.     Query created by: Jair Solorio on 3/22/2023 3:11 PM      Electronically signed by:  Shekhar Trivedi NP 3/24/2023 4:08 PM

## 2023-03-24 NOTE — PROGRESS NOTES
Attempted to give carvedilol. Patient spit it out. Nurse educated patient that it was her medicine and she had to take it. She repeated, \"I can't, it makes me sick. \" Patient refuses all PO at this time.

## 2023-03-24 NOTE — CARE COORDINATION
MSN, Cm:  patient is not scheduled for a Kyphoplasty per neurosurgery r/t COVID and current infection. Patient's discharge plan is home with son with [facility] and palliative care. Case Management will continue to follow for any discharge needs.

## 2023-03-25 ENCOUNTER — APPOINTMENT (OUTPATIENT)
Dept: CT IMAGING | Age: 88
DRG: 542 | End: 2023-03-25
Payer: COMMERCIAL

## 2023-03-25 ENCOUNTER — APPOINTMENT (OUTPATIENT)
Dept: GENERAL RADIOLOGY | Age: 88
DRG: 542 | End: 2023-03-25
Payer: COMMERCIAL

## 2023-03-25 LAB
ANION GAP SERPL CALC-SCNC: ABNORMAL MMOL/L (ref 2–11)
BASOPHILS # BLD: 0 K/UL (ref 0–0.2)
BASOPHILS NFR BLD: 0 % (ref 0–2)
BUN SERPL-MCNC: 12 MG/DL (ref 8–23)
CALCIUM SERPL-MCNC: 9.8 MG/DL (ref 8.3–10.4)
CHLORIDE SERPL-SCNC: 108 MMOL/L (ref 101–110)
CO2 SERPL-SCNC: 29 MMOL/L (ref 21–32)
CREAT SERPL-MCNC: 0.7 MG/DL (ref 0.6–1)
DIFFERENTIAL METHOD BLD: NORMAL
EOSINOPHIL # BLD: 0.1 K/UL (ref 0–0.8)
EOSINOPHIL NFR BLD: 2 % (ref 0.5–7.8)
ERYTHROCYTE [DISTWIDTH] IN BLOOD BY AUTOMATED COUNT: 13.4 % (ref 11.9–14.6)
GLUCOSE SERPL-MCNC: 105 MG/DL (ref 65–100)
HCT VFR BLD AUTO: 41.3 % (ref 35.8–46.3)
HGB BLD-MCNC: 13.3 G/DL (ref 11.7–15.4)
IMM GRANULOCYTES # BLD AUTO: 0 K/UL (ref 0–0.5)
IMM GRANULOCYTES NFR BLD AUTO: 0 % (ref 0–5)
LYMPHOCYTES # BLD: 1.4 K/UL (ref 0.5–4.6)
LYMPHOCYTES NFR BLD: 25 % (ref 13–44)
MAGNESIUM SERPL-MCNC: 2 MG/DL (ref 1.8–2.4)
MCH RBC QN AUTO: 27.8 PG (ref 26.1–32.9)
MCHC RBC AUTO-ENTMCNC: 32.2 G/DL (ref 31.4–35)
MCV RBC AUTO: 86.2 FL (ref 82–102)
MONOCYTES # BLD: 0.3 K/UL (ref 0.1–1.3)
MONOCYTES NFR BLD: 6 % (ref 4–12)
NEUTS SEG # BLD: 3.6 K/UL (ref 1.7–8.2)
NEUTS SEG NFR BLD: 67 % (ref 43–78)
NRBC # BLD: 0 K/UL (ref 0–0.2)
PLATELET # BLD AUTO: 196 K/UL (ref 150–450)
PMV BLD AUTO: 10.6 FL (ref 9.4–12.3)
POTASSIUM SERPL-SCNC: 3.5 MMOL/L (ref 3.5–5.1)
PROCALCITONIN SERPL-MCNC: <0.05 NG/ML (ref 0–0.49)
RBC # BLD AUTO: 4.79 M/UL (ref 4.05–5.2)
SODIUM SERPL-SCNC: 136 MMOL/L (ref 133–143)
WBC # BLD AUTO: 5.5 K/UL (ref 4.3–11.1)

## 2023-03-25 PROCEDURE — 6370000000 HC RX 637 (ALT 250 FOR IP): Performed by: NURSE PRACTITIONER

## 2023-03-25 PROCEDURE — 84145 PROCALCITONIN (PCT): CPT

## 2023-03-25 PROCEDURE — 2580000003 HC RX 258: Performed by: FAMILY MEDICINE

## 2023-03-25 PROCEDURE — 6370000000 HC RX 637 (ALT 250 FOR IP): Performed by: FAMILY MEDICINE

## 2023-03-25 PROCEDURE — 71045 X-RAY EXAM CHEST 1 VIEW: CPT

## 2023-03-25 PROCEDURE — 36415 COLL VENOUS BLD VENIPUNCTURE: CPT

## 2023-03-25 PROCEDURE — 80048 BASIC METABOLIC PNL TOTAL CA: CPT

## 2023-03-25 PROCEDURE — 1100000000 HC RM PRIVATE

## 2023-03-25 PROCEDURE — 70450 CT HEAD/BRAIN W/O DYE: CPT

## 2023-03-25 PROCEDURE — 83735 ASSAY OF MAGNESIUM: CPT

## 2023-03-25 PROCEDURE — 85025 COMPLETE CBC W/AUTO DIFF WBC: CPT

## 2023-03-25 RX ORDER — POTASSIUM CHLORIDE 20 MEQ/1
40 TABLET, EXTENDED RELEASE ORAL 2 TIMES DAILY WITH MEALS
Status: COMPLETED | OUTPATIENT
Start: 2023-03-25 | End: 2023-03-25

## 2023-03-25 RX ORDER — DOCUSATE SODIUM 100 MG/1
100 CAPSULE, LIQUID FILLED ORAL DAILY
Status: DISCONTINUED | OUTPATIENT
Start: 2023-03-25 | End: 2023-03-29

## 2023-03-25 RX ORDER — POLYETHYLENE GLYCOL 3350 17 G/17G
17 POWDER, FOR SOLUTION ORAL DAILY
Status: DISCONTINUED | OUTPATIENT
Start: 2023-03-25 | End: 2023-03-30 | Stop reason: HOSPADM

## 2023-03-25 RX ADMIN — SODIUM CHLORIDE, PRESERVATIVE FREE 10 ML: 5 INJECTION INTRAVENOUS at 21:15

## 2023-03-25 RX ADMIN — POLYETHYLENE GLYCOL 3350 17 G: 17 POWDER, FOR SOLUTION ORAL at 13:04

## 2023-03-25 RX ADMIN — DOCUSATE SODIUM 100 MG: 100 CAPSULE, LIQUID FILLED ORAL at 14:07

## 2023-03-25 RX ADMIN — B-COMPLEX W/ C & FOLIC ACID TAB 1 TABLET: TAB at 10:11

## 2023-03-25 RX ADMIN — MAGNESIUM GLUCONATE 500 MG ORAL TABLET 400 MG: 500 TABLET ORAL at 09:45

## 2023-03-25 RX ADMIN — CARVEDILOL 3.12 MG: 3.12 TABLET, FILM COATED ORAL at 09:45

## 2023-03-25 RX ADMIN — POLYETHYLENE GLYCOL 3350 17 G: 17 POWDER, FOR SOLUTION ORAL at 10:48

## 2023-03-25 RX ADMIN — TAMSULOSIN HYDROCHLORIDE 0.4 MG: 0.4 CAPSULE ORAL at 09:45

## 2023-03-25 RX ADMIN — POTASSIUM CHLORIDE 40 MEQ: 1500 TABLET, EXTENDED RELEASE ORAL at 09:45

## 2023-03-25 RX ADMIN — PANTOPRAZOLE SODIUM 40 MG: 40 TABLET, DELAYED RELEASE ORAL at 05:52

## 2023-03-25 RX ADMIN — POTASSIUM CHLORIDE 40 MEQ: 1500 TABLET, EXTENDED RELEASE ORAL at 16:30

## 2023-03-25 RX ADMIN — ROSUVASTATIN 20 MG: 20 TABLET, FILM COATED ORAL at 20:48

## 2023-03-25 RX ADMIN — FAMOTIDINE 10 MG: 20 TABLET ORAL at 20:47

## 2023-03-25 RX ADMIN — LOSARTAN POTASSIUM 25 MG: 25 TABLET, FILM COATED ORAL at 09:45

## 2023-03-25 RX ADMIN — HYDROCODONE BITARTRATE AND ACETAMINOPHEN 1 TABLET: 5; 325 TABLET ORAL at 20:47

## 2023-03-25 RX ADMIN — ISOSORBIDE MONONITRATE 60 MG: 30 TABLET, EXTENDED RELEASE ORAL at 09:45

## 2023-03-25 RX ADMIN — CARVEDILOL 3.12 MG: 3.12 TABLET, FILM COATED ORAL at 16:30

## 2023-03-25 RX ADMIN — SODIUM CHLORIDE, PRESERVATIVE FREE 10 ML: 5 INJECTION INTRAVENOUS at 10:11

## 2023-03-25 RX ADMIN — GUAIFENESIN AND DEXTROMETHORPHAN 5 ML: 100; 10 SYRUP ORAL at 20:21

## 2023-03-25 ASSESSMENT — PAIN SCALES - PAIN ASSESSMENT IN ADVANCED DEMENTIA (PAINAD)
FACIALEXPRESSION: 2
FACIALEXPRESSION: 0
NEGVOCALIZATION: 2
NEGVOCALIZATION: 0
TOTALSCORE: 0
CONSOLABILITY: 0
BREATHING: 0
BODYLANGUAGE: 1
BREATHING: 0
BODYLANGUAGE: 0
TOTALSCORE: 7
CONSOLABILITY: 2

## 2023-03-25 ASSESSMENT — PAIN SCALES - GENERAL
PAINLEVEL_OUTOF10: 7
PAINLEVEL_OUTOF10: 0

## 2023-03-25 NOTE — PROGRESS NOTES
Patient resting in bed on 2L NC. A&Ox1. Respirations even and unlabored. Patient denies pain and is in no acute distress at this time. No acute events overnight. Call bell within reach, patient instructed to call if assistance is needed. Preparing to give report to oncoming RN.

## 2023-03-25 NOTE — PROGRESS NOTES
Creatinine 0.60 0.6 - 1.0 MG/DL    Est, Glom Filt Rate >60 >60 ml/min/1.73m2    Calcium 9.0 8.3 - 10.4 MG/DL   Brain Natriuretic Peptide    Collection Time: 03/23/23  2:47 PM   Result Value Ref Range    NT Pro-BNP 2,531 (H) <450 PG/ML   CBC with Auto Differential    Collection Time: 03/24/23  3:51 AM   Result Value Ref Range    WBC 3.7 (L) 4.3 - 11.1 K/uL    RBC 4.23 4.05 - 5.2 M/uL    Hemoglobin 11.6 (L) 11.7 - 15.4 g/dL    Hematocrit 36.7 35.8 - 46.3 %    MCV 86.8 82 - 102 FL    MCH 27.4 26.1 - 32.9 PG    MCHC 31.6 31.4 - 35.0 g/dL    RDW 13.4 11.9 - 14.6 %    Platelets 688 105 - 089 K/uL    MPV 10.3 9.4 - 12.3 FL    nRBC 0.00 0.0 - 0.2 K/uL    Differential Type AUTOMATED      Seg Neutrophils 43 43 - 78 %    Lymphocytes 47 (H) 13 - 44 %    Monocytes 7 4.0 - 12.0 %    Eosinophils % 3 0.5 - 7.8 %    Basophils 0 0.0 - 2.0 %    Immature Granulocytes 0 0.0 - 5.0 %    Segs Absolute 1.6 (L) 1.7 - 8.2 K/UL    Absolute Lymph # 1.7 0.5 - 4.6 K/UL    Absolute Mono # 0.3 0.1 - 1.3 K/UL    Absolute Eos # 0.1 0.0 - 0.8 K/UL    Basophils Absolute 0.0 0.0 - 0.2 K/UL    Absolute Immature Granulocyte 0.0 0.0 - 0.5 K/UL   Basic Metabolic Panel w/ Reflex to MG    Collection Time: 03/24/23  3:51 AM   Result Value Ref Range    Sodium 139 133 - 143 mmol/L    Potassium 3.2 (L) 3.5 - 5.1 mmol/L    Chloride 107 101 - 110 mmol/L    CO2 30 21 - 32 mmol/L    Anion Gap 2 2 - 11 mmol/L    Glucose 84 65 - 100 mg/dL    BUN 11 8 - 23 MG/DL    Creatinine 0.60 0.6 - 1.0 MG/DL    Est, Glom Filt Rate >60 >60 ml/min/1.73m2    Calcium 9.4 8.3 - 10.4 MG/DL   Magnesium    Collection Time: 03/24/23  3:51 AM   Result Value Ref Range    Magnesium 2.0 1.8 - 2.4 mg/dL   Basic Metabolic Panel w/ Reflex to MG    Collection Time: 03/25/23  9:08 AM   Result Value Ref Range    Sodium 136 133 - 143 mmol/L    Potassium 3.5 3.5 - 5.1 mmol/L    Chloride 108 101 - 110 mmol/L    CO2 29 21 - 32 mmol/L    Anion Gap Cannot be calculated 2 - 11 mmol/L    Glucose 105 (H) 65 - 100 mg/dL    BUN 12 8 - 23 MG/DL    Creatinine 0.70 0.6 - 1.0 MG/DL    Est, Glom Filt Rate >60 >60 ml/min/1.73m2    Calcium 9.8 8.3 - 10.4 MG/DL   CBC with Auto Differential    Collection Time: 03/25/23  9:08 AM   Result Value Ref Range    WBC 5.5 4.3 - 11.1 K/uL    RBC 4.79 4.05 - 5.2 M/uL    Hemoglobin 13.3 11.7 - 15.4 g/dL    Hematocrit 41.3 35.8 - 46.3 %    MCV 86.2 82 - 102 FL    MCH 27.8 26.1 - 32.9 PG    MCHC 32.2 31.4 - 35.0 g/dL    RDW 13.4 11.9 - 14.6 %    Platelets 597 658 - 361 K/uL    MPV 10.6 9.4 - 12.3 FL    nRBC 0.00 0.0 - 0.2 K/uL    Differential Type AUTOMATED      Seg Neutrophils 67 43 - 78 %    Lymphocytes 25 13 - 44 %    Monocytes 6 4.0 - 12.0 %    Eosinophils % 2 0.5 - 7.8 %    Basophils 0 0.0 - 2.0 %    Immature Granulocytes 0 0.0 - 5.0 %    Segs Absolute 3.6 1.7 - 8.2 K/UL    Absolute Lymph # 1.4 0.5 - 4.6 K/UL    Absolute Mono # 0.3 0.1 - 1.3 K/UL    Absolute Eos # 0.1 0.0 - 0.8 K/UL    Basophils Absolute 0.0 0.0 - 0.2 K/UL    Absolute Immature Granulocyte 0.0 0.0 - 0.5 K/UL   Procalcitonin    Collection Time: 03/25/23  9:08 AM   Result Value Ref Range    Procalcitonin <0.05 0.00 - 0.49 ng/mL   Magnesium    Collection Time: 03/25/23  9:08 AM   Result Value Ref Range    Magnesium 2.0 1.8 - 2.4 mg/dL       I have personally reviewed imaging studies:  Other Studies:  XR CHEST PORTABLE   Final Result   -Interval increased mildly prominent bilateral interstitial opacities, likely   mild pulmonary edema.   -Small left pleural effusion.   -Interval increased patchy bibasilar opacities, likely atelectasis, though   infection/aspiration are also considerations in the appropriate clinical   context. CT SPINE [facility] LUMB WO CONTRAST   Final Result      1. Acute T12 compression fracture with 20% loss of height, not present 5 weeks    ago. 2. Redemonstration of T9, T10 and T11 compression fractures with indwelling    kyphoplasty cement.             This examination was interpreted by Peter De Luna

## 2023-03-25 NOTE — PROGRESS NOTES
Patient resting in bed on 2L NC. Respirations present and unlabored. A&Ox1. External catheter patent and draining clear, yellow urnine. No needs at this time. Call bell within reach and encouraged to call with needs.

## 2023-03-26 LAB
ANION GAP SERPL CALC-SCNC: 0 MMOL/L (ref 2–11)
BASOPHILS # BLD: 0 K/UL (ref 0–0.2)
BASOPHILS NFR BLD: 0 % (ref 0–2)
BUN SERPL-MCNC: 11 MG/DL (ref 8–23)
CALCIUM SERPL-MCNC: 9.5 MG/DL (ref 8.3–10.4)
CHLORIDE SERPL-SCNC: 109 MMOL/L (ref 101–110)
CO2 SERPL-SCNC: 31 MMOL/L (ref 21–32)
CREAT SERPL-MCNC: 0.6 MG/DL (ref 0.6–1)
DIFFERENTIAL METHOD BLD: NORMAL
EOSINOPHIL # BLD: 0.1 K/UL (ref 0–0.8)
EOSINOPHIL NFR BLD: 2 % (ref 0.5–7.8)
ERYTHROCYTE [DISTWIDTH] IN BLOOD BY AUTOMATED COUNT: 13.3 % (ref 11.9–14.6)
GLUCOSE SERPL-MCNC: 110 MG/DL (ref 65–100)
HCT VFR BLD AUTO: 39.9 % (ref 35.8–46.3)
HGB BLD-MCNC: 12.7 G/DL (ref 11.7–15.4)
IMM GRANULOCYTES # BLD AUTO: 0 K/UL (ref 0–0.5)
IMM GRANULOCYTES NFR BLD AUTO: 0 % (ref 0–5)
LYMPHOCYTES # BLD: 1.5 K/UL (ref 0.5–4.6)
LYMPHOCYTES NFR BLD: 29 % (ref 13–44)
MAGNESIUM SERPL-MCNC: 2.1 MG/DL (ref 1.8–2.4)
MCH RBC QN AUTO: 27.8 PG (ref 26.1–32.9)
MCHC RBC AUTO-ENTMCNC: 31.8 G/DL (ref 31.4–35)
MCV RBC AUTO: 87.3 FL (ref 82–102)
MONOCYTES # BLD: 0.4 K/UL (ref 0.1–1.3)
MONOCYTES NFR BLD: 9 % (ref 4–12)
NEUTS SEG # BLD: 3 K/UL (ref 1.7–8.2)
NEUTS SEG NFR BLD: 60 % (ref 43–78)
NRBC # BLD: 0 K/UL (ref 0–0.2)
PLATELET # BLD AUTO: 184 K/UL (ref 150–450)
PMV BLD AUTO: 10.6 FL (ref 9.4–12.3)
POTASSIUM SERPL-SCNC: 3.4 MMOL/L (ref 3.5–5.1)
PROCALCITONIN SERPL-MCNC: <0.05 NG/ML (ref 0–0.49)
RBC # BLD AUTO: 4.57 M/UL (ref 4.05–5.2)
SODIUM SERPL-SCNC: 140 MMOL/L (ref 133–143)
WBC # BLD AUTO: 5.1 K/UL (ref 4.3–11.1)

## 2023-03-26 PROCEDURE — 85025 COMPLETE CBC W/AUTO DIFF WBC: CPT

## 2023-03-26 PROCEDURE — 36415 COLL VENOUS BLD VENIPUNCTURE: CPT

## 2023-03-26 PROCEDURE — 84145 PROCALCITONIN (PCT): CPT

## 2023-03-26 PROCEDURE — 6360000002 HC RX W HCPCS: Performed by: FAMILY MEDICINE

## 2023-03-26 PROCEDURE — 83735 ASSAY OF MAGNESIUM: CPT

## 2023-03-26 PROCEDURE — 1100000000 HC RM PRIVATE

## 2023-03-26 PROCEDURE — 80048 BASIC METABOLIC PNL TOTAL CA: CPT

## 2023-03-26 PROCEDURE — 6360000002 HC RX W HCPCS: Performed by: NURSE PRACTITIONER

## 2023-03-26 PROCEDURE — 6370000000 HC RX 637 (ALT 250 FOR IP): Performed by: FAMILY MEDICINE

## 2023-03-26 PROCEDURE — 6370000000 HC RX 637 (ALT 250 FOR IP): Performed by: NURSE PRACTITIONER

## 2023-03-26 PROCEDURE — 2580000003 HC RX 258: Performed by: FAMILY MEDICINE

## 2023-03-26 RX ORDER — TRAMADOL HYDROCHLORIDE 50 MG/1
50 TABLET ORAL EVERY 8 HOURS
Status: DISCONTINUED | OUTPATIENT
Start: 2023-03-26 | End: 2023-03-26

## 2023-03-26 RX ORDER — POTASSIUM CHLORIDE 20 MEQ/1
40 TABLET, EXTENDED RELEASE ORAL
Status: COMPLETED | OUTPATIENT
Start: 2023-03-26 | End: 2023-03-26

## 2023-03-26 RX ORDER — KETOROLAC TROMETHAMINE 30 MG/ML
30 INJECTION, SOLUTION INTRAMUSCULAR; INTRAVENOUS ONCE
Status: COMPLETED | OUTPATIENT
Start: 2023-03-26 | End: 2023-03-26

## 2023-03-26 RX ORDER — FUROSEMIDE 10 MG/ML
40 INJECTION INTRAMUSCULAR; INTRAVENOUS ONCE
Status: COMPLETED | OUTPATIENT
Start: 2023-03-26 | End: 2023-03-26

## 2023-03-26 RX ORDER — POTASSIUM CHLORIDE 20 MEQ/1
40 TABLET, EXTENDED RELEASE ORAL 2 TIMES DAILY WITH MEALS
Status: DISCONTINUED | OUTPATIENT
Start: 2023-03-26 | End: 2023-03-26

## 2023-03-26 RX ORDER — ACETAMINOPHEN 325 MG/1
650 TABLET ORAL EVERY 8 HOURS
Status: DISCONTINUED | OUTPATIENT
Start: 2023-03-26 | End: 2023-03-30 | Stop reason: HOSPADM

## 2023-03-26 RX ORDER — MORPHINE SULFATE 2 MG/ML
0.5 INJECTION, SOLUTION INTRAMUSCULAR; INTRAVENOUS EVERY 6 HOURS PRN
Status: DISCONTINUED | OUTPATIENT
Start: 2023-03-26 | End: 2023-03-30 | Stop reason: HOSPADM

## 2023-03-26 RX ADMIN — ROSUVASTATIN 20 MG: 20 TABLET, FILM COATED ORAL at 21:37

## 2023-03-26 RX ADMIN — SODIUM CHLORIDE, PRESERVATIVE FREE 10 ML: 5 INJECTION INTRAVENOUS at 21:38

## 2023-03-26 RX ADMIN — ISOSORBIDE MONONITRATE 60 MG: 30 TABLET, EXTENDED RELEASE ORAL at 09:53

## 2023-03-26 RX ADMIN — POTASSIUM CHLORIDE 40 MEQ: 20 TABLET, EXTENDED RELEASE ORAL at 17:38

## 2023-03-26 RX ADMIN — ACETAMINOPHEN 650 MG: 325 TABLET ORAL at 21:37

## 2023-03-26 RX ADMIN — FUROSEMIDE 40 MG: 10 INJECTION, SOLUTION INTRAMUSCULAR; INTRAVENOUS at 09:59

## 2023-03-26 RX ADMIN — POTASSIUM CHLORIDE 40 MEQ: 20 TABLET, EXTENDED RELEASE ORAL at 09:58

## 2023-03-26 RX ADMIN — PANTOPRAZOLE SODIUM 40 MG: 40 TABLET, DELAYED RELEASE ORAL at 05:50

## 2023-03-26 RX ADMIN — DOCUSATE SODIUM 100 MG: 100 CAPSULE, LIQUID FILLED ORAL at 09:53

## 2023-03-26 RX ADMIN — POTASSIUM CHLORIDE 40 MEQ: 20 TABLET, EXTENDED RELEASE ORAL at 12:46

## 2023-03-26 RX ADMIN — CARVEDILOL 3.12 MG: 3.12 TABLET, FILM COATED ORAL at 17:38

## 2023-03-26 RX ADMIN — ONDANSETRON 4 MG: 2 INJECTION INTRAMUSCULAR; INTRAVENOUS at 12:41

## 2023-03-26 RX ADMIN — TAMSULOSIN HYDROCHLORIDE 0.4 MG: 0.4 CAPSULE ORAL at 09:53

## 2023-03-26 RX ADMIN — B-COMPLEX W/ C & FOLIC ACID TAB 1 TABLET: TAB at 09:53

## 2023-03-26 RX ADMIN — KETOROLAC TROMETHAMINE 30 MG: 30 INJECTION, SOLUTION INTRAMUSCULAR; INTRAVENOUS at 17:40

## 2023-03-26 RX ADMIN — LOSARTAN POTASSIUM 25 MG: 25 TABLET, FILM COATED ORAL at 09:53

## 2023-03-26 RX ADMIN — ACETAMINOPHEN 650 MG: 325 TABLET ORAL at 12:45

## 2023-03-26 RX ADMIN — POLYETHYLENE GLYCOL 3350 17 G: 17 POWDER, FOR SOLUTION ORAL at 09:54

## 2023-03-26 RX ADMIN — SODIUM CHLORIDE, PRESERVATIVE FREE 10 ML: 5 INJECTION INTRAVENOUS at 09:54

## 2023-03-26 RX ADMIN — FAMOTIDINE 10 MG: 20 TABLET ORAL at 21:37

## 2023-03-26 RX ADMIN — TRAMADOL HYDROCHLORIDE 50 MG: 50 TABLET ORAL at 13:14

## 2023-03-26 RX ADMIN — CARVEDILOL 3.12 MG: 3.12 TABLET, FILM COATED ORAL at 09:59

## 2023-03-26 NOTE — FLOWSHEET NOTE
B/P running lower than pts normal. Dawn Quezada NP notified by this RN at            03/26/23 1515 03/26/23 1630   Vital Signs   Temp 97.6 °F (36.4 °C)  --    Temp Source Oral  --    Heart Rate 70  --    Heart Rate Source Monitor  --    Resp 18 19   BP (!) 96/50 (!) 93/57   MAP (Calculated) 65 69   BP Location Right Arm  --    BP Method Automatic  --    Level of Consciousness 0  --    MEWS Score 2  --    Oxygen Therapy   SpO2 96 %  --    O2 Device Nasal cannula  --    O2 Flow Rate (L/min) 2 L/min  --

## 2023-03-26 NOTE — FLOWSHEET NOTE
03/25/23 2047   Pain Assessment   Pain Assessment Pain Assessment in Advanced Dementia (PAINAD)   Pain Level 7   Patient's Stated Pain Goal Unable to verbalize/indicate pain goal   Pain Assessment in Advanced Dementia (PAINAD)   Breathing Independent of Vocalization 0   Negative Vocalization 2   Facial Expression 2   Body Language 1   Consolability 2   PAINAD Score 7     Norco 5mg PO given.

## 2023-03-26 NOTE — FLOWSHEET NOTE
03/25/23 214   Pain Assessment   Pain Assessment Adult Nonverbal Pain Scale (NPVS)   Pain Level 0   Pain Assessment in Advanced Dementia (PAINAD)   Breathing Independent of Vocalization 0   Negative Vocalization 0   Facial Expression 0   Body Language 0   Consolability 0   PAINAD Score 0   Opioid-Induced Sedation   POSS Score S     Relief of pain and coughing.

## 2023-03-26 NOTE — PROGRESS NOTES
needed/ordered? No  Diet:  ADULT ORAL NUTRITION SUPPLEMENT; Breakfast, Lunch, Dinner; Standard High Calorie/High Protein Oral Supplement  ADULT DIET; Easy to Chew  VTE prophylaxis: SCD's   Code status: Full Code    Hospital Problems:  Principal Problem:    T12 compression fracture, initial encounter [facility])  Active Problems:    Chronic renal disease, stage III ([facility] [address] [906316]    Acute cystitis with hematuria    S/P kyphoplasty    Intractable pain    Mild protein-calorie malnutrition (HCC)    Hypokalemia    Hypomagnesemia    V-tach (HCC)  Resolved Problems:    * No resolved hospital problems. *      Objective:   Patient Vitals for the past 24 hrs:   Temp Pulse Resp BP SpO2   03/26/23 0745 98.4 °F (36.9 °C) 70 18 (!) 146/74 92 %   03/26/23 0346 97.4 °F (36.3 °C) 70 18 (!) 149/80 96 %   03/25/23 2316 97.3 °F (36.3 °C) 65 18 (!) 147/84 96 %   03/25/23 2242 97.5 °F (36.4 °C) 67 18 (!) 155/75 94 %   03/25/23 1455 97.9 °F (36.6 °C) 72 18 (!) 150/70 94 %   03/25/23 1219 97.3 °F (36.3 °C) 72 18 (!) 162/87 92 %       Oxygen Therapy  SpO2: 92 %  O2 Device: Nasal cannula  O2 Flow Rate (L/min): 2 L/min  End Tidal CO2: 2 (%)    Estimated body mass index is 22.2 kg/m² as calculated from the following:    Height as of this encounter: 5' 6\" (1.676 m). Weight as of this encounter: 137 lb 9.1 oz (62.4 kg). Intake/Output Summary (Last 24 hours) at 3/26/2023 0915  Last data filed at 3/25/2023 1805  Gross per 24 hour   Intake 60 ml   Output 650 ml   Net -590 ml         Physical Exam:   Blood pressure (!) 146/74, pulse 70, temperature 98.4 °F (36.9 °C), temperature source Oral, resp. rate 18, height 5' 6\" (1.676 m), weight 137 lb 9.1 oz (62.4 kg), SpO2 92 %. General:    Elderly, frail appearing  Head:  Normocephalic, atraumatic  CV:   RRR. No m/r/g. No jugular venous distension. Lungs: On 2 L NC, thick mucus expectorated during my exam.   Abdomen:   Soft, nontender, nondistended. Extremities: No cyanosis or clubbing.   No 11.1 K/uL    RBC 4.57 4.05 - 5.2 M/uL    Hemoglobin 12.7 11.7 - 15.4 g/dL    Hematocrit 39.9 35.8 - 46.3 %    MCV 87.3 82 - 102 FL    MCH 27.8 26.1 - 32.9 PG    MCHC 31.8 31.4 - 35.0 g/dL    RDW 13.3 11.9 - 14.6 %    Platelets 441 605 - 240 K/uL    MPV 10.6 9.4 - 12.3 FL    nRBC 0.00 0.0 - 0.2 K/uL    Differential Type AUTOMATED      Seg Neutrophils 60 43 - 78 %    Lymphocytes 29 13 - 44 %    Monocytes 9 4.0 - 12.0 %    Eosinophils % 2 0.5 - 7.8 %    Basophils 0 0.0 - 2.0 %    Immature Granulocytes 0 0.0 - 5.0 %    Segs Absolute 3.0 1.7 - 8.2 K/UL    Absolute Lymph # 1.5 0.5 - 4.6 K/UL    Absolute Mono # 0.4 0.1 - 1.3 K/UL    Absolute Eos # 0.1 0.0 - 0.8 K/UL    Basophils Absolute 0.0 0.0 - 0.2 K/UL    Absolute Immature Granulocyte 0.0 0.0 - 0.5 K/UL   Basic Metabolic Panel w/ Reflex to MG    Collection Time: 03/26/23  4:36 AM   Result Value Ref Range    Sodium 140 133 - 143 mmol/L    Potassium 3.4 (L) 3.5 - 5.1 mmol/L    Chloride 109 101 - 110 mmol/L    CO2 31 21 - 32 mmol/L    Anion Gap 0 (L) 2 - 11 mmol/L    Glucose 110 (H) 65 - 100 mg/dL    BUN 11 8 - 23 MG/DL    Creatinine 0.60 0.6 - 1.0 MG/DL    Est, Glom Filt Rate >60 >60 ml/min/1.73m2    Calcium 9.5 8.3 - 10.4 MG/DL   Magnesium    Collection Time: 03/26/23  4:36 AM   Result Value Ref Range    Magnesium 2.1 1.8 - 2.4 mg/dL       I have personally reviewed imaging studies:  Other Studies:  XR CHEST PORTABLE   Final Result   Improved aeration the lungs bilaterally with persistent airspace   opacity at the left lung base with small left pleural effusion. Atelectasis or   pneumonia could be considered. CT HEAD WO CONTRAST   Final Result   1. No acute intracranial abnormality. 2. Moderate cerebral and posterior fossa atrophy with mild bilateral chronic   white matter microangiopathic changes. 3. 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             XR CHEST PORTABLE   Final Result   -Interval increased mildly prominent bilateral interstitial opacities,

## 2023-03-27 LAB
ANION GAP SERPL CALC-SCNC: 2 MMOL/L (ref 2–11)
BASOPHILS # BLD: 0 K/UL (ref 0–0.2)
BASOPHILS NFR BLD: 0 % (ref 0–2)
BUN SERPL-MCNC: 20 MG/DL (ref 8–23)
CALCIUM SERPL-MCNC: 10.2 MG/DL (ref 8.3–10.4)
CHLORIDE SERPL-SCNC: 109 MMOL/L (ref 101–110)
CO2 SERPL-SCNC: 30 MMOL/L (ref 21–32)
CREAT SERPL-MCNC: 1 MG/DL (ref 0.6–1)
DIFFERENTIAL METHOD BLD: NORMAL
EOSINOPHIL # BLD: 0.2 K/UL (ref 0–0.8)
EOSINOPHIL NFR BLD: 4 % (ref 0.5–7.8)
ERYTHROCYTE [DISTWIDTH] IN BLOOD BY AUTOMATED COUNT: 13.5 % (ref 11.9–14.6)
GLUCOSE SERPL-MCNC: 112 MG/DL (ref 65–100)
HCT VFR BLD AUTO: 39.2 % (ref 35.8–46.3)
HGB BLD-MCNC: 12.5 G/DL (ref 11.7–15.4)
IMM GRANULOCYTES # BLD AUTO: 0 K/UL (ref 0–0.5)
IMM GRANULOCYTES NFR BLD AUTO: 0 % (ref 0–5)
LYMPHOCYTES # BLD: 1.6 K/UL (ref 0.5–4.6)
LYMPHOCYTES NFR BLD: 27 % (ref 13–44)
MCH RBC QN AUTO: 28.2 PG (ref 26.1–32.9)
MCHC RBC AUTO-ENTMCNC: 31.9 G/DL (ref 31.4–35)
MCV RBC AUTO: 88.5 FL (ref 82–102)
MONOCYTES # BLD: 0.5 K/UL (ref 0.1–1.3)
MONOCYTES NFR BLD: 9 % (ref 4–12)
NEUTS SEG # BLD: 3.6 K/UL (ref 1.7–8.2)
NEUTS SEG NFR BLD: 60 % (ref 43–78)
NRBC # BLD: 0 K/UL (ref 0–0.2)
PLATELET # BLD AUTO: 199 K/UL (ref 150–450)
PMV BLD AUTO: 10.9 FL (ref 9.4–12.3)
POTASSIUM SERPL-SCNC: 4.1 MMOL/L (ref 3.5–5.1)
RBC # BLD AUTO: 4.43 M/UL (ref 4.05–5.2)
SODIUM SERPL-SCNC: 141 MMOL/L (ref 133–143)
WBC # BLD AUTO: 5.9 K/UL (ref 4.3–11.1)

## 2023-03-27 PROCEDURE — 97530 THERAPEUTIC ACTIVITIES: CPT

## 2023-03-27 PROCEDURE — 2400000000

## 2023-03-27 PROCEDURE — 2700000000 HC OXYGEN THERAPY PER DAY

## 2023-03-27 PROCEDURE — 6370000000 HC RX 637 (ALT 250 FOR IP): Performed by: NURSE PRACTITIONER

## 2023-03-27 PROCEDURE — 2580000003 HC RX 258: Performed by: FAMILY MEDICINE

## 2023-03-27 PROCEDURE — 85025 COMPLETE CBC W/AUTO DIFF WBC: CPT

## 2023-03-27 PROCEDURE — 97161 PT EVAL LOW COMPLEX 20 MIN: CPT

## 2023-03-27 PROCEDURE — 80048 BASIC METABOLIC PNL TOTAL CA: CPT

## 2023-03-27 PROCEDURE — 1100000000 HC RM PRIVATE

## 2023-03-27 PROCEDURE — 36415 COLL VENOUS BLD VENIPUNCTURE: CPT

## 2023-03-27 PROCEDURE — 94760 N-INVAS EAR/PLS OXIMETRY 1: CPT

## 2023-03-27 PROCEDURE — 6370000000 HC RX 637 (ALT 250 FOR IP): Performed by: FAMILY MEDICINE

## 2023-03-27 RX ADMIN — LOSARTAN POTASSIUM 25 MG: 25 TABLET, FILM COATED ORAL at 08:57

## 2023-03-27 RX ADMIN — ISOSORBIDE MONONITRATE 60 MG: 30 TABLET, EXTENDED RELEASE ORAL at 08:57

## 2023-03-27 RX ADMIN — ACETAMINOPHEN 650 MG: 325 TABLET ORAL at 03:56

## 2023-03-27 RX ADMIN — SODIUM CHLORIDE, PRESERVATIVE FREE 10 ML: 5 INJECTION INTRAVENOUS at 21:16

## 2023-03-27 RX ADMIN — ROSUVASTATIN 20 MG: 20 TABLET, FILM COATED ORAL at 21:16

## 2023-03-27 RX ADMIN — SODIUM CHLORIDE, PRESERVATIVE FREE 10 ML: 5 INJECTION INTRAVENOUS at 08:59

## 2023-03-27 RX ADMIN — B-COMPLEX W/ C & FOLIC ACID TAB 1 TABLET: TAB at 08:57

## 2023-03-27 RX ADMIN — DOCUSATE SODIUM 100 MG: 100 CAPSULE, LIQUID FILLED ORAL at 08:58

## 2023-03-27 RX ADMIN — POLYETHYLENE GLYCOL 3350 17 G: 17 POWDER, FOR SOLUTION ORAL at 08:58

## 2023-03-27 RX ADMIN — FAMOTIDINE 10 MG: 20 TABLET ORAL at 21:17

## 2023-03-27 RX ADMIN — TAMSULOSIN HYDROCHLORIDE 0.4 MG: 0.4 CAPSULE ORAL at 08:57

## 2023-03-27 RX ADMIN — PANTOPRAZOLE SODIUM 40 MG: 40 TABLET, DELAYED RELEASE ORAL at 05:57

## 2023-03-27 RX ADMIN — ACETAMINOPHEN 650 MG: 325 TABLET ORAL at 11:30

## 2023-03-27 RX ADMIN — ACETAMINOPHEN 650 MG: 325 TABLET ORAL at 21:16

## 2023-03-27 RX ADMIN — CARVEDILOL 3.12 MG: 3.12 TABLET, FILM COATED ORAL at 08:57

## 2023-03-27 ASSESSMENT — PAIN DESCRIPTION - LOCATION: LOCATION: BACK

## 2023-03-27 ASSESSMENT — PAIN DESCRIPTION - DESCRIPTORS: DESCRIPTORS: ACHING

## 2023-03-27 ASSESSMENT — PAIN SCALES - GENERAL
PAINLEVEL_OUTOF10: 5
PAINLEVEL_OUTOF10: 0
PAINLEVEL_OUTOF10: 0

## 2023-03-27 NOTE — THERAPY EVALUATION
Walk-in shower  Bathroom Toilet: Handicap height  Bathroom Equipment: Grab bars in shower  Bathroom Accessibility: Accessible  Home Equipment: [address] Help From: Family  ADL Assistance: Needs assistance  Toileting: Independent  Homemaking Assistance: Needs assistance  Ambulation Assistance: Needs assistance  Transfer Assistance: Needs assistance  Active : No  Patient's  Info: family  Occupation: Retired    OBJECTIVE:     PAIN: VITALS / O2: Dominick Valladares / [address] / [employer]:   Pre Treatment: Some back pain reported with mobility, no number stated         Post Treatment: Same Vitals    VSS    Oxygen  O2 Therapy: Ovsrfy2L O2, nasal cannula   Purewick    RESTRICTIONS/PRECAUTIONS:  Restrictions/Precautions: Fall Risk  Required Braces or Orthoses?: Yes        Spinal Precautions: No Bending, No Lifting, No Twisting  Spinal: Thoracic Lumbar Sacral Orthotics     GROSS EVALUATION: Intact Impaired (Comments):   AROM [x]     PROM [x]    Strength []  BLE strength grossly 3+/5   Balance []  Fair sitting balance (Romain) and poor standing balance (maxAx2)   Posture [] N/A   Sensation [x]     Coordination [x]      Tone [x]     Edema [x]    Activity Tolerance []  Limited by weakness and fatigue    []      COGNITION/  PERCEPTION: Intact Impaired (Comments):   Orientation []     Vision []     Hearing []     Cognition  []       MOBILITY: I Mod I S SBA CGA Min Mod Max Total  NT x2 Comments:   Bed Mobility    Rolling [] [] [] [] [] [] [] [] [] [x] []    Supine to Sit [] [] [] [] [] [x] [] [] [] [] [] Log roll technique   Scooting [] [] [] [] [] [x] [] [] [] [] []    Sit to Supine [] [] [] [] [] [] [] [] [] [x] []    Transfers    Sit to Stand [] [] [] [] [] [] [] [x] [] [] [x]    Bed to Chair [] [] [] [] [] [] [] [x] [] [] [x] SPT with BUE support   Stand to Sit [] [] [] [] [] [] [] [x] [] [] [x]     [] [] [] [] [] [] [] [] [] [] []    I=Independent, Mod I=Modified Independent, S=Supervision, SBA=Standby Assistance, Care;Precautions; Equipment; Family Education  Education Outcome: Verbalized understanding    TIME IN/OUT:  Time In: 1216  Time Out: Maximilian 93  Minutes: 67 [facility] [place],

## 2023-03-27 NOTE — PROGRESS NOTES
Patient is in bed comfortably. No pain or distress noted. Respirations are even and unlabored on 2L NC. Patient instructed to call for assistance. Patient confused to situation, will continue to monitor.

## 2023-03-27 NOTE — PROGRESS NOTES
Patient resting in bed. Alert and oriented to person and place, Respirations even and unlabored on 2L NC. Denies any pain. No acute distress noted. No events overnight.

## 2023-03-27 NOTE — CARE COORDINATION
Msn, CM:  patient to have fitting of current brace and new Kyphoplasty on 3/29/2023. Patient's plan is home with [facility] and palliative care when medically stable. Case Management will continue to follow.

## 2023-03-27 NOTE — PROGRESS NOTES
Physical Therapy Note:    Attempted to see patient this AM for physical therapy evaluation session. Patient is waiting for orthotics to resize her TLSO which is required for mobility. She is also waiting IR consult to determine if she will have a kyphoplasty. Will follow and re-attempt as schedule permits/patient available.  Thank you,    Luciana Mayen, PT

## 2023-03-27 NOTE — PROGRESS NOTES
Patient is up in the chair. No pain or distress noted. Respirations are even and unlabored on 2L NC. Family is at bedside. Pt instructed to use call light if assistance is needed.  Will continue to monitor

## 2023-03-27 NOTE — PROGRESS NOTES
Patient is up in the chair. No pain or distress noted. Respirations are even and unlabored on 2L NC. Family is at bedside. Pt instructed to use call light if assistance is needed. Will continue to monitor.

## 2023-03-27 NOTE — PROGRESS NOTES
Hospitalist Progress Note   Admit Date:  3/21/2023  6:55 PM   Name:  Radha Whatley   Age:  80 y.o. Sex:  female  :  1931   MRN:  [id number]   Room:  [facility]    Presenting Complaint: Back Pain     Reason(s) for Admission: Hypokalemia [E87.6]  T12 compression fracture, initial encounter ([facility] [S22.080A]  Compression fracture of T12 vertebra, initial encounter ([facility] [S22.080A]  COVID-19 virus infection [U07.1]     Hospital Course:   Radha Whatley is a 80 y.o. female with medical history of  of HTN, CAD, Chr Sys. CHF, CKD stage 3, mild protein/calorie malnutrition, osteoporosis with 3 recent Thoracic compression fractures (vertebras T9, 10, 11), s/p Kyphoplasty at all 3 levels, , Feb., ,  who presented with history that she was at [facility] for rehab but staff there noticed patient was getting some irritation under her arms and across her thoracic region from the TLSO brace she was supposed to be wearing and they took it off. She began having more back pain w/o brace on. They also noticed her not eating / drinking as much, stated not feeling well, vomited once and had some hypoxia at 88% RA (pt not normally on o2 at baseline). CXR at [facility] showed PNA and they tested pt for covid: positive. They placed pt on omnicef / azithromycin antibx combo dipika 3/16/23, for 5 few days and then they had just added paxlovid the day pt arrived here 3/21/23 (only rec'd one dose PTA). Hypoxic on arrival herein the ED 88-90% RA, but 93% or more on 1L NC. Admission CXR here no acute infiltrate. Pt was dehydrated on the arrival exam, and w a low potassium;IV fld resuscitation initiated and potassium replaced. A CT thoracic spine was performed which showed a new T12 compression fracture. Seen by neurosurgery. Per note from 3/24, patient would need to wait 4 weeks for procedure. IR consulted 3/27. Subjective & 24hr Events (23): A&O  to name and place. , . Sats mid 90s on 2 liters.    Spoke with IR, per mmol/L    CO2 31 21 - 32 mmol/L    Anion Gap 0 (L) 2 - 11 mmol/L    Glucose 110 (H) 65 - 100 mg/dL    BUN 11 8 - 23 MG/DL    Creatinine 0.60 0.6 - 1.0 MG/DL    Est, Glom Filt Rate >60 >60 ml/min/1.73m2    Calcium 9.5 8.3 - 10.4 MG/DL   Magnesium    Collection Time: 03/26/23  4:36 AM   Result Value Ref Range    Magnesium 2.1 1.8 - 2.4 mg/dL   Procalcitonin    Collection Time: 03/26/23  4:36 AM   Result Value Ref Range    Procalcitonin <0.05 0.00 - 0.49 ng/mL   CBC with Auto Differential    Collection Time: 03/27/23  5:17 AM   Result Value Ref Range    WBC 5.9 4.3 - 11.1 K/uL    RBC 4.43 4.05 - 5.2 M/uL    Hemoglobin 12.5 11.7 - 15.4 g/dL    Hematocrit 39.2 35.8 - 46.3 %    MCV 88.5 82 - 102 FL    MCH 28.2 26.1 - 32.9 PG    MCHC 31.9 31.4 - 35.0 g/dL    RDW 13.5 11.9 - 14.6 %    Platelets 338 494 - 373 K/uL    MPV 10.9 9.4 - 12.3 FL    nRBC 0.00 0.0 - 0.2 K/uL    Differential Type AUTOMATED      Seg Neutrophils 60 43 - 78 %    Lymphocytes 27 13 - 44 %    Monocytes 9 4.0 - 12.0 %    Eosinophils % 4 0.5 - 7.8 %    Basophils 0 0.0 - 2.0 %    Immature Granulocytes 0 0.0 - 5.0 %    Segs Absolute 3.6 1.7 - 8.2 K/UL    Absolute Lymph # 1.6 0.5 - 4.6 K/UL    Absolute Mono # 0.5 0.1 - 1.3 K/UL    Absolute Eos # 0.2 0.0 - 0.8 K/UL    Basophils Absolute 0.0 0.0 - 0.2 K/UL    Absolute Immature Granulocyte 0.0 0.0 - 0.5 K/UL   Basic Metabolic Panel w/ Reflex to MG    Collection Time: 03/27/23  5:17 AM   Result Value Ref Range    Sodium 141 133 - 143 mmol/L    Potassium 4.1 3.5 - 5.1 mmol/L    Chloride 109 101 - 110 mmol/L    CO2 30 21 - 32 mmol/L    Anion Gap 2 2 - 11 mmol/L    Glucose 112 (H) 65 - 100 mg/dL    BUN 20 8 - 23 MG/DL    Creatinine 1.00 0.6 - 1.0 MG/DL    Est, Glom Filt Rate 53 (L) >60 ml/min/1.73m2    Calcium 10.2 8.3 - 10.4 MG/DL       I have personally reviewed imaging studies:  Other Studies:  XR CHEST PORTABLE   Final Result   Improved aeration the lungs bilaterally with persistent airspace   opacity at the left

## 2023-03-27 NOTE — CONSULTS
fifth left metatarsal bone 5/18/2021    Congestive heart failure (HCC)     Coronary artery disease involving coronary bypass graft of native heart without angina pectoris 12/14/2015    CVD (cerebrovascular disease) 8/19/2013    Dyslipidemia 8/19/2013    GERD (gastroesophageal reflux disease)     HTN (hypertension) 8/19/2013    Left breast mass 4/13/2017    Diagnostic Mammogram negative. Long term current use of anticoagulant therapy     Myocardial infarction [facility]) 2005, 2006    [facility], Dr. Xavier Garcia    Osteoporosis 8/19/2013    Positive H. pylori test 8/19/2013    Rectal polyp 10/9/2017    Anoscopy:  Revealed large cauliflower-like polyp at the anterior midline just above the dentate line, mobile soft, to have removed with Dr. Gt Farias ([facility]), benign. No more fecal leakage.        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      hysterectomy    HYSTERECTOMY, TOTAL ABDOMINAL (CERVIX REMOVED)  1972    IR KYPHOPLASTY THORACIC FIRST LEVEL  1/30/2023    IR KYPHOPLASTY THORACIC FIRST LEVEL 1/30/2023 [facility]    IR KYPHOPLASTY THORACIC FIRST LEVEL  2/9/2023    IR KYPHOPLASTY THORACIC FIRST LEVEL 2/9/2023 [facility]    LITHOTRIPSY  2008 x 2    ORTHOPEDIC SURGERY      2 rotater cuff right shoulder    [place] UNLISTED PROCEDURE CARDIAC SURGERY      by pass    SPINE SURGERY N/A 2/23/2023    T11 Kyphoplasty and Bone Biopsy performed by Cami Schmidt MD at [address]      suspension      Family History   Problem Relation Age of Onset    Emergence Delirium Neg Hx     Heart Disease Brother     Cancer Brother         prostate    Heart Disease Brother     Heart Disease Father 3- but anesthesia will be consulted first to make sure that they are agreeable to sedating her given her recent COVID infection. Plavix has been held for 5 days and will need to continue to be held prior to her procedure. Lovenox will also need to be held the evening before her procedure. She will need to be n.p.o. the midnight before. Update: 12:46 pm I was notified that anesthesia will not sedate patient for 4 weeks due to requiring oxygen for Covid.   We recommend she wear her TLSO brace and can reach out to her to reassess her next month

## 2023-03-28 LAB
ANION GAP SERPL CALC-SCNC: 2 MMOL/L (ref 2–11)
BASOPHILS # BLD: 0 K/UL (ref 0–0.2)
BASOPHILS NFR BLD: 1 % (ref 0–2)
BUN SERPL-MCNC: 22 MG/DL (ref 8–23)
CALCIUM SERPL-MCNC: 9.8 MG/DL (ref 8.3–10.4)
CHLORIDE SERPL-SCNC: 109 MMOL/L (ref 101–110)
CO2 SERPL-SCNC: 29 MMOL/L (ref 21–32)
CREAT SERPL-MCNC: 0.7 MG/DL (ref 0.6–1)
DIFFERENTIAL METHOD BLD: NORMAL
EOSINOPHIL # BLD: 0.2 K/UL (ref 0–0.8)
EOSINOPHIL NFR BLD: 4 % (ref 0.5–7.8)
ERYTHROCYTE [DISTWIDTH] IN BLOOD BY AUTOMATED COUNT: 13.3 % (ref 11.9–14.6)
GLUCOSE SERPL-MCNC: 106 MG/DL (ref 65–100)
HCT VFR BLD AUTO: 37 % (ref 35.8–46.3)
HGB BLD-MCNC: 11.8 G/DL (ref 11.7–15.4)
IMM GRANULOCYTES # BLD AUTO: 0 K/UL (ref 0–0.5)
IMM GRANULOCYTES NFR BLD AUTO: 0 % (ref 0–5)
LYMPHOCYTES # BLD: 1.6 K/UL (ref 0.5–4.6)
LYMPHOCYTES NFR BLD: 31 % (ref 13–44)
MCH RBC QN AUTO: 27.8 PG (ref 26.1–32.9)
MCHC RBC AUTO-ENTMCNC: 31.9 G/DL (ref 31.4–35)
MCV RBC AUTO: 87.3 FL (ref 82–102)
MONOCYTES # BLD: 0.5 K/UL (ref 0.1–1.3)
MONOCYTES NFR BLD: 9 % (ref 4–12)
NEUTS SEG # BLD: 2.8 K/UL (ref 1.7–8.2)
NEUTS SEG NFR BLD: 55 % (ref 43–78)
NRBC # BLD: 0 K/UL (ref 0–0.2)
PLATELET # BLD AUTO: 208 K/UL (ref 150–450)
PMV BLD AUTO: 10.5 FL (ref 9.4–12.3)
POTASSIUM SERPL-SCNC: 3.8 MMOL/L (ref 3.5–5.1)
RBC # BLD AUTO: 4.24 M/UL (ref 4.05–5.2)
SODIUM SERPL-SCNC: 140 MMOL/L (ref 133–143)
WBC # BLD AUTO: 5.1 K/UL (ref 4.3–11.1)

## 2023-03-28 PROCEDURE — 2580000003 HC RX 258: Performed by: FAMILY MEDICINE

## 2023-03-28 PROCEDURE — 99221 1ST HOSP IP/OBS SF/LOW 40: CPT | Performed by: NURSE PRACTITIONER

## 2023-03-28 PROCEDURE — 6370000000 HC RX 637 (ALT 250 FOR IP): Performed by: FAMILY MEDICINE

## 2023-03-28 PROCEDURE — 6370000000 HC RX 637 (ALT 250 FOR IP): Performed by: NURSE PRACTITIONER

## 2023-03-28 PROCEDURE — 97535 SELF CARE MNGMENT TRAINING: CPT

## 2023-03-28 PROCEDURE — 80048 BASIC METABOLIC PNL TOTAL CA: CPT

## 2023-03-28 PROCEDURE — 97530 THERAPEUTIC ACTIVITIES: CPT

## 2023-03-28 PROCEDURE — 97166 OT EVAL MOD COMPLEX 45 MIN: CPT

## 2023-03-28 PROCEDURE — 1100000000 HC RM PRIVATE

## 2023-03-28 PROCEDURE — 85025 COMPLETE CBC W/AUTO DIFF WBC: CPT

## 2023-03-28 PROCEDURE — 36415 COLL VENOUS BLD VENIPUNCTURE: CPT

## 2023-03-28 RX ADMIN — DOCUSATE SODIUM 100 MG: 100 CAPSULE, LIQUID FILLED ORAL at 08:46

## 2023-03-28 RX ADMIN — SODIUM CHLORIDE, PRESERVATIVE FREE 10 ML: 5 INJECTION INTRAVENOUS at 09:42

## 2023-03-28 RX ADMIN — POLYETHYLENE GLYCOL 3350 17 G: 17 POWDER, FOR SOLUTION ORAL at 08:44

## 2023-03-28 RX ADMIN — LOSARTAN POTASSIUM 25 MG: 25 TABLET, FILM COATED ORAL at 08:47

## 2023-03-28 RX ADMIN — ISOSORBIDE MONONITRATE 60 MG: 30 TABLET, EXTENDED RELEASE ORAL at 08:46

## 2023-03-28 RX ADMIN — SODIUM CHLORIDE, PRESERVATIVE FREE 10 ML: 5 INJECTION INTRAVENOUS at 21:28

## 2023-03-28 RX ADMIN — FAMOTIDINE 10 MG: 20 TABLET ORAL at 21:21

## 2023-03-28 RX ADMIN — TAMSULOSIN HYDROCHLORIDE 0.4 MG: 0.4 CAPSULE ORAL at 08:47

## 2023-03-28 RX ADMIN — CARVEDILOL 3.12 MG: 3.12 TABLET, FILM COATED ORAL at 16:45

## 2023-03-28 RX ADMIN — ACETAMINOPHEN 650 MG: 325 TABLET ORAL at 21:21

## 2023-03-28 RX ADMIN — POLYETHYLENE GLYCOL 3350 17 G: 17 POWDER, FOR SOLUTION ORAL at 21:22

## 2023-03-28 RX ADMIN — B-COMPLEX W/ C & FOLIC ACID TAB 1 TABLET: TAB at 08:48

## 2023-03-28 RX ADMIN — CARVEDILOL 3.12 MG: 3.12 TABLET, FILM COATED ORAL at 08:45

## 2023-03-28 RX ADMIN — ACETAMINOPHEN 650 MG: 325 TABLET ORAL at 12:16

## 2023-03-28 RX ADMIN — ROSUVASTATIN 20 MG: 20 TABLET, FILM COATED ORAL at 21:21

## 2023-03-28 RX ADMIN — PANTOPRAZOLE SODIUM 40 MG: 40 TABLET, DELAYED RELEASE ORAL at 06:04

## 2023-03-28 ASSESSMENT — PAIN SCALES - PAIN ASSESSMENT IN ADVANCED DEMENTIA (PAINAD)
FACIALEXPRESSION: 0
BREATHING: 0
BREATHING: 0
CONSOLABILITY: 0
FACIALEXPRESSION: 0
CONSOLABILITY: 0
BODYLANGUAGE: 0
BODYLANGUAGE: 0
NEGVOCALIZATION: 0
TOTALSCORE: 0
NEGVOCALIZATION: 0
TOTALSCORE: 0

## 2023-03-28 ASSESSMENT — PAIN SCALES - WONG BAKER
WONGBAKER_NUMERICALRESPONSE: 0
WONGBAKER_NUMERICALRESPONSE: 0

## 2023-03-28 NOTE — PROGRESS NOTES
Interventional Radiology Inpatient Communication    Interventional Radiology has received a consult for Kyphoplasty. Any diagnostic labs to be performed on collected specimen must be entered into [facility] prior to transport to Interventional Radiology. We anticipate this procedure will be completed on 03/29/23. Primary Care Nurse:  Diogo Smart RN    Please ensure the following is completed:    Patient is NPO: yes    Blood thinners held: yes    Patient must have working IV: yes    Patient must be in a hospital gown with snaps and be transported via stretcher to Interventional Radiology. Please send hospital chart and labels. If the patient is unable to provide consent for the procedure, please contact Interventional Radiology at [phone].

## 2023-03-28 NOTE — PROGRESS NOTES
Received report from [facility]. Patient is on 2L NC with no s/s of distress. Patient is alert  times 2, to person and time. Patient is confused saying she was waiting for the brace that she was wearing. No needs at this time. Call bell is within reach and patient is instructed to call for assistance. Will continue to monitor.

## 2023-03-28 NOTE — CONSULTS
2    ORTHOPEDIC SURGERY      2 rotater cuff right shoulder    [place] UNLISTED PROCEDURE CARDIAC SURGERY      by pass    SPINE SURGERY N/A 2/23/2023    T11 Kyphoplasty and Bone Biopsy performed by Eunice Gibbs MD at [address]      suspension     Family History   Problem Relation Age of Onset    Emergence Delirium Neg Hx     Heart Disease Brother     Cancer Brother         prostate    Heart Disease Brother     Heart Disease Father     Stroke Mother     Heart Disease Mother     Post-op Cognitive Dysfunction Neg Hx     Other Neg Hx     Breast Cancer Neg Hx     Heart Disease Brother     Heart Disease Brother     [address] Hypertherm Neg Hx     Pseudochol. Deficiency Neg Hx     Delayed Awakening Neg Hx     Post-op Nausea/Vomiting Neg Hx       Social History     Tobacco Use    Smoking status: Never    Smokeless tobacco: Never   Substance Use Topics    Alcohol use: No     Prior to Admission medications    Medication Sig Start Date End Date Taking? Authorizing Provider   phenazopyridine (PYRIDIUM) 200 MG tablet  3/13/23   Historical Provider, MD   tamsulosin (FLOMAX) 0.4 MG capsule  3/6/23   Historical Provider, MD   UNABLE TO FIND TLSO Full-Body Back Brace Support for Compression Fractures, Osteoporosis, Upper Spine Injuries 3/14/23   Payam Chandler MD   OXYGEN Inhale 2 L into the lungs 2 liters prn    Historical Provider, MD   HYDROcodone-acetaminophen (NORCO) 5-325 MG per tablet Take 1 tablet by mouth every 6 hours as needed for Pain.     Historical Provider, MD   ondansetron (ZOFRAN-ODT) 4 MG disintegrating tablet Take 1 tablet by mouth 3 times daily as needed for Nausea or Vomiting 3/3/23   Morales Sharp MD   tiZANidine (ZANAFLEX) 2 MG tablet Take 1 tablet by mouth every 8 hours as needed (back pain) 1/13/23   Quita Ladd MD   irbesartan (AVAPRO) 75 MG tablet TAKE 1 TABLET DAILY 12/7/22   Brady Egan MD   carvedilol (COREG) 3.125 MG tablet Take 1 tablet by mouth 2 times daily TAKE 1 TABLET oriented      Assessment:     [unfilled]    Signed By: ALESSANDRA Mills - CNP     March 28, 2023

## 2023-03-28 NOTE — PROGRESS NOTES
ACUTE PHYSICAL THERAPY GOALS:   (Developed with and agreed upon by patient and/or caregiver.)     (1.) Martha Gruber  will move from supine to sit and sit to supine  with INDEPENDENT within 7 treatment day(s). (2.) Martha Gruber will transfer from bed to chair and chair to bed with MINIMAL ASSIST using the least restrictive device within 7 treatment day(s). (3.) Martha Gruber will ambulate with MINIMAL ASSIST for 50 feet with the least restrictive device within 7 treatment day(s). (4.) Martha Gruber will perform standing static and dynamic balance activities x 15 minutes with MINIMAL ASSIST to improve safety within 7 treatment day(s). (5.) Martha Gruber will perform bilateral lower extremity exercises x 15 min for HEP with INDEPENDENCE to improve strength, endurance, and functional mobility within 7 treatment day(s). PHYSICAL THERAPY: Daily Note AM   (Link to Caseload Tracking: PT Visit Days : 2  Time In/Out PT Charge Capture  Rehab Caseload Tracker  Orders    Martha Gruber is a 80 y.o. female   PRIMARY DIAGNOSIS: T12 compression fracture, initial encounter ([facility]  Hypokalemia [E87.6]  T12 compression fracture, initial encounter ([facility] [S22.080A]  Compression fracture of T12 vertebra, initial encounter ([facility] [S50.191D]  COVID-19 virus infection [U07.1]       Inpatient: Payor: [address] [place] [address] / Plan: [address] [place] [address] / Product Type: *No Product type* /     ASSESSMENT:     REHAB RECOMMENDATIONS:   Recommendation to date pending progress:  Setting:  Short-term Rehab  Son wants to [facility]    Equipment:    To Be Determined     ASSESSMENT:  Ms. Titi Blake  is a 80year old female who presents supine in bed upon PT entry. Pt made good progress towards her goals today, performing mobility including bed mobility, transfers, standing balance tasks, and amb x5ft with min-modAx2. TLSO donned EOB prior to mobility.  Pt presents as functioning below her baseline, with deficits in mobility including transfers, [] [] [] [x] [] [] [] [x]     [] [] [] [] [] [] [] [] [] [] []    I=Independent, Mod I=Modified Independent, S=Supervision, SBA=Standby Assistance, CGA=Contact Guard Assistance,   Min=Minimal Assistance, Mod=Moderate Assistance, Max=Maximal Assistance, Total=Total Assistance, NT=Not Tested    BALANCE: Good Fair+ Fair Fair- Poor NT Comments   Sitting Static [x] [] [] [] [] []    Sitting Dynamic [] [x] [] [] [] [] CGA-Romain sitting EOB             Standing Static [] [] [x] [] [] []    Standing Dynamic [] [] [] [x] [x] []      GAIT: I Mod I S SBA CGA Min Mod Max Total  NT x2 Comments:   Level of Assistance [] [] [] [] [] [] [x] [] [] [] [x] Able to take steps during SPT   Distance 5  feet    DME [facility]    Gait Quality Decreased sebastián , Decreased step clearance, Decreased step length, Narrow base of support, Shuffling , and Trunk sway increased    Weightbearing Status      Stairs      I=Independent, Mod I=Modified Independent, S=Supervision, SBA=Standby Assistance, CGA=Contact Guard Assistance,   Min=Minimal Assistance, Mod=Moderate Assistance, Max=Maximal Assistance, Total=Total Assistance, NT=Not Tested    PLAN:   FREQUENCY AND DURATION: 3 times/week for duration of hospital stay or until stated goals are met, whichever comes first.    TREATMENT:   TREATMENT:   Therapeutic Activity (24 Minutes): Therapeutic activity included Rolling, Supine to Sit, Scooting, Lateral Scooting, Transfer Training, Ambulation on level ground, Sitting balance , and Standing balance to improve functional Activity tolerance, Balance, Mobility, and Strength.     TREATMENT GRID:  N/A    AFTER TREATMENT PRECAUTIONS: Bed/Chair Locked, Call light within reach, Chair, Needs within reach, RN notified, and Visitors at bedside    INTERDISCIPLINARY COLLABORATION:  RN/ PCT, OT/ DOMINGUEZ, and Rehab Attendant    EDUCATION:      TIME IN/OUT:  Time In: 1034  Time Out: [address]  Minutes: [address], PT

## 2023-03-28 NOTE — PLAN OF CARE
Problem: Discharge Planning  Goal: Discharge to home or other facility with appropriate resources  Outcome: Progressing  Flowsheets (Taken 3/28/2023 0732 by Malachi Gutiérrez RN)  Discharge to home or other facility with appropriate resources: Refer to discharge planning if patient needs post-hospital services based on physician order or complex needs related to functional status, cognitive ability or social support system     Problem: Pain  Goal: Verbalizes/displays adequate comfort level or baseline comfort level  Outcome: Progressing  Flowsheets  Taken 3/28/2023 1545 by Malachi Gutiérrez RN  Verbalizes/displays adequate comfort level or baseline comfort level: Encourage patient to monitor pain and request assistance  Taken 3/28/2023 0732 by Malachi Gutiérrez RN  Verbalizes/displays adequate comfort level or baseline comfort level:   Encourage patient to monitor pain and request assistance   Assess pain using appropriate pain scale   Administer analgesics based on type and severity of pain and evaluate response

## 2023-03-28 NOTE — PROGRESS NOTES
Patient is resting comfortably in the bed. Patient is on 2L NC with no s/s of distress. No events overnight. No needs expressed at this time. Call bell is within reach. Preparing to give bedside report.

## 2023-03-28 NOTE — PROGRESS NOTES
Comprehensive Nutrition Assessment    Type and Reason for Visit: Reassess  Malnutrition Screening Tool: Malnutrition Screen  Have you recently lost weight without trying?: 2 to 13 pounds (1 point)  Have you been eating poorly because of a decreased appetite?: Yes (1 point)  Malnutrition Screening Tool Score: 2    Nutrition Recommendations/Plan:   Meals and Snacks:  Diet: Continue current order  Nutrition Supplement Therapy:  Medical food supplement therapy:  Modify Magic Cup three times per day (this provides 290 kcal and 9 grams protein per serving)     Malnutrition Assessment:  Malnutrition Status: Mild malnutrition  Context: Acute Illness  Findings of clinical characteristics of malnutrition:   Energy Intake:  75% or less of estimated energy requirements for 7 or more days  Weight Loss:  No significant weight loss     Body Fat Loss:  Mild body fat loss Orbital, Buccal region   Muscle Mass Loss:  Mild muscle mass loss Temples (temporalis), Clavicles (pectoralis & deltoids), Scapula (trapezius)  Fluid Accumulation:  Unable to assess     Strength:  Not Performed      Nutrition Assessment:  Nutrition History: Son at bedside reports persistent poor appetite for the last 8-9 weeks. He reports pt has been struggling with nausea, which has now resolved, but poor appetite continues. He reports UBW of 140 lbs prior to onset of poor appetite. Of note, EMR shows weight fluctuating from 135-125 over the last several months. Unable to accurately quantify weight loss at this time. Do You Have Any Cultural, Yazdanism, or Ethnic Food Preferences?: No   Nutrition Background:       PMH significant for HTN, CAD, CHF, CKD3, mild protein calorie malnutrition, and osteoporosis. Pt also with recent T12 compression fracture noted. Pt presents this admission for weakness, poor appetite, nausea, and vomiting. Upon admission pt was found to be COVID-19 +. Nutrition Interval:  RD met w/pt and son at bedside.  Son reports poor po intake and states pt is eating only bites of her meals. Also states she is drinking only a few sips of ensure because she does not like them. Pt is agreeable to [address] TID. RD stressed the importance of adequate kcal/protein intake despite poor appetite to prevent further malnutrition or loss of strength.      Current Nutrition Therapies:  ADULT ORAL NUTRITION SUPPLEMENT; Breakfast, Lunch, Dinner; Standard High Calorie/High Protein Oral Supplement  ADULT DIET; Easy to Chew    Current Intake:   Average Meal Intake: 1-25% (per patient and son) Average Supplements Intake: 1-25% (son reports pt does not like ensure)      Anthropometric Measures:  Height: 5' 6\" (167.6 cm)  Current Body Wt: 125 lb (56.7 kg) (3/21), Weight source: Stated  BMI: 20.2, Underweight (BMI less than 22) age over 72     Ideal Body Weight (Kg) (Calculated): 59 kg (130 lbs), 96.2 %  Usual Body Wt:  , Percent weight change:         BMI Category Underweight (BMI less than 22) age over 72    Estimated Daily Nutrient Needs:  Energy (kcal/day): 1071-7423 (25-30 kcal/kg) (Kcal/kg Weight used: 56.7 kg Current  Protein (g/day): 59-71 (1-1.2 g/kg) Weight Used: (Ideal) 59.1 kg  Fluid (ml/day):   (1 ml/kcal)    Nutrition Diagnosis:   Inadequate energy intake related to  (decreased appetite) as evidenced by intake 0-25%    Nutrition Interventions:   Food and/or Nutrient Delivery: Continue Current Diet, Modify Oral Nutrition Supplement  Nutrition Education/Counseling: Survival skills/brief education completed  Coordination of Nutrition Care: Continue to monitor while inpatient  Plan of Care discussed with: patient, son    Goals:   Previous Goal Met: No Progress toward Goal(s)  Active Goal: PO intake 50% or greater, by next RD assessment       Nutrition Monitoring and Evaluation:      Food/Nutrient Intake Outcomes: Food and Nutrient Intake, Supplement Intake  Physical Signs/Symptoms Outcomes: Weight, Meal Time Behavior    Discharge Planning:    Continue current diet,

## 2023-03-28 NOTE — PROGRESS NOTES
ACUTE OCCUPATIONAL THERAPY GOALS:   (Developed with and agreed upon by patient and/or caregiver.)  1. Patient will verbalize and demonstrate understanding of spinal precautions with 100% accuracy during ADLs. 2. Patient will complete upper body bathing and dressing with MODERATE ASSIST, verbal cues, and adaptive equipment as needed, including back brace . 3. Patient will complete functional transfers with MINIMAL ASSIST and adaptive equipment as needed. 4. Patient will complete toileting and toilet transfer with MINIMAL ASSIST. 5. Patient will complete functional mobility of household distances with MINIMAL ASSIST and adaptive equipment as needed. 6. Patient will demonstrate ability to log roll in bed with CONTACT GUARD ASSIST and minimal verbal cues from therapist.   7. Patient will complete grooming with STAND BY ASSIST in supported sitting at sink level.    8. Patient will be able to eyad and doff TLSO brace with STAND BY ASSIST and less than 2 cues from therapist.     Timeframe: 7 visits      OCCUPATIONAL THERAPY Initial Assessment, Daily Note, and AM       OT Visit Days: 1  Acknowledge Orders  Time  OT Charge Capture  Rehab Caseload Tracker    TLSO brace when OELE Saenz Bares is a 80 y.o. female   PRIMARY DIAGNOSIS: T12 compression fracture, initial encounter ([facility]  Hypokalemia [E87.6]  T12 compression fracture, initial encounter ([facility] [S22.080A]  Compression fracture of T12 vertebra, initial encounter ([facility] [F26.594Y]  COVID-19 virus infection [U07.1]       Reason for Referral: Generalized Muscle Weakness (M62.81)  Other lack of cordination (R27.8)  Difficulty in walking, Not elsewhere classified (R26.2)  Other abnormalities of gait and mobility (R26.89)  Unspecified Lack of Coordination (R27.9)  Inpatient[facility] [address] [facility] / Plan: [address] / Product Type: *No Product type* /     ASSESSMENT:     REHAB RECOMMENDATIONS:   Recommendation to date pending Training  Therapeutic Activity  Therapeutic Exercise/HEP  Neuromuscular Re-education  Gait Training  Manual Therapy  Education         TREATMENT:     EVALUATION: MODERATE COMPLEXITY: (Untimed Charge)    TREATMENT:   Co-Treatment PT/OT necessary due to patient's decreased overall endurance/tolerance levels, as well as need for high level skilled assistance to complete functional transfers/mobility and functional tasks  Self Care (10 minutes): Patient participated in upper body bathing, lower body bathing, toileting, upper body dressing, lower body dressing, self feeding, and grooming ADLs in supported sitting and supine with maximal visual, verbal, manual, and tactile cueing to increase independence, decrease assistance required, increase activity tolerance, increase safety awareness, and maintain precautions. Patient also participated in functional mobility, functional transfer, energy conservation, and adaptive equipment training to increase independence, decrease assistance required, increase activity tolerance, increase safety awareness, and maintain precautions. TREATMENT GRID:  N/A    AFTER TREATMENT PRECAUTIONS: Bed/Chair Locked, Call light within reach, Chair, Heels floated, Needs within reach, and RN notified    INTERDISCIPLINARY COLLABORATION:  RN/ PCT, PT/ PTA, OT/ DOMINGUEZ, and RN Case Manager/      EDUCATION:  Education Given To: Patient;Staff  Education Provided: Role of Therapy;Plan of Care;Home Exercise Program;Precautions; ADL Adaptive Strategies;Transfer Training;Energy Conservation;IADL Safety;Orientation;Equipment; Fall Prevention Strategies  Education Provided Comments: back brace and safety  Education Method: Demonstration;Verbal  Barriers to Learning: None  Education Outcome: Continued education needed    TOTAL TREATMENT DURATION AND TIME:  Time In: 56  Time Out: [address]  Minutes: 10    ALDO HERNANDEZ OT   Aldo Hernandez, MS, OTR/L

## 2023-03-28 NOTE — FLOWSHEET NOTE
Patient on 2 L NC. Safety measures noted. Will continue to monitor per policy. 03/28/23 0700   Handoff   Communication Given Shift Handoff   Handoff Received From Rhoda Raza RN   Handoff Communication Face to Face; At bedside

## 2023-03-28 NOTE — PROGRESS NOTES
Hospitalist Progress Note   Admit Date:  3/21/2023  6:55 PM   Name:  Justyna Torres   Age:  80 y.o. Sex:  female  :  1931   MRN:  [id number]   Room:  [facility]/    Presenting/Chief Complaint: Back Pain     Reason(s) for Admission: Hypokalemia [E87.6]  T12 compression fracture, initial encounter ([facility] [S22.080A]  Compression fracture of T12 vertebra, initial encounter ([facility] [R52.334J]  COVID-19 virus infection [U07.1]     Hospital Course:   Justyna Torres is a 80 y.o. female with medical history of of HTN, CAD, Chr Sys. CHF, CKD stage 3, mild protein/calorie malnutrition, osteoporosis with 3 recent Thoracic compression fractures (vertebras T9, 10, 11), s/p Kyphoplasty and wearing a TSLO brace. Staff noticed irritation from brace and decided to take brace off. Patient then complained of an increase in back pain. Staff also noticed new poor oral intake and some hypoxia and CXR was done showing pNa and she also tested COVID positive. She was placed on empiric abx and Paxlovid on 3/21/23 (only received x 1 dose). Patient sent to ER for further management     In the ER: CXR with no acute infiltrates. CT Thoracic spine showing new T12 compression fracture and seen by neurosurgery who have plans for kyphoplasty 3/28/23. Hypoxic on arrival herein the ED 88-90% RA      Subjective & 24hr Events (23): Patient endorses some continued back pain that is managed with current analgesia. Son states that he is unable to care for patient at this time due to debility but does not want her to return to [address] [place] [address] Road:     Principal Problem:   T12 compression fracture, initial encounter ([facility]     S/P kyphoplasty  Plan:   -IR has postponed kyphoplasty. Will not sedate for at least 4 weeks due to O2 requirement with COVID  -Refitted TSLO brace   -Prn analgesia   -Will restart AC/Antiplatelets today   -Son has requested rehab today stating he is unable to care for patient.  Does not want to return to [facility] Active Problems:    Chronic renal disease, stage III ([facility] [[id number]]  Plan:   -Creatinine stable  -Hold nephrotoxics as able and avoid hypotension    Acute Metabolic Encephalopathy  Plan:  -CT Head unremarkable   -Alert and oriented x 3 today  -Delirium precautions    Dysphagia  Plan:  -SLP with recs for easy to chew       A/C CHF exacerbation  Plan:   -Patient stable. Received x 1 dose of lasix       CAD  Plan:  -ASA/Plavix    PT/OT evals and PPD needed/ordered? Yes  Diet:  ADULT ORAL NUTRITION SUPPLEMENT; Breakfast, Lunch, Dinner; Standard High Calorie/High Protein Oral Supplement  ADULT DIET; Easy to Chew  VTE prophylaxis: Lovenox   Code status: Full Code    Hospital Problems:  Principal Problem:    T12 compression fracture, initial encounter [facility])  Active Problems:    Chronic renal disease, stage III ([facility] [[id number]]    Acute cystitis with hematuria    S/P kyphoplasty    Intractable pain    Mild protein-calorie malnutrition (HCC)    Hypokalemia    Hypomagnesemia    V-tach (HCC)  Resolved Problems:    * No resolved hospital problems. *      Objective:   Patient Vitals for the past 24 hrs:   Temp Pulse Resp BP SpO2   03/28/23 1132 97.7 °F (36.5 °C) 69 18 107/65 98 %   03/28/23 0728 97.9 °F (36.6 °C) 70 18 (!) 152/77 98 %   03/28/23 0312 97.7 °F (36.5 °C) 69 18 (!) 140/78 98 %   03/27/23 2305 98.2 °F (36.8 °C) 70 18 122/65 99 %   03/27/23 1939 97.9 °F (36.6 °C) 70 19 (!) 130/93 97 %   03/27/23 1616 -- 72 -- 106/60 97 %   03/27/23 1514 97.5 °F (36.4 °C) 70 16 (!) 113/50 96 %       Oxygen Therapy  SpO2: 98 %  Pulse Oximeter Device Mode: Intermittent  O2 Device: Nasal cannula  O2 Flow Rate (L/min): 2 L/min  End Tidal CO2: 2 (%)    Estimated body mass index is 20.14 kg/m² as calculated from the following:    Height as of this encounter: 5' 6\" (1.676 m). Weight as of this encounter: 124 lb 12.5 oz (56.6 kg).     Intake/Output Summary (Last 24 hours) at 3/28/2023 1209  Last data filed at 3/28/2023 0611  Gross per 24

## 2023-03-29 PROCEDURE — 6370000000 HC RX 637 (ALT 250 FOR IP): Performed by: FAMILY MEDICINE

## 2023-03-29 PROCEDURE — 6370000000 HC RX 637 (ALT 250 FOR IP): Performed by: NURSE PRACTITIONER

## 2023-03-29 PROCEDURE — 1100000000 HC RM PRIVATE

## 2023-03-29 PROCEDURE — 2700000000 HC OXYGEN THERAPY PER DAY

## 2023-03-29 PROCEDURE — 2580000003 HC RX 258: Performed by: FAMILY MEDICINE

## 2023-03-29 PROCEDURE — 94760 N-INVAS EAR/PLS OXIMETRY 1: CPT

## 2023-03-29 RX ORDER — BISACODYL 10 MG
10 SUPPOSITORY, RECTAL RECTAL DAILY PRN
Status: DISCONTINUED | OUTPATIENT
Start: 2023-03-29 | End: 2023-03-30 | Stop reason: HOSPADM

## 2023-03-29 RX ORDER — SENNA PLUS 8.6 MG/1
1 TABLET ORAL 2 TIMES DAILY
Status: DISCONTINUED | OUTPATIENT
Start: 2023-03-29 | End: 2023-03-30 | Stop reason: HOSPADM

## 2023-03-29 RX ADMIN — LOSARTAN POTASSIUM 25 MG: 25 TABLET, FILM COATED ORAL at 08:01

## 2023-03-29 RX ADMIN — CARVEDILOL 3.12 MG: 3.12 TABLET, FILM COATED ORAL at 17:37

## 2023-03-29 RX ADMIN — SODIUM CHLORIDE, PRESERVATIVE FREE 10 ML: 5 INJECTION INTRAVENOUS at 20:48

## 2023-03-29 RX ADMIN — ASPIRIN 81 MG: 81 TABLET, COATED ORAL at 08:02

## 2023-03-29 RX ADMIN — DOCUSATE SODIUM 100 MG: 100 CAPSULE, LIQUID FILLED ORAL at 08:02

## 2023-03-29 RX ADMIN — ACETAMINOPHEN 650 MG: 325 TABLET ORAL at 21:29

## 2023-03-29 RX ADMIN — B-COMPLEX W/ C & FOLIC ACID TAB 1 TABLET: TAB at 11:16

## 2023-03-29 RX ADMIN — PANTOPRAZOLE SODIUM 40 MG: 40 TABLET, DELAYED RELEASE ORAL at 06:09

## 2023-03-29 RX ADMIN — POLYETHYLENE GLYCOL 3350 17 G: 17 POWDER, FOR SOLUTION ORAL at 08:02

## 2023-03-29 RX ADMIN — ROSUVASTATIN 20 MG: 20 TABLET, FILM COATED ORAL at 21:29

## 2023-03-29 RX ADMIN — ISOSORBIDE MONONITRATE 60 MG: 30 TABLET, EXTENDED RELEASE ORAL at 08:01

## 2023-03-29 RX ADMIN — FAMOTIDINE 10 MG: 20 TABLET ORAL at 21:29

## 2023-03-29 RX ADMIN — ACETAMINOPHEN 650 MG: 325 TABLET ORAL at 11:16

## 2023-03-29 RX ADMIN — TAMSULOSIN HYDROCHLORIDE 0.4 MG: 0.4 CAPSULE ORAL at 08:00

## 2023-03-29 RX ADMIN — ACETAMINOPHEN 650 MG: 325 TABLET ORAL at 03:25

## 2023-03-29 RX ADMIN — CARVEDILOL 3.12 MG: 3.12 TABLET, FILM COATED ORAL at 08:01

## 2023-03-29 ASSESSMENT — PAIN DESCRIPTION - LOCATION: LOCATION: BACK

## 2023-03-29 ASSESSMENT — PAIN SCALES - GENERAL: PAINLEVEL_OUTOF10: 5

## 2023-03-29 NOTE — PROGRESS NOTES
KYPHOPLASTY THORACIC 1 VERTEBRAL BODY    (Results Pending)       Current Meds:  Current Facility-Administered Medications   Medication Dose Route Frequency    bisacodyl (DULCOLAX) suppository 10 mg  10 mg Rectal Daily PRN    acetaminophen (TYLENOL) tablet 650 mg  650 mg Oral q8h    morphine injection 0.5 mg  0.5 mg IntraVENous Q6H PRN    docusate sodium (COLACE) capsule 100 mg  100 mg Oral Daily    polyethylene glycol (GLYCOLAX) packet 17 g  17 g Oral Daily    guaiFENesin-dextromethorphan (ROBITUSSIN DM) 100-10 MG/5ML syrup 5 mL  5 mL Oral Q4H PRN    sodium chloride flush 0.9 % injection 5-40 mL  5-40 mL IntraVENous 2 times per day    sodium chloride flush 0.9 % injection 5-40 mL  5-40 mL IntraVENous PRN    0.9 % sodium chloride infusion   IntraVENous PRN    enoxaparin (LOVENOX) injection 40 mg  40 mg SubCUTAneous Daily    ondansetron (ZOFRAN-ODT) disintegrating tablet 4 mg  4 mg Oral Q8H PRN    Or    ondansetron (ZOFRAN) injection 4 mg  4 mg IntraVENous Q6H PRN    polyethylene glycol (GLYCOLAX) packet 17 g  17 g Oral Daily PRN    acetaminophen (TYLENOL) tablet 650 mg  650 mg Oral Q6H PRN    Or    acetaminophen (TYLENOL) suppository 650 mg  650 mg Rectal Q6H PRN    famotidine (PEPCID) tablet 10 mg  10 mg Oral QHS    aspirin EC tablet 81 mg  81 mg Oral Daily    carvedilol (COREG) tablet 3.125 mg  3.125 mg Oral BID WC    clopidogrel (PLAVIX) tablet 75 mg  75 mg Oral Daily    losartan (COZAAR) tablet 25 mg  25 mg Oral Daily    isosorbide mononitrate (IMDUR) extended release tablet 60 mg  60 mg Oral Daily    multivitamin 1 tablet  1 tablet Oral Daily    pantoprazole (PROTONIX) tablet 40 mg  40 mg Oral QAM AC    rosuvastatin (CRESTOR) tablet 20 mg  20 mg Oral QHS    tamsulosin (FLOMAX) capsule 0.4 mg  0.4 mg Oral Daily    tiZANidine (ZANAFLEX) tablet 2 mg  2 mg Oral Q8H PRN    albuterol sulfate HFA (PROVENTIL;VENTOLIN;PROAIR) 108 (90 Base) MCG/ACT inhaler 2 puff  2 puff Inhalation Q6H PRN       Signed:  Twan Yang, APRN - CNP    Part of this note may have been written by using a voice dictation software. The note has been proof read but may still contain some grammatical/other typographical errors.

## 2023-03-29 NOTE — CARE COORDINATION
MSN CM:  spoke with patient's son this afternoon about rehab choices. Son is worried about hurting patient while attempting to help her. List given and decision to be made before the end of the day. Case Management will continue to follow.

## 2023-03-30 ENCOUNTER — TELEPHONE (OUTPATIENT)
Dept: NEUROSURGERY | Age: 88
End: 2023-03-30

## 2023-03-30 ENCOUNTER — CLINICAL DOCUMENTATION (OUTPATIENT)
Dept: NEUROSURGERY | Age: 88
End: 2023-03-30

## 2023-03-30 VITALS
OXYGEN SATURATION: 94 % | TEMPERATURE: 97.5 F | BODY MASS INDEX: 19.38 KG/M2 | HEART RATE: 82 BPM | SYSTOLIC BLOOD PRESSURE: 106 MMHG | HEIGHT: 66 IN | DIASTOLIC BLOOD PRESSURE: 59 MMHG | RESPIRATION RATE: 18 BRPM | WEIGHT: 120.59 LBS

## 2023-03-30 PROBLEM — R09.02 HYPOXIA: Status: ACTIVE | Noted: 2023-03-30

## 2023-03-30 LAB — POTASSIUM SERPL-SCNC: 3.4 MMOL/L (ref 3.5–5.1)

## 2023-03-30 PROCEDURE — 6370000000 HC RX 637 (ALT 250 FOR IP): Performed by: FAMILY MEDICINE

## 2023-03-30 PROCEDURE — 36415 COLL VENOUS BLD VENIPUNCTURE: CPT

## 2023-03-30 PROCEDURE — 97530 THERAPEUTIC ACTIVITIES: CPT

## 2023-03-30 PROCEDURE — 2580000003 HC RX 258: Performed by: FAMILY MEDICINE

## 2023-03-30 PROCEDURE — 84132 ASSAY OF SERUM POTASSIUM: CPT

## 2023-03-30 PROCEDURE — 97535 SELF CARE MNGMENT TRAINING: CPT

## 2023-03-30 PROCEDURE — 94761 N-INVAS EAR/PLS OXIMETRY MLT: CPT

## 2023-03-30 PROCEDURE — 6360000002 HC RX W HCPCS: Performed by: FAMILY MEDICINE

## 2023-03-30 PROCEDURE — 97112 NEUROMUSCULAR REEDUCATION: CPT

## 2023-03-30 PROCEDURE — 6370000000 HC RX 637 (ALT 250 FOR IP): Performed by: NURSE PRACTITIONER

## 2023-03-30 RX ORDER — POLYETHYLENE GLYCOL 3350 17 G/17G
17 POWDER, FOR SOLUTION ORAL DAILY PRN
Qty: 30 EACH | Refills: 0 | Status: SHIPPED | OUTPATIENT
Start: 2023-03-30 | End: 2023-04-29

## 2023-03-30 RX ORDER — GUAIFENESIN/DEXTROMETHORPHAN 100-10MG/5
5 SYRUP ORAL EVERY 4 HOURS PRN
Qty: 120 ML | Refills: 0 | Status: SHIPPED | OUTPATIENT
Start: 2023-03-30 | End: 2023-04-09

## 2023-03-30 RX ORDER — HYDROCODONE BITARTRATE AND ACETAMINOPHEN 5; 325 MG/1; MG/1
1 TABLET ORAL EVERY 6 HOURS PRN
Qty: 12 TABLET | Refills: 0 | Status: SHIPPED | OUTPATIENT
Start: 2023-03-30 | End: 2023-04-02

## 2023-03-30 RX ORDER — NALOXONE HYDROCHLORIDE 4 MG/.1ML
1 SPRAY NASAL PRN
Qty: 1 EACH | Refills: 0 | Status: SHIPPED | OUTPATIENT
Start: 2023-03-30

## 2023-03-30 RX ORDER — DOXYCYCLINE HYCLATE 100 MG
100 TABLET ORAL 2 TIMES DAILY
Qty: 10 TABLET | Refills: 0 | Status: SHIPPED | OUTPATIENT
Start: 2023-03-30 | End: 2023-04-04

## 2023-03-30 RX ORDER — POTASSIUM CHLORIDE 20 MEQ/1
20 TABLET, EXTENDED RELEASE ORAL DAILY
Qty: 4 TABLET | Refills: 0 | Status: SHIPPED | OUTPATIENT
Start: 2023-03-30 | End: 2023-04-03

## 2023-03-30 RX ORDER — POTASSIUM CHLORIDE 20 MEQ/1
40 TABLET, EXTENDED RELEASE ORAL ONCE
Status: COMPLETED | OUTPATIENT
Start: 2023-03-30 | End: 2023-03-30

## 2023-03-30 RX ORDER — TIZANIDINE 2 MG/1
2 TABLET ORAL EVERY 12 HOURS PRN
Qty: 14 TABLET | Refills: 0 | Status: SHIPPED | OUTPATIENT
Start: 2023-03-30 | End: 2023-04-06

## 2023-03-30 RX ORDER — SENNA PLUS 8.6 MG/1
1 TABLET ORAL 2 TIMES DAILY
Qty: 60 TABLET | Refills: 0 | Status: SHIPPED | OUTPATIENT
Start: 2023-03-30 | End: 2023-04-29

## 2023-03-30 RX ADMIN — ASPIRIN 81 MG: 81 TABLET, COATED ORAL at 08:17

## 2023-03-30 RX ADMIN — SENNOSIDES 8.6 MG: 8.6 TABLET, FILM COATED ORAL at 08:17

## 2023-03-30 RX ADMIN — LOSARTAN POTASSIUM 25 MG: 25 TABLET, FILM COATED ORAL at 08:17

## 2023-03-30 RX ADMIN — POTASSIUM CHLORIDE 40 MEQ: 1500 TABLET, EXTENDED RELEASE ORAL at 12:12

## 2023-03-30 RX ADMIN — CARVEDILOL 3.12 MG: 3.12 TABLET, FILM COATED ORAL at 08:17

## 2023-03-30 RX ADMIN — B-COMPLEX W/ C & FOLIC ACID TAB 1 TABLET: TAB at 08:17

## 2023-03-30 RX ADMIN — CLOPIDOGREL BISULFATE 75 MG: 75 TABLET ORAL at 08:17

## 2023-03-30 RX ADMIN — ACETAMINOPHEN 650 MG: 325 TABLET ORAL at 12:12

## 2023-03-30 RX ADMIN — PANTOPRAZOLE SODIUM 40 MG: 40 TABLET, DELAYED RELEASE ORAL at 06:04

## 2023-03-30 RX ADMIN — ISOSORBIDE MONONITRATE 60 MG: 30 TABLET, EXTENDED RELEASE ORAL at 08:17

## 2023-03-30 RX ADMIN — SODIUM CHLORIDE, PRESERVATIVE FREE 10 ML: 5 INJECTION INTRAVENOUS at 08:18

## 2023-03-30 RX ADMIN — TAMSULOSIN HYDROCHLORIDE 0.4 MG: 0.4 CAPSULE ORAL at 08:17

## 2023-03-30 RX ADMIN — ENOXAPARIN SODIUM 40 MG: 100 INJECTION SUBCUTANEOUS at 08:18

## 2023-03-30 ASSESSMENT — PAIN SCALES - PAIN ASSESSMENT IN ADVANCED DEMENTIA (PAINAD)
BODYLANGUAGE: 0
BREATHING: 0
TOTALSCORE: 0
NEGVOCALIZATION: 0
CONSOLABILITY: 0
FACIALEXPRESSION: 0

## 2023-03-30 NOTE — PROGRESS NOTES
Oximetry with exercise not feasible as PT is not getting her up at this time. At rest on room air pt was 93%, HR 68.

## 2023-03-30 NOTE — PROGRESS NOTES
ACUTE OCCUPATIONAL THERAPY GOALS:   (Developed with and agreed upon by patient and/or caregiver.)  1. Patient will verbalize and demonstrate understanding of spinal precautions with 100% accuracy during ADLs. 2. Patient will complete upper body bathing and dressing with MODERATE ASSIST, verbal cues, and adaptive equipment as needed, including back brace . 3. Patient will complete functional transfers with MINIMAL ASSIST and adaptive equipment as needed. 4. Patient will complete toileting and toilet transfer with MINIMAL ASSIST. 5. Patient will complete functional mobility of household distances with MINIMAL ASSIST and adaptive equipment as needed. 6. Patient will demonstrate ability to log roll in bed with CONTACT GUARD ASSIST and minimal verbal cues from therapist.   7. Patient will complete grooming with STAND BY ASSIST in supported sitting at sink level.    8. Patient will be able to eyad and doff TLSO brace with STAND BY ASSIST and less than 2 cues from therapist.      Timeframe: 7 visits      OCCUPATIONAL THERAPY: Daily Note PM   OT Visit Days: 2   Time In/Out  OT Charge Capture  Rehab Caseload Tracker  OT Orders    Hasmukh Lugo is a 80 y.o. female   PRIMARY DIAGNOSIS: T12 compression fracture, initial encounter ([facility]  Hypokalemia [E87.6]  T12 compression fracture, initial encounter ([facility] [S22.080A]  Compression fracture of T12 vertebra, initial encounter ([facility] [S82.668V]  COVID-19 virus infection [U07.1]       Inpatient: Payor: [address] [place] [address] / Plan: [address] [place] [address] / Product Type: *No Product type* /     ASSESSMENT:     REHAB RECOMMENDATIONS: CURRENT LEVEL OF FUNCTION:  (Most Recently Demonstrated)   Recommendation to date pending progress:  Setting:  Home Health Therapy    Equipment:    To Be Determined Bathing:  Not Tested  Dressing:  Not Tested  Feeding/Grooming:  Not Tested  Toileting:  Not Tested  Functional Mobility:  Minimal Assist x2     ASSESSMENT:  Ms. Joann Iniguez is doing fair

## 2023-03-30 NOTE — PROGRESS NOTES
Received report from [facility]. Patient is resting comfortably in the bed. Patient is on 2L NC with no s/s of distress. Patient is alert and oriented times 4. No needs expressed at this time. Call bell is within reach. Will continue to monitor.

## 2023-03-30 NOTE — PROGRESS NOTES
Patient's son contacted our office today to ask how soon patient could be scheduled for surgery. Patient is currently in the hospital waiting to be discharged today. Patient tested positive for Covid, had pneumonia and is on oxygen. I explained to the son that protocol was 6-8 weeks because of her symptoms with Covid. I informed the son that I would put a message in to Dr. Madisyn Ragland to see when the patient could be scheduled for surgery. Per Dr. Madisyn Ragland, it seems as if the hospital consulted IR during her stay and she is scheduled for a kyphoplasty with IR on 04.24.2023. I contacted the patient's son and gave him the message.

## 2023-03-30 NOTE — DISCHARGE INSTRUCTIONS
Interventional Radiology will call with appointment . Advised to f/u with PCP in a week with cbc.bmp and magnesium. Come back to ER if any worsening pain. Advised to continue using the TLSO Brace.

## 2023-03-30 NOTE — DISCHARGE SUMMARY
Hospitalist Discharge Summary   Admit Date:  3/21/2023  6:55 PM   DC Note date: 3/30/2023  Name:  Julia Ovalles   Age:  80 y.o. Sex:  female  :  1931   MRN:  [id number]   Room:  [facility]  PCP:  Bradford Damon MD    Presenting Complaint: Back Pain     Initial Admission Diagnosis: Hypokalemia [E87.6]  [facility] [S22.080A]  Compression fracture of T12 vertebra, initial encounter ([facility] [V44.311R]  COVID-19 virus infection [U07.1]     Problem List for this Hospitalization (present on admission):    Principal Problem:    T12 compression fracture, initial encounter [facility])  Active Problems:    Chronic renal disease, stage III ([facility] [110630]    Acute cystitis with hematuria    S/P kyphoplasty    Intractable pain    Mild protein-calorie malnutrition ([facility]    Hypokalemia    Hypomagnesemia    V-tach ([facility]    Hypoxia  Resolved Problems:    * No resolved hospital problems. *    H&P  Julia Ovalles is a 80 y.o. female with medical history of HTN, CAD, Chr Sys. CHF, CKD stage 3, mild protein/calorie malnutrition, osteoporosis with 3 recent Thoracic compression fractures, s/p Kyphoplasty at all 3 levels, , Feb., ,  who presented with history that she was at [facility] for STR after the latest Kyphoplasty. I reviewed the ED notes and outside notes from [facility]. Maurizio Mcgrawnam not present at time of my exam. Initially she did okay at [facility], per son report to ED provider, but was diagnosed with Covid pneumonia and moved to the Covid unit 5 days ago. She has been weak, not up as much, and her TLSO brace was rubbing her thorax near her anterior axillary region, so apparently the [facility] staff left her out of it and had her sitting on the bed side at some point over the weekend. Since this weekend she has had more back paint and not been able to be up at all. She has had poor appetite and been nauseated with some emesis and 1 episode of loose stools that she reported.   Evaluation in the ED .    Service: [facility] information:  [address] [address]  [phone]                           Time spent in patient discharge and coordination 32 minutes. Plan was discussed with son and patient. All questions answered. Patient was stable at time of discharge. Instructions given to call a physician or return if any concerns. Current Discharge Medication List        START taking these medications    Details   guaiFENesin-dextromethorphan (ROBITUSSIN DM) 100-10 MG/5ML syrup Take 5 mLs by mouth every 4 hours as needed for Cough  Qty: 120 mL, Refills: 0      polyethylene glycol (GLYCOLAX) 17 g packet Take 17 g by mouth daily as needed for Constipation  Qty: 30 each, Refills: 0      senna (SENOKOT) 8.6 MG tablet Take 1 tablet by mouth 2 times daily Hold the medication if having diarrhea  Qty: 60 tablet, Refills: 0      doxycycline hyclate (VIBRA-TABS) 100 MG tablet Take 1 tablet by mouth 2 times daily for 5 days  Qty: 10 tablet, Refills: 0      naloxone 4 MG/0.1ML LIQD nasal spray 1 spray by Nasal route as needed for Opioid Reversal  Qty: 1 each, Refills: 0      potassium chloride (KLOR-CON M) 20 MEQ extended release tablet Take 1 tablet by mouth daily for 4 days  Qty: 4 tablet, Refills: 0           CONTINUE these medications which have CHANGED    Details   HYDROcodone-acetaminophen (NORCO) 5-325 MG per tablet Take 1 tablet by mouth every 6 hours as needed for Pain for up to 3 days.  Max Daily Amount: 4 tablets  Qty: 12 tablet, Refills: 0    Comments: Reduce doses taken as pain becomes manageable  Associated Diagnoses: T12 compression fracture, initial encounter ([facility])      tiZANidine (ZANAFLEX) 2 MG tablet Take 1 tablet by mouth every 12 hours as needed (back pain)  Qty: 14 tablet, Refills: 0    Associated Diagnoses: Acute midline low back pain without sciatica           CONTINUE these medications which have NOT CHANGED    Details

## 2023-03-30 NOTE — PROGRESS NOTES
03/30/23 1354   Resting (Room Air)   SpO2 93   HR 68   During Walk (Room Air)   SpO2 95   HR 79   Walk/Assistance Device Ambulation   Rate of Dyspnea 1   Symptoms Other (comment)   Comments imbalance requiring 2 person assist and back brace.    During Walk (On O2)   Need Additional O2 Flow Rate Rows No   After Walk   SpO2 94   HR 82   Rate of Dyspnea 0   Does the Patient Qualify for Home O2 No   Does the Patient Need Portable Oxygen Tanks No

## 2023-03-30 NOTE — FLOWSHEET NOTE
Patient on 2 L NC. Safety measures noted. Will continue to monitor per policy.      03/30/23 0658   Handoff   Communication Given Shift Handoff   Handoff Received From Yung rodriguez RN   Handoff Communication Face to Face

## 2023-03-30 NOTE — PROGRESS NOTES
ACUTE PHYSICAL THERAPY GOALS:   (Developed with and agreed upon by patient and/or caregiver.)     (1.) Guerda Nina  will move from supine to sit and sit to supine  with INDEPENDENT within 7 treatment day(s). (2.) Guerda Nina will transfer from bed to chair and chair to bed with MINIMAL ASSIST using the least restrictive device within 7 treatment day(s). (3.) Guerda Nina will ambulate with MINIMAL ASSIST for 50 feet with the least restrictive device within 7 treatment day(s). (4.) Guerda Nina will perform standing static and dynamic balance activities x 15 minutes with MINIMAL ASSIST to improve safety within 7 treatment day(s). (5.) Guerda Nina will perform bilateral lower extremity exercises x 15 min for HEP with INDEPENDENCE to improve strength, endurance, and functional mobility within 7 treatment day(s). PHYSICAL THERAPY: Daily Note PM   (Link to Caseload Tracking: PT Visit Days : 3  Time In/Out PT Charge Capture  Rehab Caseload Tracker  Orders    Guerda Nina is a 80 y.o. female   PRIMARY DIAGNOSIS: T12 compression fracture, initial encounter ([facility] [address]  Hypokalemia [E87.6]  T12 compression fracture, initial encounter ([facility] [S22.080A]  Compression [facility] [Y14.853P]  COVID-19 virus infection [U07.1]       Inpatient: Payor: [address] [place] [address] / Plan: [address] [place] [address] / Product Type: *No Product type* /     ASSESSMENT:     REHAB RECOMMENDATIONS:   Recommendation to date pending progress:  Setting:  Short-term Rehab  Son wants HHPT    Equipment:    To Be Determined     ASSESSMENT:  Ms. Nicanor Cardozo  is a 80year old female who presents supine in bed upon PT entry and is agreeable to therapy. She sat to side of bed with min assist x 2. TLSO donned EOB prior to mobility. Pt stood and ambulated in room about 14' with min hand held assist x 1-2. She would probably benefit from using rolling walker for balance.   She returned supine and performed to Sit [] [] [] [] [] [x] [] [] [] [] [x]     [] [] [] [] [] [] [] [] [] [] []    I=Independent, Mod I=Modified Independent, S=Supervision, SBA=Standby Assistance, CGA=Contact Guard Assistance,   Min=Minimal Assistance, Mod=Moderate Assistance, Max=Maximal Assistance, Total=Total Assistance, NT=Not Tested    BALANCE: Good Fair+ Fair Fair- Poor NT Comments   Sitting Static [x] [] [] [] [] []    Sitting Dynamic [] [x] [] [] [] [] CGA-Romain sitting EOB             Standing Static [] [] [x] [] [] []    Standing Dynamic [] [] [] [x] [x] []      GAIT: I Mod I S SBA CGA Min Mod Max Total  NT x2 Comments:   Level of Assistance [] [] [] [] [] [x] [] [] [] [] [x]    Distance 14 feet    DME HA    Gait Quality Decreased sebastián , Decreased step clearance, Decreased step length, Narrow base of support, Shuffling , and Trunk sway increased    Weightbearing Status      Stairs      I=Independent, Mod I=Modified Independent, S=Supervision, SBA=Standby Assistance, CGA=Contact Guard Assistance,   Min=Minimal Assistance, Mod=Moderate Assistance, Max=Maximal Assistance, Total=Total Assistance, NT=Not Tested    PLAN:   FREQUENCY AND DURATION: 3 times/week for duration of hospital stay or until stated goals are met, whichever comes first.    TREATMENT:   TREATMENT:   Co-Treatment PT/OT necessary due to patient's decreased overall endurance/tolerance levels, as well as need for high level skilled assistance to complete functional transfers/mobility and functional tasks  Therapeutic Activity (25 Minutes): Therapeutic activity included Rolling, Supine to Sit, Sit to Supine, Scooting, Lateral Scooting, Transfer Training, Ambulation on level ground, Sitting balance , and Standing balance to improve functional Activity tolerance, Balance, Mobility, and Strength.     TREATMENT GRID:  N/A    AFTER TREATMENT PRECAUTIONS: Bed, Bed/Chair Locked, Call light within reach, Needs within reach, RN notified, Side rails x3, and Visitors at

## 2023-03-30 NOTE — PROGRESS NOTES
Discharge instructions explained to patient. Understanding verbalized of all discharge instructions, both written and verbal. Adequate time given for questions. All questions asked/answered. Discharge medications reviewed as appropriate and Rx's called to patients pharmacy of choice. PIV removed and bandage applied.

## 2023-03-31 ENCOUNTER — CARE COORDINATION (OUTPATIENT)
Dept: CARE COORDINATION | Facility: CLINIC | Age: 88
End: 2023-03-31

## 2023-04-03 ENCOUNTER — CARE COORDINATION (OUTPATIENT)
Dept: CARE COORDINATION | Facility: CLINIC | Age: 88
End: 2023-04-03

## 2023-04-03 ENCOUNTER — TELEPHONE (OUTPATIENT)
Dept: INTERNAL MEDICINE CLINIC | Facility: CLINIC | Age: 88
End: 2023-04-03

## 2023-04-03 DIAGNOSIS — S22.000A COMPRESSION FRACTURE OF BODY OF THORACIC VERTEBRA (HCC): ICD-10-CM

## 2023-04-03 DIAGNOSIS — R52 INTRACTABLE PAIN: ICD-10-CM

## 2023-04-03 DIAGNOSIS — Z98.890 S/P KYPHOPLASTY: ICD-10-CM

## 2023-04-03 DIAGNOSIS — S22.080D COMPRESSION FRACTURE OF T11 VERTEBRA WITH ROUTINE HEALING, SUBSEQUENT ENCOUNTER: Primary | ICD-10-CM

## 2023-04-03 DIAGNOSIS — M80.00XG AGE-RELATED OSTEOPOROSIS WITH CURRENT PATHOLOGICAL FRACTURE WITH DELAYED HEALING, SUBSEQUENT ENCOUNTER: ICD-10-CM

## 2023-04-03 NOTE — CARE COORDINATION
[facility] Care Transitions Initial Follow Up Call    Call within 2 business days of discharge: Yes    Patient Current Location:  Home:  [facility] Transition Nurse contacted the patient by telephone to perform post hospital discharge assessment. Verified name and  with patient as identifiers. Provided introduction to self, and explanation of the Care Transition Nurse role. Patient: Silvia Abad Patient : 1931   MRN: [id number]  Reason for Admission: compression fracture  Discharge Date: 3/30/23 RARS: Readmission Risk Score: 18.1      Last Discharge  Street       Date Complaint Diagnosis Description Type Department Provider    3/21/23 Back Pain Compression fracture of T12 vertebra, initial encounter ([facility] . .. ED to Hosp-Admission (Discharged) (ADMITTED) [id number] Maycol Reagan MD; Hua Phillips. .. Was this an external facility discharge? No Discharge Facility: sfd    Challenges to be reviewed by the provider   Additional needs identified to be addressed with provider: No  none               Method of communication with provider: none. Patient currently d/c home with son. CTN spoke with son in regards to discharge. Patient home and had not heard from [facility] as of yet. CTN to look into referral and/or reach out to PCP for assistance in obtaining a referral. Patient does not have follow up and had recommendations for lab work following d/c. Patients son indicated he would prefer to not have an OV due to the pain and effort for patient to endure with standing and traveling with recent frx. Care Transition Nurse reviewed discharge instructions, medical action plan, and red flags with patient who verbalized understanding. The family was given an opportunity to ask questions and does not have any further questions or concerns at this time. Were discharge instructions available to patient? Yes.  Reviewed appropriate site of care based on symptoms and resources

## 2023-04-04 DIAGNOSIS — R00.1 BRADYCARDIA: ICD-10-CM

## 2023-04-04 DIAGNOSIS — Z95.0 PACEMAKER: ICD-10-CM

## 2023-04-07 ENCOUNTER — CARE COORDINATION (OUTPATIENT)
Dept: CARE COORDINATION | Facility: CLINIC | Age: 88
End: 2023-04-07

## 2023-04-07 ENCOUNTER — TELEPHONE (OUTPATIENT)
Dept: INTERNAL MEDICINE CLINIC | Facility: CLINIC | Age: 88
End: 2023-04-07

## 2023-04-07 DIAGNOSIS — Z51.5: Primary | ICD-10-CM

## 2023-04-10 ENCOUNTER — TELEPHONE (OUTPATIENT)
Dept: INTERNAL MEDICINE CLINIC | Facility: CLINIC | Age: 88
End: 2023-04-10

## 2023-04-10 PROBLEM — Z51.5: Status: ACTIVE | Noted: 2023-04-10

## 2023-05-16 ENCOUNTER — TELEPHONE (OUTPATIENT)
Dept: INTERNAL MEDICINE CLINIC | Facility: CLINIC | Age: 88
End: 2023-05-16

## 2023-05-16 NOTE — TELEPHONE ENCOUNTER
Patient states that she is \"flat of her back\", she states she wants to be able to walk around. Patient wants to know if there is anything else that can be done?

## 2023-05-17 ENCOUNTER — TELEPHONE (OUTPATIENT)
Dept: NEUROSURGERY | Age: 88
End: 2023-05-17

## 2023-05-17 NOTE — TELEPHONE ENCOUNTER
Mr Jagdeep Fall states patient is not c/o any back pain but wants to gain strength back. She is living with her son.  Patient's son states patient remains in hospice- Advised him on 04-11-23 Dr. Daniel Bellamy placed an order in for home care /PT in which referral was d/maine

## 2023-05-17 NOTE — TELEPHONE ENCOUNTER
Pts son called requesting PT -was sure if them coming to her home would be easier since shes 89. Please call thanks. Said she doesn't have any back pain.

## 2023-05-31 ENCOUNTER — TELEPHONE (OUTPATIENT)
Age: 88
End: 2023-05-31

## 2023-05-31 NOTE — TELEPHONE ENCOUNTER
Patient  left  stating she need to cancel her device check and 6 month follow up with Dr. Jagdish Tanner. She had a stroke in January and broke multiple bones in her back in March. She has undergone 3 surgeries and pending another. She has to remain flat on her back and unable to be transported to [address] [place] [address] Rd. BIO home monitor working. Patient aware appts were canceled. She will call back once she is able to make appts.

## 2023-06-05 PROCEDURE — 93294 REM INTERROG EVL PM/LDLS PM: CPT | Performed by: INTERNAL MEDICINE

## 2023-06-05 PROCEDURE — 93296 REM INTERROG EVL PM/IDS: CPT | Performed by: INTERNAL MEDICINE

## 2023-07-18 ENCOUNTER — TELEPHONE (OUTPATIENT)
Age: 88
End: 2023-07-18

## 2023-07-18 NOTE — TELEPHONE ENCOUNTER
Patient called and said she has been in the hospital for months for broken vertebrae's. She had a stack of bills when she got home. She receives a bill from [facility] [employer] for   [employer] 12/04/2022 and it said she owes $130.00.  Please call this patient and assist.

## 2023-07-19 NOTE — TELEPHONE ENCOUNTER
Called patient and advised them that we will submit her December claim to [facility].

## 2023-08-06 NOTE — PROGRESS NOTES
Hospitalist Progress Note   Admit Date:  2023  7:07 PM   Name:  Delma Pitts   Age:  80 y.o. Sex:  female  :  1931   MRN:  [id number]   Room:      Presenting Complaint: No chief complaint on file. Reason(s) for Admission: Intractable pain [R52]     Hospital Course:   Ms. Natali Stock is a 80 y.o. female with medical history of Delma Pitts is a 80 y.o. female with medical history of HTN, CAD, CHF, CKD who presents as a direct admit from home with intractable back/flank pain. Over 2 months period, she has been diagnosed with T10, then T9 fractures, and subsequently underwent kyphoplasties on  and , respectively. 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 Neurosurgery consulted and plan for kyphoplasty and bone biopsy on  after plavix wash out. TLSO when UOOB. Subjective & 24hr Events (23): Able to urinate on her own since yesterday, no repeat catheterization required. C/o back pain today, was up to chair earlier and had some increased discomfort after getting back to bed. Family at bedside. She is doing ok otherwise. Assessment & Plan:   # T10 compression fracture, lumbar disc protrusion, T11 interosseous hemangioma with intractable pain              - S/p prior kyphoplasty on  and               - S/p kyphoplasty with Dr. Narciso Arndt on . TLSO brace when up, pain control, PT/OT. - Resume ASA/Plavix 7d post op per Neurosurgery (3/2/23)     # Acute urinary retention              - Moore out . Straight cath x1 on , has not needed repeat. # HTN // HLD              - ARB resumed today. Con't Coreg, Imdur.               - Statin     # GERD              - PPI     # CAD              - Resume ASA and Plavix POD 7 (3/2/23)     # CKD3              - Baseline    PT/OT evals and PPD needed/ordered?   Yes  Diet:  ADULT DIET; Regular  ADULT ORAL NUTRITION SUPPLEMENT; Breakfast, Lunch, Dinner; Standard High Calorie/High Protein Oral Supplement  VTE prophylaxis: SCD's   Code status: Full Code    Hospital Problems:  Principal Problem:    Intractable pain  Active Problems:    Chronic renal disease, stage III ([facility]) [996893]    Chronic systolic (congestive) heart failure    Compression fracture of T11 vertebra (H[facility])    S/P kyphoplasty    Vertebral body hemangioma    Mild protein-calorie malnutrition (HCC)    CAD (coronary artery disease)    HTN (hypertension)  Resolved Problems:    * No resolved hospital problems. *      Objective:   Patient Vitals for the past 24 hrs:   Temp Pulse Resp BP SpO2   02/26/23 1124 97.7 °F (36.5 °C) 65 18 138/62 --   02/26/23 0653 97.9 °F (36.6 °C) 69 16 (!) 144/68 95 %   02/26/23 0610 -- -- 20 -- --   02/26/23 0322 98.4 °F (36.9 °C) 69 18 (!) 141/67 95 %   02/25/23 2341 99 °F (37.2 °C) 70 18 139/79 92 %   02/25/23 2032 -- -- 20 -- --   02/25/23 2006 98.2 °F (36.8 °C) 65 19 (!) 122/53 95 %   02/25/23 1711 -- 73 -- 137/75 --   02/25/23 1509 97.5 °F (36.4 °C) 69 18 (!) 108/56 94 %       Oxygen Therapy  SpO2: 95 %  Pulse via Oximetry: 67 beats per minute  Pulse Oximeter Device Mode: Continuous  Pulse Oximeter Device Location: Left, Hand  O2 Device: None (Room air)  O2 Flow Rate (L/min): 2 L/min    Estimated body mass index is 18.34 kg/m² as calculated from the following:    Height as of this encounter: 5' 6\" (1.676 m). Weight as of this encounter: 113 lb 9.6 oz (51.5 kg). Intake/Output Summary (Last 24 hours) at 2/26/2023 1253  Last data filed at 2/26/2023 0650  Gross per 24 hour   Intake 240 ml   Output 800 ml   Net -560 ml         Physical Exam:   Blood pressure 138/62, pulse 65, temperature 97.7 °F (36.5 °C), temperature source Oral, resp. rate 18, height 5' 6\" (1.676 m), weight 113 lb 9.6 oz (51.5 kg), SpO2 95 %. General:    Well nourished, thin, appears stated age.  A little uncomfortable in bed, but NAD.  Head:  Normocephalic, atraumatic. Eyes:  Sclerae appear normal. Pupils equally round. ENT:  Nares appear normal. Moist oral mucosa  Neck:  No restricted ROM. Trachea midline   CV:   RRR. No m/r/g. No jugular venous distension. Lungs:   CTAB. No wheezing, rhonchi, or rales. Symmetric expansion. Abdomen:   Soft, nontender, nondistended. Extremities: No cyanosis or clubbing. No edema  Skin:     No rashes and normal coloration. Warm and dry. Neuro:  CN II-XII grossly intact. Psych:  Normal mood and affect. I have personally reviewed labs and tests:  Recent Labs:  No results found for this or any previous visit (from the past 48 hour(s)). I have personally reviewed imaging studies:  Other Studies:  XR THORACIC SPINE (2 VIEWS)   Final Result   Status post kyphoplasty. No obvious complication. Total fluoroscopy time: One minute 36 seconds; two fluoroscopic spot images   saved      [facility] XR TECHNOLOGIST SERVICE   Final Result      MRI LUMBAR SPINE WO CONTRAST   Final Result   1. No acute musculoskeletal abnormality or compression fracture. 2. Multilevel degenerative disc and facet disease with a prominent left   paracentral/posterior lateral disc protrusion at L3-L4. This does not result in   central stenosis. There is mild left foramina narrowing. 3. Moderate bilateral foramina narrowing at L4-L5 with mild bilateral foramina   narrowing at L5-S1. MRI THORACIC SPINE WO CONTRAST   Final Result   1. Status post kyphoplasty at T9 and T10 without obvious complication. 2. 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 3. No central spinal stenosis or cord compression. 4. Bilateral pleural effusions. CT ABDOMEN PELVIS WO CONTRAST Additional Contrast? Radiologist Recommendation   Final Result         1. 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  No evidence of   obstructive uropathy. Bilateral renal calcifications noted. 2. Bilateral pleural effusions with bibasilar atelectasis present. New when   compared with the prior exam.   3. Diverticulosis. No CT evidence of diverticulitis. XR CHEST PORTABLE   Final Result      1. No acute cardiopulmonary process identified. This exam was interpreted by a board-certified, body-imaging fellowship trained    radiologist from [address] [place] [address]. If there are any questions regarding    this examination please feel free to contact a radiologist at [phone].       [address]    2/14/2023 9:00:00 PM          Current Meds:  Current Facility-Administered Medications   Medication Dose Route Frequency    ibuprofen (ADVIL;MOTRIN) tablet 400 mg  400 mg Oral Q6H PRN    oxyCODONE (ROXICODONE) immediate release tablet 5 mg  5 mg Oral Q4H PRN    senna (SENOKOT) tablet 17.2 mg  2 tablet Oral BID    bisacodyl (DULCOLAX) suppository 10 mg  10 mg Rectal Daily PRN    HYDROcodone homatropine (HYCODAN) 5-1.5 MG/5ML solution 5 mL  5 mL Oral Q4H PRN    docusate sodium (COLACE) capsule 100 mg  100 mg Oral Daily    rosuvastatin (CRESTOR) tablet 20 mg  20 mg Oral Nightly    guaiFENesin (MUCINEX) extended release tablet 600 mg  600 mg Oral BID    [Held by provider] aspirin EC tablet 81 mg  81 mg Oral Daily    pantoprazole (PROTONIX) tablet 40 mg  40 mg Oral Daily    carvedilol (COREG) tablet 3.125 mg  3.125 mg Oral BID with meals    isosorbide mononitrate (IMDUR) extended release tablet 60 mg  60 mg Oral Daily    tiZANidine (ZANAFLEX) tablet 2 mg  2 mg Oral Q8H PRN    lidocaine 4 % external patch 1 patch  1 patch TransDERmal Daily    losartan (COZAAR) tablet 25 mg  25 mg Oral Daily    [Held by provider] spironolactone (ALDACTONE) tablet 25 mg  25 mg Oral Daily    sodium chloride flush 0.9 % injection 5-40 mL  5-40 mL IntraVENous 2 times per day    sodium chloride flush 0.9 % injection 5-40 mL  5-40 mL IntraVENous PRN 0.9 % sodium chloride infusion   IntraVENous PRN    ondansetron (ZOFRAN-ODT) disintegrating tablet 4 mg  4 mg Oral Q8H PRN    Or    ondansetron (ZOFRAN) injection 4 mg  4 mg IntraVENous Q6H PRN    polyethylene glycol (GLYCOLAX) packet 17 g  17 g Oral Daily PRN    morphine injection 2 mg  2 mg IntraVENous Q4H PRN       Signed:  Belia Canas MD    Part of this note may have been written by using a voice dictation software. The note has been proof read but may still contain some grammatical/other typographical errors. 07-Aug-2023 11:36

## 2023-08-07 ENCOUNTER — TELEPHONE (OUTPATIENT)
Dept: NEUROSURGERY | Age: 88
End: 2023-08-07

## 2023-08-07 NOTE — TELEPHONE ENCOUNTER
Advised patient on  home exercises and strengthening with brace on. Per Mr HinsonGuevara Harrison the patient has no pain- also advised patient to follow up with PCP for her visual and hearing changes.  The patient denies any pain

## 2023-08-07 NOTE — TELEPHONE ENCOUNTER
Ms Gamino Most called to see if she needs to come in to be re-evaluated for surgery. She states she has been laying in bed and is tired of laying in bed. Please advise.

## 2023-09-05 PROCEDURE — 93294 REM INTERROG EVL PM/LDLS PM: CPT | Performed by: INTERNAL MEDICINE

## 2023-09-05 PROCEDURE — 93296 REM INTERROG EVL PM/IDS: CPT | Performed by: INTERNAL MEDICINE

## 2023-09-07 DIAGNOSIS — Z95.0 PACEMAKER: ICD-10-CM

## 2023-09-07 DIAGNOSIS — R00.1 BRADYCARDIA: ICD-10-CM

## 2023-12-12 ENCOUNTER — APPOINTMENT (OUTPATIENT)
Dept: GENERAL RADIOLOGY | Age: 88
End: 2023-12-12
Payer: COMMERCIAL

## 2023-12-12 ENCOUNTER — HOSPITAL ENCOUNTER (EMERGENCY)
Age: 88
Discharge: HOME | End: 2023-12-12
Attending: EMERGENCY MEDICINE
Payer: COMMERCIAL

## 2023-12-12 ENCOUNTER — APPOINTMENT (OUTPATIENT)
Dept: CT IMAGING | Age: 88
End: 2023-12-12
Payer: COMMERCIAL

## 2023-12-12 VITALS
RESPIRATION RATE: 23 BRPM | WEIGHT: 120 LBS | HEART RATE: 71 BPM | DIASTOLIC BLOOD PRESSURE: 48 MMHG | SYSTOLIC BLOOD PRESSURE: 111 MMHG | OXYGEN SATURATION: 93 % | HEIGHT: 66 IN | TEMPERATURE: 97.4 F | BODY MASS INDEX: 19.29 KG/M2

## 2023-12-12 DIAGNOSIS — N39.0 URINARY TRACT INFECTION WITHOUT HEMATURIA, SITE UNSPECIFIED: Primary | ICD-10-CM

## 2023-12-12 LAB
ALBUMIN SERPL-MCNC: 2.8 G/DL (ref 3.2–4.6)
ALBUMIN/GLOB SERPL: 0.7 (ref 0.4–1.6)
ALP SERPL-CCNC: 68 U/L (ref 50–136)
ALT SERPL-CCNC: 10 U/L (ref 12–65)
ANION GAP SERPL CALC-SCNC: 5 MMOL/L (ref 2–11)
AST SERPL-CCNC: 6 U/L (ref 15–37)
BACTERIA URNS QL MICRO: ABNORMAL /HPF
BASOPHILS # BLD: 0 K/UL (ref 0–0.2)
BASOPHILS NFR BLD: 1 % (ref 0–2)
BILIRUB SERPL-MCNC: 0.7 MG/DL (ref 0.2–1.1)
BILIRUB UR QL: NEGATIVE
BUN SERPL-MCNC: 14 MG/DL (ref 8–23)
CALCIUM SERPL-MCNC: 11 MG/DL (ref 8.3–10.4)
CASTS URNS QL MICRO: ABNORMAL /LPF
CHLORIDE SERPL-SCNC: 107 MMOL/L (ref 103–113)
CO2 SERPL-SCNC: 27 MMOL/L (ref 21–32)
CREAT SERPL-MCNC: 0.9 MG/DL (ref 0.6–1)
D DIMER PPP FEU-MCNC: 1.86 UG/ML(FEU)
DIFFERENTIAL METHOD BLD: NORMAL
EKG ATRIAL RATE: 70 BPM
EKG DIAGNOSIS: NORMAL
EKG P AXIS: -21 DEGREES
EKG P-R INTERVAL: 190 MS
EKG Q-T INTERVAL: 454 MS
EKG QRS DURATION: 87 MS
EKG QTC CALCULATION (BAZETT): 487 MS
EKG R AXIS: 25 DEGREES
EKG T AXIS: 91 DEGREES
EKG VENTRICULAR RATE: 69 BPM
EOSINOPHIL # BLD: 0.2 K/UL (ref 0–0.8)
EOSINOPHIL NFR BLD: 3 % (ref 0.5–7.8)
EPI CELLS #/AREA URNS HPF: ABNORMAL /HPF
ERYTHROCYTE [DISTWIDTH] IN BLOOD BY AUTOMATED COUNT: 12.3 % (ref 11.9–14.6)
GLOBULIN SER CALC-MCNC: 4.3 G/DL (ref 2.8–4.5)
GLUCOSE SERPL-MCNC: 76 MG/DL (ref 65–100)
GLUCOSE UR QL STRIP.AUTO: 100 MG/DL
HCT VFR BLD AUTO: 42.7 % (ref 35.8–46.3)
HGB BLD-MCNC: 14.2 G/DL (ref 11.7–15.4)
IMM GRANULOCYTES # BLD AUTO: 0 K/UL (ref 0–0.5)
IMM GRANULOCYTES NFR BLD AUTO: 0 % (ref 0–5)
KETONES UR-MCNC: 15 MG/DL
LEUKOCYTE ESTERASE UR QL STRIP: ABNORMAL
LYMPHOCYTES # BLD: 1.4 K/UL (ref 0.5–4.6)
LYMPHOCYTES NFR BLD: 18 % (ref 13–44)
MCH RBC QN AUTO: 29.3 PG (ref 26.1–32.9)
MCHC RBC AUTO-ENTMCNC: 33.3 G/DL (ref 31.4–35)
MCV RBC AUTO: 88 FL (ref 82–102)
MONOCYTES # BLD: 0.6 K/UL (ref 0.1–1.3)
MONOCYTES NFR BLD: 7 % (ref 4–12)
NEUTS SEG # BLD: 5.4 K/UL (ref 1.7–8.2)
NEUTS SEG NFR BLD: 71 % (ref 43–78)
NITRITE UR QL: POSITIVE
NRBC # BLD: 0 K/UL (ref 0–0.2)
NT PRO BNP: 5549 PG/ML
PH UR: 6.5 (ref 5–9)
PLATELET # BLD AUTO: 204 K/UL (ref 150–450)
PMV BLD AUTO: 10.3 FL (ref 9.4–12.3)
POTASSIUM SERPL-SCNC: 3.8 MMOL/L (ref 3.5–5.1)
PROT SERPL-MCNC: 7.1 G/DL (ref 6.3–8.2)
PROT UR QL: 30 MG/DL
RBC # BLD AUTO: 4.85 M/UL (ref 4.05–5.2)
RBC # UR STRIP: ABNORMAL
RBC #/AREA URNS HPF: ABNORMAL /HPF
SARS-COV-2 RDRP RESP QL NAA+PROBE: NOT DETECTED
SERVICE CMNT-IMP: ABNORMAL
SODIUM SERPL-SCNC: 139 MMOL/L (ref 136–146)
SOURCE: NORMAL
SP GR UR: 1.02 (ref 1–1.02)
TROPONIN I SERPL HS-MCNC: 20.4 PG/ML (ref 0–14)
TROPONIN I SERPL HS-MCNC: 20.4 PG/ML (ref 0–14)
UROBILINOGEN UR QL: 1 EU/DL (ref 0.2–1)
WBC # BLD AUTO: 7.6 K/UL (ref 4.3–11.1)
WBC URNS QL MICRO: ABNORMAL /HPF

## 2023-12-12 PROCEDURE — 81015 MICROSCOPIC EXAM OF URINE: CPT

## 2023-12-12 PROCEDURE — 81001 URINALYSIS AUTO W/SCOPE: CPT

## 2023-12-12 PROCEDURE — 74176 CT ABD & PELVIS W/O CONTRAST: CPT

## 2023-12-12 PROCEDURE — 71045 X-RAY EXAM CHEST 1 VIEW: CPT

## 2023-12-12 PROCEDURE — 84484 ASSAY OF TROPONIN QUANT: CPT

## 2023-12-12 PROCEDURE — 6360000004 HC RX CONTRAST MEDICATION: Performed by: EMERGENCY MEDICINE

## 2023-12-12 PROCEDURE — 87635 SARS-COV-2 COVID-19 AMP PRB: CPT

## 2023-12-12 PROCEDURE — 93010 ELECTROCARDIOGRAM REPORT: CPT | Performed by: INTERNAL MEDICINE

## 2023-12-12 PROCEDURE — 99285 EMERGENCY DEPT VISIT HI MDM: CPT

## 2023-12-12 PROCEDURE — 80053 COMPREHEN METABOLIC PANEL: CPT

## 2023-12-12 PROCEDURE — 81003 URINALYSIS AUTO W/O SCOPE: CPT

## 2023-12-12 PROCEDURE — 93005 ELECTROCARDIOGRAM TRACING: CPT | Performed by: EMERGENCY MEDICINE

## 2023-12-12 PROCEDURE — 96374 THER/PROPH/DIAG INJ IV PUSH: CPT

## 2023-12-12 PROCEDURE — 85379 FIBRIN DEGRADATION QUANT: CPT

## 2023-12-12 PROCEDURE — 6360000002 HC RX W HCPCS: Performed by: EMERGENCY MEDICINE

## 2023-12-12 PROCEDURE — 71260 CT THORAX DX C+: CPT

## 2023-12-12 PROCEDURE — 83880 ASSAY OF NATRIURETIC PEPTIDE: CPT

## 2023-12-12 PROCEDURE — 2580000003 HC RX 258: Performed by: EMERGENCY MEDICINE

## 2023-12-12 PROCEDURE — 85025 COMPLETE CBC W/AUTO DIFF WBC: CPT

## 2023-12-12 RX ORDER — LEVOFLOXACIN 500 MG/1
500 TABLET, FILM COATED ORAL DAILY
Qty: 10 TABLET | Refills: 0 | Status: SHIPPED | OUTPATIENT
Start: 2023-12-12 | End: 2023-12-22

## 2023-12-12 RX ADMIN — WATER 1000 MG: 1 INJECTION INTRAMUSCULAR; INTRAVENOUS; SUBCUTANEOUS at 18:07

## 2023-12-12 RX ADMIN — IOPAMIDOL 75 ML: 755 INJECTION, SOLUTION INTRAVENOUS at 16:14

## 2023-12-12 ASSESSMENT — PAIN SCALES - GENERAL: PAINLEVEL_OUTOF10: 6

## 2023-12-12 ASSESSMENT — PAIN DESCRIPTION - ORIENTATION: ORIENTATION: LEFT

## 2023-12-12 ASSESSMENT — PAIN - FUNCTIONAL ASSESSMENT: PAIN_FUNCTIONAL_ASSESSMENT: 0-10

## 2023-12-12 ASSESSMENT — PAIN DESCRIPTION - LOCATION: LOCATION: FLANK

## 2023-12-12 ASSESSMENT — PAIN DESCRIPTION - DESCRIPTORS: DESCRIPTORS: SHARP;STABBING

## 2023-12-12 ASSESSMENT — ENCOUNTER SYMPTOMS: BACK PAIN: 1

## 2023-12-12 NOTE — ED TRIAGE NOTES
Pt arrives from home via EMS w/ cc of L flank pain (worse w/ deep breath), cough, weakness x 3 days. Pt denies polyuria, dysuria. HX: HTN, Pacemaker, CABG (1988).  VSS Pt bgl 102 Pt A&O x 4

## 2023-12-12 NOTE — ED NOTES
Pt report and care transferred to [facility] at this time.       Tristan Loya RN  12/12/23 2912

## 2023-12-12 NOTE — ED PROVIDER NOTES
GERD (gastroesophageal reflux disease)     HTN (hypertension) 8/19/2013    Left breast mass 4/13/2017    Diagnostic Mammogram negative. Long term current use of anticoagulant therapy     Myocardial infarction [facility]) 2005, 2006    [facility], Dr. Rigo Stevenson    Osteoporosis 8/19/2013    Positive H. pylori test 8/19/2013    Rectal polyp 10/9/2017    Anoscopy:  Revealed large cauliflower-like polyp at the anterior midline just above the dentate line, mobile soft, to have removed with Dr. Sunny Zhang ([facility]), benign. No more fecal leakage. 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[facility])     [address]        Past Surgical History:   Procedure Laterality Date    APPENDECTOMY      BLADDER SUSPENSION  2005    COLONOSCOPY  2012    [address] [facility] [employer]    @ [address] [address]      hysterectomy    HYSTERECTOMY, TOTAL ABDOMINAL (CERVIX REMOVED)  1972    IR KYPHOPLASTY THORACIC FIRST LEVEL  1/30/2023    IR KYPHOPLASTY THORACIC FIRST LEVEL 1/30/2023 [facility]    IR KYPHOPLASTY THORACIC FIRST LEVEL  2/9/2023    IR KYPHOPLASTY THORACIC FIRST LEVEL 2/9/2023 [facility]    LITHOTRIPSY  2008 x 2    ORTHOPEDIC SURGERY      2 rotater cuff right shoulder    CT UNLISTED PROCEDURE CARDIAC SURGERY      by pass    SPINE SURGERY N/A 2/23/2023    T11 Kyphoplasty and Bone Biopsy performed by Nawaf Hernandes MD at [address] [place] [address]        Social History     Socioeconomic History    Marital status:     Tobacco Use    Smoking status: Never    Smokeless tobacco: Never   Vaping Use    Vaping Use: Never used   Substance and Sexual Activity    Alcohol use: No    Drug use: No     Social Determinants of Health     Financial Resource Strain: High Risk (2/13/2023)    Overall Financial Resource Strain (CARDIA)     Difficulty of Paying Living Expenses: Very hard

## 2024-03-08 PROCEDURE — 93294 REM INTERROG EVL PM/LDLS PM: CPT | Performed by: INTERNAL MEDICINE

## 2024-03-08 PROCEDURE — 93296 REM INTERROG EVL PM/IDS: CPT | Performed by: INTERNAL MEDICINE

## 2024-03-20 ENCOUNTER — TELEPHONE (OUTPATIENT)
Dept: INTERNAL MEDICINE CLINIC | Facility: CLINIC | Age: 89
End: 2024-03-20

## 2024-03-20 NOTE — TELEPHONE ENCOUNTER
----- Message from Aida De Santiago sent at 3/20/2024 11:44 AM EDT -----  Subject: Appointment Request    Reason for Call: Established Patient Appointment needed: Routine Existing   Condition Follow Up    QUESTIONS    Reason for appointment request? Available appointments did not meet   patient need     Additional Information for Provider? Pt would like to schedule an appt   with Dr. Bain as soon as possible. She states that she is under Hospice   care but would not like to be under them anymore there she would like a   discussion with PCP. Please return call to pt to schedule.   ---------------------------------------------------------------------------  --------------  CALL BACK INFO  [phone]; OK to leave message on voicemail  ---------------------------------------------------------------------------  --------------  SCRIPT ANSWERS

## 2024-04-04 ENCOUNTER — OFFICE VISIT (OUTPATIENT)
Dept: INTERNAL MEDICINE CLINIC | Facility: CLINIC | Age: 89
End: 2024-04-04
Payer: COMMERCIAL

## 2024-04-04 VITALS
BODY MASS INDEX: 19.37 KG/M2 | TEMPERATURE: 99 F | SYSTOLIC BLOOD PRESSURE: 126 MMHG | HEIGHT: 66 IN | RESPIRATION RATE: 16 BRPM | OXYGEN SATURATION: 93 % | HEART RATE: 69 BPM | DIASTOLIC BLOOD PRESSURE: 80 MMHG

## 2024-04-04 DIAGNOSIS — S22.000A COMPRESSION FRACTURE OF BODY OF THORACIC VERTEBRA (HCC): ICD-10-CM

## 2024-04-04 DIAGNOSIS — I50.22 CHRONIC SYSTOLIC (CONGESTIVE) HEART FAILURE (HCC): ICD-10-CM

## 2024-04-04 DIAGNOSIS — I47.20 V-TACH (HCC): ICD-10-CM

## 2024-04-04 DIAGNOSIS — N39.0 URINARY TRACT INFECTION WITHOUT HEMATURIA, SITE UNSPECIFIED: Primary | ICD-10-CM

## 2024-04-04 DIAGNOSIS — Z51.5: ICD-10-CM

## 2024-04-04 DIAGNOSIS — E44.1 MILD PROTEIN-CALORIE MALNUTRITION (HCC): ICD-10-CM

## 2024-04-04 DIAGNOSIS — I25.708 CORONARY ARTERY DISEASE OF BYPASS GRAFT OF NATIVE HEART WITH STABLE ANGINA PECTORIS (HCC): ICD-10-CM

## 2024-04-04 DIAGNOSIS — N18.30 STAGE 3 CHRONIC KIDNEY DISEASE, UNSPECIFIED WHETHER STAGE 3A OR 3B CKD (HCC): ICD-10-CM

## 2024-04-04 LAB
ERYTHROCYTE [DISTWIDTH] IN BLOOD BY AUTOMATED COUNT: 12.9 % (ref 11.9–14.6)
HCT VFR BLD AUTO: 39.4 % (ref 35.8–46.3)
HGB BLD-MCNC: 12.3 G/DL (ref 11.7–15.4)
MCH RBC QN AUTO: 27.2 PG (ref 26.1–32.9)
MCHC RBC AUTO-ENTMCNC: 31.2 G/DL (ref 31.4–35)
MCV RBC AUTO: 87.2 FL (ref 82–102)
NRBC # BLD: 0 K/UL (ref 0–0.2)
PLATELET # BLD AUTO: 269 K/UL (ref 150–450)
PMV BLD AUTO: 11.3 FL (ref 9.4–12.3)
RBC # BLD AUTO: 4.52 M/UL (ref 4.05–5.2)
WBC # BLD AUTO: 9.4 K/UL (ref 4.3–11.1)

## 2024-04-04 PROCEDURE — 99214 OFFICE O/P EST MOD 30 MIN: CPT | Performed by: INTERNAL MEDICINE

## 2024-04-04 PROCEDURE — 96372 THER/PROPH/DIAG INJ SC/IM: CPT | Performed by: INTERNAL MEDICINE

## 2024-04-04 PROCEDURE — 1123F ACP DISCUSS/DSCN MKR DOCD: CPT | Performed by: INTERNAL MEDICINE

## 2024-04-04 RX ORDER — ACETAMINOPHEN 325 MG/1
650 TABLET ORAL EVERY 6 HOURS PRN
COMMUNITY

## 2024-04-04 RX ORDER — CEFTRIAXONE 500 MG/1
500 INJECTION, POWDER, FOR SOLUTION INTRAMUSCULAR; INTRAVENOUS ONCE
Status: COMPLETED | OUTPATIENT
Start: 2024-04-04 | End: 2024-04-04

## 2024-04-04 RX ORDER — CHOLECALCIFEROL (VITAMIN D3) 125 MCG
5 CAPSULE ORAL NIGHTLY
COMMUNITY
Start: 2024-03-21

## 2024-04-04 RX ORDER — TIZANIDINE 2 MG/1
2 TABLET ORAL EVERY 6 HOURS PRN
COMMUNITY

## 2024-04-04 RX ADMIN — CEFTRIAXONE 500 MG: 500 INJECTION, POWDER, FOR SOLUTION INTRAMUSCULAR; INTRAVENOUS at 15:57

## 2024-04-04 NOTE — PROGRESS NOTES
DAILY WITH MEALS, Disp: 180 tablet, Rfl: 3    isosorbide mononitrate (IMDUR) 60 MG extended release tablet, Take 1 tablet by mouth daily, Disp: 90 tablet, Rfl: 3    Multiple Vitamins-Minerals (HAIR SKIN AND NAILS FORMULA PO), Take by mouth, Disp: , Rfl:     pantoprazole (PROTONIX) 20 MG tablet, Take 1 tablet by mouth daily, Disp: 90 tablet, Rfl: 3    potassium chloride (KLOR-CON M) 20 MEQ extended release tablet, Take 1 tablet by mouth daily for 4 days, Disp: 4 tablet, Rfl: 0    phenazopyridine (PYRIDIUM) 200 MG tablet, , Disp: , Rfl:     tamsulosin (FLOMAX) 0.4 MG capsule, , Disp: , Rfl:     clopidogrel (PLAVIX) 75 MG tablet, Take 1 tablet by mouth daily The details of the medication are not available because there are pending changes by a home health clinician. (Patient not taking: Reported on 4/4/2024), Disp: 90 tablet, Rfl: 3    rosuvastatin (CRESTOR) 20 MG tablet, Take 1 tablet by mouth daily TAKE 1 TABLET NIGHTLY (Patient not taking: Reported on 4/4/2024), Disp: 90 tablet, Rfl: 3    aspirin 81 MG EC tablet, Take 1 tablet by mouth daily (Patient not taking: Reported on 4/4/2024), Disp: , Rfl:          Review of Systems   Constitutional:  Positive for fatigue.   Genitourinary:  Positive for dysuria.   Neurological:  Positive for weakness.       No results found for this or any previous visit (from the past 2160 hour(s)).    Vitals:    04/04/24 1422   BP: 126/80   Pulse: 69   Resp: 16   Temp: 99 °F (37.2 °C)   SpO2: 93%   Height: 1.676 m (5' 6\")     Wt Readings from Last 3 Encounters:   12/12/23 54.4 kg (120 lb)   03/30/23 54.7 kg (120 lb 9.5 oz)   03/14/23 56.2 kg (124 lb)     BP Readings from Last 3 Encounters:   04/04/24 126/80   12/12/23 (!) 111/48   03/30/23 (!) 106/59     Physical Exam  Vitals and nursing note reviewed. Exam conducted with a chaperone present.   Constitutional:       General: She is not in acute distress.     Appearance: She is not toxic-appearing.   Cardiovascular:      Rate and Rhythm:

## 2024-04-05 LAB
25(OH)D3 SERPL-MCNC: 147.4 NG/ML (ref 30–100)
ALBUMIN SERPL-MCNC: 2.7 G/DL (ref 3.2–4.6)
ALBUMIN/GLOB SERPL: 0.7 (ref 0.4–1.6)
ALP SERPL-CCNC: 73 U/L (ref 50–136)
ALT SERPL-CCNC: 10 U/L (ref 12–65)
ANION GAP SERPL CALC-SCNC: 7 MMOL/L (ref 2–11)
AST SERPL-CCNC: 13 U/L (ref 15–37)
BILIRUB SERPL-MCNC: 0.4 MG/DL (ref 0.2–1.1)
BUN SERPL-MCNC: 15 MG/DL (ref 8–23)
CALCIUM SERPL-MCNC: 11.2 MG/DL (ref 8.3–10.4)
CHLORIDE SERPL-SCNC: 104 MMOL/L (ref 103–113)
CO2 SERPL-SCNC: 28 MMOL/L (ref 21–32)
CREAT SERPL-MCNC: 1.1 MG/DL (ref 0.6–1)
GLOBULIN SER CALC-MCNC: 4 G/DL (ref 2.8–4.5)
GLUCOSE SERPL-MCNC: 89 MG/DL (ref 65–100)
POTASSIUM SERPL-SCNC: 3.1 MMOL/L (ref 3.5–5.1)
PROT SERPL-MCNC: 6.7 G/DL (ref 6.3–8.2)
SODIUM SERPL-SCNC: 139 MMOL/L (ref 136–146)
VIT B12 SERPL-MCNC: 510 PG/ML (ref 193–986)

## 2024-04-07 LAB
BACTERIA SPEC CULT: ABNORMAL
BACTERIA SPEC CULT: ABNORMAL
SERVICE CMNT-IMP: ABNORMAL

## 2024-04-10 LAB
AEROBE ID RESULT 1: ABNORMAL
ANTIMICROBIAL SUSCEPTIBILITY: ABNORMAL
BACTERIA ISLT: ABNORMAL
SPECIMEN SOURCE: ABNORMAL

## 2024-04-12 LAB
BACTERIA SPEC CULT: ABNORMAL
SERVICE CMNT-IMP: ABNORMAL

## 2024-04-15 ENCOUNTER — TELEPHONE (OUTPATIENT)
Dept: INTERNAL MEDICINE CLINIC | Facility: CLINIC | Age: 89
End: 2024-04-15

## (undated) DEVICE — C-ARM: Brand: UNBRANDED

## (undated) DEVICE — BONE CEMENT CX01B KYPHON XPEDE W MXR US: Brand: KYPHON® XPEDE™ BONE CEMENT AND KYPHON® MIXER PACK

## (undated) DEVICE — NEEDLE HYPO 25GA L1.5IN BLU POLYPR HUB S STL REG BVL STR

## (undated) DEVICE — GLOVE SURG SZ 75 L12IN FNGR THK79MIL GRN LTX FREE

## (undated) DEVICE — BONE TAMP KIT KPX103PB FF X2 10/3 1 STP: Brand: KYPHOPAK™ TRAY

## (undated) DEVICE — SHEET, T, LAPAROTOMY, STERILE: Brand: MEDLINE

## (undated) DEVICE — GOWN,BREATHABLE SLV,AURORA,LG,STRL: Brand: MEDLINE

## (undated) DEVICE — SURGICAL PROCEDURE PACK BASIC ST FRANCIS

## (undated) DEVICE — INTENDED FOR TISSUE SEPARATION, AND OTHER PROCEDURES THAT REQUIRE A SHARP SURGICAL BLADE TO PUNCTURE OR CUT.: Brand: BARD-PARKER ® STAINLESS STEEL BLADES

## (undated) DEVICE — SUTURE MCRYL SZ 3-0 L27IN ABSRB UD L19MM PS-2 3/8 CIR PRIM Y427H

## (undated) DEVICE — APPLICATOR MEDICATED 26 CC SOLUTION HI LT ORNG CHLORAPREP

## (undated) DEVICE — 3M™ TEGADERM™ TRANSPARENT FILM DRESSING FRAME STYLE, 1624W, 2-3/8 IN X 2-3/4 IN (6 CM X 7 CM), 100/CT 4CT/CASE: Brand: 3M™ TEGADERM™

## (undated) DEVICE — PAD,NON-ADHERENT,3X8,STERILE,LF,1/PK: Brand: MEDLINE

## (undated) DEVICE — GLOVE ORANGE PI 7   MSG9070

## (undated) DEVICE — JACKSON TABLE POSITIONER KIT: Brand: MEDLINE INDUSTRIES, INC.

## (undated) DEVICE — 3M™ IOBAN™ 2 ANTIMICROBIAL INCISE DRAPE 6650EZ: Brand: IOBAN™ 2

## (undated) DEVICE — GEL MEDC ULTRASOUND 5L -- REPLACED BY 326862

## (undated) DEVICE — Device

## (undated) DEVICE — GOWN,REINFORCED,POLY,AURORA,XXLARGE,STR: Brand: MEDLINE

## (undated) DEVICE — SHEET,DRAPE,53X77,STERILE: Brand: MEDLINE

## (undated) DEVICE — BONE BIOPSY DEVICE F05A BBD SIZE 3: Brand: MEDTRONIC REUSABLE INSTRUMENTS